# Patient Record
Sex: MALE | Race: WHITE | Employment: OTHER | URBAN - METROPOLITAN AREA
[De-identification: names, ages, dates, MRNs, and addresses within clinical notes are randomized per-mention and may not be internally consistent; named-entity substitution may affect disease eponyms.]

---

## 2017-08-10 ENCOUNTER — HOSPITAL ENCOUNTER (OUTPATIENT)
Dept: VASCULAR LAB | Age: 82
Discharge: HOME | End: 2017-08-10
Attending: FAMILY MEDICINE | Admitting: FAMILY MEDICINE

## 2017-08-10 DIAGNOSIS — I65.23 OCCLUSION AND STENOSIS OF BILATERAL CAROTID ARTERIES: ICD-10-CM

## 2018-09-17 ENCOUNTER — HOSPITAL ENCOUNTER (OUTPATIENT)
Age: 83
Discharge: HOME | End: 2018-09-17
Payer: MEDICARE

## 2018-09-17 LAB
FOLATE: 9.65 NG/ML (ref 4.78–24.2)
SEDIMENTATION RATE, ERYTHROCYTE: 4 MM/HR (ref 0–20)

## 2018-09-17 PROCEDURE — 86334 IMMUNOFIX E-PHORESIS SERUM: CPT

## 2018-09-17 PROCEDURE — 84165 PROTEIN E-PHORESIS SERUM: CPT

## 2018-09-17 PROCEDURE — 86780 TREPONEMA PALLIDUM: CPT

## 2018-09-17 PROCEDURE — 84155 ASSAY OF PROTEIN SERUM: CPT

## 2018-09-17 PROCEDURE — 85652 RBC SED RATE AUTOMATED: CPT

## 2018-09-17 PROCEDURE — 82746 ASSAY OF FOLIC ACID SERUM: CPT

## 2018-09-17 PROCEDURE — 36415 COLL VENOUS BLD VENIPUNCTURE: CPT

## 2018-09-17 PROCEDURE — 84425 ASSAY OF VITAMIN B-1: CPT

## 2018-09-18 LAB — TOTAL SYPHILLIS IGG/IGM: NORMAL

## 2018-09-19 LAB
ALBUMIN SERPL-MCNC: 3.8 G/DL (ref 3.1–4.9)
ALPHA-1-GLOBULIN: 0.3 G/DL (ref 0.2–0.4)
ALPHA-2-GLOBULIN: 0.8 G/DL (ref 0.4–1.1)
BETA GLOBULIN: 1 G/DL (ref 0.9–1.6)
GAMMA GLOBULIN: 1 G/DL (ref 0.6–1.8)
SPE/IFE INTERPRETATION: NORMAL
TOTAL PROTEIN: 6.8 G/DL (ref 6.4–8.2)

## 2018-09-22 LAB — VITAMIN B1 WHOLE BLOOD: 153 NMOL/L (ref 70–180)

## 2018-11-03 ENCOUNTER — HOSPITAL ENCOUNTER (EMERGENCY)
Age: 83
Discharge: HOME | End: 2018-11-03
Attending: EMERGENCY MEDICINE
Payer: MEDICARE

## 2018-11-03 ENCOUNTER — APPOINTMENT (OUTPATIENT)
Dept: GENERAL RADIOLOGY | Age: 83
End: 2018-11-03
Payer: MEDICARE

## 2018-11-03 VITALS
HEART RATE: 65 BPM | DIASTOLIC BLOOD PRESSURE: 67 MMHG | BODY MASS INDEX: 28.04 KG/M2 | RESPIRATION RATE: 16 BRPM | SYSTOLIC BLOOD PRESSURE: 162 MMHG | WEIGHT: 185 LBS | HEIGHT: 68 IN | OXYGEN SATURATION: 98 % | TEMPERATURE: 98 F

## 2018-11-03 DIAGNOSIS — M54.32 BILATERAL SCIATICA: ICD-10-CM

## 2018-11-03 DIAGNOSIS — M54.31 BILATERAL SCIATICA: ICD-10-CM

## 2018-11-03 DIAGNOSIS — R42 LIGHTHEADEDNESS: ICD-10-CM

## 2018-11-03 DIAGNOSIS — R20.2 NUMBNESS AND TINGLING OF BOTH FEET: Primary | ICD-10-CM

## 2018-11-03 DIAGNOSIS — R20.0 NUMBNESS AND TINGLING OF BOTH FEET: Primary | ICD-10-CM

## 2018-11-03 DIAGNOSIS — R20.2 PARESTHESIA: ICD-10-CM

## 2018-11-03 LAB
A/G RATIO: 1.7 (ref 1.1–2.2)
ALBUMIN SERPL-MCNC: 4.2 G/DL (ref 3.4–5)
ALP BLD-CCNC: 61 U/L (ref 40–129)
ALT SERPL-CCNC: 7 U/L (ref 10–40)
ANION GAP SERPL CALCULATED.3IONS-SCNC: 9 MMOL/L (ref 3–16)
AST SERPL-CCNC: 18 U/L (ref 15–37)
BASOPHILS ABSOLUTE: 0 K/UL (ref 0–0.2)
BASOPHILS RELATIVE PERCENT: 0.4 %
BILIRUB SERPL-MCNC: 1.1 MG/DL (ref 0–1)
BUN BLDV-MCNC: 12 MG/DL (ref 7–20)
CALCIUM SERPL-MCNC: 9.1 MG/DL (ref 8.3–10.6)
CHLORIDE BLD-SCNC: 101 MMOL/L (ref 99–110)
CO2: 30 MMOL/L (ref 21–32)
CREAT SERPL-MCNC: 1 MG/DL (ref 0.8–1.3)
EOSINOPHILS ABSOLUTE: 0.1 K/UL (ref 0–0.6)
EOSINOPHILS RELATIVE PERCENT: 0.8 %
GFR AFRICAN AMERICAN: >60
GFR NON-AFRICAN AMERICAN: >60
GLOBULIN: 2.5 G/DL
GLUCOSE BLD-MCNC: 90 MG/DL (ref 70–99)
HCT VFR BLD CALC: 44.8 % (ref 40.5–52.5)
HEMOGLOBIN: 15.3 G/DL (ref 13.5–17.5)
LYMPHOCYTES ABSOLUTE: 1.5 K/UL (ref 1–5.1)
LYMPHOCYTES RELATIVE PERCENT: 21.1 %
MAGNESIUM: 2.1 MG/DL (ref 1.8–2.4)
MCH RBC QN AUTO: 30.2 PG (ref 26–34)
MCHC RBC AUTO-ENTMCNC: 34.2 G/DL (ref 31–36)
MCV RBC AUTO: 88.3 FL (ref 80–100)
MONOCYTES ABSOLUTE: 0.6 K/UL (ref 0–1.3)
MONOCYTES RELATIVE PERCENT: 7.9 %
NEUTROPHILS ABSOLUTE: 5.1 K/UL (ref 1.7–7.7)
NEUTROPHILS RELATIVE PERCENT: 69.8 %
PDW BLD-RTO: 13.9 % (ref 12.4–15.4)
PLATELET # BLD: 148 K/UL (ref 135–450)
PMV BLD AUTO: 9.5 FL (ref 5–10.5)
POTASSIUM SERPL-SCNC: 3.9 MMOL/L (ref 3.5–5.1)
RBC # BLD: 5.07 M/UL (ref 4.2–5.9)
SODIUM BLD-SCNC: 140 MMOL/L (ref 136–145)
TOTAL PROTEIN: 6.7 G/DL (ref 6.4–8.2)
TROPONIN: <0.01 NG/ML
WBC # BLD: 7.3 K/UL (ref 4–11)

## 2018-11-03 PROCEDURE — 84484 ASSAY OF TROPONIN QUANT: CPT

## 2018-11-03 PROCEDURE — 93005 ELECTROCARDIOGRAM TRACING: CPT | Performed by: EMERGENCY MEDICINE

## 2018-11-03 PROCEDURE — 83735 ASSAY OF MAGNESIUM: CPT

## 2018-11-03 PROCEDURE — 99284 EMERGENCY DEPT VISIT MOD MDM: CPT

## 2018-11-03 PROCEDURE — 71046 X-RAY EXAM CHEST 2 VIEWS: CPT

## 2018-11-03 PROCEDURE — 85025 COMPLETE CBC W/AUTO DIFF WBC: CPT

## 2018-11-03 PROCEDURE — 80053 COMPREHEN METABOLIC PANEL: CPT

## 2018-11-03 ASSESSMENT — PAIN SCALES - GENERAL: PAINLEVEL_OUTOF10: 0

## 2018-11-03 ASSESSMENT — PAIN DESCRIPTION - LOCATION: LOCATION: CHEST

## 2018-11-03 NOTE — ED PROVIDER NOTES
Smokeless tobacco: Not on file    Alcohol use 0.6 oz/week     1 Cans of beer per week    Drug use: No    Sexual activity: Not on file     Other Topics Concern    Not on file     Social History Narrative    No narrative on file     No current facility-administered medications for this encounter. Current Outpatient Prescriptions   Medication Sig Dispense Refill    naproxen (NAPROSYN) 500 MG tablet Take 1 tablet by mouth 2 times daily 30 tablet 0    lidocaine (LIDODERM) 5 % Place 1 patch onto the skin daily 12 hours on, 12 hours off. 30 patch 0    vitamin B-12 (CYANOCOBALAMIN) 1000 MCG tablet Take 1,000 mcg by mouth daily.  clorazepate (TRANXENE) 7.5 MG tablet Take 3.75 mg by mouth 2 times daily. Lyndsay Von rosuvastatin (CRESTOR) 10 MG tablet Take 10 mg by mouth daily.  Omega 3 1000 MG CAPS Take  by mouth.  aspirin 81 MG tablet Take 81 mg by mouth daily. Allergies   Allergen Reactions    Buspar [Buspirone] Other (See Comments)     Face,face numb    Codeine Other (See Comments)     Face,lips numb    Ritalin [Methylphenidate Hcl] Other (See Comments)     Face. lips numb    Zoloft [Sertraline Hcl] Other (See Comments)     Numbness of lips       REVIEW OF SYSTEMS  10 systems reviewed, pertinent positives per HPI otherwise noted to be negative. PHYSICAL EXAM  BP (!) 183/91   Pulse 68   Temp 98 °F (36.7 °C)   Resp 16   Ht 5' 8\" (1.727 m)   Wt 185 lb (83.9 kg)   SpO2 97%   BMI 28.13 kg/m²   GENERAL APPEARANCE: Awake and alert. Cooperative. No acute distress. HEAD: Normocephalic. Atraumatic. EYES: PERRL. EOM's grossly intact. ENT: Mucous membranes are moist.   NECK: Supple. HEART: RRR. LUNGS: Respirations unlabored. CTAB. Good air exchange. Speaking comfortably in full sentences. BACK: No midline spinal tenderness or step-off. ABDOMEN: Soft. Non-distended. Non-tender. No guarding or rebound. Normal bowel sounds. EXTREMITIES: No peripheral edema.  Moves all extremities equally. All extremities neurovascularly intact. : Deferred  SKIN: Warm and dry. No acute rashes. NEUROLOGICAL: Alert and oriented. No gross facial drooping. Strength 5/5, sensation intact. Normal coordination. Gait is slow but steady. No direct assistance needed. PSYCHIATRIC: Normal mood and affect. LABS  I have reviewed all labs for this visit.    Results for orders placed or performed during the hospital encounter of 11/03/18   CBC Auto Differential   Result Value Ref Range    WBC 7.3 4.0 - 11.0 K/uL    RBC 5.07 4.20 - 5.90 M/uL    Hemoglobin 15.3 13.5 - 17.5 g/dL    Hematocrit 44.8 40.5 - 52.5 %    MCV 88.3 80.0 - 100.0 fL    MCH 30.2 26.0 - 34.0 pg    MCHC 34.2 31.0 - 36.0 g/dL    RDW 13.9 12.4 - 15.4 %    Platelets 805 643 - 760 K/uL    MPV 9.5 5.0 - 10.5 fL    Neutrophils % 69.8 %    Lymphocytes % 21.1 %    Monocytes % 7.9 %    Eosinophils % 0.8 %    Basophils % 0.4 %    Neutrophils # 5.1 1.7 - 7.7 K/uL    Lymphocytes # 1.5 1.0 - 5.1 K/uL    Monocytes # 0.6 0.0 - 1.3 K/uL    Eosinophils # 0.1 0.0 - 0.6 K/uL    Basophils # 0.0 0.0 - 0.2 K/uL   Comprehensive Metabolic Panel   Result Value Ref Range    Sodium 140 136 - 145 mmol/L    Potassium 3.9 3.5 - 5.1 mmol/L    Chloride 101 99 - 110 mmol/L    CO2 30 21 - 32 mmol/L    Anion Gap 9 3 - 16    Glucose 90 70 - 99 mg/dL    BUN 12 7 - 20 mg/dL    CREATININE 1.0 0.8 - 1.3 mg/dL    GFR Non-African American >60 >60    GFR African American >60 >60    Calcium 9.1 8.3 - 10.6 mg/dL    Total Protein 6.7 6.4 - 8.2 g/dL    Alb 4.2 3.4 - 5.0 g/dL    Albumin/Globulin Ratio 1.7 1.1 - 2.2    Total Bilirubin 1.1 (H) 0.0 - 1.0 mg/dL    Alkaline Phosphatase 61 40 - 129 U/L    ALT 7 (L) 10 - 40 U/L    AST 18 15 - 37 U/L    Globulin 2.5 g/dL   Troponin   Result Value Ref Range    Troponin <0.01 <0.01 ng/mL   Magnesium   Result Value Ref Range    Magnesium 2.10 1.80 - 2.40 mg/dL   EKG 12 Lead   Result Value Ref Range    Ventricular Rate 73 BPM    Atrial Rate 73 BPM    P-R Interval 176 ms    QRS Duration 90 ms    Q-T Interval 396 ms    QTc Calculation (Bazett) 436 ms    P Axis 89 degrees    R Axis -23 degrees    T Axis 21 degrees    Diagnosis       Sinus rhythm with occasional Premature ventricular complexesOtherwise normal ECGWhen compared with ECG of 26-JUN-2008 08:16,Premature ventricular complexes are now PresentConfirmed by JULIAN ROJAS MD ([id number]) on 11/4/2018 7:22:00 AM       EKG  EKG interpreted by me. Sinus rhythm with some PVCs, QTC is 436 of the  and QRS is 90. No significant ST elevation or depression. RADIOLOGY  X-RAYS:  I have reviewed radiologic plain film image(s). ALL OTHER NON-PLAIN FILM IMAGES SUCH AS CT, ULTRASOUND AND MRI HAVE BEEN READ BY THE RADIOLOGIST. XR CHEST STANDARD (2 VW)   Final Result   No acute process. MRI LUMBAR SPINE WO CONTRAST    (Results Pending)          ED COURSE/MDM  Patient seen and evaluated. Labs and imaging reviewed and results discussed with patient. This is a chronic issue that the patient has been having and I do believe he needs a reevaluation by his neurosurgeon. He said he can make an appointment and likely see him within the week. I also said the patient will likely need an MRI of his lumbar spine to reevaluate if there are any changes since the surgery over a year ago. He has a walker and a cane at home that he can use for ambulation. I do not think the patient is any concern here of cauda equina or any other significant back issues that require admission or emergent MRI or CT scan at this time. I also think that what the patient is describing to me with some of the pain down his leg sometimes that this may be due to sciatica, it is very likely the patient could have some changes since his surgery that require re-intervention but at the very least reevaluation by his neurosurgeon. Patient is an agreement with this plan. Plan of care discussed with patient.        I estimate there is LOW risk for ABDOMINAL AORTIC

## 2018-11-04 LAB
EKG ATRIAL RATE: 73 BPM
EKG DIAGNOSIS: NORMAL
EKG P AXIS: 89 DEGREES
EKG P-R INTERVAL: 176 MS
EKG Q-T INTERVAL: 396 MS
EKG QRS DURATION: 90 MS
EKG QTC CALCULATION (BAZETT): 436 MS
EKG R AXIS: -23 DEGREES
EKG T AXIS: 21 DEGREES
EKG VENTRICULAR RATE: 73 BPM

## 2018-11-04 PROCEDURE — 93010 ELECTROCARDIOGRAM REPORT: CPT | Performed by: INTERNAL MEDICINE

## 2018-11-09 ENCOUNTER — HOSPITAL ENCOUNTER (OUTPATIENT)
Dept: MRI IMAGING | Age: 83
Discharge: HOME | End: 2018-11-09
Payer: MEDICARE

## 2018-11-09 DIAGNOSIS — M48.062 SPINAL STENOSIS, LUMBAR REGION WITH NEUROGENIC CLAUDICATION: ICD-10-CM

## 2018-11-09 DIAGNOSIS — M54.31 BILATERAL SCIATICA: ICD-10-CM

## 2018-11-09 DIAGNOSIS — M54.32 BILATERAL SCIATICA: ICD-10-CM

## 2018-11-09 PROCEDURE — 6360000004 HC RX CONTRAST MEDICATION: Performed by: FAMILY MEDICINE

## 2018-11-09 PROCEDURE — 72158 MRI LUMBAR SPINE W/O & W/DYE: CPT

## 2018-11-09 PROCEDURE — A9579 GAD-BASE MR CONTRAST NOS,1ML: HCPCS | Performed by: FAMILY MEDICINE

## 2018-11-09 RX ADMIN — GADOTERIDOL 16 ML: 279.3 INJECTION, SOLUTION INTRAVENOUS at 07:08

## 2019-01-11 ENCOUNTER — HOSPITAL ENCOUNTER (OUTPATIENT)
Dept: VASCULAR LAB | Age: 84
Discharge: HOME | End: 2019-01-11
Payer: MEDICARE

## 2019-01-11 ENCOUNTER — HOSPITAL ENCOUNTER (OUTPATIENT)
Age: 84
Discharge: HOME | End: 2019-01-11
Payer: MEDICARE

## 2019-01-11 LAB
ALBUMIN SERPL-MCNC: 4.4 G/DL (ref 3.4–5)
ALP BLD-CCNC: 71 U/L (ref 40–129)
ALT SERPL-CCNC: 11 U/L (ref 10–40)
ANION GAP SERPL CALCULATED.3IONS-SCNC: 12 MMOL/L (ref 3–16)
AST SERPL-CCNC: 21 U/L (ref 15–37)
BASOPHILS ABSOLUTE: 0 K/UL (ref 0–0.2)
BASOPHILS RELATIVE PERCENT: 0.4 %
BILIRUB SERPL-MCNC: 1.9 MG/DL (ref 0–1)
BILIRUBIN DIRECT: 0.4 MG/DL (ref 0–0.3)
BILIRUBIN, INDIRECT: 1.5 MG/DL (ref 0–1)
BUN BLDV-MCNC: 12 MG/DL (ref 7–20)
CALCIUM SERPL-MCNC: 9.1 MG/DL (ref 8.3–10.6)
CHLORIDE BLD-SCNC: 104 MMOL/L (ref 99–110)
CHOLESTEROL, TOTAL: 114 MG/DL (ref 0–199)
CO2: 29 MMOL/L (ref 21–32)
CREAT SERPL-MCNC: 1 MG/DL (ref 0.8–1.3)
EOSINOPHILS ABSOLUTE: 0.1 K/UL (ref 0–0.6)
EOSINOPHILS RELATIVE PERCENT: 1.2 %
GFR AFRICAN AMERICAN: >60
GFR NON-AFRICAN AMERICAN: >60
GLUCOSE BLD-MCNC: 93 MG/DL (ref 70–99)
HCT VFR BLD CALC: 46.7 % (ref 40.5–52.5)
HDLC SERPL-MCNC: 52 MG/DL (ref 40–60)
HEMOGLOBIN: 15.4 G/DL (ref 13.5–17.5)
LDL CHOLESTEROL CALCULATED: 43 MG/DL
LYMPHOCYTES ABSOLUTE: 1.3 K/UL (ref 1–5.1)
LYMPHOCYTES RELATIVE PERCENT: 21.7 %
MCH RBC QN AUTO: 29.4 PG (ref 26–34)
MCHC RBC AUTO-ENTMCNC: 33 G/DL (ref 31–36)
MCV RBC AUTO: 89 FL (ref 80–100)
MONOCYTES ABSOLUTE: 0.5 K/UL (ref 0–1.3)
MONOCYTES RELATIVE PERCENT: 8.5 %
NEUTROPHILS ABSOLUTE: 4.1 K/UL (ref 1.7–7.7)
NEUTROPHILS RELATIVE PERCENT: 68.2 %
PDW BLD-RTO: 13.9 % (ref 12.4–15.4)
PLATELET # BLD: 123 K/UL (ref 135–450)
PMV BLD AUTO: 9.8 FL (ref 5–10.5)
POTASSIUM SERPL-SCNC: 4.6 MMOL/L (ref 3.5–5.1)
RBC # BLD: 5.25 M/UL (ref 4.2–5.9)
SODIUM BLD-SCNC: 145 MMOL/L (ref 136–145)
T4 FREE: 1.1 NG/DL (ref 0.9–1.8)
TOTAL PROTEIN: 6.8 G/DL (ref 6.4–8.2)
TRIGL SERPL-MCNC: 93 MG/DL (ref 0–150)
TSH SERPL DL<=0.05 MIU/L-ACNC: 3.67 UIU/ML (ref 0.27–4.2)
VITAMIN B-12: 770 PG/ML (ref 211–911)
VLDLC SERPL CALC-MCNC: 19 MG/DL
WBC # BLD: 6.1 K/UL (ref 4–11)

## 2019-01-11 PROCEDURE — 84439 ASSAY OF FREE THYROXINE: CPT

## 2019-01-11 PROCEDURE — 83036 HEMOGLOBIN GLYCOSYLATED A1C: CPT

## 2019-01-11 PROCEDURE — 80076 HEPATIC FUNCTION PANEL: CPT

## 2019-01-11 PROCEDURE — 85025 COMPLETE CBC W/AUTO DIFF WBC: CPT

## 2019-01-11 PROCEDURE — 80061 LIPID PANEL: CPT

## 2019-01-11 PROCEDURE — 93880 EXTRACRANIAL BILAT STUDY: CPT

## 2019-01-11 PROCEDURE — 84443 ASSAY THYROID STIM HORMONE: CPT

## 2019-01-11 PROCEDURE — 36415 COLL VENOUS BLD VENIPUNCTURE: CPT

## 2019-01-11 PROCEDURE — 80048 BASIC METABOLIC PNL TOTAL CA: CPT

## 2019-01-11 PROCEDURE — 82607 VITAMIN B-12: CPT

## 2019-01-12 LAB
ESTIMATED AVERAGE GLUCOSE: 114 MG/DL
HBA1C MFR BLD: 5.6 %

## 2019-02-13 ENCOUNTER — HOSPITAL ENCOUNTER (OUTPATIENT)
Age: 84
Discharge: HOME | End: 2019-02-13
Payer: MEDICARE

## 2019-02-13 LAB
ALBUMIN SERPL-MCNC: 4.4 G/DL (ref 3.4–5)
ALP BLD-CCNC: 71 U/L (ref 40–129)
ALT SERPL-CCNC: 8 U/L (ref 10–40)
AST SERPL-CCNC: 18 U/L (ref 15–37)
BILIRUB SERPL-MCNC: 1.6 MG/DL (ref 0–1)
BILIRUBIN DIRECT: 0.3 MG/DL (ref 0–0.3)
BILIRUBIN, INDIRECT: 1.3 MG/DL (ref 0–1)
TOTAL PROTEIN: 7 G/DL (ref 6.4–8.2)

## 2019-02-13 PROCEDURE — 36415 COLL VENOUS BLD VENIPUNCTURE: CPT

## 2019-02-13 PROCEDURE — 80076 HEPATIC FUNCTION PANEL: CPT

## 2019-07-23 ENCOUNTER — HOSPITAL ENCOUNTER (OUTPATIENT)
Age: 84
Discharge: HOME | End: 2019-07-23
Payer: MEDICARE

## 2019-07-23 LAB
ALBUMIN SERPL-MCNC: 4.3 G/DL (ref 3.4–5)
ALP BLD-CCNC: 71 U/L (ref 40–129)
ALT SERPL-CCNC: 7 U/L (ref 10–40)
ANION GAP SERPL CALCULATED.3IONS-SCNC: 8 MMOL/L (ref 3–16)
AST SERPL-CCNC: 18 U/L (ref 15–37)
BASOPHILS ABSOLUTE: 0 K/UL (ref 0–0.2)
BASOPHILS RELATIVE PERCENT: 0.2 %
BILIRUB SERPL-MCNC: 1.5 MG/DL (ref 0–1)
BILIRUBIN DIRECT: 0.3 MG/DL (ref 0–0.3)
BILIRUBIN, INDIRECT: 1.2 MG/DL (ref 0–1)
BUN BLDV-MCNC: 13 MG/DL (ref 7–20)
CALCIUM SERPL-MCNC: 9.7 MG/DL (ref 8.3–10.6)
CHLORIDE BLD-SCNC: 103 MMOL/L (ref 99–110)
CHOLESTEROL, TOTAL: 120 MG/DL (ref 0–199)
CO2: 30 MMOL/L (ref 21–32)
CREAT SERPL-MCNC: 1.2 MG/DL (ref 0.8–1.3)
EOSINOPHILS ABSOLUTE: 0.1 K/UL (ref 0–0.6)
EOSINOPHILS RELATIVE PERCENT: 1.5 %
GFR AFRICAN AMERICAN: >60
GFR NON-AFRICAN AMERICAN: 58
GLUCOSE BLD-MCNC: 135 MG/DL (ref 70–99)
HCT VFR BLD CALC: 44.7 % (ref 40.5–52.5)
HDLC SERPL-MCNC: 53 MG/DL (ref 40–60)
HEMOGLOBIN: 14.9 G/DL (ref 13.5–17.5)
LDL CHOLESTEROL CALCULATED: 42 MG/DL
LYMPHOCYTES ABSOLUTE: 1.2 K/UL (ref 1–5.1)
LYMPHOCYTES RELATIVE PERCENT: 18.9 %
MCH RBC QN AUTO: 29.7 PG (ref 26–34)
MCHC RBC AUTO-ENTMCNC: 33.4 G/DL (ref 31–36)
MCV RBC AUTO: 89 FL (ref 80–100)
MONOCYTES ABSOLUTE: 0.5 K/UL (ref 0–1.3)
MONOCYTES RELATIVE PERCENT: 8.2 %
NEUTROPHILS ABSOLUTE: 4.7 K/UL (ref 1.7–7.7)
NEUTROPHILS RELATIVE PERCENT: 71.2 %
PDW BLD-RTO: 14.3 % (ref 12.4–15.4)
PLATELET # BLD: 120 K/UL (ref 135–450)
PMV BLD AUTO: 10.6 FL (ref 5–10.5)
POTASSIUM SERPL-SCNC: 4.9 MMOL/L (ref 3.5–5.1)
RBC # BLD: 5.03 M/UL (ref 4.2–5.9)
SODIUM BLD-SCNC: 141 MMOL/L (ref 136–145)
TOTAL PROTEIN: 6.7 G/DL (ref 6.4–8.2)
TRIGL SERPL-MCNC: 126 MG/DL (ref 0–150)
VLDLC SERPL CALC-MCNC: 25 MG/DL
WBC # BLD: 6.5 K/UL (ref 4–11)

## 2019-07-23 PROCEDURE — 80061 LIPID PANEL: CPT

## 2019-07-23 PROCEDURE — 36415 COLL VENOUS BLD VENIPUNCTURE: CPT

## 2019-07-23 PROCEDURE — 80076 HEPATIC FUNCTION PANEL: CPT

## 2019-07-23 PROCEDURE — 85025 COMPLETE CBC W/AUTO DIFF WBC: CPT

## 2019-07-23 PROCEDURE — 80048 BASIC METABOLIC PNL TOTAL CA: CPT

## 2019-08-27 ENCOUNTER — HOSPITAL ENCOUNTER (OUTPATIENT)
Age: 84
Discharge: HOME | End: 2019-08-27
Payer: MEDICARE

## 2019-08-27 LAB
ALBUMIN SERPL-MCNC: 4.5 G/DL (ref 3.4–5)
ALP BLD-CCNC: 75 U/L (ref 40–129)
ALT SERPL-CCNC: 8 U/L (ref 10–40)
AST SERPL-CCNC: 19 U/L (ref 15–37)
BASOPHILS ABSOLUTE: 0 K/UL (ref 0–0.2)
BASOPHILS RELATIVE PERCENT: 0.4 %
BILIRUB SERPL-MCNC: 1.2 MG/DL (ref 0–1)
BILIRUBIN DIRECT: 0.3 MG/DL (ref 0–0.3)
BILIRUBIN, INDIRECT: 0.9 MG/DL (ref 0–1)
EOSINOPHILS ABSOLUTE: 0.1 K/UL (ref 0–0.6)
EOSINOPHILS RELATIVE PERCENT: 1 %
GLUCOSE FASTING: 114 MG/DL (ref 70–99)
HCT VFR BLD CALC: 47.5 % (ref 40.5–52.5)
HEMOGLOBIN: 15.7 G/DL (ref 13.5–17.5)
LYMPHOCYTES ABSOLUTE: 1.7 K/UL (ref 1–5.1)
LYMPHOCYTES RELATIVE PERCENT: 22.2 %
MCH RBC QN AUTO: 29.4 PG (ref 26–34)
MCHC RBC AUTO-ENTMCNC: 33.1 G/DL (ref 31–36)
MCV RBC AUTO: 88.8 FL (ref 80–100)
MONOCYTES ABSOLUTE: 0.7 K/UL (ref 0–1.3)
MONOCYTES RELATIVE PERCENT: 9 %
NEUTROPHILS ABSOLUTE: 5 K/UL (ref 1.7–7.7)
NEUTROPHILS RELATIVE PERCENT: 67.4 %
PDW BLD-RTO: 13.9 % (ref 12.4–15.4)
PLATELET # BLD: 152 K/UL (ref 135–450)
PMV BLD AUTO: 10.5 FL (ref 5–10.5)
RBC # BLD: 5.35 M/UL (ref 4.2–5.9)
TOTAL PROTEIN: 7.1 G/DL (ref 6.4–8.2)
WBC # BLD: 7.4 K/UL (ref 4–11)

## 2019-08-27 PROCEDURE — 85025 COMPLETE CBC W/AUTO DIFF WBC: CPT

## 2019-08-27 PROCEDURE — 80076 HEPATIC FUNCTION PANEL: CPT

## 2019-08-27 PROCEDURE — 82947 ASSAY GLUCOSE BLOOD QUANT: CPT

## 2019-08-27 PROCEDURE — 36415 COLL VENOUS BLD VENIPUNCTURE: CPT

## 2020-06-24 ENCOUNTER — HOSPITAL ENCOUNTER (INPATIENT)
Age: 85
LOS: 25 days | Discharge: NURSING, REHAB OR LONG-TERM CARE | DRG: 870 | End: 2020-07-20
Attending: EMERGENCY MEDICINE | Admitting: INTERNAL MEDICINE
Payer: MEDICARE

## 2020-06-24 ENCOUNTER — APPOINTMENT (OUTPATIENT)
Dept: GENERAL RADIOLOGY | Age: 85
DRG: 870 | End: 2020-06-24
Payer: MEDICARE

## 2020-06-24 LAB
A/G RATIO: 1.4 (ref 1.1–2.2)
ALBUMIN SERPL-MCNC: 3.9 G/DL (ref 3.4–5)
ALP BLD-CCNC: 68 U/L (ref 40–129)
ALT SERPL-CCNC: <5 U/L (ref 10–40)
AMORPHOUS: ABNORMAL /HPF
ANION GAP SERPL CALCULATED.3IONS-SCNC: 11 MMOL/L (ref 3–16)
AST SERPL-CCNC: 11 U/L (ref 15–37)
BACTERIA: ABNORMAL /HPF
BASOPHILS ABSOLUTE: 0.1 K/UL (ref 0–0.2)
BASOPHILS RELATIVE PERCENT: 0.6 %
BILIRUB SERPL-MCNC: 2.5 MG/DL (ref 0–1)
BILIRUBIN URINE: ABNORMAL
BLOOD, URINE: ABNORMAL
BUN BLDV-MCNC: 15 MG/DL (ref 7–20)
CALCIUM SERPL-MCNC: 8.5 MG/DL (ref 8.3–10.6)
CHLORIDE BLD-SCNC: 98 MMOL/L (ref 99–110)
CLARITY: ABNORMAL
CO2: 26 MMOL/L (ref 21–32)
COLOR: YELLOW
CREAT SERPL-MCNC: 1.2 MG/DL (ref 0.8–1.3)
D DIMER: 430 NG/ML DDU (ref 0–229)
EOSINOPHILS ABSOLUTE: 0 K/UL (ref 0–0.6)
EOSINOPHILS RELATIVE PERCENT: 0.1 %
EPITHELIAL CELLS, UA: ABNORMAL /HPF (ref 0–5)
GFR AFRICAN AMERICAN: >60
GFR NON-AFRICAN AMERICAN: 58
GLOBULIN: 2.8 G/DL
GLUCOSE BLD-MCNC: 121 MG/DL (ref 70–99)
GLUCOSE URINE: NEGATIVE MG/DL
HCT VFR BLD CALC: 42.1 % (ref 40.5–52.5)
HEMOGLOBIN: 14.1 G/DL (ref 13.5–17.5)
KETONES, URINE: 40 MG/DL
LEUKOCYTE ESTERASE, URINE: ABNORMAL
LYMPHOCYTES ABSOLUTE: 0.6 K/UL (ref 1–5.1)
LYMPHOCYTES RELATIVE PERCENT: 4.9 %
MCH RBC QN AUTO: 29.4 PG (ref 26–34)
MCHC RBC AUTO-ENTMCNC: 33.4 G/DL (ref 31–36)
MCV RBC AUTO: 88 FL (ref 80–100)
MICROSCOPIC EXAMINATION: YES
MONOCYTES ABSOLUTE: 0.9 K/UL (ref 0–1.3)
MONOCYTES RELATIVE PERCENT: 7.4 %
MUCUS: ABNORMAL /LPF
NEUTROPHILS ABSOLUTE: 10.6 K/UL (ref 1.7–7.7)
NEUTROPHILS RELATIVE PERCENT: 87 %
NITRITE, URINE: NEGATIVE
PDW BLD-RTO: 13.9 % (ref 12.4–15.4)
PH UA: 5.5 (ref 5–8)
PLATELET # BLD: 135 K/UL (ref 135–450)
PMV BLD AUTO: 9.1 FL (ref 5–10.5)
POTASSIUM REFLEX MAGNESIUM: 4.1 MMOL/L (ref 3.5–5.1)
PRO-BNP: 382 PG/ML (ref 0–449)
PROTEIN UA: 30 MG/DL
RBC # BLD: 4.78 M/UL (ref 4.2–5.9)
RBC UA: ABNORMAL /HPF (ref 0–4)
SODIUM BLD-SCNC: 135 MMOL/L (ref 136–145)
SPECIFIC GRAVITY UA: 1.02 (ref 1–1.03)
TOTAL PROTEIN: 6.7 G/DL (ref 6.4–8.2)
TROPONIN: <0.01 NG/ML
URINE REFLEX TO CULTURE: ABNORMAL
URINE TYPE: ABNORMAL
UROBILINOGEN, URINE: 2 E.U./DL
WBC # BLD: 12.2 K/UL (ref 4–11)
WBC UA: ABNORMAL /HPF (ref 0–5)

## 2020-06-24 PROCEDURE — 6360000002 HC RX W HCPCS

## 2020-06-24 PROCEDURE — 84484 ASSAY OF TROPONIN QUANT: CPT

## 2020-06-24 PROCEDURE — 93005 ELECTROCARDIOGRAM TRACING: CPT | Performed by: NURSE PRACTITIONER

## 2020-06-24 PROCEDURE — 87186 SC STD MICRODIL/AGAR DIL: CPT

## 2020-06-24 PROCEDURE — 81001 URINALYSIS AUTO W/SCOPE: CPT

## 2020-06-24 PROCEDURE — 85025 COMPLETE CBC W/AUTO DIFF WBC: CPT

## 2020-06-24 PROCEDURE — U0003 INFECTIOUS AGENT DETECTION BY NUCLEIC ACID (DNA OR RNA); SEVERE ACUTE RESPIRATORY SYNDROME CORONAVIRUS 2 (SARS-COV-2) (CORONAVIRUS DISEASE [COVID-19]), AMPLIFIED PROBE TECHNIQUE, MAKING USE OF HIGH THROUGHPUT TECHNOLOGIES AS DESCRIBED BY CMS-2020-01-R: HCPCS

## 2020-06-24 PROCEDURE — 83615 LACTATE (LD) (LDH) ENZYME: CPT

## 2020-06-24 PROCEDURE — 83880 ASSAY OF NATRIURETIC PEPTIDE: CPT

## 2020-06-24 PROCEDURE — 87086 URINE CULTURE/COLONY COUNT: CPT

## 2020-06-24 PROCEDURE — 71046 X-RAY EXAM CHEST 2 VIEWS: CPT

## 2020-06-24 PROCEDURE — 2500000003 HC RX 250 WO HCPCS

## 2020-06-24 PROCEDURE — 87077 CULTURE AEROBIC IDENTIFY: CPT

## 2020-06-24 PROCEDURE — 80053 COMPREHEN METABOLIC PANEL: CPT

## 2020-06-24 PROCEDURE — 85379 FIBRIN DEGRADATION QUANT: CPT

## 2020-06-24 PROCEDURE — 82728 ASSAY OF FERRITIN: CPT

## 2020-06-24 PROCEDURE — 99285 EMERGENCY DEPT VISIT HI MDM: CPT

## 2020-06-24 PROCEDURE — 2580000003 HC RX 258: Performed by: EMERGENCY MEDICINE

## 2020-06-24 RX ORDER — 0.9 % SODIUM CHLORIDE 0.9 %
1000 INTRAVENOUS SOLUTION INTRAVENOUS ONCE
Status: COMPLETED | OUTPATIENT
Start: 2020-06-24 | End: 2020-06-25

## 2020-06-24 RX ADMIN — SODIUM CHLORIDE 1000 ML: 9 INJECTION, SOLUTION INTRAVENOUS at 23:58

## 2020-06-24 ASSESSMENT — ENCOUNTER SYMPTOMS
ABDOMINAL PAIN: 0
SHORTNESS OF BREATH: 0
RHINORRHEA: 0
COLOR CHANGE: 0
SORE THROAT: 0

## 2020-06-24 NOTE — ED PROVIDER NOTES
[place] [address]        Pt Name: Amara Evans  MRN: [id number]  Birthdate 1935  Dateof evaluation: 6/24/2020  Provider: GRACIA Calvo - CNP  PCP: Jessica Badillo MD  ED Attending: No att. providers found    [facility]       Chief Complaint   Patient presents with    Fatigue     Pt c/o fatigue and low energy x 2 mths, however states over the past 2-3 days it has gotten sifnificantly worse. Temp of 101.7 at home today, 99.3 during triage. Pt states he did attend a [employer] 1wk ago, over 200 ppl present. HISTORY OF PRESENTILLNESS   (Location/Symptom, Timing/Onset, Context/Setting, Quality, Duration, Modifying Factors, Severity)  Note limiting factors. Amara Evans is a 82 y.o. male for fatigue. Onset was the last few months however over the past few days he has not been able to complete tasks he was normally able to complete due to becoming fatigued. He denies any chest pain. Duration has been nothing. Context includes nothing. Alleviating factors include nothing. Aggravating factors include nothing. Pain is 0/10. nothing has been used for pain today. Nursing Notes were all reviewed and agreed with or any disagreements were addressed  in the HPI. REVIEW OF SYSTEMS    (2-9 systems for level 4, 10 or more for level 5)     Review of Systems   Constitutional: Positive for fatigue. Negative for fever. HENT: Negative for congestion, rhinorrhea and sore throat. Respiratory: Negative for shortness of breath. Cardiovascular: Negative for chest pain. Gastrointestinal: Negative for abdominal pain. Genitourinary: Negative for decreased urine volume and difficulty urinating. Musculoskeletal: Negative for arthralgias and myalgias. Skin: Negative for color change and rash. Neurological: Negative for dizziness and light-headedness. Psychiatric/Behavioral: Negative for agitation.    All other systems occasional    Drug use: No    Sexual activity: Not Currently     Partners: Female   Lifestyle    Physical activity     Days per week: None     Minutes per session: None    Stress: None   Relationships    Social connections     Talks on phone: None     Gets together: None     Attends Taoist service: None     Active member of club or organization: None     Attends meetings of clubs or organizations: None     Relationship status: None    Intimate partner violence     Fear of current or ex partner: None     Emotionally abused: None     Physically abused: None     Forced sexual activity: None   Other Topics Concern    None   Social History Narrative    None       SCREENINGS    [place] Coma Scale  Eye Opening: Spontaneous  Best Verbal Response: Oriented  Best Motor Response: Obeys commands  [place] Coma Scale Score: 15        PHYSICAL EXAM  (up to 7 for level 4, 8 or more for level 5)     ED Triage Vitals [06/24/20 1943]   BP Temp Temp Source Pulse Resp SpO2 Height Weight   (!) 145/59 99.3 °F (37.4 °C) Oral 85 20 96 % 5' 7\" (1.702 m) 185 lb (83.9 kg)       Physical Exam  Constitutional:       Appearance: He is well-developed. HENT:      Head: Normocephalic and atraumatic. Neck:      Musculoskeletal: Normal range of motion. Cardiovascular:      Rate and Rhythm: Normal rate. Pulmonary:      Effort: Pulmonary effort is normal. No respiratory distress. Abdominal:      General: There is no distension. Palpations: Abdomen is soft. Tenderness: There is no abdominal tenderness. Musculoskeletal: Normal range of motion. Skin:     General: Skin is warm and dry. Neurological:      Mental Status: He is alert and oriented to person, place, and time.          DIAGNOSTIC RESULTS   LABS:    Labs Reviewed   CULTURE, URINE - Abnormal; Notable for the following components:       Result Value    Organism Staphylococcus epidermidis (*)     All other components within normal limits    Narrative:     ORDER#: [id number]                          ORDERED BY: Yinka Santana  SOURCE: Urine Clean Catch                  COLLECTED:  06/24/20 22:30  ANTIBIOTICS AT SUZY.:                      RECEIVED :  06/25/20 06:45  Performed at:  [facility]  [address] [place] [address] [place] [address]   Phone [phone]   CBC WITH AUTO DIFFERENTIAL - Abnormal; Notable for the following components:    WBC 12.2 (*)     Neutrophils Absolute 10.6 (*)     Lymphocytes Absolute 0.6 (*)     All other components within normal limits    Narrative:     Performed at:  [facility],  [place], [facility]   Phone [phone]   COMPREHENSIVE METABOLIC PANEL W/ REFLEX TO MG FOR LOW K - Abnormal; Notable for the following components:    Sodium 135 (*)     Chloride 98 (*)     Glucose 121 (*)     GFR Non- 58 (*)     Total Bilirubin 2.5 (*)     ALT <5 (*)     AST 11 (*)     All other components within normal limits    Narrative:     Performed at:  [facility] [address],  [employer] [place] [facility]   Phone [phone]   URINE RT REFLEX TO CULTURE - Abnormal; Notable for the following components:    Clarity, UA SL CLOUDY (*)     Bilirubin Urine MODERATE (*)     Ketones, Urine 40 (*)     Blood, Urine LARGE (*)     Protein, UA 30 (*)     Urobilinogen, Urine 2.0 (*)     Leukocyte Esterase, Urine TRACE (*)     All other components within normal limits    Narrative:     Performed at:  [facility]) - [facility],  [place], [facility]   Phone [phone] - Abnormal; Notable for the following components:    SARS-CoV-2, PCR DETECTED (*)     All other components within normal limits    Narrative:     [address] [facility]. [id number],  Microbiology results called to and read back by MEL Wright, 06/25/2020  22:11, by [facility]  Performed at:  [facility]  [address] [facility] [address]   Phone [phone]   MICROSCOPIC URINALYSIS - Abnormal; Notable for the following components:    Mucus, UA 4+ (*)     RBC, UA 21-50 (*)     Bacteria, UA 1+ (*)     All other components within normal limits    Narrative:     Performed at:  [facility] 75,  [place], [email or web address]   Phone [phone]   D-DIMER, QUANTITATIVE - Abnormal; Notable for the following components:    D-Dimer, Quant 430 (*)     All other components within normal limits    Narrative:     Performed at:  [facility] [address],  [place], [place] [facility]   Phone [phone]   COMPREHENSIVE METABOLIC PANEL W/ REFLEX TO MG FOR LOW K - Abnormal; Notable for the following components:    Glucose 104 (*)     Calcium 8.0 (*)     Total Protein 5.6 (*)     Alb 3.3 (*)     Total Bilirubin 1.9 (*)     ALT <5 (*)     AST 10 (*)     All other components within normal limits    Narrative:     Performed at:  [facility] [address],  [place], [place] [employer]   Phone [phone]   CBC - Abnormal; Notable for the following components:    Hemoglobin 12.8 (*)     Hematocrit 39.0 (*)     Platelets 147 (*)     All other components within normal limits    Narrative:     Performed at:  [facility] [address],  [place], [place] [employer]   Phone [phone]   COMPREHENSIVE METABOLIC PANEL W/ REFLEX TO MG FOR LOW K - Abnormal; Notable for the following components:    Sodium 133 (*)     Chloride 96 (*)     Glucose 103 (*)     Total Bilirubin 1.7 (*)     ALT <5 (*)     All other components within normal limits    Narrative:     Performed at:  [address] [facility] [address],  [place], [email or web address]   Phone [phone]   CBC WITH AUTO DIFFERENTIAL - Abnormal; Notable for the following components:    WBC 11.1 (*)     Neutrophils Absolute 9.4 (*)     Lymphocytes Absolute 0.9 (*)     All other components within normal limits    Narrative:     Performed at:  [facility]) - [facility],  [place], [facility]   Phone [phone]   D-DIMER, QUANTITATIVE - Abnormal; Notable for the following components:    D-Dimer, Quant 682 (*)     All other components within normal limits    Narrative:     Performed at:  [facility],  [place], [facility]   Phone [phone]   CULTURE, BLOOD 2    Narrative:     ORDER#: [id number]                          ORDERED BY: ANDREW MURPHY  SOURCE: Blood                              COLLECTED:  06/25/20 03:35  ANTIBIOTICS AT SUZY.:                      RECEIVED :  06/25/20 06:40  If child <=2 yrs old please draw pediatric bottle. ~Blood Culture #2  Performed at:  [facility]  [address] [facility] [place] [address]   Phone [phone]   CULTURE, BLOOD 1    Narrative:     ORDER#: [id number]                          ORDERED BY: ANDREW MURPHY  SOURCE: Blood                              COLLECTED:  06/25/20 03:35  ANTIBIOTICS AT SUZY.:                      RECEIVED :  06/25/20 06:40  If child <=2 yrs old please draw pediatric bottle. ~Blood Culture #1  Performed at:  [facility]  [address] [facility] [employer] [address]   Phone [phone]   CULTURE, RESPIRATORY   TROPONIN    Narrative:     Performed at:  [place] [address],  [place], [facility]   Phone [phone]   BRAIN NATRIURETIC PEPTIDE    Narrative:     Performed at:  [facility] [address],  [place], [facility]   Phone [phone]   LACTATE DEHYDROGENASE    Narrative:     Performed at:  [place] [address],  [place], [facility]   Phone [phone]   FERRITIN    Narrative: Performed at:  [facility]  [address] [facility] [address]   Phone [phone]   LACTATE, SEPSIS    Narrative:     Performed at:  [facility],  [place], [facility]   Phone [phone]   LACTATE, SEPSIS    Narrative:     Performed at:  [facility],  [place], [facility]   Phone [phone]   TROPONIN    Narrative:     Performed at:  [facility],  [place], [facility]   Phone [phone]   TROPONIN    Narrative:     Performed at:  [facility]) [facility] [address],  [place], [facility]   Phone [phone]       All other labs werewithin normal range or not returned as of this dictation. EKG: All EKG's are interpreted by the Emergency Department Physician who either signs or Co-signs this chart in the absence of acardiologist.  Please see their note for interpretation of EKG. RADIOLOGY:     Chest x-ray interpreted by radiologist for no radiographic evidence of acute cardiopulmonary disease. Interpretation per the Radiologist below, if available at the time of this note:    CT CHEST PULMONARY EMBOLISM W CONTRAST   Final Result   1. No pulmonary embolism. 2. Multifocal pneumonia scattered throughout both lungs with minimal pleural   effusions. XR CHEST STANDARD (2 VW)   Final Result   No radiographic evidence of acute pulmonary disease. No results found.       PROCEDURES   Unless otherwise noted below, none     Procedures     CRITICAL CARE TIME   N/A    CONSULTS:  IP CONSULT TO HOSPITALIST  IP CONSULT TO CARDIOLOGY  IP CONSULT TO PULMONOLOGY      EMERGENCYDEPARTMENT COURSE and DIFFERENTIAL DIAGNOSIS/MDM:   Vitals:    Vitals:    06/26/20 1624 06/26/20 1726 06/26/20 1800 06/26/20 1830   BP:       Pulse:       Resp:       Temp: 101.8 °F (38.8 °C) 101.5 °F (38.6 °C) 100.5 °F (38.1 °C) 99 °F (37.2 °C)   TempSrc:  Oral  Oral   SpO2:       Weight:       Height:           Patient was given the following medications:  Medications   cefTRIAXone (ROCEPHIN) 1 g IVPB in 50 mL D5W minibag (0 g Intravenous Stopped 6/26/20 0304)   rosuvastatin (CRESTOR) tablet 10 mg (10 mg Oral Given 6/26/20 0837)   aspirin chewable tablet 81 mg (81 mg Oral Given 6/26/20 0837)   sodium chloride flush 0.9 % injection 10 mL (10 mLs Intravenous Given 6/26/20 0837)   sodium chloride flush 0.9 % injection 10 mL (has no administration in time range)   acetaminophen (TYLENOL) tablet 650 mg (650 mg Oral Given 6/26/20 1529)     Or   acetaminophen (TYLENOL) suppository 650 mg ( Rectal See Alternative 6/26/20 1529)   enoxaparin (LOVENOX) injection 40 mg (40 mg Subcutaneous Given 6/26/20 0837)   melatonin tablet 3 mg (3 mg Oral Given 6/26/20 0157)   aluminum & magnesium hydroxide-simethicone (MAALOX) 200-200-20 MG/5ML suspension 30 mL (has no administration in time range)   pantoprazole (PROTONIX) tablet 40 mg (40 mg Oral Given 6/26/20 0952)   ondansetron (ZOFRAN) injection 4 mg (4 mg Intravenous Given 6/26/20 0952)   azithromycin (ZITHROMAX) tablet 500 mg (has no administration in time range)   dexamethasone (DECADRON) injection 6 mg (6 mg Intravenous Given 6/26/20 1816)   0.9 % sodium chloride bolus (0 mLs Intravenous Stopped 6/25/20 0647)   iopamidol (ISOVUE-370) 76 % injection 85 mL (85 mLs Intravenous Given 6/25/20 0134)       Patient was seen and evaluated by myself and Deanna Smith. Patient here today for increased fatigue. Patient reports that over the last few months he has been having fatigue. However he states over the last few days his fatigue is become worse. Patient reports that he has no endurance. He also reports that he had a fever of 101.7 earlier today. He denies any chest pain, shortness of breath, or abdominal pain.   On exam he is a pleasant awake and alert

## 2020-06-24 NOTE — LETTER
Beneficiary Notification Letter  BPCI Advanced     Your Doctor or Juanita,    We wanted to let you know that your health care provider, [facility], has volunteered to take part in our [facility] for [place] & Medicaid Services (CMS) Bundled Payments for [address] [facility] (BPCI Advanced). This doesnt change your Medicare rights or benefits and you dont need to do anything. What are bundled payments? A bundled payment combines, or bundles together, payments that Medicare makes to your health care providers for the many different kinds of medical services you might get in a specific time period. In BPCI Advanced, this time period could include a hospital  inpatient stay or outpatient procedure, plus 90 days. Why would Medicare bundle payments? Bundled payments are thought of as a value-based way to pay because health care  providers are responsible for both the quality and cost of medical care they give. This is  a relatively new way of paying health care providers compared to thefee-for-service  way Medicare has traditionally paid, where providers are paid separately for each  service they provide. Bundled payments encourage these providers to work together to  provide better, more coordinated care during your hospital stay, or outpatient procedure,  and through your recovery. What does BPCI Advance mean for me? Youre more likely to get even better care when hospitals, doctors, and other health care  providers work together. In BPCI Advanced, hospitals, doctors, and other health care  providers may be rewarded for providing better, more coordinated health care. Medicare  will watch BPCI Advanced participants closely to make sure that you and other patients  keep getting efficient, high quality care. What do I need to know about BPCI Advanced? Whats most important for you to know is that your Medicare rights and benefits wont  change because your health care provider is participating in [address] [place]. Medicare  will keep covering all of your medically necessary services. Even though Medicare will pay your doctor in a different way under BPCI Advanced,  how much you have to pay wont change. Health care providers and suppliers who  are enrolled in Medicare will submit their Medicare claims like they always have. Youll have all the same Medicare rights and protections, including the right to  choose which hospital, doctor, or other health care provider you see. If you dont want to  get care from a health care provider whos participating in [address] [place], then youll  have to choose a different health care provider whos not participating in the Model. How can I give feedback about my health care? Medicare might ask you to take a voluntary survey about the services and care you  received from [facility] during your hospital stay or outpatient procedure and for a specific period of time afterwards. You can decide whether you want to take the voluntary survey, but if you do, itll help Medicare make BPCI Advanced and the care of other Medicare patients better. If you have concerns or complaints about your care, you can:   · Talk to your doctor or health care provider. · Contact your Beneficiary and Family Centered Care Quality Improvement   Organization [employer] [employer] [facility]). You can get your BFCC-QIOs phone number at   Medicare.gov/contacts or by calling 1-800-MEDICARE. TTY users can call   7-471.572.4875. Where can I learn more about BPCI Advanced? Learn more about BPCI Advanced at https://innovation.cms.gov/initiatives/bpci-advanced/:  · A list of all the hospitals and physician group practices in the country participating   in [address] [place].   · All of the inpatient and outpatient Clinical Episodes that are currently included   under BPCI Advanced. A Clinical Episode is a grouping of medical conditions or   diagnoses that are included in the [address] [facility].

## 2020-06-25 ENCOUNTER — APPOINTMENT (OUTPATIENT)
Dept: CT IMAGING | Age: 85
DRG: 870 | End: 2020-06-25
Payer: MEDICARE

## 2020-06-25 PROBLEM — J18.9 MULTIFOCAL PNEUMONIA: Status: ACTIVE | Noted: 2020-06-25

## 2020-06-25 LAB
A/G RATIO: 1.4 (ref 1.1–2.2)
ALBUMIN SERPL-MCNC: 3.3 G/DL (ref 3.4–5)
ALP BLD-CCNC: 58 U/L (ref 40–129)
ALT SERPL-CCNC: <5 U/L (ref 10–40)
ANION GAP SERPL CALCULATED.3IONS-SCNC: 11 MMOL/L (ref 3–16)
AST SERPL-CCNC: 10 U/L (ref 15–37)
BILIRUB SERPL-MCNC: 1.9 MG/DL (ref 0–1)
BUN BLDV-MCNC: 14 MG/DL (ref 7–20)
CALCIUM SERPL-MCNC: 8 MG/DL (ref 8.3–10.6)
CHLORIDE BLD-SCNC: 102 MMOL/L (ref 99–110)
CO2: 23 MMOL/L (ref 21–32)
CREAT SERPL-MCNC: 1 MG/DL (ref 0.8–1.3)
EKG ATRIAL RATE: 80 BPM
EKG ATRIAL RATE: 85 BPM
EKG DIAGNOSIS: NORMAL
EKG DIAGNOSIS: NORMAL
EKG P AXIS: 41 DEGREES
EKG P AXIS: 90 DEGREES
EKG P-R INTERVAL: 182 MS
EKG P-R INTERVAL: 182 MS
EKG Q-T INTERVAL: 360 MS
EKG Q-T INTERVAL: 376 MS
EKG QRS DURATION: 96 MS
EKG QRS DURATION: 98 MS
EKG QTC CALCULATION (BAZETT): 415 MS
EKG QTC CALCULATION (BAZETT): 447 MS
EKG R AXIS: -13 DEGREES
EKG R AXIS: -26 DEGREES
EKG T AXIS: 42 DEGREES
EKG T AXIS: 70 DEGREES
EKG VENTRICULAR RATE: 80 BPM
EKG VENTRICULAR RATE: 85 BPM
FERRITIN: 97.3 NG/ML (ref 30–400)
GFR AFRICAN AMERICAN: >60
GFR NON-AFRICAN AMERICAN: >60
GLOBULIN: 2.3 G/DL
GLUCOSE BLD-MCNC: 104 MG/DL (ref 70–99)
HCT VFR BLD CALC: 39 % (ref 40.5–52.5)
HEMOGLOBIN: 12.8 G/DL (ref 13.5–17.5)
LACTATE DEHYDROGENASE: 170 U/L (ref 100–190)
LACTIC ACID, SEPSIS: 0.9 MMOL/L (ref 0.4–1.9)
LACTIC ACID, SEPSIS: 0.9 MMOL/L (ref 0.4–1.9)
MCH RBC QN AUTO: 29 PG (ref 26–34)
MCHC RBC AUTO-ENTMCNC: 32.8 G/DL (ref 31–36)
MCV RBC AUTO: 88.5 FL (ref 80–100)
PDW BLD-RTO: 14.1 % (ref 12.4–15.4)
PLATELET # BLD: 122 K/UL (ref 135–450)
PMV BLD AUTO: 9.9 FL (ref 5–10.5)
POTASSIUM REFLEX MAGNESIUM: 3.8 MMOL/L (ref 3.5–5.1)
RBC # BLD: 4.41 M/UL (ref 4.2–5.9)
SARS-COV-2, PCR: DETECTED
SODIUM BLD-SCNC: 136 MMOL/L (ref 136–145)
TOTAL PROTEIN: 5.6 G/DL (ref 6.4–8.2)
TROPONIN: <0.01 NG/ML
TROPONIN: <0.01 NG/ML
WBC # BLD: 9.7 K/UL (ref 4–11)

## 2020-06-25 PROCEDURE — 6360000002 HC RX W HCPCS: Performed by: INTERNAL MEDICINE

## 2020-06-25 PROCEDURE — 93005 ELECTROCARDIOGRAM TRACING: CPT | Performed by: EMERGENCY MEDICINE

## 2020-06-25 PROCEDURE — 93010 ELECTROCARDIOGRAM REPORT: CPT | Performed by: INTERNAL MEDICINE

## 2020-06-25 PROCEDURE — 36415 COLL VENOUS BLD VENIPUNCTURE: CPT

## 2020-06-25 PROCEDURE — 2580000003 HC RX 258: Performed by: INTERNAL MEDICINE

## 2020-06-25 PROCEDURE — 80053 COMPREHEN METABOLIC PANEL: CPT

## 2020-06-25 PROCEDURE — 99222 1ST HOSP IP/OBS MODERATE 55: CPT | Performed by: INTERNAL MEDICINE

## 2020-06-25 PROCEDURE — 87040 BLOOD CULTURE FOR BACTERIA: CPT

## 2020-06-25 PROCEDURE — 85027 COMPLETE CBC AUTOMATED: CPT

## 2020-06-25 PROCEDURE — 6370000000 HC RX 637 (ALT 250 FOR IP): Performed by: INTERNAL MEDICINE

## 2020-06-25 PROCEDURE — 71260 CT THORAX DX C+: CPT

## 2020-06-25 PROCEDURE — 84484 ASSAY OF TROPONIN QUANT: CPT

## 2020-06-25 PROCEDURE — 83605 ASSAY OF LACTIC ACID: CPT

## 2020-06-25 PROCEDURE — 6360000004 HC RX CONTRAST MEDICATION: Performed by: EMERGENCY MEDICINE

## 2020-06-25 PROCEDURE — 1200000000 HC SEMI PRIVATE

## 2020-06-25 RX ORDER — ACETAMINOPHEN 650 MG/1
650 SUPPOSITORY RECTAL EVERY 6 HOURS PRN
Status: DISCONTINUED | OUTPATIENT
Start: 2020-06-25 | End: 2020-07-20 | Stop reason: HOSPADM

## 2020-06-25 RX ORDER — ASPIRIN 81 MG/1
81 TABLET, CHEWABLE ORAL DAILY
Status: DISCONTINUED | OUTPATIENT
Start: 2020-06-25 | End: 2020-07-20 | Stop reason: HOSPADM

## 2020-06-25 RX ORDER — ACETAMINOPHEN 325 MG/1
650 TABLET ORAL EVERY 6 HOURS PRN
Status: DISCONTINUED | OUTPATIENT
Start: 2020-06-25 | End: 2020-07-20 | Stop reason: HOSPADM

## 2020-06-25 RX ORDER — SODIUM CHLORIDE 9 MG/ML
INJECTION, SOLUTION INTRAVENOUS CONTINUOUS
Status: DISCONTINUED | OUTPATIENT
Start: 2020-06-25 | End: 2020-06-25

## 2020-06-25 RX ORDER — SODIUM CHLORIDE 0.9 % (FLUSH) 0.9 %
10 SYRINGE (ML) INJECTION EVERY 12 HOURS SCHEDULED
Status: DISCONTINUED | OUTPATIENT
Start: 2020-06-25 | End: 2020-07-20 | Stop reason: HOSPADM

## 2020-06-25 RX ORDER — ROSUVASTATIN CALCIUM 10 MG/1
10 TABLET, COATED ORAL DAILY
Status: DISCONTINUED | OUTPATIENT
Start: 2020-06-25 | End: 2020-07-20 | Stop reason: HOSPADM

## 2020-06-25 RX ORDER — CEFTRIAXONE SODIUM 250 MG/1
250 INJECTION, POWDER, FOR SOLUTION INTRAMUSCULAR; INTRAVENOUS ONCE
Status: DISCONTINUED | OUTPATIENT
Start: 2020-06-25 | End: 2020-06-25

## 2020-06-25 RX ORDER — AZITHROMYCIN 250 MG/1
1000 TABLET, FILM COATED ORAL ONCE
Status: DISCONTINUED | OUTPATIENT
Start: 2020-06-25 | End: 2020-06-25

## 2020-06-25 RX ORDER — SODIUM CHLORIDE 0.9 % (FLUSH) 0.9 %
10 SYRINGE (ML) INJECTION PRN
Status: DISCONTINUED | OUTPATIENT
Start: 2020-06-25 | End: 2020-07-20 | Stop reason: HOSPADM

## 2020-06-25 RX ADMIN — ACETAMINOPHEN 650 MG: 325 TABLET ORAL at 21:03

## 2020-06-25 RX ADMIN — ROSUVASTATIN CALCIUM 10 MG: 10 TABLET, FILM COATED ORAL at 09:01

## 2020-06-25 RX ADMIN — IOPAMIDOL 85 ML: 755 INJECTION, SOLUTION INTRAVENOUS at 01:34

## 2020-06-25 RX ADMIN — SODIUM CHLORIDE: 9 INJECTION, SOLUTION INTRAVENOUS at 06:53

## 2020-06-25 RX ADMIN — ACETAMINOPHEN 650 MG: 325 TABLET ORAL at 04:53

## 2020-06-25 RX ADMIN — CEFTRIAXONE SODIUM 1 G: 1 INJECTION, POWDER, FOR SOLUTION INTRAMUSCULAR; INTRAVENOUS at 03:34

## 2020-06-25 RX ADMIN — ASPIRIN 81 MG 81 MG: 81 TABLET ORAL at 09:01

## 2020-06-25 RX ADMIN — ACETAMINOPHEN 650 MG: 325 TABLET ORAL at 15:23

## 2020-06-25 RX ADMIN — ENOXAPARIN SODIUM 40 MG: 40 INJECTION SUBCUTANEOUS at 09:02

## 2020-06-25 RX ADMIN — Medication 10 ML: at 19:54

## 2020-06-25 RX ADMIN — Medication 10 ML: at 09:02

## 2020-06-25 RX ADMIN — DEXTROSE MONOHYDRATE 500 MG: 50 INJECTION, SOLUTION INTRAVENOUS at 03:47

## 2020-06-25 ASSESSMENT — PAIN SCALES - GENERAL
PAINLEVEL_OUTOF10: 0

## 2020-06-25 NOTE — PROGRESS NOTES
Consult has been called to Dr. Marysol Hercules on 6/25/20. Spoke with dimple.  9:01 AM    Yovani Wagner  6/25/2020

## 2020-06-25 NOTE — PROGRESS NOTES
Brief progress note as patient was seen by nocturnist this morning. 82year old male presented with c/o dyspnea on exertion, fatigue, light-headedness, and fever. He gets light-headed when he stands too fast.      + low grade temp, leukocytosis. UTI, CT with multifocal pneumonia. Admitted to med-surg for pneumonia, UTI, and COVID rule out. Sepsis  - + leukocytosis, low grade fever)  - POA due to pneumonia, UTI  - blood, urine, sputum cx pending. Multi-focal pneumonia  - Treat with Rocephin, Zithromax D#1  - pulmonology consulted. UTI  - Rocephin D#1  - urine cx pending. Fatigue/light-headedness  - positive orthostatics in the ED  - cardiology consulted  - monitor on tele. Serial troponin. Initial is negative. - receiving IVF's. - repeat orthostatic vitals. Leukocytosis  - likely due to above  - resolved on repeat. Elevated D-dimer  - CT negative for PE    Hyperlipidemia  - on statin.      DVT Prophylaxis: Lovenox  Low fat diet  Code status: Full Code    Larsonchevy Griderruth FNP-C  6/25/2020

## 2020-06-25 NOTE — CONSULTS
Patient is being seen at the request of Dr. Debra Vazquez for a consultation for near syncope, pneumonia, R/O COVID    HISTORY OF PRESENT ILLNESS: 82-year-old male with minimal medical history who presented with a 2-month history of fatigue and lightheadedness, orthostasis. The symptoms worsened over the last 2 days and so he measured his temperature which was 101.7 at home. Without treatment, his temperature improved to 99.5 in the ER. He reported that he attended a corn hole tournament with several 100 people 1 week prior. PAST MEDICAL HISTORY:  Past Medical History:   Diagnosis Date    Arthritis     B12 deficiency 05/2014    Hyperlipidemia      PAST SURGICAL HISTORY:  Past Surgical History:   Procedure Laterality Date    BACK SURGERY      CAROTID ENDARTERECTOMY Left     COLONOSCOPY      UPPER GASTROINTESTINAL ENDOSCOPY  07/12/14    Minimal chronic gastritis       FAMILY HISTORY:  No known early lung disease    SOCIAL HISTORY:   reports that he has quit smoking. His smoking use included cigarettes. He started smoking about 29 years ago. He has a 25.00 pack-year smoking history. He has never used smokeless tobacco.    Scheduled Meds:   cefTRIAXone (ROCEPHIN) IV  1 g Intravenous Q24H    azithromycin  500 mg Intravenous Q24H    rosuvastatin  10 mg Oral Daily    aspirin  81 mg Oral Daily    sodium chloride flush  10 mL Intravenous 2 times per day    enoxaparin  40 mg Subcutaneous Daily     Continuous Infusions:   sodium chloride 150 mL/hr at 06/25/20 0653     PRN Meds:  sodium chloride flush, acetaminophen **OR** acetaminophen    ALLERGIES:  Patient is allergic to buspar [buspirone]; codeine; ritalin [methylphenidate hcl]; and zoloft [sertraline hcl].     REVIEW OF SYSTEMS:  Constitutional: + for fever  HENT: Negative for sore throat  Eyes: Negative for redness   Respiratory: Negative for dyspnea, cough  Cardiovascular: Negative for chest pain  Gastrointestinal: Negative for vomiting, diarrhea Genitourinary: Negative for hematuria   Musculoskeletal: Negative for arthralgias   Skin: Negative for rash  Neurological: + syncope  Hematological: Negative for adenopathy  Psychiatric/Behavorial: Negative for anxiety    PHYSICAL EXAM:  Blood pressure (!) 123/59, pulse 71, temperature 98.5 °F (36.9 °C), temperature source Oral, resp. rate 18, height 5' 7\" (1.702 m), weight 185 lb (83.9 kg), SpO2 95 %.' on RA  Gen: No distress. Eyes: PERRL. No sclera icterus. No conjunctival injection. ENT: No discharge. Pharynx clear. Neck: Trachea midline. No obvious mass. Resp: No accessory muscle use. No crackles. No wheezes. No rhonchi. No dullness on percussion. CV: Regular rate. Regular rhythm. No murmur or rub. No edema. Peripheral pulses are 2+. Capillary refill is less than 3 seconds. GI: Non-tender. Non-distended. No hernia. Skin: Warm and dry. No nodule on exposed extremities. Lymph: No cervical LAD. No supraclavicular LAD. M/S: No cyanosis. No joint deformity. No clubbing. Neuro: Awake. Alert. Moves all four extremities. Psych: Oriented x 3. No anxiety. LABS:  CBC:   Recent Labs     06/24/20 2135 06/25/20  0632   WBC 12.2* 9.7   HGB 14.1 12.8*   HCT 42.1 39.0*   MCV 88.0 88.5    122*     BMP:   Recent Labs     06/24/20 2135 06/25/20  0632   * 136   K 4.1 3.8   CL 98* 102   CO2 26 23   BUN 15 14   CREATININE 1.2 1.0     LIVER PROFILE:   Recent Labs     06/24/20 2135 06/25/20  0632   AST 11* 10*   ALT <5* <5*   BILITOT 2.5* 1.9*   ALKPHOS 68 58     PT/INR: No results for input(s): PROTIME, INR in the last 72 hours. APTT: No results for input(s): APTT in the last 72 hours.   UA:  Recent Labs     06/24/20  2230   COLORU Yellow   PHUR 5.5   WBCUA 3-5   RBCUA 21-50*   MUCUS 4+*   BACTERIA 1+*   CLARITYU SL CLOUDY*   SPECGRAV 1.025   LEUKOCYTESUR TRACE*   UROBILINOGEN 2.0*   BILIRUBINUR MODERATE*   BLOODU LARGE*   GLUCOSEU Negative   AMORPHOUS 3+     No results for input(s): PHART,

## 2020-06-25 NOTE — PROGRESS NOTES
4 Eyes Skin Assessment     The patient is being assess for   Admission    I agree that 2 RN's have performed a thorough Head to Toe Skin Assessment on the patient. ALL assessment sites listed below have been assessed. Areas assessed by both nurses:   [x]   Head, Face, and Ears   [x]   Shoulders, Back, and Chest, Abdomen  [x]   Arms, Elbows, and Hands   [x]   Coccyx, Sacrum, and Ischium  [x]   Legs, Feet, and Heels        Scattered bruising. **SHARE this note so that the co-signing nurse is able to place an eSignature**    Co-signer eSignature: Electronically signed by Eligio Cabrera RN on 6/25/20 at 6:50 AM EDT    Does the Patient have Skin Breakdown?   No          Cliff Prevention initiated:  NA   Wound Care Orders initiated:  NA      [facility] nurse consulted for Pressure Injury (Stage 3,4, Unstageable, DTI, NWPT, Complex wounds)and New or Established Ostomies:  NA      Primary Nurse eSignature: Electronically signed by Phuc Butler RN on 6/25/20 at 7:39 AM EDT

## 2020-06-25 NOTE — ED PROVIDER NOTES
Emergency Department Provider Note     Location: [place] [address] ED  6/24/2020    I independently performed a history and physical on Raheem Martinez. All diagnostic, treatment, and disposition decisions were made by myself in conjunction with the mid-level provider. Briefly, this is a 82 y.o. male here for shortness of breath, worse with exertion, fatigue, \"I am running out of gas a lot faster than I had been before\", he is a little lightheaded, especially with standing up too fast, has been notable for the past 2 to 3 months, but the past few days have been significantly worse, also had a fever today, but no significant cough with productive sputum, no urinary symptoms, when asked specifically, he states when he gets short of breath like he cannot catch his breath he may feel a little pressure within his chest, no known cardiac history, no history of DVT or PE, no leg swelling. Did not take anything for fever today, he does not take medications other than vitamins/supplements and his anti-lipidemic medicine, he is normally very active, was around a large group for people last weekend but no known sick contacts, greater than 200 teams for corn hole tournament, reported he was wearing a mask    ED Triage Vitals [06/24/20 1943]   BP Temp Temp Source Pulse Resp SpO2 Height Weight   (!) 145/59 99.3 °F (37.4 °C) Oral 85 20 96 % 5' 7\" (1.702 m) 185 lb (83.9 kg)      Exam:  Appears well for stated age, no acute distress, A&Ox4, heart occasionally irregular, rate in the 80s, no M/G/R, lungs CTAB, resp. Nonlabored, no abd tenderness, no peritoneal signs/no rebound/no guarding, no lower extremity edema or calf tenderness to palpation    Patient seen and examined. EKG  The Ekg interpreted by me in the absence of a cardiologist shows.   Sinus rhythm with PACs, rate 85, QTc 447, slight left axis deviation, no ST segment or T wave changes indicative of acute ischemia  Xr Chest Standard (2 Vw)    Result Date: 6/24/2020  EXAMINATION: TWO XRAY VIEWS OF THE CHEST 6/24/2020 8:13 pm COMPARISON: Chest x-ray dated 11/03/2018. HISTORY: ORDERING SYSTEM PROVIDED HISTORY: fatigue TECHNOLOGIST PROVIDED HISTORY: Reason for exam:->fatigue Reason for Exam: Fatigue (Pt c/o fatigue and low energy x 2 mths, however states over the past 2-3 days it has gotten sifnificantly worse. Temp of 101.7 at home today, 99.3 during triage. Pt states he did attend a [employer] tournament 1wk ago, over 200 ppl present. ) Acuity: Acute Type of Exam: Initial FINDINGS: HEART/MEDIASTINUM: The cardiomediastinal silhouette is within normal limits. PLEURA/LUNGS: There are no focal consolidations or pleural effusions. There is no appreciable pneumothorax. BONES/SOFT TISSUE: No acute abnormality. No radiographic evidence of acute pulmonary disease. Ct Chest Pulmonary Embolism W Contrast    Result Date: 6/25/2020  EXAMINATION: CTA OF THE CHEST 6/25/2020 1:33 am TECHNIQUE: CTA of the chest was performed after the administration of 85 mL Isovue 370 intravenous contrast.  Multiplanar reformatted images are provided for review. MIP images are provided for review. Dose modulation, iterative reconstruction, and/or weight based adjustment of the mA/kV was utilized to reduce the radiation dose to as low as reasonably achievable. COMPARISON: Correlation with concurrent chest radiograph HISTORY: Acute shortness of breath with borderline hypoxia and elevated D-dimer. FINDINGS: Image quality degraded by motion artifact. Pulmonary Arteries: Pulmonary arteries are adequately opacified for evaluation. No intraluminal filling defect to suggest pulmonary embolism. Main pulmonary artery is normal in caliber. Mediastinum: Heart size at the upper limit of normal.  Coronary artery calcification. No pericardial effusion. Thoracic aorta is normal caliber. No lymphadenopathy. Thyroid and esophagus are unremarkable.  Lungs/pleura: Multifocal areas of consolidation throughout both lungs. Minimal right and trace left pleural effusions with adjacent atelectasis. Upper Abdomen: No acute abnormality. Soft Tissues/Bones: Flowing ossification anteriorly along the spine suggests DISH. 1. No pulmonary embolism. 2. Multifocal pneumonia scattered throughout both lungs with minimal pleural effusions.        Results for orders placed or performed during the hospital encounter of 06/24/20   CBC Auto Differential   Result Value Ref Range    WBC 12.2 (H) 4.0 - 11.0 K/uL    RBC 4.78 4.20 - 5.90 M/uL    Hemoglobin 14.1 13.5 - 17.5 g/dL    Hematocrit 42.1 40.5 - 52.5 %    MCV 88.0 80.0 - 100.0 fL    MCH 29.4 26.0 - 34.0 pg    MCHC 33.4 31.0 - 36.0 g/dL    RDW 13.9 12.4 - 15.4 %    Platelets 012 978 - 038 K/uL    MPV 9.1 5.0 - 10.5 fL    Neutrophils % 87.0 %    Lymphocytes % 4.9 %    Monocytes % 7.4 %    Eosinophils % 0.1 %    Basophils % 0.6 %    Neutrophils Absolute 10.6 (H) 1.7 - 7.7 K/uL    Lymphocytes Absolute 0.6 (L) 1.0 - 5.1 K/uL    Monocytes Absolute 0.9 0.0 - 1.3 K/uL    Eosinophils Absolute 0.0 0.0 - 0.6 K/uL    Basophils Absolute 0.1 0.0 - 0.2 K/uL   Comprehensive Metabolic Panel w/ Reflex to MG   Result Value Ref Range    Sodium 135 (L) 136 - 145 mmol/L    Potassium reflex Magnesium 4.1 3.5 - 5.1 mmol/L    Chloride 98 (L) 99 - 110 mmol/L    CO2 26 21 - 32 mmol/L    Anion Gap 11 3 - 16    Glucose 121 (H) 70 - 99 mg/dL    BUN 15 7 - 20 mg/dL    CREATININE 1.2 0.8 - 1.3 mg/dL    GFR Non-African American 58 (A) >60    GFR African American >60 >60    Calcium 8.5 8.3 - 10.6 mg/dL    Total Protein 6.7 6.4 - 8.2 g/dL    Alb 3.9 3.4 - 5.0 g/dL    Albumin/Globulin Ratio 1.4 1.1 - 2.2    Total Bilirubin 2.5 (H) 0.0 - 1.0 mg/dL    Alkaline Phosphatase 68 40 - 129 U/L    ALT <5 (L) 10 - 40 U/L    AST 11 (L) 15 - 37 U/L    Globulin 2.8 g/dL   Urinalysis Reflex to Culture   Result Value Ref Range    Color, UA Yellow Straw/Yellow    Clarity, UA SL CLOUDY (A) Clear    Glucose, Ur Negative Negative mg/dL Bilirubin Urine MODERATE (A) Negative    Ketones, Urine 40 (A) Negative mg/dL    Specific Gravity, UA 1.025 1.005 - 1.030    Blood, Urine LARGE (A) Negative    pH, UA 5.5 5.0 - 8.0    Protein, UA 30 (A) Negative mg/dL    Urobilinogen, Urine 2.0 (A) <2.0 E.U./dL    Nitrite, Urine Negative Negative    Leukocyte Esterase, Urine TRACE (A) Negative    Microscopic Examination YES     Urine Type NotGiven     Urine Reflex to Culture Not Indicated    Troponin   Result Value Ref Range    Troponin <0.01 <0.01 ng/mL   Brain Natriuretic Peptide   Result Value Ref Range    Pro- 0 - 449 pg/mL   Microscopic Urinalysis   Result Value Ref Range    Mucus, UA 4+ (A) None Seen /LPF    WBC, UA 3-5 0 - 5 /HPF    RBC, UA 21-50 (A) 0 - 4 /HPF    Epithelial Cells, UA 2-5 0 - 5 /HPF    Bacteria, UA 1+ (A) None Seen /HPF    Amorphous, UA 3+ /HPF   D-Dimer, Quantitative   Result Value Ref Range    D-Dimer, Quant 430 (H) 0 - 229 ng/mL DDU   EKG 12 Lead   Result Value Ref Range    Ventricular Rate 80 BPM    Atrial Rate 80 BPM    P-R Interval 182 ms    QRS Duration 96 ms    Q-T Interval 360 ms    QTc Calculation (Bazett) 415 ms    P Axis 90 degrees    R Axis -26 degrees    T Axis 70 degrees    Diagnosis       Normal sinus rhythm with sinus arrhythmiaNormal ECGNo previous ECGs available     EKG interpretation by me, initial EKG with rate 80, normal sinus rhythm with sinus arrhythmia, QTc 415, no ST segment or T wave changes indicative of acute ischemia    For further details of [address] [place] [address] [facility] emergency department encounter, please see Colton Abernathy NP's documentation.   42-year-old male with fatigue and shortness of breath with exertion, questionable chest pressure at times, given his age and symptoms, he at least warrants cardiac evaluation, I did have concern for infectious process versus PE given the borderline low O2 sats at 93 to 94% with a good waveform even at rest, thus ordered d-dimer, which was slightly elevated, ordered CT PE study for further evaluation, plan was for admission for cardiac work-up, patient was signed out to me at 0000 by NP for further disposition, CT scan showed no PE, but a multifocal pneumonia, I do have concerns for COVID given his recent outing around large groups of people, given his age he is at higher risk for complications, test pending, he is not requiring any supplemental oxygen at this time, cardiac work-up will be warranted after this acute issue has resolved, he was given a bolus of fluids, was orthostatic, but not hypotensive, dropped from 260D to 891X systolic with laying to standing, this makes sense given his intermittent lightheadedness with standing symptoms, Dr. Joselin Cedillo agreed to accept for admission. Orders Placed This Encounter   Procedures    Culture, Blood 2    Culture, Blood 1    XR CHEST STANDARD (2 VW)    CT CHEST PULMONARY EMBOLISM W CONTRAST    CBC Auto Differential    Comprehensive Metabolic Panel w/ Reflex to MG    Urinalysis Reflex to Culture    Troponin    Brain Natriuretic Peptide    COVID-19    COVID-19    COVID-19    Microscopic Urinalysis    D-Dimer, Quantitative    Lactate dehydrogenase    Ferritin    Lactate, Sepsis    Telemetry monitoring    Ambulate patient    Inpatient consult to Hospitalist    EKG 12 Lead    EKG 12 Lead    Saline lock IV     Orders Placed This Encounter   Medications    0.9 % sodium chloride bolus    DISCONTD: cefTRIAXone (ROCEPHIN) injection 250 mg    DISCONTD: azithromycin (ZITHROMAX) tablet 1,000 mg    iopamidol (ISOVUE-370) 76 % injection 85 mL    DISCONTD: cefTRIAXone (ROCEPHIN) 1 g IVPB in 50 mL D5W minibag    DISCONTD: azithromycin (ZITHROMAX) 500 mg in D5W 250ml addavial     ED Course as of Jun 25 0233   Thu Jun 25, 2020   0227 I spoke to Dr. Joselin Cedillo who agreed to except for admission, he plans to start Rocephin and azithromycin, stated I did not need to started down here.   This will cover for UTI as well though the patient had no urinary symptoms at this time.    [SY]      ED Course User Index  [SY] Jayy Nava DO     Clinical Impression:  1. Fatigue, unspecified type    2. Leukocytosis, unspecified type    3. Hematuria, unspecified type    4. Suspected COVID-19 virus infection    5. Multifocal pneumonia    6. Asymptomatic bacteriuria    7. Dyspnea on exertion    8. Hyperlipidemia, unspecified hyperlipidemia type    9. Orthostasis        Disposition:  Patient admitted to the hospital in stable condition. This chart was generated in part by using Dragon Dictation system and may contain errors related to that system including errors in grammar, punctuation, and spelling, as well as words and phrases that may be inappropriate. If there are any questions or concerns please feel free to contact the dictating provider for clarification.            Jayy Nava,   06/25/20 Ryan Osuna, DO  06/25/20 9738

## 2020-06-25 NOTE — PROGRESS NOTES
Pt exercised at bedside. Pulse oxy decreased from 97% to 90-91%. Pt stated he was slightly SOB but not as baD as when he walked from the car into the ER. Orthostatic VS done with B/P drop but not a rise in HR. HR up to 94 with exercise.

## 2020-06-25 NOTE — PROGRESS NOTES
Patient admitted to room 225 from ED. Patient oriented to room, call light, bed rails, phone, lights and bathroom. Patient instructed about the schedule of the day including: vital sign frequency, lab draws, possible tests, frequency of MD and staff rounds, daily weights, I &O's and prescribed diet. Bed alarm not in place, patient a/o x4, low fall risk. Telemetry box in place, patient aware of placement and reason. Bed locked, in lowest position, side rails up 2/4, call light within reach. Recliner Assessment  Patient is able to demonstrated the ability to move from a reclining position to an upright position within the recliner. Released urine cytology results with a note to the patient on the portal.  She would like to reschedule her CT scan.  I encouraged her to go ahead and schedule the cystoscopy which was ordered.  Please reach out to her this AM and discuss her CT appointment as well as the cystoscopy.  Thank you.

## 2020-06-25 NOTE — H&P
Hospital Medicine History & Physical      PCP: Karyle Beery, MD    Date of Service: Pt seen/examined on 6/25/20 and admitted on 6/25/20 to Inpatient. Chief Complaint   Patient presents with    Fatigue     Pt c/o fatigue and low energy x 2 mths, however states over the past 2-3 days it has gotten sifnificantly worse. Temp of 101.7 at home today, 99.3 during triage. Pt states he did attend a [employer] 1wk ago, over 200 ppl present. History Of Present Illness: The patient is a 82 y.o. male with PMH below, presents with SOB/PINEDA, fatigue, lightheadedness, dyspnea on exertion, fever. Patient reports that for the last few months he is had increasing problems with fatigue and lightheadedness. He reports he gets lightheaded especially when he stands up too fast.  He also describes dyspnea on exertion which appears to be worsening even with a few steps. He does not have shortness of breath at rest.  He says he feels like \"I am running out of gas much faster than I used to. \"  He reports that he went to a corn hole tournament last week. There were approximately 200 teams there. He does not know of any sick contacts but was in close contact with many people there. He also notes that he has had a little bit of chest pressure but denies that it is actually vinod pain. He denies urinary symptoms. He has had an occasional cough but does not believe it has been productive. He reports that he is normally fairly active and independent but has been less so the last several days. He reports he measured his temperature at 101.7 at home today. His temp was 99.3 and 99.5 in the ER.     Past Medical History:        Diagnosis Date    Arthritis     B12 deficiency 05/2014    Hyperlipidemia        Past Surgical History:        Procedure Laterality Date    BACK SURGERY      CAROTID ENDARTERECTOMY Left     UPPER GASTROINTESTINAL ENDOSCOPY  07/12/14    Minimal chronic gastritis       Medications Prior to Admission:    Prior to Admission medications    Medication Sig Start Date End Date Taking? Authorizing Provider   vitamin B-12 (CYANOCOBALAMIN) 1000 MCG tablet Take 1,000 mcg by mouth daily. Yes Historical Provider, MD   clorazepate (TRANXENE) 7.5 MG tablet Take 3.75 mg by mouth 2 times daily. .   Yes Historical Provider, MD   rosuvastatin (CRESTOR) 10 MG tablet Take 10 mg by mouth daily. Yes Historical Provider, MD   Omega 3 1000 MG CAPS Take  by mouth. Yes Historical Provider, MD   aspirin 81 MG tablet Take 81 mg by mouth daily. Yes Historical Provider, MD       Allergies:  Buspar [buspirone]; Codeine; Ritalin [methylphenidate hcl]; and Zoloft [sertraline hcl]    Social History:    TOBACCO:   reports that he has quit smoking. He started smoking about 29 years ago. He has never used smokeless tobacco.  ETOH:   reports current alcohol use of about 1.0 standard drinks of alcohol per week. Family History:  Reviewed in detail and negative for DM, Early CAD, Cancer (except as below). Positive as follows:        Problem Relation Age of Onset    Cancer Neg Hx        REVIEW OF SYSTEMS:   Pertinent positives/negatives as follows: SOB/PINEDA, fatigue, lightheadedness, dyspnea on exertion, fever, and as discussed in HPI, otherwise a complete ROS performed and all other systems are negative  PHYSICAL EXAM PERFORMED:    BP (!) 147/67   Pulse 82   Temp 99.5 °F (37.5 °C) (Oral)   Resp 24   Ht 5' 7\" (1.702 m)   Wt 185 lb (83.9 kg)   SpO2 92%   BMI 28.98 kg/m²     GEN:  A&Ox3, NAD. HEENT:  NC/AT,EOMI, MMM, no erythema/exudates or visible masses. CVS:  Normal S1,S2. RRR. Without M/G/R.   LUNG:   CTA-B. no wheezes, rales or rhonchi  ABD:  Soft, ND/NT, BS+ x4. Without G/R.  EXT: 2+ pulses, no c/c/e. Brisk cap refill. PSY:  Thought process intact, affect appropriate. BRITT:  CN III-XII intact, moves all 4 spontaneously, sensory grossly intact. SKIN: No rash or lesions on visible skin.     Chart review shows recent radiographs:  Xr Chest Standard (2 Vw)    Result Date: 6/24/2020  EXAMINATION: TWO XRAY VIEWS OF THE CHEST 6/24/2020 8:13 pm COMPARISON: Chest x-ray dated 11/03/2018. HISTORY: ORDERING SYSTEM PROVIDED HISTORY: fatigue TECHNOLOGIST PROVIDED HISTORY: Reason for exam:->fatigue Reason for Exam: Fatigue (Pt c/o fatigue and low energy x 2 mths, however states over the past 2-3 days it has gotten sifnificantly worse. Temp of 101.7 at home today, 99.3 during triage. Pt states he did attend a [employer] 1wk ago, over 200 ppl present. ) Acuity: Acute Type of Exam: Initial FINDINGS: HEART/MEDIASTINUM: The cardiomediastinal silhouette is within normal limits. PLEURA/LUNGS: There are no focal consolidations or pleural effusions. There is no appreciable pneumothorax. BONES/SOFT TISSUE: No acute abnormality. No radiographic evidence of acute pulmonary disease. Ct Chest Pulmonary Embolism W Contrast    Result Date: 6/25/2020  EXAMINATION: CTA OF THE CHEST 6/25/2020 1:33 am TECHNIQUE: CTA of the chest was performed after the administration of 85 mL Isovue 370 intravenous contrast.  Multiplanar reformatted images are provided for review. MIP images are provided for review. Dose modulation, iterative reconstruction, and/or weight based adjustment of the mA/kV was utilized to reduce the radiation dose to as low as reasonably achievable. COMPARISON: Correlation with concurrent chest radiograph HISTORY: Acute shortness of breath with borderline hypoxia and elevated D-dimer. FINDINGS: Image quality degraded by motion artifact. Pulmonary Arteries: Pulmonary arteries are adequately opacified for evaluation. No intraluminal filling defect to suggest pulmonary embolism. Main pulmonary artery is normal in caliber. Mediastinum: Heart size at the upper limit of normal.  Coronary artery calcification. No pericardial effusion. Thoracic aorta is normal caliber. No lymphadenopathy.   Thyroid and esophagus are unremarkable. Lungs/pleura: Multifocal areas of consolidation throughout both lungs. Minimal right and trace left pleural effusions with adjacent atelectasis. Upper Abdomen: No acute abnormality. Soft Tissues/Bones: Flowing ossification anteriorly along the spine suggests DISH. 1. No pulmonary embolism. 2. Multifocal pneumonia scattered throughout both lungs with minimal pleural effusions. EKG 12 Lead [[id number]]    Collected: 06/24/20 2030    Updated: 06/24/20 2033     Ventricular Rate 80 BPM    Atrial Rate 80 BPM    P-R Interval 182 ms    QRS Duration 96 ms    Q-T Interval 360 ms    QTc Calculation (Bazett) 415 ms    P Axis 90 degrees    R Axis -26 degrees    T Axis 70 degrees    Diagnosis Normal sinus rhythm with sinus arrhythmiaNormal ECGNo previous ECGs available     CBC:  Recent Labs     06/24/20 2135   WBC 12.2*   HGB 14.1   HCT 42.1           RENAL  Recent Labs     06/24/20 2135   *   K 4.1   CL 98*   CO2 26   BUN 15   CREATININE 1.2   GLUCOSE 121*       Hemoglobin a1c:  Lab Results   Component Value Date    LABA1C 5.6 01/11/2019       LFT'S:  Recent Labs     06/24/20 2135   AST 11*   ALT <5*   BILITOT 2.5*   ALKPHOS 68     CARDIAC ENZYMES:   Recent Labs     06/24/20 2135   TROPONINI <0.01     Lab Results   Component Value Date    PROBNP 382 06/24/2020       LACTIC ACID:  Pending    U/A:  Recent Labs     06/24/20 2230   LEUKOCYTESUR TRACE*   BACTERIA 1+*   WBCUA 3-5   COLORU Yellow   RBCUA 21-50*   MUCUS 4+*   CLARITYU SL CLOUDY*   SPECGRAV 1.025   BLOODU LARGE*   GLUCOSEU Negative   AMORPHOUS 3+     PHYSICIAN CERTIFICATION  I certify that Shaniqua Baez is expected to be hospitalized for 2 midnights based on the following assessment and plan:    ASSESSMENT/PLAN:  1. Sepsis (WBC, HR, RR) likely related to multifocal PNA and/or complicated UTI. IV ceftriaxone and azithro. F/u cx. COVID IS a consideration. Droplet +, f/u labs. No need for pressors at this time.   Nebs per COVID

## 2020-06-25 NOTE — PLAN OF CARE
Problem: SAFETY  Goal: Free from accidental physical injury  6/25/2020 1108 by Dmitri Lopez RN  Outcome: Ongoing     Problem: KNOWLEDGE DEFICIT  Goal: Patient/S.O. demonstrates understanding of disease process, treatment plan, medications, and discharge instructions.   6/25/2020 1108 by Dmitri Lopez RN  Outcome: Ongoing     Problem: Airway Clearance - Ineffective:  Goal: Clear lung sounds  Description: Clear lung sounds  Outcome: Ongoing     Problem: Airway Clearance - Ineffective:  Goal: Ability to maintain a clear airway will improve  Description: Ability to maintain a clear airway will improve  Outcome: Ongoing     Problem: Gas Exchange - Impaired:  Goal: Levels of oxygenation will improve  Description: Levels of oxygenation will improve  Outcome: Ongoing     Problem: Hyperthermia:  Goal: Ability to maintain a body temperature in the normal range will improve  Description: Ability to maintain a body temperature in the normal range will improve  Outcome: Ongoing

## 2020-06-25 NOTE — CONSULTS
[address] [facility]  [phone]        Reason for Consultation/Chief Complaint: \"I have been having fatigue low energy, SOB, fever 101. 7. \"    Consulted for  Near syncope + orthostatics in ED    Remains in COVID rule out and HPI per chart secondarily    History of Present Illness:  Linda Velazquez is a 82 y.o. patient who presented to [facility] 6/25/20 with c/o SOB, fatigue, and lightheadedness. He has PMH of HTN, HLD, anxiety, arthritis, and s/p Left CEA. No documented cardiac history. Most recent Carotid US 05/29/20 50-69% stenosis right internal carotid artery; Left endarterectomy site is widely patent. Now presents with above compliants. He reports he recently attended corn hole tournament with 200+ people and had close contact with people. Started having dry cough and home temperature reported 101.7. Admit EKG revealed NSR with sinus arrhythmia 80bpm. Note CXR no radiographic evidence of acute pulmonary disease. Note CTPA showed multifocal pneumonia scattered throughout both lungs with minimal pleural effusions; note D-dimer 430, WBC 12.2; APL=758; Troponin neg x 1. Note +orthostatics (supine GC=589//62; sitting 130/58; standing 112/56). I have been asked to provide consultation regarding further management and testing. Past Medical History:   has a past medical history of Arthritis, B12 deficiency, and Hyperlipidemia. Surgical History:   has a past surgical history that includes Carotid endarterectomy (Left); back surgery; Upper gastrointestinal endoscopy (07/12/14); and Colonoscopy. Social History: in [facility] rule out   reports that he has quit smoking. His smoking use included cigarettes. He started smoking about 29 years ago. He has a 25.00 pack-year smoking history. He has never used smokeless tobacco. He reports current alcohol use of about 1.0 standard drinks of alcohol per week. He reports that he does not use drugs.      Family History:  in COVID rule out    family history is not on file. Home Medications:  Were reviewed and are listed in nursing record. and/or listed below  Prior to Admission medications    Medication Sig Start Date End Date Taking? Authorizing Provider   vitamin B-12 (CYANOCOBALAMIN) 1000 MCG tablet Take 1,000 mcg by mouth daily. Yes Historical Provider, MD   clorazepate (TRANXENE) 7.5 MG tablet Take 3.75 mg by mouth 2 times daily. .   Yes Historical Provider, MD   rosuvastatin (CRESTOR) 10 MG tablet Take 10 mg by mouth daily. Yes Historical Provider, MD   Omega 3 1000 MG CAPS Take  by mouth. Yes Historical Provider, MD   aspirin 81 MG tablet Take 81 mg by mouth daily. Yes Historical Provider, MD        Allergies:  Buspar [buspirone];  Codeine; Ritalin [methylphenidate hcl]; and Zoloft [sertraline hcl]     Review of Systems: deferred due to Covid r/o    Physical Examination:  Deferred due to R/O Covid-19 infxn, isolation, and conservation of PPE    Vitals:    20 0415   BP: (!) 154/73   Pulse: 80   Resp: 20   Temp: 100.4 °F (38 °C)   SpO2: 97%    Weight: 185 lb (83.9 kg)         Labs  CBC:   Lab Results   Component Value Date    WBC 12.2 2020    RBC 4.78 2020    HGB 14.1 2020    HCT 42.1 2020    MCV 88.0 2020    RDW 13.9 2020     2020     CMP:    Lab Results   Component Value Date     2020    K 4.1 2020    CL 98 2020    CO2 26 2020    BUN 15 2020    CREATININE 1.2 2020    GFRAA >60 2020    AGRATIO 1.4 2020    LABGLOM 58 2020    GLUCOSE 121 2020    PROT 6.7 2020    CALCIUM 8.5 2020    BILITOT 2.5 2020    ALKPHOS 68 2020    AST 11 2020    ALT <5 2020     PT/INR:  No results found for: PTINR  Lab Results   Component Value Date    TROPONINI <0.01 2020       EK20 I have reviewed EKG with the following interpretation: Impression:  See HPI Normal sinus rhythm with sinus arrhythmiaNormal ECGNo previous ECGs availableConfirmed by NELSY DIOP MD ([id number]) on 6/25/2020 7:59:14 AM    CXR 06/24/20 No radiographic evidence of acute pulmonary disease. CTPA  06/25/20  Pulmonary Arteries: Pulmonary arteries are adequately opacified for evaluation. No intraluminal filling defect to suggest pulmonary embolism. Main pulmonary artery is normal in caliber. Mediastinum: Heart size at the upper limit of normal.  Coronary artery calcification. No pericardial effusion. Thoracic aorta is normal caliber. No lymphadenopathy. Thyroid and esophagus are unremarkable. Lungs/pleura: Multifocal areas of consolidation throughout both lungs. Minimal right and trace left pleural effusions with adjacent atelectasis. Upper Abdomen: No acute abnormality. Soft Tissues/Bones: Flowing ossification anteriorly along the spine suggests DISH. Assessment:  Denny Bui is a 82 y.o. patient who presented to [facility] 6/25/20 with c/o SOB, fatigue, and lightheadedness. He has PMH of HTN, HLD, anxiety, arthritis, and s/p Left CEA. No documented cardiac history. Most recent Carotid US 05/29/20 50-69% stenosis right internal carotid artery; Left endarterectomy site is widely patent. Now presents with above compliants. He reports he [place] and had close contact with people. Started having dry cough and home temperature reported 101.7. Admit EKG revealed NSR with sinus arrhythmia 80bpm. Note CXR no radiographic evidence of acute pulmonary disease. Note CTPA showed multifocal pneumonia scattered throughout both lungs with minimal pleural effusions; note D-dimer 430, WBC 12.2; XQV=858; Troponin neg x 1. Note +orthostatics (supine VJ=044//62; sitting 130/58; standing 112/56). Diagnosis of lightheadedness due to orthostasis in elderly male with multi-focal PNA being ruled out for Covid-19. Recs:  1. Note BNP low for elderly person > 76yo and not c/w CHF.  Likely SOB more c/w PNA. 2. Continue IVF for hydration and recheck orthostatics. 3. Continue IV rocephin and azithromycin per IM doc. 4. Pulmonary consult pending. 5. Continue baby aspirin qd and crestor 10mg qd. 6. No need for cardiac testing at this time and no further recs. Would treat PNA and await Covid testing. Will sign off and please call if further questions. Patient Active Problem List   Diagnosis    Multifocal pneumonia       Thank you for allowing to us to participate in the care or Sharmin Lilly. Further evaluation will be based upon the patient's clinical course and testing results.

## 2020-06-26 LAB
A/G RATIO: 1.2 (ref 1.1–2.2)
ALBUMIN SERPL-MCNC: 3.7 G/DL (ref 3.4–5)
ALP BLD-CCNC: 67 U/L (ref 40–129)
ALT SERPL-CCNC: <5 U/L (ref 10–40)
ANION GAP SERPL CALCULATED.3IONS-SCNC: 12 MMOL/L (ref 3–16)
AST SERPL-CCNC: 19 U/L (ref 15–37)
BASOPHILS ABSOLUTE: 0 K/UL (ref 0–0.2)
BASOPHILS RELATIVE PERCENT: 0.3 %
BILIRUB SERPL-MCNC: 1.7 MG/DL (ref 0–1)
BUN BLDV-MCNC: 13 MG/DL (ref 7–20)
CALCIUM SERPL-MCNC: 8.6 MG/DL (ref 8.3–10.6)
CHLORIDE BLD-SCNC: 96 MMOL/L (ref 99–110)
CO2: 25 MMOL/L (ref 21–32)
CREAT SERPL-MCNC: 1 MG/DL (ref 0.8–1.3)
D DIMER: 682 NG/ML DDU (ref 0–229)
EOSINOPHILS ABSOLUTE: 0 K/UL (ref 0–0.6)
EOSINOPHILS RELATIVE PERCENT: 0.3 %
GFR AFRICAN AMERICAN: >60
GFR NON-AFRICAN AMERICAN: >60
GLOBULIN: 3.1 G/DL
GLUCOSE BLD-MCNC: 103 MG/DL (ref 70–99)
HCT VFR BLD CALC: 43.2 % (ref 40.5–52.5)
HEMOGLOBIN: 14.3 G/DL (ref 13.5–17.5)
LYMPHOCYTES ABSOLUTE: 0.9 K/UL (ref 1–5.1)
LYMPHOCYTES RELATIVE PERCENT: 8.1 %
MCH RBC QN AUTO: 29.2 PG (ref 26–34)
MCHC RBC AUTO-ENTMCNC: 33.2 G/DL (ref 31–36)
MCV RBC AUTO: 88.1 FL (ref 80–100)
MONOCYTES ABSOLUTE: 0.7 K/UL (ref 0–1.3)
MONOCYTES RELATIVE PERCENT: 6.1 %
NEUTROPHILS ABSOLUTE: 9.4 K/UL (ref 1.7–7.7)
NEUTROPHILS RELATIVE PERCENT: 85.2 %
PDW BLD-RTO: 14.1 % (ref 12.4–15.4)
PLATELET # BLD: 144 K/UL (ref 135–450)
PMV BLD AUTO: 9.1 FL (ref 5–10.5)
POTASSIUM REFLEX MAGNESIUM: 4.2 MMOL/L (ref 3.5–5.1)
RBC # BLD: 4.91 M/UL (ref 4.2–5.9)
SODIUM BLD-SCNC: 133 MMOL/L (ref 136–145)
TOTAL PROTEIN: 6.8 G/DL (ref 6.4–8.2)
WBC # BLD: 11.1 K/UL (ref 4–11)

## 2020-06-26 PROCEDURE — 6360000002 HC RX W HCPCS: Performed by: INTERNAL MEDICINE

## 2020-06-26 PROCEDURE — 36415 COLL VENOUS BLD VENIPUNCTURE: CPT

## 2020-06-26 PROCEDURE — 85379 FIBRIN DEGRADATION QUANT: CPT

## 2020-06-26 PROCEDURE — 80053 COMPREHEN METABOLIC PANEL: CPT

## 2020-06-26 PROCEDURE — 6360000002 HC RX W HCPCS: Performed by: NURSE PRACTITIONER

## 2020-06-26 PROCEDURE — 2580000003 HC RX 258: Performed by: INTERNAL MEDICINE

## 2020-06-26 PROCEDURE — 6370000000 HC RX 637 (ALT 250 FOR IP): Performed by: INTERNAL MEDICINE

## 2020-06-26 PROCEDURE — 1200000000 HC SEMI PRIVATE

## 2020-06-26 PROCEDURE — 99232 SBSQ HOSP IP/OBS MODERATE 35: CPT | Performed by: INTERNAL MEDICINE

## 2020-06-26 PROCEDURE — 85025 COMPLETE CBC W/AUTO DIFF WBC: CPT

## 2020-06-26 RX ORDER — ONDANSETRON 2 MG/ML
4 INJECTION INTRAMUSCULAR; INTRAVENOUS EVERY 6 HOURS PRN
Status: DISCONTINUED | OUTPATIENT
Start: 2020-06-26 | End: 2020-07-20 | Stop reason: HOSPADM

## 2020-06-26 RX ORDER — AZITHROMYCIN 250 MG/1
500 TABLET, FILM COATED ORAL DAILY
Status: DISCONTINUED | OUTPATIENT
Start: 2020-06-27 | End: 2020-06-27

## 2020-06-26 RX ORDER — MAGNESIUM HYDROXIDE/ALUMINUM HYDROXICE/SIMETHICONE 120; 1200; 1200 MG/30ML; MG/30ML; MG/30ML
30 SUSPENSION ORAL EVERY 6 HOURS PRN
Status: DISCONTINUED | OUTPATIENT
Start: 2020-06-26 | End: 2020-07-20 | Stop reason: HOSPADM

## 2020-06-26 RX ORDER — PANTOPRAZOLE SODIUM 40 MG/1
40 TABLET, DELAYED RELEASE ORAL
Status: DISCONTINUED | OUTPATIENT
Start: 2020-06-26 | End: 2020-06-29

## 2020-06-26 RX ORDER — DEXAMETHASONE SODIUM PHOSPHATE 4 MG/ML
6 INJECTION, SOLUTION INTRA-ARTICULAR; INTRALESIONAL; INTRAMUSCULAR; INTRAVENOUS; SOFT TISSUE DAILY
Status: DISCONTINUED | OUTPATIENT
Start: 2020-06-26 | End: 2020-07-02

## 2020-06-26 RX ORDER — LANOLIN ALCOHOL/MO/W.PET/CERES
3 CREAM (GRAM) TOPICAL NIGHTLY PRN
Status: DISCONTINUED | OUTPATIENT
Start: 2020-06-26 | End: 2020-06-29

## 2020-06-26 RX ADMIN — PANTOPRAZOLE SODIUM 40 MG: 40 TABLET, DELAYED RELEASE ORAL at 09:52

## 2020-06-26 RX ADMIN — Medication 10 ML: at 08:37

## 2020-06-26 RX ADMIN — ASPIRIN 81 MG 81 MG: 81 TABLET ORAL at 08:37

## 2020-06-26 RX ADMIN — CEFTRIAXONE SODIUM 1 G: 1 INJECTION, POWDER, FOR SOLUTION INTRAMUSCULAR; INTRAVENOUS at 02:00

## 2020-06-26 RX ADMIN — DEXAMETHASONE SODIUM PHOSPHATE 6 MG: 4 INJECTION, SOLUTION INTRAMUSCULAR; INTRAVENOUS at 18:16

## 2020-06-26 RX ADMIN — DEXTROSE MONOHYDRATE 500 MG: 50 INJECTION, SOLUTION INTRAVENOUS at 03:04

## 2020-06-26 RX ADMIN — ONDANSETRON HYDROCHLORIDE 4 MG: 2 INJECTION, SOLUTION INTRAMUSCULAR; INTRAVENOUS at 09:52

## 2020-06-26 RX ADMIN — ACETAMINOPHEN 650 MG: 325 TABLET ORAL at 15:29

## 2020-06-26 RX ADMIN — ROSUVASTATIN CALCIUM 10 MG: 10 TABLET, FILM COATED ORAL at 08:37

## 2020-06-26 RX ADMIN — MELATONIN 3 MG ORAL TABLET 3 MG: 3 TABLET ORAL at 01:57

## 2020-06-26 RX ADMIN — ENOXAPARIN SODIUM 40 MG: 40 INJECTION SUBCUTANEOUS at 08:37

## 2020-06-26 ASSESSMENT — PAIN SCALES - GENERAL: PAINLEVEL_OUTOF10: 0

## 2020-06-26 NOTE — PROGRESS NOTES
Pulmonary Progress Note  CC: Fever    Subjective: Fever    IV line peripheral    EXAM:   BP (!) 122/57   Pulse 80   Temp 99.7 °F (37.6 °C) (Oral)   Resp 16   Ht 5' 7\" (1.702 m)   Wt 185 lb (83.9 kg)   SpO2 92%   BMI 28.98 kg/m²  on room air  Constitutional:  No acute distress   HEENT: no scleral icterus  Neck: No tracheal deviation present. Cardiovascular: Normal heart sounds. Pulmonary/Chest: No wheezes. No rhonchi. No rales. No decreased breath sounds. No accessory muscle usage or stridor. Abdominal: Soft. Musculoskeletal: No cyanosis. No clubbing. Skin: Skin is warm and dry. Scheduled Meds:   pantoprazole  40 mg Oral QAM AC    cefTRIAXone (ROCEPHIN) IV  1 g Intravenous Q24H    azithromycin  500 mg Intravenous Q24H    rosuvastatin  10 mg Oral Daily    aspirin  81 mg Oral Daily    sodium chloride flush  10 mL Intravenous 2 times per day    enoxaparin  40 mg Subcutaneous Daily     Continuous Infusions:    PRN Meds:  melatonin, aluminum & magnesium hydroxide-simethicone, ondansetron, sodium chloride flush, acetaminophen **OR** acetaminophen    Labs:  CBC:   Recent Labs     06/24/20 2135 06/25/20  0632 06/26/20  0609   WBC 12.2* 9.7 11.1*   HGB 14.1 12.8* 14.3   HCT 42.1 39.0* 43.2   MCV 88.0 88.5 88.1    122* 144     BMP:   Recent Labs     06/24/20 2135 06/25/20  0632 06/26/20  0609   * 136 133*   K 4.1 3.8 4.2   CL 98* 102 96*   CO2 26 23 25   BUN 15 14 13   CREATININE 1.2 1.0 1.0       Cultures:  6/24/2020 SARS-CoV-2 PCR positive  6/24/2020 urine staph epi    Chest imaging was reviewed by me and showed   CTPA 6/25/2020  1. No pulmonary embolism. 2. Multifocal pneumonia scattered throughout both lungs with minimal pleural   effusions.      ASSESSMENT:  · Fever  · COVID-19 viral pneumonia  · Orthostasis     PLAN:  · - COVID-19 isolation, droplet plus, test pending  -   · Okay to discontinue ceftriaxone and azithromycin from pulmonary perspective    · Add Decadron 6 mg IV daily, the risk and potential benefits as well as the relatively poor supporting data for this were all discussed with the patient    · Lovenox

## 2020-06-26 NOTE — PLAN OF CARE
Problem: SAFETY  Goal: Free from accidental physical injury  Outcome: Ongoing     Problem: KNOWLEDGE DEFICIT  Goal: Patient/S.O. demonstrates understanding of disease process, treatment plan, medications, and discharge instructions.   Outcome: Ongoing     Problem: Airway Clearance - Ineffective:  Goal: Clear lung sounds  Description: Clear lung sounds  Outcome: Ongoing     Problem: Airway Clearance - Ineffective:  Goal: Ability to maintain a clear airway will improve  Description: Ability to maintain a clear airway will improve  Outcome: Ongoing     Problem: Gas Exchange - Impaired:  Goal: Levels of oxygenation will improve  Description: Levels of oxygenation will improve  Outcome: Ongoing     Problem: Hyperthermia:  Goal: Ability to maintain a body temperature in the normal range will improve  Description: Ability to maintain a body temperature in the normal range will improve  Outcome: Ongoing     Problem: Airway Clearance - Ineffective  Goal: Achieve or maintain patent airway  Outcome: Ongoing     Problem: Gas Exchange - Impaired  Goal: Absence of hypoxia  Outcome: Ongoing

## 2020-06-26 NOTE — PROGRESS NOTES
Awake in bed. PM assessment complete. Temp 102.7, tylenol unavail at this time. Cold cloths provided. No needs voiced. Call light in reach. Will cont to monitor.  Lina Serna

## 2020-06-26 NOTE — PROGRESS NOTES
Progress Note    Admit Date:  6/24/2020     Patient with COVID-19    He presented with complaints of fatigue, and fevers. He attended a [employer]  tournament about 2 weeks ago with over 200 people, most of them not wearing any masks. Subjective:  Mr. Trihsa Bosch states he feels better, still weak. Minimal cough and shortness of breath. Oxygen saturation stable on room air. Low-grade fevers today. T-max 102.1 °F last night    Objective:   BP (!) 122/57   Pulse 80   Temp 99.7 °F (37.6 °C) (Oral)   Resp 16   Ht 5' 7\" (1.702 m)   Wt 185 lb (83.9 kg)   SpO2 92%   BMI 28.98 kg/m²       Intake/Output Summary (Last 24 hours) at 6/26/2020 1019  Last data filed at 6/26/2020 0934  Gross per 24 hour   Intake 2110 ml   Output 650 ml   Net 1460 ml         Physical Exam:  General:  Awake, alert, NAD  Skin:  Warm and dry  Neck:  JVD absent. Neck supple  Chest: Diminished breath sounds . no wheezes, rales or rhonchi. Cardiovascular:  RRR ,S1S2 normal  Abdomen:  Soft, non tender, non distended, BS +  Extremities:  No edema. Intact peripheral pulses. Brisk cap refill, < 2 secs  Neuro: non focal      Medications:   Scheduled Meds:   pantoprazole  40 mg Oral QAM AC    cefTRIAXone (ROCEPHIN) IV  1 g Intravenous Q24H    azithromycin  500 mg Intravenous Q24H    rosuvastatin  10 mg Oral Daily    aspirin  81 mg Oral Daily    sodium chloride flush  10 mL Intravenous 2 times per day    enoxaparin  40 mg Subcutaneous Daily       Continuous Infusions:      Data:  CBC:   Recent Labs     06/24/20 2135 06/25/20 0632 06/26/20  0609   WBC 12.2* 9.7 11.1*   RBC 4.78 4.41 4.91   HGB 14.1 12.8* 14.3   HCT 42.1 39.0* 43.2   MCV 88.0 88.5 88.1   RDW 13.9 14.1 14.1    122* 144     BMP:   Recent Labs     06/24/20 2135 06/25/20  0632 06/26/20  0609   * 136 133*   K 4.1 3.8 4.2   CL 98* 102 96*   CO2 26 23 25   BUN 15 14 13   CREATININE 1.2 1.0 1.0     BNP: No results for input(s): BNP in the last 72 hours.   PT/INR: No results for input(s): PROTIME, INR in the last 72 hours. APTT: No results for input(s): APTT in the last 72 hours. CARDIAC ENZYMES:   Recent Labs     06/24/20  2135 06/25/20  1122 06/25/20  1730   TROPONINI <0.01 <0.01 <0.01     FASTING LIPID PANEL:  Lab Results   Component Value Date    CHOL 120 07/23/2019    HDL 53 07/23/2019    TRIG 126 07/23/2019     LIVER PROFILE:   Recent Labs     06/24/20  2135 06/25/20  0632 06/26/20  0609   AST 11* 10* 19   ALT <5* <5* <5*   BILITOT 2.5* 1.9* 1.7*   ALKPHOS 68 58 67        Radiology  CT CHEST PULMONARY EMBOLISM W CONTRAST   Final Result   1. No pulmonary embolism. 2. Multifocal pneumonia scattered throughout both lungs with minimal pleural   effusions. XR CHEST STANDARD (2 VW)   Final Result   No radiographic evidence of acute pulmonary disease. Assessment:  Principal Problem:    COVID-19  Active Problems:    Multifocal pneumonia    Orthostasis  Resolved Problems:    * No resolved hospital problems. 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 Started on  Dexamethasone  -Persistent fevers     UTI  - Rocephin --> switched to Cipro  - urine cx growing staph     Fatigue/light-headedness  - positive orthostatics in the ED  -Improved with IV hydration  - monitor on tele. - Serial troponin negative    Leukocytosis  - likely due to above  - resolved on repeat.      Elevated D-dimer  - CT negative for PE     Hyperlipidemia  - on statin.     Dyspepsia  -Add PPI     DVT Prophylaxis: Lovenox  Low fat diet  Code status: Full Code      Timoteo Montes MD 6/26/2020 10:19 AM

## 2020-06-26 NOTE — PROGRESS NOTES
Per pt request called Dtr Lu, & updated her on positive covid testing. DTR lives with pt.  Prateek Harman

## 2020-06-26 NOTE — PROGRESS NOTES
Pt positive COVID. Dr Shikha Chou updated. Spoke with Ryann Zimmer, clinical supervisor, & updated her with positive results bc pt's spouse is admitted on other unit in hospital & they do live together.  Clinical to update MD. Sofia Prieto

## 2020-06-26 NOTE — FLOWSHEET NOTE
06/26/20 1529   Vital Signs   Temp 102.1 °F (38.9 °C)   Temp Source Oral   Pulse 85   Heart Rate Source Monitor   Resp 18   BP (!) 153/75   Pain Assessment   Pain Level 0   Oxygen Therapy   SpO2 91 %   O2 Device None (Room air)   Pt given prn tylenol for fever of 102. 1. Dr. Lia Day into see patient. Pt denies further needs. No needs at this time. Will continue to monitor.

## 2020-06-27 PROBLEM — U07.1 COVID-19: Status: ACTIVE | Noted: 2020-06-27

## 2020-06-27 LAB
ORGANISM: ABNORMAL
URINE CULTURE, ROUTINE: ABNORMAL

## 2020-06-27 PROCEDURE — 6360000002 HC RX W HCPCS: Performed by: INTERNAL MEDICINE

## 2020-06-27 PROCEDURE — 2700000000 HC OXYGEN THERAPY PER DAY

## 2020-06-27 PROCEDURE — 2580000003 HC RX 258: Performed by: INTERNAL MEDICINE

## 2020-06-27 PROCEDURE — 99232 SBSQ HOSP IP/OBS MODERATE 35: CPT | Performed by: INTERNAL MEDICINE

## 2020-06-27 PROCEDURE — 6370000000 HC RX 637 (ALT 250 FOR IP): Performed by: INTERNAL MEDICINE

## 2020-06-27 PROCEDURE — 94761 N-INVAS EAR/PLS OXIMETRY MLT: CPT

## 2020-06-27 PROCEDURE — 1200000000 HC SEMI PRIVATE

## 2020-06-27 RX ORDER — CIPROFLOXACIN 250 MG/1
250 TABLET, FILM COATED ORAL EVERY 12 HOURS SCHEDULED
Status: DISCONTINUED | OUTPATIENT
Start: 2020-06-27 | End: 2020-06-29

## 2020-06-27 RX ADMIN — Medication 10 ML: at 20:18

## 2020-06-27 RX ADMIN — CIPROFLOXACIN 250 MG: 250 TABLET, FILM COATED ORAL at 20:18

## 2020-06-27 RX ADMIN — CIPROFLOXACIN 250 MG: 250 TABLET, FILM COATED ORAL at 08:47

## 2020-06-27 RX ADMIN — PANTOPRAZOLE SODIUM 40 MG: 40 TABLET, DELAYED RELEASE ORAL at 06:32

## 2020-06-27 RX ADMIN — Medication 10 ML: at 08:47

## 2020-06-27 RX ADMIN — ASPIRIN 81 MG 81 MG: 81 TABLET ORAL at 08:47

## 2020-06-27 RX ADMIN — ENOXAPARIN SODIUM 40 MG: 40 INJECTION SUBCUTANEOUS at 08:47

## 2020-06-27 RX ADMIN — DEXAMETHASONE SODIUM PHOSPHATE 6 MG: 4 INJECTION, SOLUTION INTRAMUSCULAR; INTRAVENOUS at 08:47

## 2020-06-27 RX ADMIN — ROSUVASTATIN CALCIUM 10 MG: 10 TABLET, FILM COATED ORAL at 08:47

## 2020-06-27 NOTE — PROGRESS NOTES
Handoff report and transfer of care given at bedside to [facility]. Patient in stable condition, denies needs/concerns at this time. Call light within reach.

## 2020-06-27 NOTE — PROGRESS NOTES
Pulmonary Progress Note  CC: Fever    Subjective: Fever    IV line peripheral    EXAM:   /60   Pulse 85   Temp 97.4 °F (36.3 °C) (Oral)   Resp 18   Ht 5' 7\" (1.702 m)   Wt 185 lb (83.9 kg)   SpO2 93%   BMI 28.98 kg/m²  on room air, 93% on RA on my recheck today  Constitutional:  No acute distress   HEENT: no scleral icterus  Neck: No tracheal deviation present. Cardiovascular: Normal heart sounds. Pulmonary/Chest: No wheezes. No rhonchi. No rales. No decreased breath sounds. No accessory muscle usage or stridor. Abdominal: Soft. Musculoskeletal: No cyanosis. No clubbing. Skin: Skin is warm and dry. Scheduled Meds:   ciprofloxacin  250 mg Oral 2 times per day    pantoprazole  40 mg Oral QAM AC    dexamethasone  6 mg Intravenous Daily    rosuvastatin  10 mg Oral Daily    aspirin  81 mg Oral Daily    sodium chloride flush  10 mL Intravenous 2 times per day    enoxaparin  40 mg Subcutaneous Daily     Continuous Infusions:    PRN Meds:  melatonin, aluminum & magnesium hydroxide-simethicone, ondansetron, sodium chloride flush, acetaminophen **OR** acetaminophen    Labs:  CBC:   Recent Labs     06/24/20  2135 06/25/20  0632 06/26/20  0609   WBC 12.2* 9.7 11.1*   HGB 14.1 12.8* 14.3   HCT 42.1 39.0* 43.2   MCV 88.0 88.5 88.1    122* 144     BMP:   Recent Labs     06/24/20 2135 06/25/20  0632 06/26/20  0609   * 136 133*   K 4.1 3.8 4.2   CL 98* 102 96*   CO2 26 23 25   BUN 15 14 13   CREATININE 1.2 1.0 1.0       Cultures:  6/24/2020 SARS-CoV-2 PCR positive  6/24/2020 urine staph epi    Chest imaging was reviewed by me and showed   CTPA 6/25/2020  1. No pulmonary embolism. 2. Multifocal pneumonia scattered throughout both lungs with minimal pleural   effusions.      ASSESSMENT:  · Fever  · COVID-19 viral pneumonia  · Orthostasis     PLAN:  · - COVID-19 isolation, droplet plus, test pending  -   · Okay to discontinue ceftriaxone and azithromycin from pulmonary perspective

## 2020-06-27 NOTE — PROGRESS NOTES
Shift assessment complete; see flow sheet. Scheduled medications administered; See MAR. IV flushed without difficulty. Pt denies pain at this time. Pt denies any needs at this time.  Pt educated on use of call light and to call out with needs, verbalized understanding, bed in low locked position for pt safety

## 2020-06-27 NOTE — PROGRESS NOTES
Pt placed on O2 2L via NC at this time. Oxygen was dropping to 87 while asleep. Pt denies any needs at this time.

## 2020-06-27 NOTE — PLAN OF CARE
Problem: SAFETY  Goal: Free from accidental physical injury  Outcome: Ongoing     Problem: KNOWLEDGE DEFICIT  Goal: Patient/S.O. demonstrates understanding of disease process, treatment plan, medications, and discharge instructions. Outcome: Ongoing     Problem: Airway Clearance - Ineffective:  Goal: Clear lung sounds  Description: Clear lung sounds  Outcome: Ongoing  Goal: Ability to maintain a clear airway will improve  Description: Ability to maintain a clear airway will improve  Outcome: Ongoing     Problem: Gas Exchange - Impaired:  Goal: Levels of oxygenation will improve  Description: Levels of oxygenation will improve  Outcome: Ongoing     Problem: Hyperthermia:  Goal: Ability to maintain a body temperature in the normal range will improve  Description: Ability to maintain a body temperature in the normal range will improve  Outcome: Ongoing     Problem: Airway Clearance - Ineffective  Goal: Achieve or maintain patent airway  Outcome: Ongoing     Problem: Gas Exchange - Impaired  Goal: Absence of hypoxia  Outcome: Ongoing  Goal: Promote optimal lung function  Outcome: Ongoing     Problem: Breathing Pattern - Ineffective  Goal: Ability to achieve and maintain a regular respiratory rate  Outcome: Ongoing     Problem:  Body Temperature -  Risk of, Imbalanced  Goal: Ability to maintain a body temperature within defined limits  Outcome: Ongoing  Goal: Will regain or maintain usual level of consciousness  Outcome: Ongoing  Goal: Complications related to the disease process, condition or treatment will be avoided or minimized  Outcome: Ongoing     Problem: Isolation Precautions - Risk of Spread of Infection  Goal: Prevent transmission of infection  Outcome: Ongoing     Problem: Nutrition Deficits  Goal: Optimize nutrtional status  Outcome: Ongoing     Problem: Risk for Fluid Volume Deficit  Goal: Maintain normal heart rhythm  Outcome: Ongoing  Goal: Maintain absence of muscle cramping  Outcome: Ongoing  Goal: Maintain normal serum potassium, sodium, calcium, phosphorus, and pH  Outcome: Ongoing     Problem: Loneliness or Risk for Loneliness  Goal: Demonstrate positive use of time alone when socialization is not possible  Outcome: Ongoing     Problem: Fatigue  Goal: Verbalize increase energy and improved vitality  Outcome: Ongoing     Problem: Patient Education: Go to Patient Education Activity  Goal: Patient/Family Education  Outcome: Ongoing

## 2020-06-27 NOTE — PROGRESS NOTES
Progress Note    Admit Date:  6/24/2020     Patient with COVID-19    He presented with complaints of fatigue, and fevers. He attended a [employer]  tournament about 2 weeks ago with over 200 people, most of them not wearing any masks. Having intermittent high fevers    Subjective:  Mr. Andie Cervantes states he feels better, still weak. Minimal cough and shortness of breath. Oxygen saturation stable on room air. Low-grade fevers today. T-max 102.1 °F last night    Objective:   /61   Pulse 62   Temp 97.3 °F (36.3 °C) (Oral)   Resp 18   Ht 5' 7\" (1.702 m)   Wt 185 lb (83.9 kg)   SpO2 93%   BMI 28.98 kg/m²       Intake/Output Summary (Last 24 hours) at 6/27/2020 1621  Last data filed at 6/27/2020 1441  Gross per 24 hour   Intake 900 ml   Output --   Net 900 ml         Physical Exam:  General:  Awake, alert, NAD  Skin:  Warm and dry  Neck:  JVD absent. Neck supple  Chest: Diminished breath sounds . + rhonchi  Cardiovascular:  RRR ,S1S2 normal  Abdomen:  Soft, non tender, non distended, BS +  Extremities:  No edema. Intact peripheral pulses. Brisk cap refill, < 2 secs  Neuro: non focal      Medications:   Scheduled Meds:   ciprofloxacin  250 mg Oral 2 times per day    pantoprazole  40 mg Oral QAM AC    dexamethasone  6 mg Intravenous Daily    rosuvastatin  10 mg Oral Daily    aspirin  81 mg Oral Daily    sodium chloride flush  10 mL Intravenous 2 times per day    enoxaparin  40 mg Subcutaneous Daily       Continuous Infusions:      Data:  CBC:   Recent Labs     06/24/20 2135 06/25/20 0632 06/26/20  0609   WBC 12.2* 9.7 11.1*   RBC 4.78 4.41 4.91   HGB 14.1 12.8* 14.3   HCT 42.1 39.0* 43.2   MCV 88.0 88.5 88.1   RDW 13.9 14.1 14.1    122* 144     BMP:   Recent Labs     06/24/20 2135 06/25/20  0632 06/26/20  0609   * 136 133*   K 4.1 3.8 4.2   CL 98* 102 96*   CO2 26 23 25   BUN 15 14 13   CREATININE 1.2 1.0 1.0     BNP: No results for input(s): BNP in the last 72 hours.   PT/INR: No results for input(s): PROTIME, INR in the last 72 hours. APTT: No results for input(s): APTT in the last 72 hours. CARDIAC ENZYMES:   Recent Labs     06/24/20 2135 06/25/20  1122 06/25/20  1730   TROPONINI <0.01 <0.01 <0.01     FASTING LIPID PANEL:  Lab Results   Component Value Date    CHOL 120 07/23/2019    HDL 53 07/23/2019    TRIG 126 07/23/2019     LIVER PROFILE:   Recent Labs     06/24/20  2135 06/25/20  0632 06/26/20  0609   AST 11* 10* 19   ALT <5* <5* <5*   BILITOT 2.5* 1.9* 1.7*   ALKPHOS 68 58 67        Radiology  CT CHEST PULMONARY EMBOLISM W CONTRAST   Final Result   1. No pulmonary embolism. 2. Multifocal pneumonia scattered throughout both lungs with minimal pleural   effusions. XR CHEST STANDARD (2 VW)   Final Result   No radiographic evidence of acute pulmonary disease. Cultures  COVID-19 positive  Urine cultures growing staph epidermidis  Respiratory cultures pending  Blood cultures no growth to date         Component Value Ref Range & Units Status Collected Lab   SARS-CoV-2, PCR DETECTEDAbnormal   Not Detected Final 06/24/2020 10:00 PM [address] Lab       Assessment:  Principal Problem:    COVID-19  Active Problems:    Multifocal pneumonia    Orthostasis    Sepsis ([address]    Urinary tract infection without hematuria  Resolved Problems:    * No resolved hospital problems. 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 Started on  Dexamethasone  -Persistent fevers     UTI  - Rocephin --> switched to Cipro  - urine cx growing staph     Fatigue/light-headedness  - positive orthostatics in the ED  -Improved with IV hydration  - monitor on tele. - Serial troponin negative    Leukocytosis  - likely due to above  - resolved on repeat.

## 2020-06-28 PROCEDURE — 99232 SBSQ HOSP IP/OBS MODERATE 35: CPT | Performed by: INTERNAL MEDICINE

## 2020-06-28 PROCEDURE — 1200000000 HC SEMI PRIVATE

## 2020-06-28 PROCEDURE — 2700000000 HC OXYGEN THERAPY PER DAY

## 2020-06-28 PROCEDURE — 6370000000 HC RX 637 (ALT 250 FOR IP): Performed by: INTERNAL MEDICINE

## 2020-06-28 PROCEDURE — 6360000002 HC RX W HCPCS: Performed by: INTERNAL MEDICINE

## 2020-06-28 PROCEDURE — 94761 N-INVAS EAR/PLS OXIMETRY MLT: CPT

## 2020-06-28 PROCEDURE — 2580000003 HC RX 258: Performed by: INTERNAL MEDICINE

## 2020-06-28 RX ORDER — TETRAHYDROZOLINE HCL 0.05 %
1 DROPS OPHTHALMIC (EYE) PRN
Status: DISCONTINUED | OUTPATIENT
Start: 2020-06-28 | End: 2020-07-20 | Stop reason: HOSPADM

## 2020-06-28 RX ORDER — KETOTIFEN FUMARATE 0.35 MG/ML
1 SOLUTION/ DROPS OPHTHALMIC 2 TIMES DAILY
Status: DISCONTINUED | OUTPATIENT
Start: 2020-06-28 | End: 2020-07-20 | Stop reason: HOSPADM

## 2020-06-28 RX ADMIN — CIPROFLOXACIN 250 MG: 250 TABLET, FILM COATED ORAL at 21:33

## 2020-06-28 RX ADMIN — ASPIRIN 81 MG 81 MG: 81 TABLET ORAL at 09:09

## 2020-06-28 RX ADMIN — CIPROFLOXACIN 250 MG: 250 TABLET, FILM COATED ORAL at 09:10

## 2020-06-28 RX ADMIN — DEXAMETHASONE SODIUM PHOSPHATE 6 MG: 4 INJECTION, SOLUTION INTRAMUSCULAR; INTRAVENOUS at 09:10

## 2020-06-28 RX ADMIN — ENOXAPARIN SODIUM 40 MG: 40 INJECTION SUBCUTANEOUS at 09:11

## 2020-06-28 RX ADMIN — MELATONIN 3 MG ORAL TABLET 3 MG: 3 TABLET ORAL at 21:33

## 2020-06-28 RX ADMIN — PANTOPRAZOLE SODIUM 40 MG: 40 TABLET, DELAYED RELEASE ORAL at 06:17

## 2020-06-28 RX ADMIN — Medication 10 ML: at 21:34

## 2020-06-28 RX ADMIN — ACETAMINOPHEN 650 MG: 325 TABLET ORAL at 09:09

## 2020-06-28 RX ADMIN — Medication 10 ML: at 09:10

## 2020-06-28 RX ADMIN — MELATONIN 3 MG ORAL TABLET 3 MG: 3 TABLET ORAL at 01:50

## 2020-06-28 RX ADMIN — KETOTIFEN FUMARATE 1 DROP: 0.35 SOLUTION/ DROPS OPHTHALMIC at 21:31

## 2020-06-28 RX ADMIN — ROSUVASTATIN CALCIUM 10 MG: 10 TABLET, FILM COATED ORAL at 09:09

## 2020-06-28 ASSESSMENT — PAIN SCALES - GENERAL
PAINLEVEL_OUTOF10: 6
PAINLEVEL_OUTOF10: 0
PAINLEVEL_OUTOF10: 0

## 2020-06-28 NOTE — PROGRESS NOTES
Progress Note    Admit Date:  6/24/2020     Patient with COVID-19  On droplet plus precautions      He presented with complaints of fatigue, and fevers. He attended a [employer]  tournament about 2 weeks ago with over 200 people, most of them not wearing any masks. Having intermittent high fevers    Subjective:  Mr. Mercedes Galvan feels ill today , persistent recurrent high fevers , T-max of 102.5 °F .   Progressive hypoxia , he was on room air now requiring oxygen 3 L. Weak,  Dyspneic. Minimal cough. Discussed with pulmonology. Plan to initiate Remdesivir today. Objective:   /71   Pulse 76   Temp 99.1 °F (37.3 °C) (Oral)   Resp 16   Ht 5' 7\" (1.702 m)   Wt 185 lb (83.9 kg)   SpO2 95%   BMI 28.98 kg/m²     No intake or output data in the 24 hours ending 06/28/20 1639      Physical Exam:  General:  Awake, alert, NAD  Looks fatigued  Skin:  Warm and dry  Neck:  JVD absent. Neck supple  Chest: Diminished breath sounds . + rhonchi  Cardiovascular:  RRR ,S1S2 normal  Abdomen:  Soft, non tender, non distended, BS +  Extremities:  No edema. Intact peripheral pulses. Brisk cap refill, < 2 secs  Neuro: non focal      Medications:   Scheduled Meds:   ciprofloxacin  250 mg Oral 2 times per day    pantoprazole  40 mg Oral QAM AC    dexamethasone  6 mg Intravenous Daily    rosuvastatin  10 mg Oral Daily    aspirin  81 mg Oral Daily    sodium chloride flush  10 mL Intravenous 2 times per day    enoxaparin  40 mg Subcutaneous Daily       Continuous Infusions:      Data:  CBC:   Recent Labs     06/26/20  0609   WBC 11.1*   RBC 4.91   HGB 14.3   HCT 43.2   MCV 88.1   RDW 14.1        BMP:   Recent Labs     06/26/20  0609   *   K 4.2   CL 96*   CO2 25   BUN 13   CREATININE 1.0     BNP: No results for input(s): BNP in the last 72 hours. PT/INR: No results for input(s): PROTIME, INR in the last 72 hours. APTT: No results for input(s): APTT in the last 72 hours.   CARDIAC ENZYMES:   Recent Labs 06/25/20  1730   TROPONINI <0.01     FASTING LIPID PANEL:  Lab Results   Component Value Date    CHOL 120 07/23/2019    HDL 53 07/23/2019    TRIG 126 07/23/2019     LIVER PROFILE:   Recent Labs     06/26/20  0609   AST 19   ALT <5*   BILITOT 1.7*   ALKPHOS 79        Radiology  CT CHEST PULMONARY EMBOLISM W CONTRAST   Final Result   1. No pulmonary embolism. 2. Multifocal pneumonia scattered throughout both lungs with minimal pleural   effusions. XR CHEST STANDARD (2 VW)   Final Result   No radiographic evidence of acute pulmonary disease. Cultures  COVID-19 positive  Urine cultures growing staph epidermidis  Respiratory cultures pending  Blood cultures no growth to date         Component Value Ref Range & Units Status Collected Lab   SARS-CoV-2, PCR DETECTEDAbnormal   Not Detected Final 06/24/2020 10:00 PM [address] Lab       Assessment:  Principal Problem:    COVID-19  Active Problems:    Multifocal pneumonia    Orthostasis    Sepsis ([address]    Urinary tract infection without hematuria  Resolved Problems:    * No resolved hospital problems. 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 Started on  Dexamethasone  -Persistent fevers, worsening hypoxia now. Plan initiate treatment with Remdesivir. May need convalescent plasma transfusion     UTI  - Rocephin --> switched to Cipro  - urine cx growing staph     Fatigue/light-headedness  - secondary to COVID 19  -positive orthostatics in the ED  -Improved with IV hydration  - monitor on tele. - Serial troponin negative    Leukocytosis  - likely due to above  - resolved on repeat.      Elevated D-dimer  - CT negative for PE     Hyperlipidemia  - on statin.     Dyspepsia  -Added

## 2020-06-28 NOTE — PLAN OF CARE
Problem: SAFETY  Goal: Free from accidental physical injury  Outcome: Ongoing     Problem: KNOWLEDGE DEFICIT  Goal: Patient/S.O. demonstrates understanding of disease process, treatment plan, medications, and discharge instructions. Outcome: Ongoing     Problem: Airway Clearance - Ineffective:  Goal: Clear lung sounds  Description: Clear lung sounds  Outcome: Ongoing  Goal: Ability to maintain a clear airway will improve  Description: Ability to maintain a clear airway will improve  Outcome: Ongoing     Problem: Gas Exchange - Impaired:  Goal: Levels of oxygenation will improve  Description: Levels of oxygenation will improve  Outcome: Ongoing     Problem: Hyperthermia:  Goal: Ability to maintain a body temperature in the normal range will improve  Description: Ability to maintain a body temperature in the normal range will improve  Outcome: Ongoing     Problem: Airway Clearance - Ineffective  Goal: Achieve or maintain patent airway  Outcome: Ongoing     Problem: Gas Exchange - Impaired  Goal: Absence of hypoxia  Outcome: Ongoing  Goal: Promote optimal lung function  Outcome: Ongoing     Problem: Breathing Pattern - Ineffective  Goal: Ability to achieve and maintain a regular respiratory rate  Outcome: Ongoing     Problem:  Body Temperature -  Risk of, Imbalanced  Goal: Ability to maintain a body temperature within defined limits  Outcome: Ongoing  Goal: Will regain or maintain usual level of consciousness  Outcome: Ongoing  Goal: Complications related to the disease process, condition or treatment will be avoided or minimized  Outcome: Ongoing     Problem: Isolation Precautions - Risk of Spread of Infection  Goal: Prevent transmission of infection  Outcome: Ongoing     Problem: Nutrition Deficits  Goal: Optimize nutrtional status  Outcome: Ongoing     Problem: Risk for Fluid Volume Deficit  Goal: Maintain normal heart rhythm  Outcome: Ongoing  Goal: Maintain absence of muscle cramping  Outcome: Ongoing  Goal:

## 2020-06-28 NOTE — PROGRESS NOTES
From [place] [facility]: \"Patients should have appropriate clinical and laboratory monitoring to aid in early detection of any potential adverse events. Monitor renal and hepatic function prior to initiating and daily during therapy with remdesivir; additionally monitor serum chemistries and hematology daily during therapy. Discontinue therapy in patients who develop ALT ? 5x ULN or ALT elevation accompanied by signs or symptoms of liver inflammation or increasing conjugated bilirubin, alkaline phosphatase, or INR. · Convalescent plasma consent was discussed with patient. However, he was still saturating 90% on RA on my check. If oxygenation deteriorates further, consider enrollment in [facility] trial ([email or web address] I left trial consent form with patient to review. Will type and screen in anticipation of potential enrollment.

## 2020-06-28 NOTE — PROGRESS NOTES
Handoff report and transfer of care given at bedside to [facility]. Patient in stable condition, denies needs/concerns at this time. Call light within reach.

## 2020-06-28 NOTE — PROGRESS NOTES
Shift assessment complete; see flow sheet. Scheduled medications administered; See MAR. IV flushed without difficulty. Pt 86% on RA, O2 2LPM nc placed. Pt denies pain at this time. Pt denies any needs at this time. Pt educated on use of call light and to call out with needs, verbalized understanding, bed in low locked position for pt safety.

## 2020-06-28 NOTE — FLOWSHEET NOTE
06/28/20 1905   Oxygen Therapy   SpO2 93 %   O2 Device Nasal cannula   O2 Flow Rate (L/min) 4 L/min   80 % 2lnc when writer went into room. Pt sitting up in chair. Stated having some shortness of breath when writer first came into room but it subsided after RN increased 02 to [address] [facility].    Report given to [facility] MEL Flores

## 2020-06-29 ENCOUNTER — APPOINTMENT (OUTPATIENT)
Dept: INTERVENTIONAL RADIOLOGY/VASCULAR | Age: 85
DRG: 870 | End: 2020-06-29
Payer: MEDICARE

## 2020-06-29 ENCOUNTER — APPOINTMENT (OUTPATIENT)
Dept: GENERAL RADIOLOGY | Age: 85
DRG: 870 | End: 2020-06-29
Payer: MEDICARE

## 2020-06-29 LAB
A/G RATIO: 1.2 (ref 1.1–2.2)
ABO/RH: NORMAL
ALBUMIN SERPL-MCNC: 3.5 G/DL (ref 3.4–5)
ALP BLD-CCNC: 73 U/L (ref 40–129)
ALT SERPL-CCNC: 11 U/L (ref 10–40)
ANION GAP SERPL CALCULATED.3IONS-SCNC: 13 MMOL/L (ref 3–16)
ANTIBODY SCREEN: NORMAL
AST SERPL-CCNC: 23 U/L (ref 15–37)
BANDED NEUTROPHILS RELATIVE PERCENT: 1 % (ref 0–7)
BASE EXCESS ARTERIAL: -1.2 MMOL/L (ref -3–3)
BASE EXCESS ARTERIAL: 2.4 MMOL/L (ref -3–3)
BASOPHILS ABSOLUTE: 0 K/UL (ref 0–0.2)
BASOPHILS RELATIVE PERCENT: 0 %
BILIRUB SERPL-MCNC: 0.8 MG/DL (ref 0–1)
BLOOD CULTURE, ROUTINE: NORMAL
BUN BLDV-MCNC: 19 MG/DL (ref 7–20)
CALCIUM SERPL-MCNC: 8.5 MG/DL (ref 8.3–10.6)
CARBOXYHEMOGLOBIN ARTERIAL: 0.3 % (ref 0–1.5)
CARBOXYHEMOGLOBIN ARTERIAL: 0.9 % (ref 0–1.5)
CHLORIDE BLD-SCNC: 97 MMOL/L (ref 99–110)
CO2: 26 MMOL/L (ref 21–32)
CREAT SERPL-MCNC: 1.2 MG/DL (ref 0.8–1.3)
CULTURE, BLOOD 2: NORMAL
EOSINOPHILS ABSOLUTE: 0 K/UL (ref 0–0.6)
EOSINOPHILS RELATIVE PERCENT: 0 %
GFR AFRICAN AMERICAN: >60
GFR NON-AFRICAN AMERICAN: 58
GLOBULIN: 3 G/DL
GLUCOSE BLD-MCNC: 103 MG/DL (ref 70–99)
GLUCOSE BLD-MCNC: 111 MG/DL (ref 70–99)
GLUCOSE BLD-MCNC: 176 MG/DL (ref 70–99)
GLUCOSE BLD-MCNC: 178 MG/DL (ref 70–99)
GLUCOSE BLD-MCNC: 84 MG/DL (ref 70–99)
HCO3 ARTERIAL: 23.6 MMOL/L (ref 21–29)
HCO3 ARTERIAL: 25.1 MMOL/L (ref 21–29)
HCT VFR BLD CALC: 42.1 % (ref 40.5–52.5)
HEMOGLOBIN, ART, EXTENDED: 13.2 G/DL (ref 13.5–17.5)
HEMOGLOBIN, ART, EXTENDED: 14.8 G/DL (ref 13.5–17.5)
HEMOGLOBIN: 13.8 G/DL (ref 13.5–17.5)
INR BLD: 1.14 (ref 0.86–1.14)
LYMPHOCYTES ABSOLUTE: 0.2 K/UL (ref 1–5.1)
LYMPHOCYTES RELATIVE PERCENT: 2 %
MCH RBC QN AUTO: 28.8 PG (ref 26–34)
MCHC RBC AUTO-ENTMCNC: 32.8 G/DL (ref 31–36)
MCV RBC AUTO: 87.9 FL (ref 80–100)
METHEMOGLOBIN ARTERIAL: 0.3 %
METHEMOGLOBIN ARTERIAL: 0.3 %
MONOCYTES ABSOLUTE: 0.4 K/UL (ref 0–1.3)
MONOCYTES RELATIVE PERCENT: 4 %
NEUTROPHILS ABSOLUTE: 10.5 K/UL (ref 1.7–7.7)
NEUTROPHILS RELATIVE PERCENT: 93 %
O2 CONTENT ARTERIAL: 18 ML/DL
O2 CONTENT ARTERIAL: 18 ML/DL
O2 SAT, ARTERIAL: 89.9 %
O2 SAT, ARTERIAL: 98.7 %
O2 THERAPY: ABNORMAL
O2 THERAPY: ABNORMAL
PCO2 ARTERIAL: 33.1 MMHG (ref 35–45)
PCO2 ARTERIAL: 40.1 MMHG (ref 35–45)
PDW BLD-RTO: 14.3 % (ref 12.4–15.4)
PERFORMED ON: ABNORMAL
PERFORMED ON: NORMAL
PH ARTERIAL: 7.39 (ref 7.35–7.45)
PH ARTERIAL: 7.5 (ref 7.35–7.45)
PLATELET # BLD: 167 K/UL (ref 135–450)
PLATELET SLIDE REVIEW: ADEQUATE
PMV BLD AUTO: 9.1 FL (ref 5–10.5)
PO2 ARTERIAL: 135.5 MMHG (ref 75–108)
PO2 ARTERIAL: 51.9 MMHG (ref 75–108)
POTASSIUM SERPL-SCNC: 3.8 MMOL/L (ref 3.5–5.1)
PROTHROMBIN TIME: 13.2 SEC (ref 10–13.2)
RBC # BLD: 4.79 M/UL (ref 4.2–5.9)
SLIDE REVIEW: ABNORMAL
SODIUM BLD-SCNC: 136 MMOL/L (ref 136–145)
TCO2 ARTERIAL: 24.9 MMOL/L
TCO2 ARTERIAL: 26.1 MMOL/L
TOTAL PROTEIN: 6.5 G/DL (ref 6.4–8.2)
WBC # BLD: 11.2 K/UL (ref 4–11)

## 2020-06-29 PROCEDURE — 86901 BLOOD TYPING SEROLOGIC RH(D): CPT

## 2020-06-29 PROCEDURE — C9113 INJ PANTOPRAZOLE SODIUM, VIA: HCPCS | Performed by: INTERNAL MEDICINE

## 2020-06-29 PROCEDURE — 87205 SMEAR GRAM STAIN: CPT

## 2020-06-29 PROCEDURE — 6370000000 HC RX 637 (ALT 250 FOR IP): Performed by: INTERNAL MEDICINE

## 2020-06-29 PROCEDURE — 99291 CRITICAL CARE FIRST HOUR: CPT | Performed by: INTERNAL MEDICINE

## 2020-06-29 PROCEDURE — 86900 BLOOD TYPING SEROLOGIC ABO: CPT

## 2020-06-29 PROCEDURE — 94002 VENT MGMT INPAT INIT DAY: CPT

## 2020-06-29 PROCEDURE — 85025 COMPLETE CBC W/AUTO DIFF WBC: CPT

## 2020-06-29 PROCEDURE — 94640 AIRWAY INHALATION TREATMENT: CPT

## 2020-06-29 PROCEDURE — 2500000003 HC RX 250 WO HCPCS: Performed by: INTERNAL MEDICINE

## 2020-06-29 PROCEDURE — 85610 PROTHROMBIN TIME: CPT

## 2020-06-29 PROCEDURE — 2580000003 HC RX 258: Performed by: INTERNAL MEDICINE

## 2020-06-29 PROCEDURE — 2700000000 HC OXYGEN THERAPY PER DAY

## 2020-06-29 PROCEDURE — 5A1955Z RESPIRATORY VENTILATION, GREATER THAN 96 CONSECUTIVE HOURS: ICD-10-PCS | Performed by: INTERNAL MEDICINE

## 2020-06-29 PROCEDURE — 6360000002 HC RX W HCPCS: Performed by: INTERNAL MEDICINE

## 2020-06-29 PROCEDURE — C1769 GUIDE WIRE: HCPCS

## 2020-06-29 PROCEDURE — 74018 RADEX ABDOMEN 1 VIEW: CPT

## 2020-06-29 PROCEDURE — 87070 CULTURE OTHR SPECIMN AEROBIC: CPT

## 2020-06-29 PROCEDURE — 94750 HC PULMONARY COMPLIANCE STUDY: CPT

## 2020-06-29 PROCEDURE — 89220 SPUTUM SPECIMEN COLLECTION: CPT

## 2020-06-29 PROCEDURE — 82803 BLOOD GASES ANY COMBINATION: CPT

## 2020-06-29 PROCEDURE — 2000000000 HC ICU R&B

## 2020-06-29 PROCEDURE — 0BH17EZ INSERTION OF ENDOTRACHEAL AIRWAY INTO TRACHEA, VIA NATURAL OR ARTIFICIAL OPENING: ICD-10-PCS | Performed by: INTERNAL MEDICINE

## 2020-06-29 PROCEDURE — 36600 WITHDRAWAL OF ARTERIAL BLOOD: CPT

## 2020-06-29 PROCEDURE — 2580000003 HC RX 258

## 2020-06-29 PROCEDURE — 36573 INSJ PICC RS&I 5 YR+: CPT

## 2020-06-29 PROCEDURE — 71045 X-RAY EXAM CHEST 1 VIEW: CPT

## 2020-06-29 PROCEDURE — 80053 COMPREHEN METABOLIC PANEL: CPT

## 2020-06-29 PROCEDURE — 02HV33Z INSERTION OF INFUSION DEVICE INTO SUPERIOR VENA CAVA, PERCUTANEOUS APPROACH: ICD-10-PCS | Performed by: INTERNAL MEDICINE

## 2020-06-29 PROCEDURE — 94761 N-INVAS EAR/PLS OXIMETRY MLT: CPT

## 2020-06-29 PROCEDURE — 86850 RBC ANTIBODY SCREEN: CPT

## 2020-06-29 PROCEDURE — 99292 CRITICAL CARE ADDL 30 MIN: CPT | Performed by: INTERNAL MEDICINE

## 2020-06-29 PROCEDURE — 36415 COLL VENOUS BLD VENIPUNCTURE: CPT

## 2020-06-29 RX ORDER — FENTANYL CITRATE 50 UG/ML
75 INJECTION, SOLUTION INTRAMUSCULAR; INTRAVENOUS ONCE
Status: COMPLETED | OUTPATIENT
Start: 2020-06-29 | End: 2020-06-29

## 2020-06-29 RX ORDER — MIDAZOLAM HYDROCHLORIDE 1 MG/ML
2 INJECTION INTRAMUSCULAR; INTRAVENOUS ONCE
Status: COMPLETED | OUTPATIENT
Start: 2020-06-29 | End: 2020-06-29

## 2020-06-29 RX ORDER — MIDAZOLAM HYDROCHLORIDE 1 MG/ML
2 INJECTION INTRAMUSCULAR; INTRAVENOUS
Status: DISCONTINUED | OUTPATIENT
Start: 2020-06-29 | End: 2020-07-15

## 2020-06-29 RX ORDER — 0.9 % SODIUM CHLORIDE 0.9 %
250 INTRAVENOUS SOLUTION INTRAVENOUS ONCE
Status: COMPLETED | OUTPATIENT
Start: 2020-06-29 | End: 2020-06-29

## 2020-06-29 RX ORDER — KETOROLAC TROMETHAMINE 30 MG/ML
INJECTION, SOLUTION INTRAMUSCULAR; INTRAVENOUS
Status: DISCONTINUED
Start: 2020-06-29 | End: 2020-06-29

## 2020-06-29 RX ORDER — PANTOPRAZOLE SODIUM 40 MG/10ML
40 INJECTION, POWDER, LYOPHILIZED, FOR SOLUTION INTRAVENOUS DAILY
Status: DISCONTINUED | OUTPATIENT
Start: 2020-06-29 | End: 2020-07-10

## 2020-06-29 RX ORDER — IPRATROPIUM BROMIDE AND ALBUTEROL SULFATE 2.5; .5 MG/3ML; MG/3ML
1 SOLUTION RESPIRATORY (INHALATION) EVERY 4 HOURS
Status: DISCONTINUED | OUTPATIENT
Start: 2020-06-29 | End: 2020-07-14

## 2020-06-29 RX ORDER — LIDOCAINE HYDROCHLORIDE 10 MG/ML
5 INJECTION, SOLUTION EPIDURAL; INFILTRATION; INTRACAUDAL; PERINEURAL ONCE
Status: COMPLETED | OUTPATIENT
Start: 2020-06-29 | End: 2020-06-29

## 2020-06-29 RX ORDER — SODIUM CHLORIDE 0.9 % (FLUSH) 0.9 %
10 SYRINGE (ML) INJECTION PRN
Status: DISCONTINUED | OUTPATIENT
Start: 2020-06-29 | End: 2020-06-29 | Stop reason: SDUPTHER

## 2020-06-29 RX ORDER — KETOROLAC TROMETHAMINE 30 MG/ML
15 INJECTION, SOLUTION INTRAMUSCULAR; INTRAVENOUS ONCE
Status: COMPLETED | OUTPATIENT
Start: 2020-06-29 | End: 2020-06-29

## 2020-06-29 RX ORDER — SODIUM CHLORIDE 0.9 % (FLUSH) 0.9 %
10 SYRINGE (ML) INJECTION EVERY 12 HOURS SCHEDULED
Status: DISCONTINUED | OUTPATIENT
Start: 2020-06-29 | End: 2020-06-29 | Stop reason: SDUPTHER

## 2020-06-29 RX ORDER — SODIUM CHLORIDE, SODIUM LACTATE, POTASSIUM CHLORIDE, CALCIUM CHLORIDE 600; 310; 30; 20 MG/100ML; MG/100ML; MG/100ML; MG/100ML
INJECTION, SOLUTION INTRAVENOUS
Status: COMPLETED
Start: 2020-06-29 | End: 2020-06-29

## 2020-06-29 RX ORDER — FENTANYL CITRATE 50 UG/ML
25 INJECTION, SOLUTION INTRAMUSCULAR; INTRAVENOUS
Status: DISCONTINUED | OUTPATIENT
Start: 2020-06-29 | End: 2020-07-12

## 2020-06-29 RX ORDER — CHLORHEXIDINE GLUCONATE 0.12 MG/ML
15 RINSE ORAL 2 TIMES DAILY
Status: DISCONTINUED | OUTPATIENT
Start: 2020-06-29 | End: 2020-07-15

## 2020-06-29 RX ORDER — SODIUM CHLORIDE, SODIUM LACTATE, POTASSIUM CHLORIDE, CALCIUM CHLORIDE 600; 310; 30; 20 MG/100ML; MG/100ML; MG/100ML; MG/100ML
INJECTION, SOLUTION INTRAVENOUS CONTINUOUS
Status: DISCONTINUED | OUTPATIENT
Start: 2020-06-29 | End: 2020-07-01

## 2020-06-29 RX ORDER — IPRATROPIUM BROMIDE AND ALBUTEROL SULFATE 2.5; .5 MG/3ML; MG/3ML
1 SOLUTION RESPIRATORY (INHALATION)
Status: DISCONTINUED | OUTPATIENT
Start: 2020-06-29 | End: 2020-06-29

## 2020-06-29 RX ORDER — MIDAZOLAM HYDROCHLORIDE 5 MG/ML
2.5 INJECTION INTRAMUSCULAR; INTRAVENOUS ONCE
Status: COMPLETED | OUTPATIENT
Start: 2020-06-29 | End: 2020-06-29

## 2020-06-29 RX ORDER — PROPOFOL 10 MG/ML
10 INJECTION, EMULSION INTRAVENOUS CONTINUOUS
Status: DISCONTINUED | OUTPATIENT
Start: 2020-06-29 | End: 2020-07-08

## 2020-06-29 RX ADMIN — Medication 10 ML: at 20:24

## 2020-06-29 RX ADMIN — FENTANYL CITRATE 100 MCG/HR: 50 INJECTION, SOLUTION INTRAMUSCULAR; INTRAVENOUS at 04:27

## 2020-06-29 RX ADMIN — CEFEPIME 2 G: 2 INJECTION, POWDER, FOR SOLUTION INTRAVENOUS at 09:18

## 2020-06-29 RX ADMIN — MIDAZOLAM HYDROCHLORIDE 2.5 MG: 5 INJECTION, SOLUTION INTRAMUSCULAR; INTRAVENOUS at 02:57

## 2020-06-29 RX ADMIN — SODIUM CHLORIDE, POTASSIUM CHLORIDE, SODIUM LACTATE AND CALCIUM CHLORIDE 1000 ML: 600; 310; 30; 20 INJECTION, SOLUTION INTRAVENOUS at 10:50

## 2020-06-29 RX ADMIN — CHLORHEXIDINE GLUCONATE 0.12% ORAL RINSE 15 ML: 1.2 LIQUID ORAL at 09:19

## 2020-06-29 RX ADMIN — FENTANYL CITRATE 75 MCG/HR: 50 INJECTION, SOLUTION INTRAMUSCULAR; INTRAVENOUS at 14:17

## 2020-06-29 RX ADMIN — SODIUM CHLORIDE 250 ML: 9 INJECTION, SOLUTION INTRAVENOUS at 04:10

## 2020-06-29 RX ADMIN — FENTANYL CITRATE 75 MCG: 50 INJECTION, SOLUTION INTRAMUSCULAR; INTRAVENOUS at 03:53

## 2020-06-29 RX ADMIN — MIDAZOLAM HYDROCHLORIDE 2 MG: 2 INJECTION, SOLUTION INTRAMUSCULAR; INTRAVENOUS at 03:36

## 2020-06-29 RX ADMIN — CEFEPIME 2 G: 2 INJECTION, POWDER, FOR SOLUTION INTRAVENOUS at 20:12

## 2020-06-29 RX ADMIN — KETOROLAC TROMETHAMINE 15 MG: 30 INJECTION, SOLUTION INTRAMUSCULAR at 05:03

## 2020-06-29 RX ADMIN — KETOTIFEN FUMARATE 1 DROP: 0.35 SOLUTION/ DROPS OPHTHALMIC at 09:22

## 2020-06-29 RX ADMIN — ENOXAPARIN SODIUM 40 MG: 40 INJECTION SUBCUTANEOUS at 09:18

## 2020-06-29 RX ADMIN — PANTOPRAZOLE SODIUM 40 MG: 40 INJECTION, POWDER, FOR SOLUTION INTRAVENOUS at 09:19

## 2020-06-29 RX ADMIN — IPRATROPIUM BROMIDE AND ALBUTEROL SULFATE 1 AMPULE: .5; 3 SOLUTION RESPIRATORY (INHALATION) at 11:53

## 2020-06-29 RX ADMIN — DEXAMETHASONE SODIUM PHOSPHATE 6 MG: 4 INJECTION, SOLUTION INTRAMUSCULAR; INTRAVENOUS at 09:19

## 2020-06-29 RX ADMIN — NOREPINEPHRINE BITARTRATE 2 MCG/MIN: 1 INJECTION INTRAVENOUS at 15:53

## 2020-06-29 RX ADMIN — IPRATROPIUM BROMIDE AND ALBUTEROL SULFATE 1 AMPULE: .5; 3 SOLUTION RESPIRATORY (INHALATION) at 19:37

## 2020-06-29 RX ADMIN — VANCOMYCIN HYDROCHLORIDE 1250 MG: 1 INJECTION, POWDER, LYOPHILIZED, FOR SOLUTION INTRAVENOUS at 13:17

## 2020-06-29 RX ADMIN — KETOTIFEN FUMARATE 1 DROP: 0.35 SOLUTION/ DROPS OPHTHALMIC at 20:14

## 2020-06-29 RX ADMIN — ROSUVASTATIN CALCIUM 10 MG: 10 TABLET, FILM COATED ORAL at 09:19

## 2020-06-29 RX ADMIN — IPRATROPIUM BROMIDE AND ALBUTEROL SULFATE 1 AMPULE: .5; 3 SOLUTION RESPIRATORY (INHALATION) at 22:33

## 2020-06-29 RX ADMIN — IPRATROPIUM BROMIDE AND ALBUTEROL SULFATE 1 AMPULE: .5; 3 SOLUTION RESPIRATORY (INHALATION) at 08:04

## 2020-06-29 RX ADMIN — PROPOFOL 15 MCG/KG/MIN: 10 INJECTION, EMULSION INTRAVENOUS at 02:51

## 2020-06-29 RX ADMIN — ENOXAPARIN SODIUM 40 MG: 40 INJECTION SUBCUTANEOUS at 20:13

## 2020-06-29 RX ADMIN — ASPIRIN 81 MG 81 MG: 81 TABLET ORAL at 09:19

## 2020-06-29 RX ADMIN — PROPOFOL 25 MCG/KG/MIN: 10 INJECTION, EMULSION INTRAVENOUS at 06:26

## 2020-06-29 RX ADMIN — MIDAZOLAM 2 MG: 1 INJECTION INTRAMUSCULAR; INTRAVENOUS at 03:05

## 2020-06-29 RX ADMIN — LIDOCAINE HYDROCHLORIDE 5 ML: 10 INJECTION, SOLUTION EPIDURAL; INFILTRATION; INTRACAUDAL; PERINEURAL at 10:56

## 2020-06-29 RX ADMIN — IPRATROPIUM BROMIDE AND ALBUTEROL SULFATE 1 AMPULE: .5; 3 SOLUTION RESPIRATORY (INHALATION) at 15:45

## 2020-06-29 RX ADMIN — SODIUM CHLORIDE 250 ML: 9 INJECTION, SOLUTION INTRAVENOUS at 05:58

## 2020-06-29 RX ADMIN — CHLORHEXIDINE GLUCONATE 0.12% ORAL RINSE 15 ML: 1.2 LIQUID ORAL at 20:12

## 2020-06-29 RX ADMIN — Medication 10 ML: at 09:18

## 2020-06-29 RX ADMIN — PROPOFOL 30 MCG/KG/MIN: 10 INJECTION, EMULSION INTRAVENOUS at 20:38

## 2020-06-29 RX ADMIN — PROPOFOL 25 MCG/KG/MIN: 10 INJECTION, EMULSION INTRAVENOUS at 15:49

## 2020-06-29 RX ADMIN — SODIUM CHLORIDE 250 ML: 9 INJECTION, SOLUTION INTRAVENOUS at 09:16

## 2020-06-29 ASSESSMENT — PULMONARY FUNCTION TESTS
PIF_VALUE: 27
PIF_VALUE: 25
PIF_VALUE: 26
PIF_VALUE: 25
PIF_VALUE: 25
PIF_VALUE: 29
PIF_VALUE: 26
PIF_VALUE: 30
PIF_VALUE: 28
PIF_VALUE: 26
PIF_VALUE: 30
PIF_VALUE: 31
PIF_VALUE: 27
PIF_VALUE: 28
PIF_VALUE: 25
PIF_VALUE: 27
PIF_VALUE: 27
PIF_VALUE: 31
PIF_VALUE: 28
PIF_VALUE: 24
PIF_VALUE: 25
PIF_VALUE: 24
PIF_VALUE: 32
PIF_VALUE: 24
PIF_VALUE: 27
PIF_VALUE: 25
PIF_VALUE: 30
PIF_VALUE: 31
PIF_VALUE: 28
PIF_VALUE: 29
PIF_VALUE: 28
PIF_VALUE: 25
PIF_VALUE: 25
PIF_VALUE: 24
PIF_VALUE: 28
PIF_VALUE: 26
PIF_VALUE: 26

## 2020-06-29 ASSESSMENT — PAIN SCALES - GENERAL
PAINLEVEL_OUTOF10: 0
PAINLEVEL_OUTOF10: 4
PAINLEVEL_OUTOF10: 0

## 2020-06-29 NOTE — PROGRESS NOTES
Telephone consent obtained from wife, Mone Kahn, for PICC placement. Verified with Chris Ndiaye RN. All questions answered at this time.

## 2020-06-29 NOTE — PLAN OF CARE
Nutrition Problem: Inadequate oral intake  Intervention: Food and/or Nutrient Delivery: Continue NPO, Start Tube Feeding  Nutritional Goals: patient will tolerate Vital High-Protein at goal rate of 60 ml/hr x 20 hours without GI distress, without s/s of aspiration, and without additional lab/fluid disturbances

## 2020-06-29 NOTE — PROGRESS NOTES
Shift assessment completed, see flow sheet. Pt not following commands with a RASS of -2 Intubated and sedated on AC #8 ETT, at 23 LL. RR 16 /  / FiO2 100% / PEEP 14. NSR on monitor, HR 69, BP 88/52, SpO2 96%. Respirations are easy, even, and unlabored. Bilateral lung sounds diminished. OG in place at 64, with TF at low wall suction. 2 PIVs in place, WNL with fentanyl gtt infusing at 75 mcg/hr and propofol gtt infusing at 25 mcg/kg/hr. Fischer in place and patent with STAT lock. Bilateral soft wrist restraints in place for pt safety. Call light within reach. Bed in lowest position. Bed alarm on. Will continue to monitor.

## 2020-06-29 NOTE — PROGRESS NOTES
Was called by aide that pt O2 was in 66's, went to assess pt. Pt alert and orientated, Bumped Oxygen to 6L nc, pt only went up to 80%, Called respiratory, Called Clinical, called Charge. Placed On non-rebreather on 15L, Pt still only up to 87% Glucose checked,    0210- Phycisian called. Pt transferred to ICU.

## 2020-06-29 NOTE — PROCEDURES
Pt was emergently intubated secondary . Pt was sedated with 80 mg Propofol and 50 mg rocuronium. Pt was intubated in a single attempt using a Glide scope with a size 4 MAC in place. The tip of a size 8 ETT was inserted between the cords and then advanced off of the stylette into the air way to 23 cm from the lip. Cuff was inflated and secured. Good CO2 color change and bilateral breath sounds auscultated. Vent settings provided to respiratory staff. OG was passed to 64 cm from the lip. CXR ordered and pending to verify tube placement.

## 2020-06-29 NOTE — PROGRESS NOTES
06/29/20 0257   Vent Information   Skin Assessment Clean, dry, & intact   Suction Catheter Diameter 14   Vent Type 840   Vent Mode AC/VC   Vt Ordered 450 mL   Rate Set 16 bmp   Peak Flow 60 L/min   Pressure Support 0 cmH20   FiO2  100 %   Sensitivity 3   PEEP/CPAP 10   I Time/ I Time % 0 s   Vent Patient Data   High Peep/I Pressure 0   Peak Inspiratory Pressure 28 cmH2O   Mean Airway Pressure 14 cmH20   Rate Measured 19 br/min   Vt Exhaled 447 mL   Minute Volume 8.88 Liters   I:E Ratio 1:2.40   Cough/Sputum   Sputum How Obtained Suctioned;Endotracheal   Cough Non-productive   Spontaneous Breathing Trial (SBT) RT Doc   Pulse 91   Breath Sounds   Right Upper Lobe Diminished   Right Middle Lobe Diminished   Right Lower Lobe Diminished   Left Upper Lobe Diminished   Left Lower Lobe Diminished   Additional Respiratory  Assessments   Resp 21   Position Semi-Harris's   Alarm Settings   High Pressure Alarm 40 cmH2O   Low Minute Volume Alarm 2.5 L/min   Apnea (secs) 20 secs   High Respiratory Rate 50 br/min   Low Exhaled Vt  250 mL   ETT (adult)   Placement Date/Time: 06/29/20 0230   Preoxygenation: Yes  Mask Ventilation: Ventilated by mask (1)  Technique: Video laryngoscopy  Type: Cuffed  Tube Size: 8 mm  Laryngoscope: GlideScope  Blade Size: 4  Location: Oral  Insertion attempts: 1  Placement. ..    Secured at 23 cm   Measured From [address] [place] [address],[address] By [employer] tube leblanc   Site Condition Dry

## 2020-06-29 NOTE — PROGRESS NOTES
Dr. Maya Roy rounding on the floor. Updated on the patient in regards to overnight events and current status. No new orders at this time.

## 2020-06-29 NOTE — PROGRESS NOTES
Called by staff to evaluate patient whose O2 requirements increasing. Dr. Williams Denise notified and in room. Patient increased to 100%NRB with O2 sats only 80-84%. Transferred to ICU. ABG attempted. Vapotherm started- O2 sats remain 87-90%. ABG obtained after several sticks on vapotherm. 2883- Patient a/o x4 Intubation explained to patient by Dr. Wililams Denise and resp. Staff RN to update patient's wife. 0- Texted daughter Laila Iglesias at patient's request.  Aaronjonn Iglesias then called and spoke with patient, updated her and assured her staff would text her after procedure.

## 2020-06-29 NOTE — PROGRESS NOTES
Pt opens eyes, coughing, restless. O2 sat 84-88%  Suctioned for small amount clear. PRN Versed given again.

## 2020-06-29 NOTE — PROGRESS NOTES
Pulmonary & Critical Care Medicine ICU Progress Note    CC: Acute hypoxic respiratory failure secondary to COVID-19    Events of Last 24 hours: Required intubation overnight for progressive respiratory failure with resistant hypoxia. The patient is now maintaining his oxygenation on the ventilator. He developed hypotension with sedation and has required some IV fluid. He had a fever of 104. 2. Propofol gtt 25  Fentanyl gtt 75    Invasive Lines: PICC 6/29/20    MV:  6/29/20  Vent Mode: AC/VC Rate Set: 16 bmp/Vt Ordered: 450 mL/ /FiO2 : (S) 90 %  Recent Labs     06/29/20  0220 06/29/20 0645   PHART 7.497* 7.388   HWH0IRX 33.1* 40.1   PO2ART 51.9* 135.5*       IV:   propofol 25 mcg/kg/min (06/29/20 0626)    fentaNYL (SUBLIMAZE) infusion 75 mcg/hr (06/29/20 0617)       Vitals:  BP (!) 88/52   Pulse 68   Temp 99.9 °F (37.7 °C) (Rectal)   Resp 16   Ht 5' 7\" (1.702 m)   Wt 186 lb (84.4 kg)   SpO2 97%   BMI 29.13 kg/m²   on vent    Intake/Output Summary (Last 24 hours) at 6/29/2020 0817  Last data filed at 6/29/2020 0600  Gross per 24 hour   Intake 303.42 ml   Output 50 ml   Net 253.42 ml     EXAM:  Constitutional: ill appearing. Not in distress. Eyes: PERRL. No sclera icterus. ENT: Normal Nose. Normal Tongue. Neck:  Trachea is midline. No thyroid tenderness. Respiratory: No accessory muscle usage. no decreased breath sounds. No wheezes. No rales. No Rhonchi. Cardiovascular: Normal S1S2. No murmur. No digit cyanosis. No digit clubbing. No LE edema. GI: Non-tender. Non-distended. Normal bowel sounds. No masses. No umbilical hernia. Skin: No rash on the exposed extremities. No Nodules on exposed extremities. Neuro: Sedated. Moves all extremities. Psych: No agitation, no anxiety.     Scheduled Meds:   chlorhexidine  15 mL Mouth/Throat BID    pantoprazole  40 mg Intravenous Daily    ipratropium-albuterol  1 ampule Inhalation Q4H    ketorolac        lidocaine 1 % injection  5 mL Intradermal Once  sodium chloride  250 mL Intravenous Once    remdesivir IVPB  100 mg Intravenous Q24H    ketotifen  1 drop Both Eyes BID    enoxaparin  40 mg Subcutaneous BID    ciprofloxacin  250 mg Oral 2 times per day    dexamethasone  6 mg Intravenous Daily    rosuvastatin  10 mg Oral Daily    aspirin  81 mg Oral Daily    sodium chloride flush  10 mL Intravenous 2 times per day     PRN Meds:  fentanNYL, midazolam, tetrahydrozoline, melatonin, aluminum & magnesium hydroxide-simethicone, ondansetron, sodium chloride flush, acetaminophen **OR** acetaminophen    Results:  CBC:   Recent Labs     06/29/20  0330   WBC 11.2*   HGB 13.8   HCT 42.1   MCV 87.9        BMP:   Recent Labs     06/29/20  0330      K 3.8   CL 97*   CO2 26   BUN 19   CREATININE 1.2     LIVER PROFILE:   Recent Labs     06/29/20  0330   AST 23   ALT 11   BILITOT 0.8   ALKPHOS 73       Cultures:      Films:  CXR was reviewed by me and it showed ETT in place  Multifocal airspace opacities and areas of atelectasis correlate with recent   CT chest.       ASSESSMENT:  · Acute hypoxic respiratory failure due to COVID 19  · ARDS  · COVID 19 viral pneumona      PLAN:   Mechanical ventilation as per my orders. The ventilator was adjusted by me at the bedside for unstable, life threatening respiratory failure. IV Propofol and Fentanyl gtt for sedation, target RASS -2, with daily spontaneous awakening trial.   Head of bed 30 degrees or higher at all times  Daily spontaneous breathing trial once PEEP less than 8, FiO2 less than 55%. · Start Vanc and Cefepime and send a tracheal aspirate  · Decadron 6 mg IV daily  · Lovenox BID for DVT prophylaxis  · Remdesevir D#2/5. Monitor renal, cbc, hepatic function daily during therapy. with remdesivir;  Discontinue therapy in patients who develop ALT ? 5x ULN or ALT elevation accompanied by signs or symptoms of liver inflammation or increasing conjugated bilirubin, alkaline phosphatase, or INR.   · Daily Ddimer and INR  · Convalescent plasma consent was discussed with patient by Dr. Jiménez Medicine yesterday. Consider enrollment in [facility] trial ([email or web address] · PICC  · Caution with IVF LR @ 75/hr  · Start TF's  · Total critical care time caring for this patient with life threatening, unstable organ failure, including direct patient contact, management of life support systems, review of data including imaging and labs, discussions with other team members and physicians at least 68 minutes so far today, excluding procedures.

## 2020-06-29 NOTE — PROGRESS NOTES
Patient starting to rest.  BP (!) 95/55   Pulse 82   Temp 104.2 °F (40.1 °C) (Rectal)   Resp 18   Ht 5' 7\" (1.702 m)   Wt 185 lb (83.9 kg)   SpO2 93%   BMI 28.98 kg/m²   Texted daughter to update on patient condition.

## 2020-06-29 NOTE — PROGRESS NOTES
Patient intubated 8.0 23 at the lip. Good color change. Place patient on vent A/C 450/16/100%/+10.   Kayla Portillo RRT

## 2020-06-29 NOTE — PROGRESS NOTES
OB Preparing for intubation. New IV placed. 1043- 80mg Propofol pushed by Dr. Amarjit Gill. O2 sats 86   BP- 169/77  0238- 50mg. Rocuronium IVP by Staff RN    6157- Pt intubated 8 ETT using glidoscope without difficulty. 23 at the lip. Good color change. OG placed 63cm- air bolus heard. Patient on vent- O2 sats 92% on 100% fiO2.

## 2020-06-29 NOTE — PROGRESS NOTES
Shift assessment complete; see flow sheet. Scheduled medications administered; See MAR. IV infusing without difficulty. O2 4LPM nc in place. Pt denies pain at this time. Pt denies any needs at this time.  Pt educated on use of call light and to call out with needs, verbalized understanding, bed in low locked position for pt safety

## 2020-06-29 NOTE — FLOWSHEET NOTE
06/29/20 1545   Vitals   BP (!) 92/51   MAP (mmHg) (!) 64     levophed started at 2 mcg/min to keep map greater than 65

## 2020-06-29 NOTE — PROGRESS NOTES
RESPIRATORY THERAPY ASSESSMENT    Name:  Xi Campuzano  Medical Record Number:  [id number]  Age: 82 y.o. Gender: male  : 1935  Today's Date:  2020  Room:  3013/3013-01    Assessment     Is the patient being admitted for a COPD or Asthma exacerbation? No   (If yes the patient will be seen every 4 hours for the first 24 hours and then reassessed)    Patient Admission Diagnosis      Allergies  Allergies   Allergen Reactions    Buspar [Buspirone] Other (See Comments)     Face,face numb    Codeine Other (See Comments)     Face,lips numb    Methylphenidate Other (See Comments)     Face numbness    Ritalin [Methylphenidate Hcl] Other (See Comments)     Face. lips numb    Zoloft [Sertraline Hcl] Other (See Comments)     Numbness of lips       Minimum Predicted Vital Capacity:               Actual Vital Capacity:                    Pulmonary History:No history  Home Oxygen Therapy:  room air  Home Respiratory Therapy:None   Current Respiratory Therapy:  duoneb q4wa          Respiratory Severity Index(RSI)   Patients with orders for inhalation medications, oxygen, or any therapeutic treatment modality will be placed on Respiratory Protocol. They will be assessed with the first treatment and at least every 72 hours thereafter. The following severity scale will be used to determine frequency of treatment intervention. Smoking History: Pulmonary Disease or Smoking History, Greater than 15 pack year = 2    Social History  Social History     Tobacco Use    Smoking status: Former Smoker     Packs/day: 1.00     Years: 25.00     Pack years: 25.00     Types: Cigarettes     Start date: 1990    Smokeless tobacco: Never Used   Substance Use Topics    Alcohol use:  Yes     Alcohol/week: 1.0 standard drinks     Types: 1 Cans of beer per week     Comment: occasional    Drug use: No       Recent Surgical History: None = 0  Past Surgical History  Past Surgical History:   Procedure Laterality Date    BACK minute, pending direction from physician. 3. Bronchodilators will be administered via Metered Dose Inhaler (MDI) with spacer when the following criteria are met:  a. Alert and cooperative     b. HR < 140 bpm  c. RR < 30 bpm                d. Can demonstrate a 2-3 second inspiratory hold  4. Bronchodilators will be administered via Hand Held Nebulizer [facility] to patients when ANY of the following criteria are met  a. Incognizant or uncooperative          b. Patients treated with HHN at Home        c. Unable to demonstrate proper use of MDI with spacer     d. RR > 30 bpm   5. Bronchodilators will be delivered via Metered Dose Inhaler (MDI), HHN, Aerogen to intubated patients on mechanical ventilation. 6. Inhalation medication orders will be delivered and/or substituted as outlined below. Aerosolized Medications Ordering and Administration Guidelines:    1. All Medications will be ordered by a physician, and their frequency and/or modality will be adjusted as defined by the patients Respiratory Severity Index (RSI) score. 2. If the patient does not have documented COPD, consider discontinuing anticholinergics when RSI is less than 9.  3. If the bronchospasm worsens (increased RSI), then the bronchodilator frequency can be increased to a maximum of every 4 hours. If greater than every 4 hours is required, the physician will be contacted. 4. If the bronchospasm improves, the frequency of the bronchodilator can be decreased, based on the patient's RSI, but not less than home treatment regimen frequency. 5. Bronchodilator(s) will be discontinued if patient has a RSI less than 9 and has received no scheduled or as needed treatment for 72  Hrs. Patients Ordered on a Mucolytic Agent:    1. Must always be administered with a bronchodilator. 2. Discontinue if patient experiences worsened bronchospasm, or secretions have lessened to the point that the patient is able to clear them with a cough.     Anti-inflammatory and Combination Medications:    1. If the patient lacks prior history of lung disease, is not using inhaled anti-inflammatory medication at home, and lacks wheezing by examination or by history for at least 24 hours, contact physician for possible discontinuation.

## 2020-06-29 NOTE — PROGRESS NOTES
Nutrition Assessment (Enteral Nutrition)    Type and Reason for Visit: Initial, Consult(consult for TF ordering and management)    Nutrition Recommendations:   1. Start TF - Vital AF 1.2 (\"semi-elemental\" formula in Epic) with a goal rate of 60 ml/hr x 20 hours. Start with 20 ml/hr and increase by 20 ml every 4 hours, as tolerated by patient, until goal rate can be achieved and maintained. Water flushes, 50 ml every 2 hours or per MD guidance. 2. Monitor TF start, rate, intake, and tolerance + water flushes. 3. Monitor vent status, sedation type/amount, TG trends, and plan of care. 4. Please obtain an actual, current weight for this patient. 5. Monitor nutrition-related labs, bowel function, and weight trends. Nutrition Assessment: patient was adequately nourished upon admission AEB adequate po intake and stable weight x at least 6 months PTA, however, patient is at risk for nutritional compromise at this time d/t diagnosis of COVID-19, intubation early this am + NPO status, and altered nutrition-related labs; will order TF so it can be started today    Malnutrition Assessment:  · Malnutrition Status: At risk for malnutrition  · Context: Acute illness or injury  · Findings of the 6 clinical characteristics of malnutrition (Minimum of 2 out of 6 clinical characteristics is required to make the diagnosis of moderate or severe Protein Calorie Malnutrition based on AND/ASPEN Guidelines):  1. Energy Intake-Less than or equal to 75% of estimated energy requirement, (x 1-2 days admission)    2. Weight Loss-No significant weight loss, in 6 months  3. Fat Loss-Unable to assess(no NFPE d/t COVID-19 precautions),    4. Muscle Loss-Unable to assess(no NFPE d/t COVID-19 precautions),    5. Fluid Accumulation-No significant fluid accumulation,    6.   Strength-Not measured    Nutrition Risk Level: High    Nutrition Needs:  · Estimated Daily Total Kcal: 1700 - 1870 kcals based on 20-22 kcals/kg/CBW  · Estimated Daily Protein (g): 80 - 101 g protein based on 1.2-1.5 g/kg/IBW  · Estimated Daily Fluid (ml/day): 1700 - 1900 ml     Nutrition Diagnosis:   · Problem: Inadequate oral intake  · Etiology: related to Impaired respiratory function-inability to consume food     Signs and symptoms:  as evidenced by NPO status due to medical condition, Intubation    Objective Information:  · Nutrition-Focused Physical Findings: patient is not available for NFPE d/t COVID-19 + status; per EMR - he was intubated early this am; he is on 25 mcg propofol; his medical status has worsened instead of improved; no BM x 4 days admission documented; LR at 75 ml/hr started this am; PICC placed this am; he is receiving Remdesevir for COVID-19  · Wound Type: None  · Current Nutrition Therapies:  · Oral Diet Orders: NPO   · Oral Diet intake: NPO  · Oral Nutrition Supplement (ONS) Orders: None  · ONS intake: NPO  · Tube Feeding (TF) Orders:   · Feeding Route: Orogastric  · Formula: Semi-elemental  · Rate (ml/hr):60 ml/hr     · Volume (ml/day): 1200 ml   · Duration: Continuous  · Additives/Modulars: (none)  · Water Flushes: 50 ml water flushes every 2 hours  · Current TF & Flush Orders Provides: TF to be started today  · Goal TF & Flush Orders Provides: Vital AF 1.2 with a goal rate of 60 ml/hr x 20 hours = 1200 ml TV, 1440 kcals, 90 g protein, and 973 ml free water + water flushes of 50 ml every 2 hours or per MD guidance  · Additional Calories: propofol at 25 mcg x 24 hours = 337 kcals from lipids  · Anthropometric Measures:  · Ht: 5' 7\" (170.2 cm)   · Current Body Wt: 186 lb (84.4 kg)(unsure whether stated or actual weight; obtained on 6/29/20)  · Admission Body Wt: 185 lb (83.9 kg)(stated weight obtained on 6/24/20)  · Usual Body Wt: (180's, per past PCP encounters)  · Weight Change: no significant weight changes/losses x at least 6 months   · Ideal Body Wt: 148 lb (67.1 kg), % Ideal Body 126%  · BMI Classification: BMI

## 2020-06-29 NOTE — PROGRESS NOTES
D: Patient is here from [place] [id number] via hospital bed, clinical , RT, RN, and Dr. Caitlin Puckett present, patient is on tele-monitor and oxygen. A: Patient lifted onto ICU bed, vital signs obtained and recorded, assessment completed and documented, greeted patient and oriented to room, unit, plan of care and treatment. Dr. Caitlin Puckett at bedside with patient to discuss plan of care.

## 2020-06-29 NOTE — PROGRESS NOTES
Pharmacy Note  Vancomycin Consult    Kole Ferrell is a 82 y.o. male started on Vancomycin for PNA; consult received from Dr. Mone Marsh to manage therapy. Also receiving the following antibiotics: cefepime. Patient Active Problem List   Diagnosis    Multifocal pneumonia    Orthostasis    COVID-19    Sepsis ([facility] [address]    Urinary tract infection without hematuria    ARDS (adult respiratory distress syndrome) ([facility] [address]       Allergies:  Buspar [buspirone]; Codeine; Methylphenidate; Ritalin [methylphenidate hcl]; and Zoloft [sertraline hcl]     Temp max:     Recent Labs     06/29/20  0330   BUN 19       Recent Labs     06/29/20  0330   CREATININE 1.2       Recent Labs     06/29/20  0330   WBC 11.2*         Intake/Output Summary (Last 24 hours) at 6/29/2020 1208  Last data filed at 6/29/2020 0800  Gross per 24 hour   Intake 480.42 ml   Output 60 ml   Net 420.42 ml       Culture Date      Source                       Results  NGTD-Blood  Urine-Staph  Covid positive      Ht Readings from Last 1 Encounters:   06/29/20 5' 7\" (1.702 m)        Wt Readings from Last 1 Encounters:   06/29/20 186 lb (84.4 kg)         Body mass index is 29.13 kg/m². Estimated Creatinine Clearance: 49 mL/min (based on SCr of 1.2 mg/dL). Goal Trough Level: 12-18 mcg/mL    Assessment/Plan:  Will initiate Vancomycin 1250mg q24h, trough due on 7/2 at 1230    Thank you for the consult. Will continue to follow.   Sonja Silverio Pharm D 6/29/202012:15 PM  .

## 2020-06-29 NOTE — PROGRESS NOTES
Care rounds completed with Dr. Carmen Amato and multidisciplinary team. Reviewed labs, meds, VS, assessment, & plan of care for today.  See dictated note and new orders for details.    -Start LR at 75 ml/hr for BP  -Start tube feed through OG  -If BP does not improve after an hour of tube feed and IVF, call for pressors  -Possibly give convalescent plasma if pt shows no improvement  -Continue remdesivir  -Start vanc and cefepime

## 2020-06-29 NOTE — PROGRESS NOTES
O2 sats up to 90%. Attempted to lay patient flat ryley place cooling blanket, O2 sats down to 87% immediately. Tylenol supp given and cooling blanket laid on top of patient for now, will place under patient when able. Dr Jolene Chapman notified of patient status, VS and temp. O2 sat slowly coming up.  92% at 0406.

## 2020-06-29 NOTE — PROGRESS NOTES
06/29/20 1545   Vent Information   Skin Assessment Clean, dry, & intact   Suction Catheter Diameter 14   Vent Type 840   Vent Mode AC/VC   Vt Ordered 450 mL   Rate Set 16 bmp   Peak Flow 70 L/min   Pressure Support 0 cmH20   FiO2  75 %   SpO2 93 %   SpO2/FiO2 ratio 124   Sensitivity 3   PEEP/CPAP 10   I Time/ I Time % 0 s   Vent Patient Data   High Peep/I Pressure 0   Peak Inspiratory Pressure 28 cmH2O   Mean Airway Pressure 14 cmH20   Rate Measured 17 br/min   Vt Exhaled 148 mL   Minute Volume 7.8 Liters   I:E Ratio 1.40:1   Cough/Sputum   Sputum How Obtained Suctioned;Endotracheal   $Obtained Sample $Induced Sputum   Cough Non-productive   Sputum Amount Small   Sputum Color White;Creamy   Tenacity Thick; Thin   Spontaneous Breathing Trial (SBT) RT Doc   Pulse 72   Breath Sounds   Right Upper Lobe Diminished   Right Middle Lobe Diminished   Right Lower Lobe Diminished   Left Upper Lobe Diminished   Left Lower Lobe Diminished   Additional Respiratory  Assessments   Resp 16   Position Semi-Harris's   Alarm Settings   High Pressure Alarm 40 cmH2O   Low Minute Volume Alarm 4 L/min   Apnea (secs) 20 secs   High Respiratory Rate 50 br/min   Low Exhaled Vt  250 mL   ETT (adult)   Placement Date/Time: 06/29/20 0230   Preoxygenation: Yes  Mask Ventilation: Ventilated by mask (1)  Technique: Video laryngoscopy  Type: Cuffed  Tube Size: 8 mm  Laryngoscope: GlideScope  Blade Size: 4  Location: Oral  Insertion attempts: 1  Placement. ..    Secured at 23 cm   Measured From Lips   ET Placement Left   Secured By Commercial tube leblanc   Site Condition Dry

## 2020-06-30 LAB
ALBUMIN SERPL-MCNC: 3.1 G/DL (ref 3.4–5)
ALP BLD-CCNC: 56 U/L (ref 40–129)
ALT SERPL-CCNC: 9 U/L (ref 10–40)
ANION GAP SERPL CALCULATED.3IONS-SCNC: 13 MMOL/L (ref 3–16)
AST SERPL-CCNC: 21 U/L (ref 15–37)
BASE EXCESS ARTERIAL: -2 MMOL/L (ref -3–3)
BASOPHILS ABSOLUTE: 0 K/UL (ref 0–0.2)
BASOPHILS RELATIVE PERCENT: 0.1 %
BILIRUB SERPL-MCNC: 0.5 MG/DL (ref 0–1)
BILIRUBIN DIRECT: <0.2 MG/DL (ref 0–0.3)
BILIRUBIN, INDIRECT: ABNORMAL MG/DL (ref 0–1)
BUN BLDV-MCNC: 31 MG/DL (ref 7–20)
CALCIUM SERPL-MCNC: 8.1 MG/DL (ref 8.3–10.6)
CARBOXYHEMOGLOBIN ARTERIAL: 0.3 % (ref 0–1.5)
CHLORIDE BLD-SCNC: 99 MMOL/L (ref 99–110)
CO2: 24 MMOL/L (ref 21–32)
CREAT SERPL-MCNC: 1.5 MG/DL (ref 0.8–1.3)
D DIMER: 653 NG/ML DDU (ref 0–229)
EOSINOPHILS ABSOLUTE: 0 K/UL (ref 0–0.6)
EOSINOPHILS RELATIVE PERCENT: 0.1 %
GFR AFRICAN AMERICAN: 54
GFR NON-AFRICAN AMERICAN: 45
GLUCOSE BLD-MCNC: 161 MG/DL (ref 70–99)
GLUCOSE BLD-MCNC: 164 MG/DL (ref 70–99)
GLUCOSE BLD-MCNC: 169 MG/DL (ref 70–99)
GLUCOSE BLD-MCNC: 181 MG/DL (ref 70–99)
GLUCOSE BLD-MCNC: 183 MG/DL (ref 70–99)
GLUCOSE BLD-MCNC: 199 MG/DL (ref 70–99)
HCO3 ARTERIAL: 23.1 MMOL/L (ref 21–29)
HCT VFR BLD CALC: 37.6 % (ref 40.5–52.5)
HEMOGLOBIN, ART, EXTENDED: 13 G/DL (ref 13.5–17.5)
HEMOGLOBIN: 12.3 G/DL (ref 13.5–17.5)
INR BLD: 1.09 (ref 0.86–1.14)
LYMPHOCYTES ABSOLUTE: 0.2 K/UL (ref 1–5.1)
LYMPHOCYTES RELATIVE PERCENT: 2.2 %
MCH RBC QN AUTO: 28.8 PG (ref 26–34)
MCHC RBC AUTO-ENTMCNC: 32.7 G/DL (ref 31–36)
MCV RBC AUTO: 88.2 FL (ref 80–100)
METHEMOGLOBIN ARTERIAL: 0.3 %
MONOCYTES ABSOLUTE: 0.7 K/UL (ref 0–1.3)
MONOCYTES RELATIVE PERCENT: 6.8 %
NEUTROPHILS ABSOLUTE: 9.9 K/UL (ref 1.7–7.7)
NEUTROPHILS RELATIVE PERCENT: 90.8 %
O2 CONTENT ARTERIAL: 18 ML/DL
O2 SAT, ARTERIAL: 97.1 %
O2 THERAPY: ABNORMAL
PCO2 ARTERIAL: 40.8 MMHG (ref 35–45)
PDW BLD-RTO: 14.6 % (ref 12.4–15.4)
PERFORMED ON: ABNORMAL
PH ARTERIAL: 7.37 (ref 7.35–7.45)
PHOSPHORUS: 3 MG/DL (ref 2.5–4.9)
PLATELET # BLD: 161 K/UL (ref 135–450)
PMV BLD AUTO: 9.4 FL (ref 5–10.5)
PO2 ARTERIAL: 94.6 MMHG (ref 75–108)
POTASSIUM SERPL-SCNC: 3.7 MMOL/L (ref 3.5–5.1)
PROTHROMBIN TIME: 12.6 SEC (ref 10–13.2)
RBC # BLD: 4.27 M/UL (ref 4.2–5.9)
SODIUM BLD-SCNC: 136 MMOL/L (ref 136–145)
TCO2 ARTERIAL: 24.4 MMOL/L
TOTAL PROTEIN: 5.5 G/DL (ref 6.4–8.2)
TRIGL SERPL-MCNC: 118 MG/DL (ref 0–150)
WBC # BLD: 11 K/UL (ref 4–11)

## 2020-06-30 PROCEDURE — 85610 PROTHROMBIN TIME: CPT

## 2020-06-30 PROCEDURE — C9113 INJ PANTOPRAZOLE SODIUM, VIA: HCPCS | Performed by: INTERNAL MEDICINE

## 2020-06-30 PROCEDURE — 94761 N-INVAS EAR/PLS OXIMETRY MLT: CPT

## 2020-06-30 PROCEDURE — 6360000002 HC RX W HCPCS: Performed by: INTERNAL MEDICINE

## 2020-06-30 PROCEDURE — 99291 CRITICAL CARE FIRST HOUR: CPT | Performed by: INTERNAL MEDICINE

## 2020-06-30 PROCEDURE — 2700000000 HC OXYGEN THERAPY PER DAY

## 2020-06-30 PROCEDURE — 2580000003 HC RX 258: Performed by: INTERNAL MEDICINE

## 2020-06-30 PROCEDURE — 85025 COMPLETE CBC W/AUTO DIFF WBC: CPT

## 2020-06-30 PROCEDURE — 94640 AIRWAY INHALATION TREATMENT: CPT

## 2020-06-30 PROCEDURE — 6370000000 HC RX 637 (ALT 250 FOR IP): Performed by: INTERNAL MEDICINE

## 2020-06-30 PROCEDURE — 2000000000 HC ICU R&B

## 2020-06-30 PROCEDURE — 80076 HEPATIC FUNCTION PANEL: CPT

## 2020-06-30 PROCEDURE — 99233 SBSQ HOSP IP/OBS HIGH 50: CPT | Performed by: INTERNAL MEDICINE

## 2020-06-30 PROCEDURE — 94003 VENT MGMT INPAT SUBQ DAY: CPT

## 2020-06-30 PROCEDURE — 84478 ASSAY OF TRIGLYCERIDES: CPT

## 2020-06-30 PROCEDURE — 36592 COLLECT BLOOD FROM PICC: CPT

## 2020-06-30 PROCEDURE — 80069 RENAL FUNCTION PANEL: CPT

## 2020-06-30 PROCEDURE — 85379 FIBRIN DEGRADATION QUANT: CPT

## 2020-06-30 PROCEDURE — 82803 BLOOD GASES ANY COMBINATION: CPT

## 2020-06-30 PROCEDURE — 94750 HC PULMONARY COMPLIANCE STUDY: CPT

## 2020-06-30 RX ORDER — NICOTINE POLACRILEX 4 MG
15 LOZENGE BUCCAL PRN
Status: DISCONTINUED | OUTPATIENT
Start: 2020-06-30 | End: 2020-07-20 | Stop reason: HOSPADM

## 2020-06-30 RX ORDER — DEXTROSE MONOHYDRATE 25 G/50ML
12.5 INJECTION, SOLUTION INTRAVENOUS PRN
Status: DISCONTINUED | OUTPATIENT
Start: 2020-06-30 | End: 2020-07-20 | Stop reason: HOSPADM

## 2020-06-30 RX ORDER — DEXTROSE MONOHYDRATE 50 MG/ML
100 INJECTION, SOLUTION INTRAVENOUS PRN
Status: DISCONTINUED | OUTPATIENT
Start: 2020-06-30 | End: 2020-07-20 | Stop reason: HOSPADM

## 2020-06-30 RX ADMIN — ENOXAPARIN SODIUM 40 MG: 40 INJECTION SUBCUTANEOUS at 20:35

## 2020-06-30 RX ADMIN — IPRATROPIUM BROMIDE AND ALBUTEROL SULFATE 1 AMPULE: .5; 3 SOLUTION RESPIRATORY (INHALATION) at 19:29

## 2020-06-30 RX ADMIN — ENOXAPARIN SODIUM 40 MG: 40 INJECTION SUBCUTANEOUS at 08:38

## 2020-06-30 RX ADMIN — INSULIN LISPRO 1 UNITS: 100 INJECTION, SOLUTION INTRAVENOUS; SUBCUTANEOUS at 12:53

## 2020-06-30 RX ADMIN — FENTANYL CITRATE 76 MCG/HR: 50 INJECTION, SOLUTION INTRAMUSCULAR; INTRAVENOUS at 01:10

## 2020-06-30 RX ADMIN — Medication 10 ML: at 20:47

## 2020-06-30 RX ADMIN — IPRATROPIUM BROMIDE AND ALBUTEROL SULFATE 1 AMPULE: .5; 3 SOLUTION RESPIRATORY (INHALATION) at 15:24

## 2020-06-30 RX ADMIN — IPRATROPIUM BROMIDE AND ALBUTEROL SULFATE 1 AMPULE: .5; 3 SOLUTION RESPIRATORY (INHALATION) at 02:30

## 2020-06-30 RX ADMIN — KETOTIFEN FUMARATE 1 DROP: 0.35 SOLUTION/ DROPS OPHTHALMIC at 08:39

## 2020-06-30 RX ADMIN — FENTANYL CITRATE 75 MCG/HR: 50 INJECTION, SOLUTION INTRAMUSCULAR; INTRAVENOUS at 12:54

## 2020-06-30 RX ADMIN — PROPOFOL 35 MCG/KG/MIN: 10 INJECTION, EMULSION INTRAVENOUS at 17:30

## 2020-06-30 RX ADMIN — CHLORHEXIDINE GLUCONATE 0.12% ORAL RINSE 15 ML: 1.2 LIQUID ORAL at 20:34

## 2020-06-30 RX ADMIN — PROPOFOL 35 MCG/KG/MIN: 10 INJECTION, EMULSION INTRAVENOUS at 08:27

## 2020-06-30 RX ADMIN — ROSUVASTATIN CALCIUM 10 MG: 10 TABLET, FILM COATED ORAL at 08:26

## 2020-06-30 RX ADMIN — CEFEPIME 2 G: 2 INJECTION, POWDER, FOR SOLUTION INTRAVENOUS at 20:34

## 2020-06-30 RX ADMIN — IPRATROPIUM BROMIDE AND ALBUTEROL SULFATE 1 AMPULE: .5; 3 SOLUTION RESPIRATORY (INHALATION) at 12:22

## 2020-06-30 RX ADMIN — KETOTIFEN FUMARATE 1 DROP: 0.35 SOLUTION/ DROPS OPHTHALMIC at 20:46

## 2020-06-30 RX ADMIN — PANTOPRAZOLE SODIUM 40 MG: 40 INJECTION, POWDER, FOR SOLUTION INTRAVENOUS at 08:27

## 2020-06-30 RX ADMIN — IPRATROPIUM BROMIDE AND ALBUTEROL SULFATE 1 AMPULE: .5; 3 SOLUTION RESPIRATORY (INHALATION) at 23:18

## 2020-06-30 RX ADMIN — SODIUM CHLORIDE, POTASSIUM CHLORIDE, SODIUM LACTATE AND CALCIUM CHLORIDE: 600; 310; 30; 20 INJECTION, SOLUTION INTRAVENOUS at 00:31

## 2020-06-30 RX ADMIN — PROPOFOL 35 MCG/KG/MIN: 10 INJECTION, EMULSION INTRAVENOUS at 23:10

## 2020-06-30 RX ADMIN — ASPIRIN 81 MG 81 MG: 81 TABLET ORAL at 08:26

## 2020-06-30 RX ADMIN — Medication 10 ML: at 08:27

## 2020-06-30 RX ADMIN — IPRATROPIUM BROMIDE AND ALBUTEROL SULFATE 1 AMPULE: .5; 3 SOLUTION RESPIRATORY (INHALATION) at 07:36

## 2020-06-30 RX ADMIN — VANCOMYCIN HYDROCHLORIDE 1250 MG: 1 INJECTION, POWDER, LYOPHILIZED, FOR SOLUTION INTRAVENOUS at 12:42

## 2020-06-30 RX ADMIN — SODIUM CHLORIDE, POTASSIUM CHLORIDE, SODIUM LACTATE AND CALCIUM CHLORIDE: 600; 310; 30; 20 INJECTION, SOLUTION INTRAVENOUS at 12:42

## 2020-06-30 RX ADMIN — DEXAMETHASONE SODIUM PHOSPHATE 6 MG: 4 INJECTION, SOLUTION INTRAMUSCULAR; INTRAVENOUS at 08:26

## 2020-06-30 RX ADMIN — INSULIN LISPRO 1 UNITS: 100 INJECTION, SOLUTION INTRAVENOUS; SUBCUTANEOUS at 20:43

## 2020-06-30 RX ADMIN — CHLORHEXIDINE GLUCONATE 0.12% ORAL RINSE 15 ML: 1.2 LIQUID ORAL at 08:26

## 2020-06-30 RX ADMIN — INSULIN LISPRO 1 UNITS: 100 INJECTION, SOLUTION INTRAVENOUS; SUBCUTANEOUS at 15:47

## 2020-06-30 RX ADMIN — CEFEPIME 2 G: 2 INJECTION, POWDER, FOR SOLUTION INTRAVENOUS at 08:26

## 2020-06-30 ASSESSMENT — PULMONARY FUNCTION TESTS
PIF_VALUE: 25
PIF_VALUE: 26
PIF_VALUE: 22
PIF_VALUE: 25
PIF_VALUE: 26
PIF_VALUE: 25
PIF_VALUE: 24
PIF_VALUE: 25
PIF_VALUE: 28
PIF_VALUE: 24
PIF_VALUE: 23
PIF_VALUE: 24
PIF_VALUE: 25
PIF_VALUE: 24
PIF_VALUE: 23
PIF_VALUE: 24
PIF_VALUE: 26
PIF_VALUE: 25
PIF_VALUE: 27
PIF_VALUE: 25
PIF_VALUE: 24
PIF_VALUE: 28

## 2020-06-30 ASSESSMENT — ENCOUNTER SYMPTOMS
DIARRHEA: 0
SHORTNESS OF BREATH: 0
COUGH: 0
VOMITING: 0

## 2020-06-30 ASSESSMENT — PAIN SCALES - GENERAL: PAINLEVEL_OUTOF10: 0

## 2020-06-30 NOTE — PROGRESS NOTES
Shift assessment complete. Patient currently intubated with ETT at 23LL. Vent settings are 16 RR, 450 VT, 10 of PEEP and 65% FiO2. Sedation appears to be in a good spot. RASS -1. When stimulated he is easily directed and respond appropriately. Propofol infusing at 35 mcg/kg/min. Fentanyl infusing at 75 mcg//h. Lungs are diminished throughout. Tolerating vent. Not a lot of sputum with ET suctioning. LEVOPHED turned off this am.   VSS. NSR on monitor with PACs. Bowels active x4. Abdomen is rounded but soft. TF infusing at 60 ml/h. He is getting 50 ml h2O flushes q2h in addition to LR at 75 ml/h. I will address this during care rounds. Trace edema noted. Central line dressing is clean, dry and intact with no signs of drainage. All tubing is current and dated. IPA caps applied to all access hubs. BUE soft wrist restraints in place due to attempts of pulling at ETT and lines. No signs of injury noted and PROM performed.      Electronically signed by Kelle Alston RN on 6/30/2020 at 9:09 AM    Vitals:    06/30/20 0831   BP: 137/79   Pulse: 90   Resp: 17   Temp: 98.1 °F (36.7 °C)   SpO2: 95%

## 2020-06-30 NOTE — PROGRESS NOTES
Patient reassessed. Patient currently intubated with ETT at 23LL. Vent settings are 16 RR, 450 VT, 10 of PEEP and 70% FiO2. Propofol infusing at 35 mcg/kg/min. Fentanyl infusing at 75 mcg//h. BUE soft wrist restraints in place due to attempts of pulling at ETT and lines. No signs of injury noted and PROM performed. Central line dressing is clean, dry and intact with no signs of drainage. All tubing is current and dated. IPA caps applied to all access hubs.      Electronically signed by Joshua Pollock RN on 6/30/2020 at 3:43 PM

## 2020-06-30 NOTE — PROGRESS NOTES
4 Eyes Skin Assessment     The patient is being assess for   Shift Handoff    I agree that 2 RN's have performed a thorough Head to Toe Skin Assessment on the patient. ALL assessment sites listed below have been assessed. Areas assessed by both nurses:   [x]   Head, Face, and Ears   [x]   Shoulders, Back, and Chest, Abdomen  [x]   Arms, Elbows, and Hands   [x]   Coccyx, Sacrum, and Ischium  [x]   Legs, Feet, and Heels          **SHARE this note so that the co-signing nurse is able to place an eSignature**    Co-signer eSignature: Electronically signed by Lars Hearn RN on 6/30/20 at 3:46 PM EDT    Does the Patient have Skin Breakdown?   No          Cliff Prevention initiated:  Yes   Wound Care Orders initiated:  No      [facility] nurse consulted for Pressure Injury (Stage 3,4, Unstageable, DTI, NWPT, Complex wounds)and New or Established Ostomies:  No      Primary Nurse eSignature: Electronically signed by Nadia Novoa RN on 6/30/20 at 6:09 AM EDT

## 2020-06-30 NOTE — PROGRESS NOTES
Internal Medicine ICU Progress Note      Events of Last 24 hours:     Patient was intubated for worsening respiratory failure by the nocturnist.  He was then transferred to the ICU. Patient has COVID-19. Same is awake and alert on the ventilator. This is in spite of sedation. He had a T-max 104.2 but is afebrile this morning. His highest temperature in the last 24 hours is 102.7 at 4:00 in the morning. After that he has been pretty much afebrile. Invasive Lines: PICC placed on 2020      MV: Intubated on 2020    Recent Labs     20  0645 20  0500   PHART 7.388 7.371   ECY9VPQ 40.1 40.8   PO2ART 135.5* 94.6       MV Settings:  Vent Mode: AC/VC Rate Set: 16 bmp/Vt Ordered: 450 mL/ /FiO2 : 65 %    IV:   propofol 35 mcg/kg/min (20 0827)    fentaNYL (SUBLIMAZE) infusion 76 mcg/hr (20 0110)    lactated ringers 75 mL/hr at 20 0031    norepinephrine Stopped (20)       Vitals:  Temp  Av °F (36.7 °C)  Min: 97.3 °F (36.3 °C)  Max: 98.4 °F (36.9 °C)  Pulse  Av.1  Min: 62  Max: 93  BP  Min: 86/54  Max: 195/68  SpO2  Av.4 %  Min: 89 %  Max: 99 %  FiO2   Av.7 %  Min: 60 %  Max: 90 %  Patient Vitals for the past 4 hrs:   BP Temp Temp src Pulse Resp SpO2   20 0903 (!) 120/57 -- -- 85 15 91 %   20 0831 137/79 98.1 °F (36.7 °C) Axillary 90 17 95 %   20 0800 (!) 148/56 -- -- 87 17 94 %   20 0742 -- -- -- -- 18 97 %   20 0739 -- -- -- 93 26 96 %   20 0700 (!) 144/62 -- -- 86 16 93 %   20 0658 -- -- -- 83 18 93 %   20 0602 (!) 155/54 -- -- 85 16 95 %   20 0600 -- -- -- 85 15 --       CVP:        Intake/Output Summary (Last 24 hours) at 2020 0913  Last data filed at 2020 0852  Gross per 24 hour   Intake 3847 ml   Output 680 ml   Net 3167 ml       EXAM:  General: He is on the ventilator. Can follow simple commands. Eyes open. .  Eyes: PERRL. No sclera icterus. No conjunctiva injected.   ENT: No discharge. Intubated. Neck: Trachea midline. Normal thyroid. Resp: No accessory muscle use. No crackles. No wheezing. No rhonchi. No dullness on percussion. CV: Regular rate. Regular rhythm. No mumur or rub. No edema. No JVD. Palpable pedal pulses. GI: Non-tender. Non-distended. No masses. No organmegaly. Normal bowel sounds. No hernia. Skin: Warm and dry. No nodule on exposed extremities. No rash on exposed extremities. Lymph: No cervical LAD. No supraclavicular LAD. M/S: No cyanosis. No joint deformity. No clubbing. Neuro: Awake. Follows commands. .   Psych: BALAJI    Medications:  Scheduled Meds:   chlorhexidine  15 mL Mouth/Throat BID    pantoprazole  40 mg Intravenous Daily    ipratropium-albuterol  1 ampule Inhalation Q4H    cefepime  2 g Intravenous Q12H    vancomycin  1,250 mg Intravenous Q24H    remdesivir IVPB  100 mg Intravenous Q24H    ketotifen  1 drop Both Eyes BID    enoxaparin  40 mg Subcutaneous BID    dexamethasone  6 mg Intravenous Daily    rosuvastatin  10 mg Oral Daily    aspirin  81 mg Oral Daily    sodium chloride flush  10 mL Intravenous 2 times per day       PRN Meds:  fentanNYL, midazolam, tetrahydrozoline, aluminum & magnesium hydroxide-simethicone, ondansetron, sodium chloride flush, acetaminophen **OR** acetaminophen    Results:  CBC:   Recent Labs     06/29/20 0330 06/30/20 0420   WBC 11.2* 11.0   HGB 13.8 12.3*   HCT 42.1 37.6*   MCV 87.9 88.2    161     BMP:   Recent Labs     06/29/20 0330 06/30/20 0420    136   K 3.8 3.7   CL 97* 99   CO2 26 24   PHOS  --  3.0   BUN 19 31*   CREATININE 1.2 1.5*     LIVER PROFILE:   Recent Labs     06/29/20 0330 06/30/20 0420   AST 23 21   ALT 11 9*   BILIDIR  --  <0.2   BILITOT 0.8 0.5   ALKPHOS 73 56     PT/INR:   Recent Labs     06/29/20 0330 06/30/20 0420   PROTIME 13.2 12.6   INR 1.14 1.09       Cultures:  Results for Will Pruett (MRN [id number]) as of 6/30/2020 09:20   Ref.  Range 6/24/2020 22:00 SARS-CoV-2, PCR Latest Ref Range: Not Detected  DETECTED (A)     Susceptibility     Staphylococcus epidermidis (1)     Antibiotic Interpretation TARA Status    ciprofloxacin Sensitive <=0.5 mcg/mL     nitrofurantoin Sensitive <=16 mcg/mL     oxacillin Sensitive <=0.25 mcg/mL     tetracycline Sensitive <=1 mcg/mL     trimethoprim-sulfamethoxazole Sensitive <=10 mcg/mL           Films:    IR PICC WO SQ PORT/PUMP > 5 YEARS   Final Result   Successful placement of PICC line. XR CHEST PORTABLE   Final Result   Appropriate endotracheal tube positioning. Multifocal airspace opacities and areas of atelectasis correlate with recent   CT chest.         XR ABDOMEN FOR NG/OG/NE TUBE PLACEMENT   Final Result   NG tube terminates over the stomach. CT CHEST PULMONARY EMBOLISM W CONTRAST   Final Result   1. No pulmonary embolism. 2. Multifocal pneumonia scattered throughout both lungs with minimal pleural   effusions. XR CHEST STANDARD (2 VW)   Final Result   No radiographic evidence of acute pulmonary disease. Assessment:    . Principal Problem:    COVID-19  Active Problems:    Multifocal pneumonia    Orthostasis    Sepsis ([address]    Urinary tract infection without hematuria    ARDS (adult respiratory distress syndrome) ([address]  Resolved Problems:    * No resolved hospital problems. *         Plan:    #Acute respiratory failure due to COVID-19. He has ARDS. Is on the ventilator. Pulmonary critical care is seeing him. His respiratory status is improved. His PEEP is down to 10 and his FiO2 is down to 60%. He is on Decadron 6 mg IV daily. #Patient has been weaned off Levophed. His sepsis is improving. #Multifocal pneumonia. Likely related to COVID-19. Tracheal aspirate sent. Continue broad-spectrum antibiotics including Vanco and cefepime. Continue Remdesevir day 3 of 5. #Patient's creatinine is 1.5 up slightly. Continue to monitor.     #Check daily d-dimer and INR.    #Convalescent plasma was discussed with the patient. #Lovenox twice daily dosing. All questions and concerns were addressed to the patient/family. Alternatives to my treatment were discussed. The note was completed using EMR. Every effort was made to ensure accuracy; however, inadvertent computerized transcription errors may be present.          Mirta Morales 9:13 AM 6/30/2020

## 2020-06-30 NOTE — PROGRESS NOTES
Vancomycin Day: 2    Patient's labs, cultures, vitals, and vancomycin regimen reviewed. Bump in Srcr but urine output is good.  Will continue with Vancomycin   Gareth Guerrero Pharm D 6/30/202012:50 PM  .

## 2020-06-30 NOTE — PLAN OF CARE
Absence of restraint-related injury  Outcome: Ongoing     Problem: Nutrition  Goal: Optimal nutrition therapy  6/30/2020 0141 by Radha Teague RN  Outcome: Ongoing  6/29/2020 1147 by Dortha Curling, RD, LD  Outcome: Not Met This Shift     Problem: Skin Integrity:  Goal: Will show no infection signs and symptoms  Description: Will show no infection signs and symptoms  Outcome: Ongoing  Goal: Absence of new skin breakdown  Description: Absence of new skin breakdown  Outcome: Ongoing     Problem: Falls - Risk of:  Goal: Will remain free from falls  Description: Will remain free from falls  Outcome: Ongoing  Goal: Absence of physical injury  Description: Absence of physical injury  Outcome: Ongoing

## 2020-06-30 NOTE — PROGRESS NOTES
06/30/20 0739   Vent Information   Skin Assessment Clean, dry, & intact   Vent Type 840   Vent Mode AC/VC   Vt Ordered 450 mL   Rate Set 16 bmp   Peak Flow 70 L/min   Pressure Support 0 cmH20   FiO2  70 %   SpO2 96 %   SpO2/FiO2 ratio 137.14   Sensitivity 3   PEEP/CPAP 10   I Time/ I Time % 0 s   Vent Patient Data   High Peep/I Pressure 0   Peak Inspiratory Pressure 23 cmH2O   Mean Airway Pressure 12 cmH20   Rate Measured 26 br/min   Vt Exhaled 482 mL   Minute Volume 11.3 Liters   I:E Ratio 1:4   Plateau Pressure 20 YEI20   Static Compliance 32 mL/cmH2O   Dynamic Compliance 26 mL/cmH2O   Cough/Sputum   Sputum How Obtained Endotracheal;Suctioned   Sputum Amount Small   Sputum Color White;Cloudy   Tenacity Thin   Spontaneous Breathing Trial (SBT) RT Doc   Pulse 93   Breath Sounds   Right Upper Lobe Crackles   Right Middle Lobe Crackles   Right Lower Lobe Crackles   Left Upper Lobe Crackles   Left Lower Lobe Crackles   Additional Respiratory  Assessments   Resp 26   Position Semi-Harris's   Alarm Settings   High Pressure Alarm 40 cmH2O   Low Minute Volume Alarm 4 L/min   Apnea (secs) 20 secs   High Respiratory Rate 50 br/min   Low Exhaled Vt  250 mL   ETT (adult)   Placement Date/Time: 06/29/20 0230   Preoxygenation: Yes  Mask Ventilation: Ventilated by mask (1)  Technique: Video laryngoscopy  Type: Cuffed  Tube Size: 8 mm  Laryngoscope: GlideScope  Blade Size: 4  Location: Oral  Insertion attempts: 1  Placement. ..    Secured at 23 cm   Measured From Lips   ET Placement Right   Secured By Commercial tube leblanc   Site Condition Dry

## 2020-06-30 NOTE — PROGRESS NOTES
Pulmonary & Critical Care Medicine ICU Progress Note    CC: Acute hypoxic respiratory failure secondary to COVID-19    Events of Last 24 hours: followed commands  Propofol gtt 35  Fentanyl gtt 75  Levo @ 2    Invasive Lines: PICC 6/29/20    MV:  6/29/20  Vent Mode: AC/VC Rate Set: 16 bmp/Vt Ordered: 450 mL/ /FiO2 : 60 %  Recent Labs     06/29/20  0645 06/30/20  0500   PHART 7.388 7.371   [id number] 40.1 40.8   PO2ART 135.5* 94.6       IV:   propofol 35 mcg/kg/min (06/30/20 0827)    fentaNYL (SUBLIMAZE) infusion 76 mcg/hr (06/30/20 0110)    lactated ringers 75 mL/hr at 06/30/20 0031    norepinephrine 2 mcg/min (06/29/20 1819)       Vitals:  BP (!) 148/56   Pulse 87   Temp 98.1 °F (36.7 °C) (Rectal)   Resp 17   Ht 5' 7\" (1.702 m)   Wt 186 lb (84.4 kg)   SpO2 94%   BMI 29.13 kg/m²   on vent    Intake/Output Summary (Last 24 hours) at 6/30/2020 0833  Last data filed at 6/30/2020 0658  Gross per 24 hour   Intake 3519 ml   Output 545 ml   Net 2974 ml     EXAM:  Constitutional: ill appearing. Not in distress. Eyes: PERRL. No sclera icterus. ENT: Normal Nose. Normal Tongue. ETT in place  Neck:  Trachea is midline. No thyroid tenderness. Respiratory: No accessory muscle usage. no decreased breath sounds. No wheezes. No rales. No Rhonchi. Cardiovascular: Normal S1S2. No murmur. No digit cyanosis. No digit clubbing. No LE edema. GI: Non-tender. Non-distended. Normal bowel sounds. No masses. No umbilical hernia. Skin: No rash on the exposed extremities. No Nodules on exposed extremities. Neuro: Sedated. Moves all extremities. Psych: No agitation, no anxiety.     Scheduled Meds:   chlorhexidine  15 mL Mouth/Throat BID    pantoprazole  40 mg Intravenous Daily    ipratropium-albuterol  1 ampule Inhalation Q4H    cefepime  2 g Intravenous Q12H    vancomycin  1,250 mg Intravenous Q24H    remdesivir IVPB  100 mg Intravenous Q24H    ketotifen  1 drop Both Eyes BID    enoxaparin  40 mg Subcutaneous BID    75/hr  · TF's  · Total critical care time caring for this patient with life threatening, unstable organ failure, including direct patient contact, management of life support systems, review of data including imaging and labs, discussions with other team members and physicians at least 40 minutes so far today, excluding procedures.

## 2020-06-30 NOTE — PROGRESS NOTES
06/30/20 1929   Vent Information   Vent Mode AC/VC   Vt Ordered 450 mL   Rate Set 16 bmp   Peak Flow 70 L/min   FiO2  70 %   SpO2 94 %   Sensitivity 3   PEEP/CPAP 10   Vent Patient Data   Peak Inspiratory Pressure 25 cmH2O   Mean Airway Pressure 15 cmH20   Rate Measured 19 br/min   Vt Exhaled 762 mL   Minute Volume 9.26 Liters   I:E Ratio 1:4.40   Plateau Pressure 20 YJD70   Static Compliance 76 mL/cmH2O   Dynamic Compliance 51 mL/cmH2O   Cough/Sputum   Cough Non-productive   Sputum Amount Small   Sputum Color Creamy   Tenacity Thick

## 2020-06-30 NOTE — PROGRESS NOTES
Patient reassessed. Pretty well unchanged. TF stopped. Will be restarted at 1600. Fentanyl infusing at 75 mcg//h. Propofol infusing at 35 mcg/kg/min. Patient currently intubated with ETT at 23LL. Vent settings are 16 RR, 450 VT, 10 of PEEP and 65% FiO2.      Electronically signed by Jose Luis Mcdonough RN on 6/30/2020 at 1:25 PM    Vitals:    06/30/20 1300   BP: 128/63   Pulse: 83   Resp: 14   Temp:    SpO2: 92%

## 2020-06-30 NOTE — PROGRESS NOTES
Amara Evans is a 82 y.o. male patient. Allergies   Allergen Reactions    Buspar [Buspirone] Other (See Comments)     Face,face numb    Codeine Other (See Comments)     Face,lips numb    Methylphenidate Other (See Comments)     Face numbness    Ritalin [Methylphenidate Hcl] Other (See Comments)     Face. lips numb    Zoloft [Sertraline Hcl] Other (See Comments)     Numbness of lips     Principal Problem:    COVID-19  Active Problems:    Multifocal pneumonia    Orthostasis    Sepsis (HCC)    Urinary tract infection without hematuria    ARDS (adult respiratory distress syndrome) (HCC)  Resolved Problems:    * No resolved hospital problems. *    Blood pressure (!) 114/52, pulse 75, temperature 98.1 °F (36.7 °C), temperature source Rectal, resp. rate 15, height 5' 7\" (1.702 m), weight 186 lb (84.4 kg), SpO2 95 %. Subjective:  Symptoms:  Stable. No shortness of breath, cough or diarrhea. Diet:  NPO (Continous Tube Feeding @ rate of 40ml/hr goal rate is 60 ml/hr). No vomiting. Activity level: Impaired due to weakness. Pain:  Pain is well controlled. (Observed no apparent s/s of pain or discomfort at this time. ). Objective:  General Appearance: In no acute distress and comfortable. Vital signs: (most recent): Blood pressure (!) 114/52, pulse 75, temperature 98.1 °F (36.7 °C), temperature source Rectal, resp. rate 15, height 5' 7\" (1.702 m), weight 186 lb (84.4 kg), SpO2 95 %. Vital signs are normal.  No fever. (Cooling blanket applied. ). Output: Minimal urine output (Observed less than 50ml within two hours. ) and no stool output. HEENT: (Unable to assess at this time. )    Lungs:  Normal respiratory rate. He is not in respiratory distress. No stridor. There are decreased breath sounds. No rales, wheezes or rhonchi. (Patient is on ventilator Assist control 16/min; ; FI02= 70%; and PEEP (10). )  Heart: Irregular rhythm. No murmur or friction rub.  (Observed Atrial

## 2020-06-30 NOTE — PROGRESS NOTES
Remdesivir-Day 3  Bun/Srcr 31/1.5, Crcl 39ml/min, LFT- WNL  Watch Crcl, below 30ml/min, may need to stop therapy  Susan MARTEL 6/30/202012:47 PM  .      .

## 2020-06-30 NOTE — PROGRESS NOTES
4 Eyes Skin Assessment     The patient is being assess for   Shift Handoff    I agree that 2 RN's have performed a thorough Head to Toe Skin Assessment on the patient. ALL assessment sites listed below have been assessed. Areas assessed by both nurses:   [x]   Head, Face, and Ears   [x]   Shoulders, Back, and Chest, Abdomen  [x]   Arms, Elbows, and Hands   [x]   Coccyx, Sacrum, and Ischium  [x]   Legs, Feet, and Heels        No new skin issues noted. *    **SHARE this note so that the co-signing nurse is able to place an eSignature**    Co-signer eSignature: Electronically signed by Kita Figueroa RN on 6/30/20 at 7:15 PM EDT    Does the Patient have Skin Breakdown?   No          Cliff Prevention initiated:  Yes   Wound Care Orders initiated:  No      [facility] nurse consulted for Pressure Injury (Stage 3,4, Unstageable, DTI, NWPT, Complex wounds)and New or Established Ostomies:  No      Primary Nurse eSignature: Electronically signed by Angelina Connelly RN on 6/30/20 at 7:27 PM EDT

## 2020-06-30 NOTE — PROGRESS NOTES
Shift report given to [facility] MEL LOPEZ  at bedside. Patient care handed off in stable condition at this time.    Electronically signed by Sai Mchugh RN on 6/30/2020 at 7:16 PM

## 2020-07-01 ENCOUNTER — APPOINTMENT (OUTPATIENT)
Dept: GENERAL RADIOLOGY | Age: 85
DRG: 870 | End: 2020-07-01
Payer: MEDICARE

## 2020-07-01 LAB
ALBUMIN SERPL-MCNC: 2.6 G/DL (ref 3.4–5)
ALBUMIN SERPL-MCNC: 2.7 G/DL (ref 3.4–5)
ALP BLD-CCNC: 49 U/L (ref 40–129)
ALT SERPL-CCNC: 8 U/L (ref 10–40)
ANION GAP SERPL CALCULATED.3IONS-SCNC: 9 MMOL/L (ref 3–16)
AST SERPL-CCNC: 17 U/L (ref 15–37)
BASE EXCESS ARTERIAL: 5.4 MMOL/L (ref -3–3)
BASOPHILS ABSOLUTE: 0 K/UL (ref 0–0.2)
BASOPHILS RELATIVE PERCENT: 0.1 %
BILIRUB SERPL-MCNC: 0.3 MG/DL (ref 0–1)
BILIRUBIN DIRECT: <0.2 MG/DL (ref 0–0.3)
BILIRUBIN, INDIRECT: ABNORMAL MG/DL (ref 0–1)
BUN BLDV-MCNC: 23 MG/DL (ref 7–20)
CALCIUM SERPL-MCNC: 7.9 MG/DL (ref 8.3–10.6)
CARBOXYHEMOGLOBIN ARTERIAL: 0.3 % (ref 0–1.5)
CHLORIDE BLD-SCNC: 103 MMOL/L (ref 99–110)
CO2: 26 MMOL/L (ref 21–32)
CREAT SERPL-MCNC: 0.9 MG/DL (ref 0.8–1.3)
CULTURE, RESPIRATORY: NORMAL
EOSINOPHILS ABSOLUTE: 0 K/UL (ref 0–0.6)
EOSINOPHILS RELATIVE PERCENT: 0.1 %
GFR AFRICAN AMERICAN: >60
GFR NON-AFRICAN AMERICAN: >60
GLUCOSE BLD-MCNC: 139 MG/DL (ref 70–99)
GLUCOSE BLD-MCNC: 146 MG/DL (ref 70–99)
GLUCOSE BLD-MCNC: 174 MG/DL (ref 70–99)
GLUCOSE BLD-MCNC: 179 MG/DL (ref 70–99)
GLUCOSE BLD-MCNC: 224 MG/DL (ref 70–99)
GRAM STAIN RESULT: NORMAL
HCO3 ARTERIAL: 31.8 MMOL/L (ref 21–29)
HCT VFR BLD CALC: 35.2 % (ref 40.5–52.5)
HEMOGLOBIN, ART, EXTENDED: 12.5 G/DL (ref 13.5–17.5)
HEMOGLOBIN: 11.8 G/DL (ref 13.5–17.5)
INR BLD: 1.09 (ref 0.86–1.14)
LYMPHOCYTES ABSOLUTE: 0.4 K/UL (ref 1–5.1)
LYMPHOCYTES RELATIVE PERCENT: 4 %
MCH RBC QN AUTO: 29.5 PG (ref 26–34)
MCHC RBC AUTO-ENTMCNC: 33.5 G/DL (ref 31–36)
MCV RBC AUTO: 87.9 FL (ref 80–100)
METHEMOGLOBIN ARTERIAL: 0.3 %
MONOCYTES ABSOLUTE: 0.8 K/UL (ref 0–1.3)
MONOCYTES RELATIVE PERCENT: 7.5 %
NEUTROPHILS ABSOLUTE: 9.6 K/UL (ref 1.7–7.7)
NEUTROPHILS RELATIVE PERCENT: 88.3 %
O2 CONTENT ARTERIAL: 17 ML/DL
O2 SAT, ARTERIAL: 94.5 %
O2 THERAPY: ABNORMAL
PCO2 ARTERIAL: 55.2 MMHG (ref 35–45)
PDW BLD-RTO: 14.4 % (ref 12.4–15.4)
PERFORMED ON: ABNORMAL
PH ARTERIAL: 7.38 (ref 7.35–7.45)
PHOSPHORUS: 1.9 MG/DL (ref 2.5–4.9)
PLATELET # BLD: 172 K/UL (ref 135–450)
PMV BLD AUTO: 9.5 FL (ref 5–10.5)
PO2 ARTERIAL: 74.8 MMHG (ref 75–108)
POTASSIUM SERPL-SCNC: 3.5 MMOL/L (ref 3.5–5.1)
PROTHROMBIN TIME: 12.7 SEC (ref 10–13.2)
RBC # BLD: 4.01 M/UL (ref 4.2–5.9)
SODIUM BLD-SCNC: 138 MMOL/L (ref 136–145)
TCO2 ARTERIAL: 33.5 MMOL/L
TOTAL PROTEIN: 5 G/DL (ref 6.4–8.2)
WBC # BLD: 10.8 K/UL (ref 4–11)

## 2020-07-01 PROCEDURE — 6360000002 HC RX W HCPCS: Performed by: INTERNAL MEDICINE

## 2020-07-01 PROCEDURE — 71045 X-RAY EXAM CHEST 1 VIEW: CPT

## 2020-07-01 PROCEDURE — 85025 COMPLETE CBC W/AUTO DIFF WBC: CPT

## 2020-07-01 PROCEDURE — 80069 RENAL FUNCTION PANEL: CPT

## 2020-07-01 PROCEDURE — 94003 VENT MGMT INPAT SUBQ DAY: CPT

## 2020-07-01 PROCEDURE — 36415 COLL VENOUS BLD VENIPUNCTURE: CPT

## 2020-07-01 PROCEDURE — 6370000000 HC RX 637 (ALT 250 FOR IP): Performed by: INTERNAL MEDICINE

## 2020-07-01 PROCEDURE — 94761 N-INVAS EAR/PLS OXIMETRY MLT: CPT

## 2020-07-01 PROCEDURE — 94640 AIRWAY INHALATION TREATMENT: CPT

## 2020-07-01 PROCEDURE — 2580000003 HC RX 258: Performed by: INTERNAL MEDICINE

## 2020-07-01 PROCEDURE — 94750 HC PULMONARY COMPLIANCE STUDY: CPT

## 2020-07-01 PROCEDURE — 2500000003 HC RX 250 WO HCPCS: Performed by: INTERNAL MEDICINE

## 2020-07-01 PROCEDURE — 2000000000 HC ICU R&B

## 2020-07-01 PROCEDURE — 99291 CRITICAL CARE FIRST HOUR: CPT | Performed by: INTERNAL MEDICINE

## 2020-07-01 PROCEDURE — 85610 PROTHROMBIN TIME: CPT

## 2020-07-01 PROCEDURE — 2700000000 HC OXYGEN THERAPY PER DAY

## 2020-07-01 PROCEDURE — 82803 BLOOD GASES ANY COMBINATION: CPT

## 2020-07-01 PROCEDURE — C9113 INJ PANTOPRAZOLE SODIUM, VIA: HCPCS | Performed by: INTERNAL MEDICINE

## 2020-07-01 PROCEDURE — 80076 HEPATIC FUNCTION PANEL: CPT

## 2020-07-01 PROCEDURE — 36592 COLLECT BLOOD FROM PICC: CPT

## 2020-07-01 PROCEDURE — 99233 SBSQ HOSP IP/OBS HIGH 50: CPT | Performed by: INTERNAL MEDICINE

## 2020-07-01 RX ADMIN — Medication 10 ML: at 08:16

## 2020-07-01 RX ADMIN — VANCOMYCIN HYDROCHLORIDE 1250 MG: 1 INJECTION, POWDER, LYOPHILIZED, FOR SOLUTION INTRAVENOUS at 13:17

## 2020-07-01 RX ADMIN — ROSUVASTATIN CALCIUM 10 MG: 10 TABLET, FILM COATED ORAL at 08:15

## 2020-07-01 RX ADMIN — ENOXAPARIN SODIUM 40 MG: 40 INJECTION SUBCUTANEOUS at 20:34

## 2020-07-01 RX ADMIN — IPRATROPIUM BROMIDE AND ALBUTEROL SULFATE 1 AMPULE: .5; 3 SOLUTION RESPIRATORY (INHALATION) at 07:35

## 2020-07-01 RX ADMIN — PANTOPRAZOLE SODIUM 40 MG: 40 INJECTION, POWDER, FOR SOLUTION INTRAVENOUS at 08:16

## 2020-07-01 RX ADMIN — INSULIN LISPRO 1 UNITS: 100 INJECTION, SOLUTION INTRAVENOUS; SUBCUTANEOUS at 12:12

## 2020-07-01 RX ADMIN — FENTANYL CITRATE 75 MCG/HR: 50 INJECTION, SOLUTION INTRAMUSCULAR; INTRAVENOUS at 01:31

## 2020-07-01 RX ADMIN — CHLORHEXIDINE GLUCONATE 0.12% ORAL RINSE 15 ML: 1.2 LIQUID ORAL at 20:34

## 2020-07-01 RX ADMIN — INSULIN LISPRO 1 UNITS: 100 INJECTION, SOLUTION INTRAVENOUS; SUBCUTANEOUS at 00:02

## 2020-07-01 RX ADMIN — FENTANYL CITRATE 75 MCG/HR: 50 INJECTION, SOLUTION INTRAMUSCULAR; INTRAVENOUS at 12:20

## 2020-07-01 RX ADMIN — IPRATROPIUM BROMIDE AND ALBUTEROL SULFATE 1 AMPULE: .5; 3 SOLUTION RESPIRATORY (INHALATION) at 15:54

## 2020-07-01 RX ADMIN — IPRATROPIUM BROMIDE AND ALBUTEROL SULFATE 1 AMPULE: .5; 3 SOLUTION RESPIRATORY (INHALATION) at 19:48

## 2020-07-01 RX ADMIN — CEFEPIME 2 G: 2 INJECTION, POWDER, FOR SOLUTION INTRAVENOUS at 08:12

## 2020-07-01 RX ADMIN — INSULIN LISPRO 2 UNITS: 100 INJECTION, SOLUTION INTRAVENOUS; SUBCUTANEOUS at 16:06

## 2020-07-01 RX ADMIN — ENOXAPARIN SODIUM 40 MG: 40 INJECTION SUBCUTANEOUS at 08:16

## 2020-07-01 RX ADMIN — POTASSIUM PHOSPHATE, MONOBASIC AND POTASSIUM PHOSPHATE, DIBASIC 30 MMOL: 224; 236 INJECTION, SOLUTION, CONCENTRATE INTRAVENOUS at 11:10

## 2020-07-01 RX ADMIN — KETOTIFEN FUMARATE 1 DROP: 0.35 SOLUTION/ DROPS OPHTHALMIC at 08:16

## 2020-07-01 RX ADMIN — PROPOFOL 35 MCG/KG/MIN: 10 INJECTION, EMULSION INTRAVENOUS at 13:18

## 2020-07-01 RX ADMIN — KETOTIFEN FUMARATE 1 DROP: 0.35 SOLUTION/ DROPS OPHTHALMIC at 21:09

## 2020-07-01 RX ADMIN — Medication 10 ML: at 20:34

## 2020-07-01 RX ADMIN — SODIUM CHLORIDE, POTASSIUM CHLORIDE, SODIUM LACTATE AND CALCIUM CHLORIDE: 600; 310; 30; 20 INJECTION, SOLUTION INTRAVENOUS at 02:08

## 2020-07-01 RX ADMIN — CHLORHEXIDINE GLUCONATE 0.12% ORAL RINSE 15 ML: 1.2 LIQUID ORAL at 08:16

## 2020-07-01 RX ADMIN — IPRATROPIUM BROMIDE AND ALBUTEROL SULFATE 1 AMPULE: .5; 3 SOLUTION RESPIRATORY (INHALATION) at 23:25

## 2020-07-01 RX ADMIN — PROPOFOL 35 MCG/KG/MIN: 10 INJECTION, EMULSION INTRAVENOUS at 08:31

## 2020-07-01 RX ADMIN — ASPIRIN 81 MG 81 MG: 81 TABLET ORAL at 08:15

## 2020-07-01 RX ADMIN — INSULIN LISPRO 1 UNITS: 100 INJECTION, SOLUTION INTRAVENOUS; SUBCUTANEOUS at 20:00

## 2020-07-01 RX ADMIN — IPRATROPIUM BROMIDE AND ALBUTEROL SULFATE 1 AMPULE: .5; 3 SOLUTION RESPIRATORY (INHALATION) at 11:56

## 2020-07-01 RX ADMIN — PROPOFOL 35 MCG/KG/MIN: 10 INJECTION, EMULSION INTRAVENOUS at 18:55

## 2020-07-01 RX ADMIN — IPRATROPIUM BROMIDE AND ALBUTEROL SULFATE 1 AMPULE: .5; 3 SOLUTION RESPIRATORY (INHALATION) at 03:23

## 2020-07-01 RX ADMIN — CEFEPIME 2 G: 2 INJECTION, POWDER, FOR SOLUTION INTRAVENOUS at 20:34

## 2020-07-01 RX ADMIN — DEXAMETHASONE SODIUM PHOSPHATE 6 MG: 4 INJECTION, SOLUTION INTRAMUSCULAR; INTRAVENOUS at 08:16

## 2020-07-01 ASSESSMENT — PULMONARY FUNCTION TESTS
PIF_VALUE: 25
PIF_VALUE: 24
PIF_VALUE: 25
PIF_VALUE: 25
PIF_VALUE: 24
PIF_VALUE: 24
PIF_VALUE: 25
PIF_VALUE: 25
PIF_VALUE: 22
PIF_VALUE: 24
PIF_VALUE: 23
PIF_VALUE: 25
PIF_VALUE: 23
PIF_VALUE: 25
PIF_VALUE: 35
PIF_VALUE: 34
PIF_VALUE: 23
PIF_VALUE: 25
PIF_VALUE: 25
PIF_VALUE: 34
PIF_VALUE: 23
PIF_VALUE: 31
PIF_VALUE: 27
PIF_VALUE: 26
PIF_VALUE: 19

## 2020-07-01 ASSESSMENT — PAIN SCALES - GENERAL: PAINLEVEL_OUTOF10: 0

## 2020-07-01 NOTE — PROGRESS NOTES
Patient reassessed. Patient currently intubated with ETT at 23LL. Vent settings are 16 RR, 450 VT, 10 of PEEP and 70% FiO2. Patient pulling smaller volumes on vent with frequent alarms. Desaturating to 87%. I believe he may have a cuff leak. RT made aware to assess this. Patient does not appear to be in distress at this time. Lungs show inspiratory wheezing in uppers. Propofol infusing at 35 mcg/kg/min. Fentanyl infusing at 75 mcg//h. BUE soft wrist restraints in place due to attempts of pulling at ETT and lines. No signs of injury noted and PROM performed. Central line dressing is clean, dry and intact with no signs of drainage. All tubing is current and dated. IPA caps applied to all access hubs. Unchanged otherwise.      Electronically signed by Khadra Street RN on 7/1/2020 at 11:38 AM    Vitals:    07/01/20 1100   BP: (!) 132/57   Pulse: 89   Resp: 15   Temp: 99 °F (37.2 °C)   SpO2: 92%

## 2020-07-01 NOTE — PROGRESS NOTES
drop Both Eyes BID    enoxaparin  40 mg Subcutaneous BID    dexamethasone  6 mg Intravenous Daily    rosuvastatin  10 mg Oral Daily    aspirin  81 mg Oral Daily    sodium chloride flush  10 mL Intravenous 2 times per day     PRN Meds:  glucose, dextrose, glucagon (rDNA), dextrose, fentanNYL, midazolam, tetrahydrozoline, aluminum & magnesium hydroxide-simethicone, ondansetron, sodium chloride flush, acetaminophen **OR** acetaminophen    Results:  CBC:   Recent Labs     06/29/20 0330 06/30/20 0420 07/01/20  0435   WBC 11.2* 11.0 10.8   HGB 13.8 12.3* 11.8*   HCT 42.1 37.6* 35.2*   MCV 87.9 88.2 87.9    161 172     BMP:   Recent Labs     06/29/20 0330 06/30/20 0420 07/01/20  0435    136 138   K 3.8 3.7 3.5   CL 97* 99 103   CO2 26 24 26   PHOS  --  3.0 1.9*   BUN 19 31* 23*   CREATININE 1.2 1.5* 0.9     LIVER PROFILE:   Recent Labs     06/29/20 0330 06/30/20 0420 07/01/20  0435   AST 23 21 17   ALT 11 9* 8*   BILIDIR  --  <0.2 <0.2   BILITOT 0.8 0.5 0.3   ALKPHOS 73 56 49       Cultures:  6/24 Urine: MSSA  6/24 COVID 19 +  6/29 Trach Asp    Films:  CXR was reviewed by me and it showed ETT in place and stable bilateral ASD      ASSESSMENT:  · Acute hypoxic respiratory failure due to COVID 19  · ARDS  · COVID 19 viral pneumona  · ANN    PLAN:   Mechanical ventilation as per my orders. The ventilator was adjusted by me at the bedside for unstable, life threatening respiratory failure. IV Propofol and Fentanyl gtt for sedation, target RASS -2, with daily spontaneous awakening trial.   Head of bed 30 degrees or higher at all times  Daily spontaneous breathing trial once PEEP less than 8, FiO2 less than 55%. · D#2 Vanc and Cefepime and send a tracheal aspirate  · Decadron 6 mg IV daily  · Lovenox BID for DVT prophylaxis  · Remdesevir D#3/5. Monitor renal, cbc, hepatic function daily during therapy. with remdesivir;  Discontinue therapy in patients who develop ALT ? 5x ULN or ALT elevation accompanied by signs or symptoms of liver inflammation or increasing conjugated bilirubin, alkaline phosphatase, or INR. · Stop IVF. Probably lasix tomorrow if Cr stable  · Replace k phos  · TF's  · Total critical care time caring for this patient with life threatening, unstable organ failure, including direct patient contact, management of life support systems, review of data including imaging and labs, discussions with other team members and physicians at least 38 minutes so far today, excluding procedures.

## 2020-07-01 NOTE — PROGRESS NOTES
Remdesivir-Day 4  Bun/Srcr  23/0.9, Crcl 66ml/min, LFT- WNL  Continue with remdesivir  Sonja Silverio Pharm D 7/1/202012:12 PM  .      . Kodi Lopez

## 2020-07-01 NOTE — PLAN OF CARE
Problem: SAFETY  Goal: Free from accidental physical injury  Outcome: Ongoing     Problem: KNOWLEDGE DEFICIT  Goal: Patient/S.O. demonstrates understanding of disease process, treatment plan, medications, and discharge instructions. Outcome: Ongoing     Problem: Airway Clearance - Ineffective:  Goal: Clear lung sounds  Description: Clear lung sounds  Outcome: Ongoing  Goal: Ability to maintain a clear airway will improve  Description: Ability to maintain a clear airway will improve  Outcome: Ongoing     Problem: Gas Exchange - Impaired:  Goal: Levels of oxygenation will improve  Description: Levels of oxygenation will improve  Outcome: Ongoing     Problem: Hyperthermia:  Goal: Ability to maintain a body temperature in the normal range will improve  Description: Ability to maintain a body temperature in the normal range will improve  Outcome: Ongoing     Problem: Airway Clearance - Ineffective  Goal: Achieve or maintain patent airway  Outcome: Ongoing     Problem: Gas Exchange - Impaired  Goal: Absence of hypoxia  Outcome: Ongoing  Goal: Promote optimal lung function  Outcome: Ongoing     Problem: Breathing Pattern - Ineffective  Goal: Ability to achieve and maintain a regular respiratory rate  Outcome: Ongoing     Problem:  Body Temperature -  Risk of, Imbalanced  Goal: Ability to maintain a body temperature within defined limits  Outcome: Ongoing  Goal: Will regain or maintain usual level of consciousness  Outcome: Ongoing  Goal: Complications related to the disease process, condition or treatment will be avoided or minimized  Outcome: Ongoing     Problem: Isolation Precautions - Risk of Spread of Infection  Goal: Prevent transmission of infection  Outcome: Ongoing     Problem: Nutrition Deficits  Goal: Optimize nutrtional status  Outcome: Ongoing     Problem: Risk for Fluid Volume Deficit  Goal: Maintain normal heart rhythm  Outcome: Ongoing  Goal: Maintain absence of muscle cramping  Outcome: Ongoing  Goal: Maintain normal serum potassium, sodium, calcium, phosphorus, and pH  Outcome: Ongoing     Problem: Loneliness or Risk for Loneliness  Goal: Demonstrate positive use of time alone when socialization is not possible  Outcome: Ongoing     Problem: Fatigue  Goal: Verbalize increase energy and improved vitality  Outcome: Ongoing     Problem: Patient Education: Go to Patient Education Activity  Goal: Patient/Family Education  Outcome: Ongoing     Problem: Restraint Use - Nonviolent/Non-Self-Destructive Behavior:  Goal: Absence of restraint indications  Description: Absence of restraint indications  Outcome: Ongoing  Goal: Absence of restraint-related injury  Description: Absence of restraint-related injury  Outcome: Ongoing     Problem: Nutrition  Goal: Optimal nutrition therapy  Outcome: Ongoing  Goal: Understanding of nutritional guidelines  Outcome: Ongoing     Problem: Skin Integrity:  Goal: Will show no infection signs and symptoms  Description: Will show no infection signs and symptoms  Outcome: Ongoing  Goal: Absence of new skin breakdown  Description: Absence of new skin breakdown  Outcome: Ongoing     Problem: Falls - Risk of:  Goal: Will remain free from falls  Description: Will remain free from falls  Outcome: Ongoing  Goal: Absence of physical injury  Description: Absence of physical injury  Outcome: Ongoing

## 2020-07-01 NOTE — PROGRESS NOTES
D: Bedside report received from [facility], all current drips, treatments, skin issues, and plan of care were reviewed by both RN's, care transferred.

## 2020-07-01 NOTE — PROGRESS NOTES
Shift report given to Leslye Barnes RN  at bedside. Patient care handed off in stable condition at this time.    Electronically signed by Carolanne Sandifer, RN on 7/1/2020 at 7:10 PM

## 2020-07-01 NOTE — PROGRESS NOTES
Pt is gurgly and vent low MV is alarming. Pt ETT is out to the 20 lip line. Advanced ETT back to the 23 lip line and getting xray to verify tube placement.

## 2020-07-01 NOTE — PROGRESS NOTES
Vancomycin Day: 3    Patient's labs, cultures, vitals, and vancomycin regimen reviewed. No changes today.   Trough due on 2nd Suzann Siad D 7/1/202012:09 PM  .

## 2020-07-01 NOTE — PROGRESS NOTES
thyroid. Resp: No accessory muscle use. No crackles. No wheezing. No rhonchi. No dullness on percussion. CV: Regular rate. Regular rhythm. No mumur or rub. No edema. No JVD. Palpable pedal pulses. GI: Non-tender. Non-distended. No masses. No organmegaly. Normal bowel sounds. No hernia. Skin: Warm and dry. No nodule on exposed extremities. No rash on exposed extremities. Lymph: No cervical LAD. No supraclavicular LAD. M/S: No cyanosis. No joint deformity. No clubbing. Neuro: Awake. Follows commands. .   Psych: Awake. Appears calm.     Medications:  Scheduled Meds:   potassium phosphate IVPB  30 mmol Intravenous Once    insulin lispro  0-6 Units Subcutaneous Q4H    chlorhexidine  15 mL Mouth/Throat BID    pantoprazole  40 mg Intravenous Daily    ipratropium-albuterol  1 ampule Inhalation Q4H    cefepime  2 g Intravenous Q12H    vancomycin  1,250 mg Intravenous Q24H    remdesivir IVPB  100 mg Intravenous Q24H    ketotifen  1 drop Both Eyes BID    enoxaparin  40 mg Subcutaneous BID    dexamethasone  6 mg Intravenous Daily    rosuvastatin  10 mg Oral Daily    aspirin  81 mg Oral Daily    sodium chloride flush  10 mL Intravenous 2 times per day       PRN Meds:  glucose, dextrose, glucagon (rDNA), dextrose, fentanNYL, midazolam, tetrahydrozoline, aluminum & magnesium hydroxide-simethicone, ondansetron, sodium chloride flush, acetaminophen **OR** acetaminophen    Results:  CBC:   Recent Labs     06/29/20 0330 06/30/20 0420 07/01/20 0435   WBC 11.2* 11.0 10.8   HGB 13.8 12.3* 11.8*   HCT 42.1 37.6* 35.2*   MCV 87.9 88.2 87.9    161 172     BMP:   Recent Labs     06/29/20 0330 06/30/20 0420 07/01/20 0435    136 138   K 3.8 3.7 3.5   CL 97* 99 103   CO2 26 24 26   PHOS  --  3.0 1.9*   BUN 19 31* 23*   CREATININE 1.2 1.5* 0.9     LIVER PROFILE:   Recent Labs     06/29/20 0330 06/30/20 0420 07/01/20 0435   AST 23 21 17   ALT 11 9* 8*   BILIDIR  --  <0.2 <0.2   BILITOT 0.8 0.5 0.3 (adult respiratory distress syndrome) ([facility] [address]    Hyperlipidemia  Resolved Problems:    * No resolved hospital problems. *         Plan:    #Acute respiratory failure due to COVID-19. He has ARDS. Is on the ventilator. Pulmonary critical care is seeing him. His respiratory status is about the same. his PEEP is 10 and but his FiO2 is up to 70%. He is on Decadron 6 mg IV daily. #Patient has been weaned off Levophed. His sepsis is improving. #Multifocal pneumonia. Likely related to COVID-19. Tracheal aspirate sent. Continue broad-spectrum antibiotics including Vanco and cefepime. Continue Remdesevir day 4 of 5. #Patient's creatinine is 0.9. Consider Lasix. #Check daily d-dimer and INR. #Convalescent plasma was discussed with the patient. #Lovenox twice daily dosing. Critically ill but stable. All questions and concerns were addressed to the patient/family. Alternatives to my treatment were discussed. The note was completed using EMR. Every effort was made to ensure accuracy; however, inadvertent computerized transcription errors may be present.          Loi Sutherland 9:46 AM 7/1/2020

## 2020-07-01 NOTE — PROGRESS NOTES
Patient reassessed. Pretty well unchanged.      Electronically signed by Ann Lee RN on 7/1/2020 at 4:09 PM

## 2020-07-01 NOTE — PROGRESS NOTES
Shift assessment complete. Patient currently intubated with ETT at [facility]. Vent settings are 16 RR, 450 VT, 10 of PEEP and 70% FiO2. Patient appears comfortable. Oxygenation appears the same as yesterday right now. He is able to engage in eye contact, follows commands and appears to understand my teachings right now. Propofol infusing at 35 mcg/kg/min. Fentanyl infusing at 75 mcg//h. LR discontinued. TF infusing at goal. H2O flushes according to dietician recommendations. +1 pitting edema noted in BLE and BUE. Central line dressing is clean, dry and intact with no signs of drainage. All tubing is current and dated. IPA caps applied to all access hubs. BUE soft wrist restraints in place due to attempts of pulling at ETT and lines. No signs of injury noted and PROM performed. Fischer in place. Much improved urine output and kidney function tests appear improved.      Electronically signed by Carolanne Sandifer, RN on 7/1/2020 at 8:53 AM    Vitals:    07/01/20 0800   BP: (!) 142/57   Pulse: 87   Resp: 17   Temp: 98.1 °F (36.7 °C)   SpO2: 95%

## 2020-07-02 PROBLEM — J12.82 PNEUMONIA DUE TO COVID-19 VIRUS: Status: ACTIVE | Noted: 2020-06-27

## 2020-07-02 LAB
ALBUMIN SERPL-MCNC: 2.9 G/DL (ref 3.4–5)
ALP BLD-CCNC: 54 U/L (ref 40–129)
ALT SERPL-CCNC: 11 U/L (ref 10–40)
ANION GAP SERPL CALCULATED.3IONS-SCNC: 9 MMOL/L (ref 3–16)
ANISOCYTOSIS: ABNORMAL
AST SERPL-CCNC: 22 U/L (ref 15–37)
ATYPICAL LYMPHOCYTE RELATIVE PERCENT: 1 % (ref 0–6)
BANDED NEUTROPHILS RELATIVE PERCENT: 1 % (ref 0–7)
BASE EXCESS ARTERIAL: 1.9 MMOL/L (ref -3–3)
BASOPHILS ABSOLUTE: 0 K/UL (ref 0–0.2)
BASOPHILS RELATIVE PERCENT: 0 %
BILIRUB SERPL-MCNC: 0.4 MG/DL (ref 0–1)
BILIRUBIN DIRECT: <0.2 MG/DL (ref 0–0.3)
BILIRUBIN, INDIRECT: ABNORMAL MG/DL (ref 0–1)
BLOOD BANK DISPENSE STATUS: NORMAL
BLOOD BANK DISPENSE STATUS: NORMAL
BLOOD BANK PRODUCT CODE: NORMAL
BLOOD BANK PRODUCT CODE: NORMAL
BPU ID: NORMAL
BPU ID: NORMAL
BUN BLDV-MCNC: 25 MG/DL (ref 7–20)
CALCIUM SERPL-MCNC: 7.8 MG/DL (ref 8.3–10.6)
CARBOXYHEMOGLOBIN ARTERIAL: 0.2 % (ref 0–1.5)
CHLORIDE BLD-SCNC: 101 MMOL/L (ref 99–110)
CO2: 28 MMOL/L (ref 21–32)
CREAT SERPL-MCNC: 0.9 MG/DL (ref 0.8–1.3)
D DIMER: 647 NG/ML DDU (ref 0–229)
DESCRIPTION BLOOD BANK: NORMAL
DESCRIPTION BLOOD BANK: NORMAL
EOSINOPHILS ABSOLUTE: 0 K/UL (ref 0–0.6)
EOSINOPHILS RELATIVE PERCENT: 0 %
FERRITIN: 200.3 NG/ML (ref 30–400)
GFR AFRICAN AMERICAN: >60
GFR NON-AFRICAN AMERICAN: >60
GLUCOSE BLD-MCNC: 128 MG/DL (ref 70–99)
GLUCOSE BLD-MCNC: 134 MG/DL (ref 70–99)
GLUCOSE BLD-MCNC: 144 MG/DL (ref 70–99)
GLUCOSE BLD-MCNC: 156 MG/DL (ref 70–99)
GLUCOSE BLD-MCNC: 210 MG/DL (ref 70–99)
GLUCOSE BLD-MCNC: 231 MG/DL (ref 70–99)
HCO3 ARTERIAL: 26.9 MMOL/L (ref 21–29)
HCT VFR BLD CALC: 36.9 % (ref 40.5–52.5)
HEMOGLOBIN, ART, EXTENDED: 12.7 G/DL (ref 13.5–17.5)
HEMOGLOBIN: 12.1 G/DL (ref 13.5–17.5)
HYPOCHROMIA: ABNORMAL
INR BLD: 1.13 (ref 0.86–1.14)
INR BLD: 1.15 (ref 0.86–1.14)
LACTATE DEHYDROGENASE: 330 U/L (ref 100–190)
LYMPHOCYTES ABSOLUTE: 0.9 K/UL (ref 1–5.1)
LYMPHOCYTES RELATIVE PERCENT: 6 %
MCH RBC QN AUTO: 28.7 PG (ref 26–34)
MCHC RBC AUTO-ENTMCNC: 32.6 G/DL (ref 31–36)
MCV RBC AUTO: 87.9 FL (ref 80–100)
METHEMOGLOBIN ARTERIAL: 0.3 %
MONOCYTES ABSOLUTE: 0.2 K/UL (ref 0–1.3)
MONOCYTES RELATIVE PERCENT: 2 %
MYELOCYTE PERCENT: 2 %
NEUTROPHILS ABSOLUTE: 11.1 K/UL (ref 1.7–7.7)
NEUTROPHILS RELATIVE PERCENT: 88 %
O2 CONTENT ARTERIAL: 17 ML/DL
O2 SAT, ARTERIAL: 93.6 %
O2 THERAPY: ABNORMAL
PCO2 ARTERIAL: 43.4 MMHG (ref 35–45)
PDW BLD-RTO: 14.4 % (ref 12.4–15.4)
PERFORMED ON: ABNORMAL
PH ARTERIAL: 7.41 (ref 7.35–7.45)
PHOSPHORUS: 2.4 MG/DL (ref 2.5–4.9)
PLATELET # BLD: 197 K/UL (ref 135–450)
PLATELET SLIDE REVIEW: ADEQUATE
PMV BLD AUTO: 9.2 FL (ref 5–10.5)
PO2 ARTERIAL: 67.4 MMHG (ref 75–108)
POLYCHROMASIA: ABNORMAL
POTASSIUM SERPL-SCNC: 4.2 MMOL/L (ref 3.5–5.1)
PROCALCITONIN: 0.22 NG/ML (ref 0–0.15)
PROTHROMBIN TIME: 13.1 SEC (ref 10–13.2)
PROTHROMBIN TIME: 13.4 SEC (ref 10–13.2)
RBC # BLD: 4.2 M/UL (ref 4.2–5.9)
SLIDE REVIEW: ABNORMAL
SODIUM BLD-SCNC: 138 MMOL/L (ref 136–145)
TCO2 ARTERIAL: 28.2 MMOL/L
TOTAL PROTEIN: 5.3 G/DL (ref 6.4–8.2)
TRIGL SERPL-MCNC: 69 MG/DL (ref 0–150)
WBC # BLD: 12.2 K/UL (ref 4–11)

## 2020-07-02 PROCEDURE — 94750 HC PULMONARY COMPLIANCE STUDY: CPT

## 2020-07-02 PROCEDURE — 36600 WITHDRAWAL OF ARTERIAL BLOOD: CPT

## 2020-07-02 PROCEDURE — 80076 HEPATIC FUNCTION PANEL: CPT

## 2020-07-02 PROCEDURE — 94761 N-INVAS EAR/PLS OXIMETRY MLT: CPT

## 2020-07-02 PROCEDURE — 85379 FIBRIN DEGRADATION QUANT: CPT

## 2020-07-02 PROCEDURE — 84145 PROCALCITONIN (PCT): CPT

## 2020-07-02 PROCEDURE — 94003 VENT MGMT INPAT SUBQ DAY: CPT

## 2020-07-02 PROCEDURE — 6360000002 HC RX W HCPCS: Performed by: INTERNAL MEDICINE

## 2020-07-02 PROCEDURE — 2700000000 HC OXYGEN THERAPY PER DAY

## 2020-07-02 PROCEDURE — 94640 AIRWAY INHALATION TREATMENT: CPT

## 2020-07-02 PROCEDURE — 85025 COMPLETE CBC W/AUTO DIFF WBC: CPT

## 2020-07-02 PROCEDURE — 87205 SMEAR GRAM STAIN: CPT

## 2020-07-02 PROCEDURE — 2580000003 HC RX 258: Performed by: INTERNAL MEDICINE

## 2020-07-02 PROCEDURE — 36592 COLLECT BLOOD FROM PICC: CPT

## 2020-07-02 PROCEDURE — 99291 CRITICAL CARE FIRST HOUR: CPT | Performed by: INTERNAL MEDICINE

## 2020-07-02 PROCEDURE — 87070 CULTURE OTHR SPECIMN AEROBIC: CPT

## 2020-07-02 PROCEDURE — C9113 INJ PANTOPRAZOLE SODIUM, VIA: HCPCS | Performed by: INTERNAL MEDICINE

## 2020-07-02 PROCEDURE — 80069 RENAL FUNCTION PANEL: CPT

## 2020-07-02 PROCEDURE — 83520 IMMUNOASSAY QUANT NOS NONAB: CPT

## 2020-07-02 PROCEDURE — 6370000000 HC RX 637 (ALT 250 FOR IP): Performed by: INTERNAL MEDICINE

## 2020-07-02 PROCEDURE — 82728 ASSAY OF FERRITIN: CPT

## 2020-07-02 PROCEDURE — 2000000000 HC ICU R&B

## 2020-07-02 PROCEDURE — 85610 PROTHROMBIN TIME: CPT

## 2020-07-02 PROCEDURE — 82803 BLOOD GASES ANY COMBINATION: CPT

## 2020-07-02 PROCEDURE — 99292 CRITICAL CARE ADDL 30 MIN: CPT | Performed by: INTERNAL MEDICINE

## 2020-07-02 PROCEDURE — 99233 SBSQ HOSP IP/OBS HIGH 50: CPT | Performed by: INTERNAL MEDICINE

## 2020-07-02 PROCEDURE — 84478 ASSAY OF TRIGLYCERIDES: CPT

## 2020-07-02 PROCEDURE — 87081 CULTURE SCREEN ONLY: CPT

## 2020-07-02 PROCEDURE — 36430 TRANSFUSION BLD/BLD COMPNT: CPT

## 2020-07-02 PROCEDURE — 83615 LACTATE (LD) (LDH) ENZYME: CPT

## 2020-07-02 PROCEDURE — 36415 COLL VENOUS BLD VENIPUNCTURE: CPT

## 2020-07-02 RX ORDER — 0.9 % SODIUM CHLORIDE 0.9 %
20 INTRAVENOUS SOLUTION INTRAVENOUS ONCE
Status: COMPLETED | OUTPATIENT
Start: 2020-07-02 | End: 2020-07-02

## 2020-07-02 RX ORDER — FUROSEMIDE 10 MG/ML
40 INJECTION INTRAMUSCULAR; INTRAVENOUS ONCE
Status: COMPLETED | OUTPATIENT
Start: 2020-07-02 | End: 2020-07-02

## 2020-07-02 RX ORDER — DEXAMETHASONE SODIUM PHOSPHATE 4 MG/ML
6 INJECTION, SOLUTION INTRA-ARTICULAR; INTRALESIONAL; INTRAMUSCULAR; INTRAVENOUS; SOFT TISSUE EVERY 12 HOURS
Status: DISCONTINUED | OUTPATIENT
Start: 2020-07-02 | End: 2020-07-04

## 2020-07-02 RX ORDER — FUROSEMIDE 10 MG/ML
20 INJECTION INTRAMUSCULAR; INTRAVENOUS ONCE
Status: COMPLETED | OUTPATIENT
Start: 2020-07-02 | End: 2020-07-02

## 2020-07-02 RX ADMIN — DEXAMETHASONE SODIUM PHOSPHATE 6 MG: 4 INJECTION, SOLUTION INTRAMUSCULAR; INTRAVENOUS at 09:16

## 2020-07-02 RX ADMIN — PROPOFOL 35 MCG/KG/MIN: 10 INJECTION, EMULSION INTRAVENOUS at 20:20

## 2020-07-02 RX ADMIN — IPRATROPIUM BROMIDE AND ALBUTEROL SULFATE 1 AMPULE: .5; 3 SOLUTION RESPIRATORY (INHALATION) at 16:29

## 2020-07-02 RX ADMIN — IPRATROPIUM BROMIDE AND ALBUTEROL SULFATE 1 AMPULE: .5; 3 SOLUTION RESPIRATORY (INHALATION) at 03:23

## 2020-07-02 RX ADMIN — FUROSEMIDE 20 MG: 10 INJECTION, SOLUTION INTRAVENOUS at 18:16

## 2020-07-02 RX ADMIN — Medication 10 ML: at 09:15

## 2020-07-02 RX ADMIN — CEFEPIME 2 G: 2 INJECTION, POWDER, FOR SOLUTION INTRAVENOUS at 20:46

## 2020-07-02 RX ADMIN — FENTANYL CITRATE 100 MCG/HR: 50 INJECTION, SOLUTION INTRAMUSCULAR; INTRAVENOUS at 12:29

## 2020-07-02 RX ADMIN — PROPOFOL 35 MCG/KG/MIN: 10 INJECTION, EMULSION INTRAVENOUS at 01:00

## 2020-07-02 RX ADMIN — PROPOFOL 35 MCG/KG/MIN: 10 INJECTION, EMULSION INTRAVENOUS at 05:47

## 2020-07-02 RX ADMIN — KETOTIFEN FUMARATE 1 DROP: 0.35 SOLUTION/ DROPS OPHTHALMIC at 09:42

## 2020-07-02 RX ADMIN — PANTOPRAZOLE SODIUM 40 MG: 40 INJECTION, POWDER, FOR SOLUTION INTRAVENOUS at 09:15

## 2020-07-02 RX ADMIN — PROPOFOL 35 MCG/KG/MIN: 10 INJECTION, EMULSION INTRAVENOUS at 14:53

## 2020-07-02 RX ADMIN — DEXAMETHASONE SODIUM PHOSPHATE 6 MG: 4 INJECTION, SOLUTION INTRAMUSCULAR; INTRAVENOUS at 20:46

## 2020-07-02 RX ADMIN — CHLORHEXIDINE GLUCONATE 0.12% ORAL RINSE 15 ML: 1.2 LIQUID ORAL at 09:15

## 2020-07-02 RX ADMIN — CHLORHEXIDINE GLUCONATE 0.12% ORAL RINSE 15 ML: 1.2 LIQUID ORAL at 20:35

## 2020-07-02 RX ADMIN — IPRATROPIUM BROMIDE AND ALBUTEROL SULFATE 1 AMPULE: .5; 3 SOLUTION RESPIRATORY (INHALATION) at 07:58

## 2020-07-02 RX ADMIN — SODIUM CHLORIDE 20 ML: 9 INJECTION, SOLUTION INTRAVENOUS at 14:55

## 2020-07-02 RX ADMIN — KETOTIFEN FUMARATE 1 DROP: 0.35 SOLUTION/ DROPS OPHTHALMIC at 20:36

## 2020-07-02 RX ADMIN — TETRAHYDROZOLINE HCL 1 DROP: 0.05 SOLUTION/ DROPS OPHTHALMIC at 20:36

## 2020-07-02 RX ADMIN — ASPIRIN 81 MG 81 MG: 81 TABLET ORAL at 09:15

## 2020-07-02 RX ADMIN — MIDAZOLAM HYDROCHLORIDE 1 MG/HR: 5 INJECTION, SOLUTION INTRAMUSCULAR; INTRAVENOUS at 12:29

## 2020-07-02 RX ADMIN — ENOXAPARIN SODIUM 40 MG: 40 INJECTION SUBCUTANEOUS at 20:46

## 2020-07-02 RX ADMIN — FENTANYL CITRATE 100 MCG/HR: 50 INJECTION, SOLUTION INTRAMUSCULAR; INTRAVENOUS at 23:02

## 2020-07-02 RX ADMIN — Medication 10 ML: at 20:35

## 2020-07-02 RX ADMIN — IPRATROPIUM BROMIDE AND ALBUTEROL SULFATE 1 AMPULE: .5; 3 SOLUTION RESPIRATORY (INHALATION) at 23:27

## 2020-07-02 RX ADMIN — IPRATROPIUM BROMIDE AND ALBUTEROL SULFATE 1 AMPULE: .5; 3 SOLUTION RESPIRATORY (INHALATION) at 19:57

## 2020-07-02 RX ADMIN — FENTANYL CITRATE 75 MCG/HR: 50 INJECTION, SOLUTION INTRAMUSCULAR; INTRAVENOUS at 00:17

## 2020-07-02 RX ADMIN — INSULIN LISPRO 2 UNITS: 100 INJECTION, SOLUTION INTRAVENOUS; SUBCUTANEOUS at 20:37

## 2020-07-02 RX ADMIN — PROPOFOL 45 MCG/KG/MIN: 10 INJECTION, EMULSION INTRAVENOUS at 09:55

## 2020-07-02 RX ADMIN — INSULIN LISPRO 2 UNITS: 100 INJECTION, SOLUTION INTRAVENOUS; SUBCUTANEOUS at 16:30

## 2020-07-02 RX ADMIN — INSULIN LISPRO 1 UNITS: 100 INJECTION, SOLUTION INTRAVENOUS; SUBCUTANEOUS at 12:45

## 2020-07-02 RX ADMIN — ENOXAPARIN SODIUM 40 MG: 40 INJECTION SUBCUTANEOUS at 09:15

## 2020-07-02 RX ADMIN — IPRATROPIUM BROMIDE AND ALBUTEROL SULFATE 1 AMPULE: .5; 3 SOLUTION RESPIRATORY (INHALATION) at 10:57

## 2020-07-02 RX ADMIN — ROSUVASTATIN CALCIUM 10 MG: 10 TABLET, FILM COATED ORAL at 09:15

## 2020-07-02 RX ADMIN — INSULIN LISPRO 1 UNITS: 100 INJECTION, SOLUTION INTRAVENOUS; SUBCUTANEOUS at 00:02

## 2020-07-02 RX ADMIN — FUROSEMIDE 40 MG: 10 INJECTION, SOLUTION INTRAMUSCULAR; INTRAVENOUS at 09:56

## 2020-07-02 RX ADMIN — CEFEPIME 2 G: 2 INJECTION, POWDER, FOR SOLUTION INTRAVENOUS at 09:15

## 2020-07-02 ASSESSMENT — PULMONARY FUNCTION TESTS
PIF_VALUE: 39
PIF_VALUE: 34
PIF_VALUE: 30
PIF_VALUE: 30
PIF_VALUE: 28
PIF_VALUE: 29
PIF_VALUE: 20
PIF_VALUE: 19
PIF_VALUE: 19
PIF_VALUE: 22
PIF_VALUE: 18
PIF_VALUE: 29
PIF_VALUE: 27
PIF_VALUE: 33
PIF_VALUE: 18
PIF_VALUE: 20
PIF_VALUE: 36
PIF_VALUE: 35
PIF_VALUE: 30
PIF_VALUE: 31
PIF_VALUE: 40
PIF_VALUE: 25
PIF_VALUE: 31
PIF_VALUE: 24
PIF_VALUE: 29
PIF_VALUE: 39
PIF_VALUE: 32
PIF_VALUE: 26
PIF_VALUE: 25
PIF_VALUE: 30
PIF_VALUE: 29
PIF_VALUE: 31
PIF_VALUE: 30
PIF_VALUE: 28
PIF_VALUE: 33
PIF_VALUE: 30
PIF_VALUE: 30
PIF_VALUE: 29

## 2020-07-02 NOTE — PROGRESS NOTES
Pulmonary & Critical Care Medicine ICU Progress Note    CC: Acute hypoxic respiratory failure secondary to COVID-19    Events of Last 24 hours:  More hypoxic. Pt alert and denies pain or SOB  Propofol gtt 35  Fentanyl gtt 75  Levo @ 2    Invasive Lines: PICC 6/29/20    MV:  6/29/20-  Vent Mode: AC/VC Rate Set: 16 bmp/Vt Ordered: 450 mL/ /FiO2 : 90 %  Recent Labs     07/01/20  0452 07/02/20  0545   PHART 7.379 7.410   [id number] 55.2* 43.4   PO2ART 74.8* 67.4*       IV:   midazolam      dextrose      propofol 45 mcg/kg/min (07/02/20 0955)    fentaNYL (SUBLIMAZE) infusion 75 mcg/hr (07/02/20 0017)    norepinephrine Stopped (06/30/20 0830)       Vitals:  /69   Pulse 90   Temp 99.1 °F (37.3 °C)   Resp 15   Ht 5' 7\" (1.702 m)   Wt 200 lb 1.6 oz (90.8 kg)   SpO2 (!) 89%   BMI 31.34 kg/m²   on vent    Intake/Output Summary (Last 24 hours) at 7/2/2020 1144  Last data filed at 7/2/2020 0547  Gross per 24 hour   Intake 2578 ml   Output 1570 ml   Net 1008 ml     EXAM:  Constitutional: ill appearing. Not in distress. Eyes: PERRL. No sclera icterus. ENT: Normal Nose. Normal Tongue. ETT in place  Neck:  Trachea is midline. No thyroid tenderness. Respiratory: No accessory muscle usage. no decreased breath sounds. No wheezes. No rales. No Rhonchi. Cardiovascular: Normal S1S2. No murmur. No digit cyanosis. No digit clubbing. No LE edema. GI: Non-tender. Non-distended. Normal bowel sounds. No masses. No umbilical hernia. Skin: No rash on the exposed extremities. No Nodules on exposed extremities. Neuro: Sedated but awakens to follow commands. Moves all extremities. Psych: No agitation, no anxiety.     Scheduled Meds:   dexamethasone  6 mg Intravenous Q12H    insulin lispro  0-6 Units Subcutaneous Q4H    chlorhexidine  15 mL Mouth/Throat BID    pantoprazole  40 mg Intravenous Daily    ipratropium-albuterol  1 ampule Inhalation Q4H    cefepime  2 g Intravenous Q12H    remdesivir IVPB  100 mg Intravenous Q24H    ketotifen  1 drop Both Eyes BID    enoxaparin  40 mg Subcutaneous BID    rosuvastatin  10 mg Oral Daily    aspirin  81 mg Oral Daily    sodium chloride flush  10 mL Intravenous 2 times per day     PRN Meds:  glucose, dextrose, glucagon (rDNA), dextrose, fentanNYL, midazolam, tetrahydrozoline, aluminum & magnesium hydroxide-simethicone, ondansetron, sodium chloride flush, acetaminophen **OR** acetaminophen    Results:  CBC:   Recent Labs     06/30/20 0420 07/01/20 0435 07/02/20  0540   WBC 11.0 10.8 12.2*   HGB 12.3* 11.8* 12.1*   HCT 37.6* 35.2* 36.9*   MCV 88.2 87.9 87.9    172 197     BMP:   Recent Labs     06/30/20 0420 07/01/20 0435 07/02/20  0540    138 138   K 3.7 3.5 4.2   CL 99 103 101   CO2 24 26 28   PHOS 3.0 1.9* 2.4*   BUN 31* 23* 25*   CREATININE 1.5* 0.9 0.9     LIVER PROFILE:   Recent Labs     06/30/20 0420 07/01/20 0435 07/02/20  0540   AST 21 17 22   ALT 9* 8* 11   BILIDIR <0.2 <0.2 <0.2   BILITOT 0.5 0.3 0.4   ALKPHOS 56 49 54       Cultures:  6/24 Urine: MSSA  6/24 COVID 19 +  6/29 Trach Asp    Films:  CXR was reviewed by me and it showed ETT in place and stable bilateral ASD      ASSESSMENT:  · Acute hypoxic respiratory failure due to COVID 19  · ARDS  · COVID 19 viral pneumona  · ANN -improved    PLAN:   Mechanical ventilation as per my orders. The ventilator was adjusted by me at the bedside for unstable, life threatening respiratory failure. IV Propofol and Fentanyl gtt for sedation, target RASS -2, with daily spontaneous awakening trial.   Head of bed 30 degrees or higher at all times  Daily spontaneous breathing trial once PEEP less than 8, FiO2 less than 55%. Start Versed gtt as pt may need to be further sedated to promote vent synchrony and he may need to start proning protocol  · Stop Vanc. Stop Cefepime if PCT normal  · Decadron 6 mg IV daily - increase to BID  · Lovenox BID for DVT prophylaxis  · Remdesevir D#4/10.  Monitor renal, cbc, hepatic function daily during therapy. with remdesivir;  Discontinue therapy in patients who develop ALT ? 5x ULN or ALT elevation accompanied by signs or symptoms of liver inflammation or increasing conjugated bilirubin, alkaline phosphatase, or INR. · Stop IVF. Probably lasix tomorrow if Cr stable  · Lasix 40mg IV x1  · ID consult to consider treating with convalescent serum  · TF's  · Total critical care time caring for this patient with life threatening, unstable organ failure, including direct patient contact, management of life support systems, review of data including imaging and labs, discussions with other team members and physicians at least 80 minutes so far today, excluding procedures.

## 2020-07-02 NOTE — PROGRESS NOTES
EAP consent and transfusion consent confirmation obtained from [facility], wife of patient per phone.

## 2020-07-02 NOTE — PROGRESS NOTES
RESPIRATORY THERAPY ASSESSMENT    Name:  Karol Trujillo  Medical Record Number:  [id number]  Age: 82 y.o. Gender: male  : 1935  Today's Date:  2020  Room:  [facility]3/3013-01    Assessment     Is the patient being admitted for a COPD or Asthma exacerbation? No   (If yes the patient will be seen every 4 hours for the first 24 hours and then reassessed)    Patient Admission Diagnosis      Allergies  Allergies   Allergen Reactions    Buspar [Buspirone] Other (See Comments)     Face,face numb    Codeine Other (See Comments)     Face,lips numb    Methylphenidate Other (See Comments)     Face numbness    Ritalin [Methylphenidate Hcl] Other (See Comments)     Face. lips numb    Zoloft [Sertraline Hcl] Other (See Comments)     Numbness of lips       Minimum Predicted Vital Capacity:               Actual Vital Capacity:                    Pulmonary History:No history  Home Oxygen Therapy:  room air  Home Respiratory Therapy:None   Current Respiratory Therapy:  duoneb q4  Treatment Type: Aerosol generator  Medications: Albuterol/Ipratropium    Respiratory Severity Index(RSI)   Patients with orders for inhalation medications, oxygen, or any therapeutic treatment modality will be placed on Respiratory Protocol. They will be assessed with the first treatment and at least every 72 hours thereafter. The following severity scale will be used to determine frequency of treatment intervention. Smoking History: Pulmonary Disease or Smoking History, Greater than 15 pack year = 2    Social History  Social History     Tobacco Use    Smoking status: Former Smoker     Packs/day: 1.00     Years: 25.00     Pack years: 25.00     Types: Cigarettes     Start date: 1990    Smokeless tobacco: Never Used   Substance Use Topics    Alcohol use:  Yes     Alcohol/week: 1.0 standard drinks     Types: 1 Cans of beer per week     Comment: occasional    Drug use: No       Recent Surgical History: None = 0  Past Surgical History  Past Surgical History:   Procedure Laterality Date    BACK SURGERY      CAROTID ENDARTERECTOMY Left     COLONOSCOPY      UPPER GASTROINTESTINAL ENDOSCOPY  07/12/14    Minimal chronic gastritis       Level of Consciousness: Comatose = 4    Level of Activity: Bedridden, unresponsive or quadriplegic = 4    Respiratory Pattern: Regular Pattern; RR 8-20 = 0    Breath Sounds: Diminshed bilaterally and/or crackles = 2    Sputum  Sputum Color: Creamy, Tenacity: Thick, Sputum How Obtained: Endotracheal, Suctioned  Cough: Strong, productive = 1    Vital Signs   /60   Pulse 81   Temp 99 °F (37.2 °C) (Temporal)   Resp 15   Ht 5' 7\" (1.702 m)   Wt 186 lb (84.4 kg)   SpO2 93%   BMI 29.13 kg/m²   SPO2 (COPD values may differ): Less than 86% on room air or greater than 92% on FiO2 greater than 50% = 4    Peak Flow (asthma only): not applicable = 0    RSI: 66-04 = Q4 (every four hours)        Plan       Goals: mobilize retained secretions, volume expansion and improve oxygenation    Patient/caregiver was educated on the proper method of use for Respiratory Care Devices:  Yes      Level of patient/caregiver understanding able to:   ? Verbalize understanding   ? Demonstrate understanding       ? Teach back        ? Needs reinforcement       ? No available caregiver               ? Other:     Response to education:  on vent     Is patient being placed on Home Treatment Regimen? No     Does the patient have everything they need prior to discharge? NA     Comments: pt assessed and chart reviewed    Plan of Care: duoneb q4    Electronically signed by Ivania Gonzalez RCP on 7/2/2020 at 4:50 AM    Respiratory Protocol Guidelines     1. Assessment and treatment by Respiratory Therapy will be initiated for medication and therapeutic interventions upon initiation of aerosolized medication. 2. Physician will be contacted for respiratory rate (RR) greater than 35 breaths per minute.  Therapy will be held for heart rate (HR) greater than 140 beats per minute, pending direction from physician. 3. Bronchodilators will be administered via Metered Dose Inhaler (MDI) with spacer when the following criteria are met:  a. Alert and cooperative     b. HR < 140 bpm  c. RR < 30 bpm                d. Can demonstrate a 2-3 second inspiratory hold  4. Bronchodilators will be administered via Hand Held Nebulizer [facility]) to patients when ANY of the following criteria are met  a. Incognizant or uncooperative          b. Patients treated with HHN at Home        c. Unable to demonstrate proper use of MDI with spacer     d. RR > 30 bpm   5. Bronchodilators will be delivered via Metered Dose Inhaler (MDI), HHN, Aerogen to intubated patients on mechanical ventilation. 6. Inhalation medication orders will be delivered and/or substituted as outlined below. Aerosolized Medications Ordering and Administration Guidelines:    1. All Medications will be ordered by a physician, and their frequency and/or modality will be adjusted as defined by the patients Respiratory Severity Index (RSI) score. 2. If the patient does not have documented COPD, consider discontinuing anticholinergics when RSI is less than 9.  3. If the bronchospasm worsens (increased RSI), then the bronchodilator frequency can be increased to a maximum of every 4 hours. If greater than every 4 hours is required, the physician will be contacted. 4. If the bronchospasm improves, the frequency of the bronchodilator can be decreased, based on the patient's RSI, but not less than home treatment regimen frequency. 5. Bronchodilator(s) will be discontinued if patient has a RSI less than 9 and has received no scheduled or as needed treatment for 72  Hrs. Patients Ordered on a Mucolytic Agent:    1. Must always be administered with a bronchodilator.     2. Discontinue if patient experiences worsened bronchospasm, or secretions have lessened to the point that the patient is able to clear them with a cough. Anti-inflammatory and Combination Medications:    1. If the patient lacks prior history of lung disease, is not using inhaled anti-inflammatory medication at home, and lacks wheezing by examination or by history for at least 24 hours, contact physician for possible discontinuation.

## 2020-07-02 NOTE — PROGRESS NOTES
D: RT reported that the ET tube was at 20 cc and that he put it in the correct position of 23 at the lip. A: Ordered a stat portable x ray to confirm correct position.

## 2020-07-02 NOTE — FLOWSHEET NOTE
07/02/20 1833   Vitals   BP (!) 126/58   Temp 99.3 °F (37.4 °C)   Temp Source Rectal   Pulse 85   Resp 15   SpO2 92 %   o s/sx transfuison reaction. Plasma transfusion continuing.

## 2020-07-02 NOTE — PROGRESS NOTES
Handoff report to Derian Harvey RN  4 Eyes Skin Assessment     The patient is being assess for   Shift Handoff    I agree that 2 RN's have performed a thorough Head to Toe Skin Assessment on the patient. ALL assessment sites listed below have been assessed. Areas assessed by both nurses:   [x]   Head, Face, and Ears   [x]   Shoulders, Back, and Chest, Abdomen  [x]   Arms, Elbows, and Hands   [x]   Coccyx, Sacrum, and Ischium  [x]   Legs, Feet, and Heels        Protective mepilex to sacrum    **SHARE this note so that the co-signing nurse is able to place an eSignature**    Co-signer eSignature: Electronically signed by Gian Riley RN on 7/2/20 at 7:28 PM EDT    Does the Patient have Skin Breakdown?   No          Cliff Prevention initiated:  Yes   Wound Care Orders initiated:  No      C nurse consulted for Pressure Injury (Stage 3,4, Unstageable, DTI, NWPT, Complex wounds)and New or Established Ostomies:  No      Primary Nurse eSignature: Electronically signed by Elon Severs, RN on 7/2/20 at 7:58 PM EDT

## 2020-07-02 NOTE — PROGRESS NOTES
Pulmonary & Critical Care Medicine ICU Progress Note    CC: Acute hypoxic respiratory failure secondary to COVID-19    Events of Last 24 hours:  More hypoxic. Pt alert and denies pain or SOB  Propofol gtt 35  Fentanyl gtt 75  Levo @ 2    Invasive Lines: PICC 6/29/20    MV:  6/29/20-  Vent Mode: AC/VC Rate Set: 16 bmp/Vt Ordered: 450 mL/ /FiO2 : 80 %  Recent Labs     07/01/20  0452 07/02/20  0545   PHART 7.379 7.410   [id number] 55.2* 43.4   PO2ART 74.8* 67.4*       IV:   dextrose      propofol 35 mcg/kg/min (07/02/20 0547)    fentaNYL (SUBLIMAZE) infusion 75 mcg/hr (07/02/20 0017)    norepinephrine Stopped (06/30/20 0830)       Vitals:  BP (!) 147/68   Pulse 99   Temp 99.1 °F (37.3 °C)   Resp 20   Ht 5' 7\" (1.702 m)   Wt 200 lb 1.6 oz (90.8 kg)   SpO2 (!) 89%   BMI 31.34 kg/m²   on vent    Intake/Output Summary (Last 24 hours) at 7/2/2020 0917  Last data filed at 7/2/2020 0547  Gross per 24 hour   Intake 3319 ml   Output 1835 ml   Net 1484 ml     EXAM:  Constitutional: ill appearing. Not in distress. Eyes: PERRL. No sclera icterus. ENT: Normal Nose. Normal Tongue. ETT in place  Neck:  Trachea is midline. No thyroid tenderness. Respiratory: No accessory muscle usage. no decreased breath sounds. No wheezes. No rales. No Rhonchi. Cardiovascular: Normal S1S2. No murmur. No digit cyanosis. No digit clubbing. No LE edema. GI: Non-tender. Non-distended. Normal bowel sounds. No masses. No umbilical hernia. Skin: No rash on the exposed extremities. No Nodules on exposed extremities. Neuro: Sedated but awakens to follow commands. Moves all extremities. Psych: No agitation, no anxiety.     Scheduled Meds:   insulin lispro  0-6 Units Subcutaneous Q4H    chlorhexidine  15 mL Mouth/Throat BID    pantoprazole  40 mg Intravenous Daily    ipratropium-albuterol  1 ampule Inhalation Q4H    cefepime  2 g Intravenous Q12H    vancomycin  1,250 mg Intravenous Q24H    remdesivir IVPB  100 mg Intravenous Q24H    ketotifen  1 drop Both Eyes BID    enoxaparin  40 mg Subcutaneous BID    dexamethasone  6 mg Intravenous Daily    rosuvastatin  10 mg Oral Daily    aspirin  81 mg Oral Daily    sodium chloride flush  10 mL Intravenous 2 times per day     PRN Meds:  glucose, dextrose, glucagon (rDNA), dextrose, fentanNYL, midazolam, tetrahydrozoline, aluminum & magnesium hydroxide-simethicone, ondansetron, sodium chloride flush, acetaminophen **OR** acetaminophen    Results:  CBC:   Recent Labs     06/30/20 0420 07/01/20 0435 07/02/20  0540   WBC 11.0 10.8 12.2*   HGB 12.3* 11.8* 12.1*   HCT 37.6* 35.2* 36.9*   MCV 88.2 87.9 87.9    172 197     BMP:   Recent Labs     06/30/20 0420 07/01/20 0435 07/02/20  0540    138 138   K 3.7 3.5 4.2   CL 99 103 101   CO2 24 26 28   PHOS 3.0 1.9* 2.4*   BUN 31* 23* 25*   CREATININE 1.5* 0.9 0.9     LIVER PROFILE:   Recent Labs     06/30/20 0420 07/01/20 0435 07/02/20  0540   AST 21 17 22   ALT 9* 8* 11   BILIDIR <0.2 <0.2 <0.2   BILITOT 0.5 0.3 0.4   ALKPHOS 56 49 54       Cultures:  6/24 Urine: MSSA  6/24 COVID 19 +  6/29 Trach Asp    Films:  CXR was reviewed by me and it showed ETT in place and stable bilateral ASD      ASSESSMENT:  · Acute hypoxic respiratory failure due to COVID 19  · ARDS  · COVID 19 viral pneumona  · ANN -improved    PLAN:   Mechanical ventilation as per my orders. The ventilator was adjusted by me at the bedside for unstable, life threatening respiratory failure. IV Propofol and Fentanyl gtt for sedation, target RASS -2, with daily spontaneous awakening trial.   Head of bed 30 degrees or higher at all times  Daily spontaneous breathing trial once PEEP less than 8, FiO2 less than 55%. Start Versed drip to increase sedation and promote vent synchrony in anticipation of possible need for proning  · Stop Vanc. Stop Cefepime if PCT normal  · Decadron 6 mg IV daily - change to BID  · Lovenox BID for DVT prophylaxis  · Remdesevir D#4/10.  Monitor renal, cbc, hepatic function daily during therapy. with remdesivir;  Discontinue therapy in patients who develop ALT ? 5x ULN or ALT elevation accompanied by signs or symptoms of liver inflammation or increasing conjugated bilirubin, alkaline phosphatase, or INR. · Stop IVF. Probably lasix tomorrow if Cr stable  · Lasix 40mg IV x1  · ID consulted to consider/ assist with treating with convalescent serum  · TF's  · Total critical care time caring for this patient with life threatening, unstable organ failure, including direct patient contact, management of life support systems, review of data including imaging and labs, discussions with other team members and physicians at least 80 minutes so far today, excluding procedures.

## 2020-07-02 NOTE — PROGRESS NOTES
D: TC from patient's daughter for an updated. A: Reported patient's current condition, vital signs, and orientation.

## 2020-07-02 NOTE — CONSULTS
Infectious Diseases   Consult Note        Admission Date: 6/24/2020  Hospital Day: Hospital Day: 9   Attending: Shyann Hayes MD  Date of service: 7/2/20     Reason for admission: Multifocal pneumonia [J18.9]    Chief complaint/ Reason for consult: Acute respiratory failure with hypoxia, COVID 19 pneumonia    Microbiology:        I have reviewed allavailable micro lab data and cultures    · Blood culture (2/2) - collected on 6/25/2020: In process  · Tracheal aspirate culture - collectedon 6/25/2020: In process  · Urine culture  - collected on 6/24/2020: 75,000 CFU per mL of staph epidermidis      Antibiotics and immunizations:       Current antibiotics: All antibiotics and their doses were reviewed by me    Recent Abx Admin                   cefepime (MAXIPIME) 2 g IVPB minibag (g) 2 g New Bag 07/02/20 0915     2 g New Bag 07/01/20 2034    remdesivir 100 mg in sodium chloride 0.9 % 250 mL IVPB (mg) 100 mg New Bag 07/01/20 2109    vancomycin (VANCOCIN) 1,250 mg in dextrose 5 % 250 mL IVPB (mg) 1,250 mg New Bag 07/01/20 1317                  Immunization History: All immunization history was reviewed by me today. Immunization History   Administered Date(s) Administered    Influenza, High Dose (Fluzone 65 yrs and older) 11/30/2012, 10/09/2015, 11/20/2016, 11/07/2018, 09/23/2019    Pneumococcal Conjugate 13-valent ([id number]) 07/10/2015, 11/08/2018    Pneumococcal Polysaccharide ([id number]) 04/13/2012    Td, unspecified formulation 01/15/1996    Tdap (Boostrix, Adacel) 05/27/2015    Zoster Live (Zostavax) 07/10/2015    Zoster Recombinant (Shingrix) 11/07/2018, 09/23/2019       Known drug allergies:      All allergies were reviewed and updated    Allergies   Allergen Reactions    Buspar [Buspirone] Other (See Comments)     Face,face numb    Codeine Other (See Comments)     Face,lips numb    Methylphenidate Other (See Comments)     Face numbness    Ritalin [Methylphenidate Hcl] Other (See Comments) Face.lips numb    Zoloft [Sertraline Hcl] Other (See Comments)     Numbness of lips       Social history:     Social History:  All social andepidemiologic history was reviewed and updated by me today as needed. · Tobacco use:   reports that he has quit smoking. His smoking use included cigarettes. He started smoking about 29 years ago. He has a 25.00 pack-year smoking history. He has never used smokeless tobacco.  · Alcohol use:   reports current alcohol use of about 1.0 standard drinks of alcohol per week. · Currently lives in: [address] [place], [place] [id number]  ·  reports no history of drug use. Assessment:     The patient is a 82 y.o. old male who  has a past medical history of Arthritis, B12 deficiency (05/2014), COVID-19 (06/25/2020), and Hyperlipidemia. with following problems:    · Acute respiratory failure with hypoxia  · Bilateral COVID 19 pneumonia  · Elevated d-dimer 653 on 6/30/2020  · Staph epidermidis in the urine culture on 6/24/2020, significance unclear, likely colonizer  · Status post PICC line placement on 6/29/2020  · Obesity Class 1 due to excess calorie intake : Body mass index is 31.34 kg/m². ·       Discussion: This is an 29-year-old male patient with severe COVID 19 pneumonia. Blood cultures from admission have been negative. His blood group is A+. Arterial blood gas from today showed arterial pH of 7.4, PO2 67.4, PCO2 43.4 and bicarb of 26.9      Plan:     Diagnostic Workup:     Will order every other day LDH level and d-dimer, which may help with prognostication (Reference: [facility]. Published online March 13, 2020. doi:10.1001/jamainternmed.2020.0994). We will also order pro calcitonin, ferritin and CK level every other day for now   2 sets of blood cultures from different sites for thoroughness of work-up   Chest x-ray reviewed.   Due to exposure risk, will avoid doing CT scan of the chest or 2 view chest x-ray due to limited utility   Nasal MRSA screen   Follow-up on tracheal aspirate culture from 6/29/2020   We will order interleukin-6 level        Antimicrobials:     Agree with IV remdesivir. As the patient has been intubated on 6/29/2020, will plan for a total of 10-day course of IV remdesivir   The patient will be a candidate for convalescent plasma therapy under [place] protocol. I tried to contact patient's wife to discuss the convalescent plasma therapy. I called both phone numbers listed in the chart several times and then I called patient's daughter, Ava Muñiz.  She told me that patient's wife is in a rehab center. I discussed the convalescent plasma therapy including the rationale, risks, benefits and alternatives with Lu in detail on phone and gave her the opportunity to ask questions. I have emailed the consent form to her to read and to discuss with her mother. If the patient's family consents for the convalescent plasma therapy, will plan for 2 units of convalescent plasma   If procalcitonin level is low and tracheal aspirate culture from 6/29/2020 remain negative, will recommend stopping IV cefepime   Agree with IV Decadron 6 mg daily according the results of the RECOVERY trial.  Okay to increase IV Decadron dose per pharmacy protocol   Maintain good glycemic control while on remdesivir and Decadron   Continue to monitor for sodium and potassium abnormalities in addition to liver and renal functions, anemia, hyperglycemia, constipation issues while on remdesivir   Endotracheal tube care and pulmonary toilet   Ventilator support to maintain oxygen saturation above 92%. Prone positioning as needed   Vasopressor support to maintain mean arterial pressure greater than 65 mmHg   Supportive care is still the cornerstone of treatment for COVID 19 infection, and information on all of the treatment modalities that are being discussed below is based on published literature available so far and some ongoing trials.   As  these recommendations are based on frequently changing guidelines and limited data available so far, these may change rapidly in coming days. [facility] Remdesevir has been granted FDA approval for emergency use in hospitalized COVID 19 patients under Emergency use authorization (EUA) provisions. Preliminary results from Adaptive COVID 19 Treatment Trial (ACTT - 1) sponsored by [employer] [facility] showed faster recovery time in patients who received Remdesveir as compared to placebo. (https://shea.org/. org/doi/full/10.1056/HIPEen7940818). This study also showed a trend towards mortality benefit in the treatment arm, however, the data on mortality benefit did not read statistical significance (which may be due to low power to detect mortality benefit in a study). Currently, a total of 68 sites including 47 in the [facility] and 21 in [facility] and [place] are participating in the ACTT trial.     In at least one recently published study, 5-day course of IV Remdesevir has not been found be statistically different from a 10-day course of IV Remdesevir in [facility] 19 patients, who are not on a mechanical ventilator (https://shea.org/. org/doi/full/10.1056/HYNNno7471298)    In the ongoing clinical trials on Remdesevir, patients with ALT and AST greater than 5 times upper limit of normal and chronic kidney disease of stage IV or worse are typically being excluded ( References: https://clinicaltrials.gov/ct2/show/OCK35042271sblrdkuz icon ; https://clinicaltrials.gov/ct2/show/TXK64328635           https://clinicaltrials.gov/ct2/show/LAD64057331 , MedicationWarning.is. html )   The RECOVERY (Randomised Evaluation of COVid-19 thERapY) trial conducted in [place] showed that dexamethasone reduced deaths by one-third in patients receiving invasive mechanical ventilation (29.0% vs. 40.7%, RR 0.65 [95% CI 0.51 to 0.82]; p<0.001), by one-fifth in patients receiving oxygen without invasive mechanical ventilation (21.5% vs. 25.0%, RR 0.80 [95% CI 0.70 to 0.92]; p=0.002), but did not reduce mortality in patients not receiving respiratory support at randomization (17.0% vs. 3.2%, RR 1.22 [95% CI 0.93 to 1.61]; p=0.14).). Dexamethasone was given up to 10 days in this trial (PokerClues.dk)   Interleukin-6 inhibitor, Tocilizumab (Actemra) has been  proposed in severely ill patients, particularly those with very high interleukin-6 levels. This medication is currently approved for moderate to severe rheumatoid arthritis. According to recent media reports about preliminary results of CORIMUNO-TOCI open-label randomized controlled trial carried out by the [facility] ([facility]) in 129 people hospitalized with moderate or severe viral pneumonia, Toclizumab may provide benefit in reducing mechanical ventilation or death as compared to standard treatment,but detailed scientific results data is awaited. NIH COVID-19 treatment panel had previously determined that there is insufficient clinical data to recommend either for or against the use of interleukin-1 or 6 inhibitors in COVID-19 infections. (https://clkxk17muwuoqkxtkwymbizbyy.nih.gov/therapeutic-options-under-investigation/host-modifiers-immunotherapy/)  · NIH COVID-19 treatment panel had previously determined that there is insufficient clinical data to recommend either for or against the use of convalescent plasma or hyperimmune immunoglobulin for the treatment of COVID-19 (AIII). However, these modalities may be tried on compassionate basis or as part of a clinical trial like the [facility] protocol in severe/life-threatening COVID-19 disease (References: https://dzsmd61uwxphosebxfdqaheokc.nih.gov/therapeutic-options-under-investigation/host-modifiers-immunotherapy/, Proceedings of the [employer] Sciences Apr 2020, 159493532; DOI: 10.1073/pnas. 1428596169, Regina Freitas et al. Treatment of 5 Critically Ill Patients (http://Talkpush/).  Avoid NSAIDs. Use acetaminophen for fever. · Lopinavir/ritonavir trial has failed to show benefits in the recently published trial in [facility] (Reference:N Engl J Med. 2020 Mar 18. doi: 10.1056/PKNPqj0995813). However, this trial was under-powered. Except in the context of a clinical trial, the COVID-19 Treatment Guidelines Panel (the NIH Panel) recommends against the use of lopinavir/ritonavir for the treatment of COVID-19. (https://cniys86cejmqtdfjjjjdnlidmc.nih.gov/therapeutic-options-under-investigation/antiviral-therapy/)  · Other medications that were previously proposed to possibly have a theoretical role in critically ill patients with COVID 19 infection include interferon beta-1b (Betraseron) due to their immunomodulatory effect. However, combination of this agent with ribavrin in MERS Coronavirus did not show benefit (Reference: Clin Infect Dis. 2019 Jun 25. CreditCleansed.pl. ). NIH COVID-19 treatment panel recommends against the use of interferons and Janus kinase inhibitors in COVID-19 infections at this time  (https://imrcm36wrzcbxbnnkagqnoafvd.nih.gov/therapeutic-options-under-investigation/host-modifiers-immunotherapy/)   Continue strict droplet plus isolation currently being used for COVID-19 infections.  Currently, there is no conclusive data to support either starting or stopping ACE inhibitor or angiotensin receptor blockers on patients with suspected or confirmed COVID 19 infection.  Recommend close vitals monitoring   Critically ill patient.   Continue close monitoring in the ICU setting   Discussed the above plan with ICU RN *   Discussed all above with Dr. Nicolás Woodard        Drug Monitoring:    · Continue serial monitoring for antibiotic toxicity as follows: CBC, CMP  · Continue to watch for following: new or worsening fever, hypotension, hives, lip swelling and redness or purulence at vascular access sites. I/v access Management:    · Continue to monitor i.v access sites for erythema, induration, discharge or tenderness. · As always, continue efforts to minimizetubes/lines/drains as clinically appropriate to reduce chances of line associated infections. Current isolation precautions:    Currently active isolation(s): Droplet Plus     Risk of Complications/Morbidity/Mortality: High     · Patient is critically ill and has a potentially life threatening infection that poses threat to life/bodily function. · There is potential for worsening infection/ sudden clinically significant or life-threatening deterioration in the patient's condition without appropriate antimicrobial therapy. · Complex medical decision making process was involved to select appropriate antimicrobial agents to reverse the cause of patient's severe infection/ illness. · Antimicrobial therapy requires intensive monitoring for toxicity and frequent dose adjustments to prevent toxicity and permanent end-organ dysfunction. Critical care time: 33 minutes    Thank you for involving me in the care of your patient. I will continue to follow. If you have any additional questions, please do not hesitate to contact me. Subjective:     Presenting complaint in ER:     Chief Complaint   Patient presents with    Fatigue     Pt c/o fatigue and low energy x 2 mths, however states over the past 2-3 days it has gotten sifnificantly worse. Temp of 101.7 at home today, 99.3 during triage. Pt states he did attend a [employer] tournament 1wk ago, over 200 ppl present. HPI: Armando Manjarrez is a 82 y.o. male patient, who was seen at the request of Dr. Deepti Begum MD.    History was obtained from chart review and ICU RN    The patient was admitted on 6/24/2020. I have been consulted to see the patient for above mentioned reason(s).     The patient has multiple medical comorbidities, and presented to the ER originally for generalized fatigue and low-grade fever. The patient was noted to have a fever 100.4 on presentation to the ER. Chest x-ray showed atypical lung infiltrates. The patient was given initially IV ceftriaxone and azithromycin for 2 days and a COVID 19 PCR test was also sent on 6/24/2020, which came back positive. The patient was started on IV remdesivir on 6/28/2020. He was spiking high fever and was also started on IV vancomycin and cefepime empirically by ICU team on 6/29/2020. The patient was intubated on 6/29/2020    IV vancomycin has been stopped today. The patient remains on ventilator and is currently on 80% FiO2    I have been asked for my opinion for management for this patient. Past Medical History: All past medical history reviewed today. Past Medical History:   Diagnosis Date    Arthritis     B12 deficiency 05/2014    COVID-19 06/25/2020    Hyperlipidemia          Past Surgical History: All pastsurgical history was reviewed today. Past Surgical History:   Procedure Laterality Date    BACK SURGERY      CAROTID ENDARTERECTOMY Left     COLONOSCOPY      UPPER GASTROINTESTINAL ENDOSCOPY  07/12/14    Minimal chronic gastritis         Family History: All family history was reviewed today. Problem Relation Age of Onset    Cancer Neg Hx          Medications: All current and past medications were reviewed. Medications Prior to Admission: vitamin B-12 (CYANOCOBALAMIN) 1000 MCG tablet, Take 1,000 mcg by mouth daily. clorazepate (TRANXENE) 7.5 MG tablet, Take 3.75 mg by mouth 2 times daily. .  rosuvastatin (CRESTOR) 10 MG tablet, Take 10 mg by mouth daily. Omega 3 1000 MG CAPS, Take  by mouth. aspirin 81 MG tablet, Take 81 mg by mouth daily.      insulin lispro  0-6 Units Subcutaneous Q4H    chlorhexidine  15 mL Mouth/Throat BID    pantoprazole  40 mg Intravenous Daily    ipratropium-albuterol  1 ampule Inhalation Q4H    cefepime  2 g Intravenous Q12H    remdesivir IVPB  100 mg Intravenous Q24H    ketotifen  1 drop Both Eyes BID    enoxaparin  40 mg Subcutaneous BID    dexamethasone  6 mg Intravenous Daily    rosuvastatin  10 mg Oral Daily    aspirin  81 mg Oral Daily    sodium chloride flush  10 mL Intravenous 2 times per day          REVIEW OF SYSTEMS:       Review of Systems   Unable to perform ROS: Intubated         Objective:       PHYSICAL EXAM:      Vitals:   Vitals:    07/02/20 0800 07/02/20 0900 07/02/20 1000 07/02/20 1059   BP: 134/66 (!) 147/68 (!) 144/65    Pulse: 90 99 99    Resp: 23 20 20 14   Temp:       TempSrc:       SpO2: 91% (!) 89% (!) 88% (!) 88%   Weight:       Height:           Physical Exam      PHYSICAL EXAM:     In-person bedside physical examination deferred. Pursuant to the emergency declaration under the [address] [place] [address] and the [employer] Act, this clinical encounter was conducted to provide necessary medical care. (Also consistent with new provisions and guidance offered by [facility] on March 18, 2020 in setting of COVID 19 outbreak and in order to preserve personal protective equipment in accordance with the flexibilities announced by CMS on March 30, 2020)   References: https://[facility]. [place].[facility]/Portals/0/Resources/COVID-19/3_18%20Telemed%20Guidance%20Updated%20March%2018. pdf?fsp=2488-19-65-542832-366                      https://[facility]. [facility]/Portals/0/Resources/COVID-19/3_18%20Telemed%20Guidance%20Updated%20March%2018. pdf?blx=[email or web address]                      [email or web address] pdf                            General: intubated and sedated per RN, on ventilator, vitals reviewed *  HEENT: endotracheal tube in place   Cardiovascular: Telemetry data reviewed, rest deferred   Pulmonary: deferred  Abdomen/GI: deferred  Neuro: deferred  Skin: deferred  Musculoskeletal:  deferred  Genitourinary: Deferred  Psych: deferred  Lymphatic/Immunologic: deferred         Intake and output:     I/O last 3 completed shifts: In: 2679 [I.V.:1512; RI/GJ:6286; IV Piggyback:300]  Out: 2055 [Urine:2055]    Lab Data:   All available labs were reviewed by me today. CBC:   Recent Labs     06/30/20 0420 07/01/20 0435 07/02/20  0540   WBC 11.0 10.8 12.2*   RBC 4.27 4.01* 4.20   HGB 12.3* 11.8* 12.1*   HCT 37.6* 35.2* 36.9*    172 197   MCV 88.2 87.9 87.9   MCH 28.8 29.5 28.7   MCHC 32.7 33.5 32.6   RDW 14.6 14.4 14.4   BANDSPCT  --   --  1        BMP:  Recent Labs     06/30/20 0420 07/01/20 0435 07/02/20  0540    138 138   K 3.7 3.5 4.2   CL 99 103 101   CO2 24 26 28   BUN 31* 23* 25*   CREATININE 1.5* 0.9 0.9   CALCIUM 8.1* 7.9* 7.8*   GLUCOSE 183* 146* 134*        Hepatic FunctionPanel:   Lab Results   Component Value Date    ALKPHOS 54 07/02/2020    ALT 11 07/02/2020    AST 22 07/02/2020    PROT 5.3 07/02/2020    BILITOT 0.4 07/02/2020    BILIDIR <0.2 07/02/2020    IBILI see below 07/02/2020    LABALBU 2.9 07/02/2020       CPK: No results found for: CKTOTAL  ESR:   Lab Results   Component Value Date    SEDRATE 4 09/17/2018     CRP: No results found for: CRP      Imaging: All pertinent images and reports for the current visit were reviewed by meduring this visit. XR CHEST PORTABLE   Final Result   Increased in degree and extent of bilateral mid and lower lung pneumonia. The increased may be partially accentuated by low lung volumes. XR CHEST 1 VW   Final Result   Persistent bilateral airspace disease, similar to prior. IR PICC WO SQ PORT/PUMP > 5 YEARS   Final Result   Successful placement of PICC line. XR CHEST PORTABLE   Final Result   Appropriate endotracheal tube positioning.       Multifocal airspace opacities and areas of atelectasis correlate with recent   CT chest.         XR ABDOMEN FOR NG/OG/NE TUBE PLACEMENT   Final Result   NG tube terminates over the stomach. CT CHEST PULMONARY EMBOLISM W CONTRAST   Final Result   1. No pulmonary embolism. 2. Multifocal pneumonia scattered throughout both lungs with minimal pleural   effusions. XR CHEST STANDARD (2 VW)   Final Result   No radiographic evidence of acute pulmonary disease. Outside records:    Labs, Microbiology, Radiology and pertinent results from Care everywhere, if available, were reviewed as a part ofthe consultation. Problem list:       Patient Active Problem List   Diagnosis Code    Multifocal pneumonia J18.9    Orthostasis I95.1    Pneumonia due to COVID-19 virus U07.1, J12.89    Sepsis ([facility] [address] A41.9    Urinary tract infection without hematuria N39.0    ARDS (adult respiratory distress syndrome) ([facility] [address] J80    Hyperlipidemia E78.5    Acute respiratory failure with hypoxia (HCC) J96.01    Class 1 obesity due to excess calories with body mass index (BMI) of 31.0 to 31.9 in adult E66.09, Z68.31    Peripherally inserted central catheter (PICC) in place Z45.2         Please note that this chart was generated using Dragon dictation software. Although every effort was made to ensure the accuracy of this automated transcription, some errors in transcription may have occurred inadvertently. If you may need any clarification, please do not hesitate to contact me through EPIC or at the phone number provided below with my electronic signature. Any pictures or media included in this note were obtained after taking informed verbal consent from the patient and with their approval to include those in the patient's medical record.       Ethan Fuentes MD, MPH  7/2/20, 10:42 AM EDT   [facility] Infectious Disease   Office: [phone]  Fax: [phone]  Tuesday AM clinic:   [address] [facility] 120  Thursday AM clinic: [facility]

## 2020-07-02 NOTE — PROGRESS NOTES
Remdesivir-Day 5  Bun/srcr 25/0.9, ALT/AST 22/0.4  Continue with same -   ID consult- will be getting Convalescent Plasma today.    Garrett Daniels Pharm D 7/2/20201:31 PM  .

## 2020-07-02 NOTE — PROGRESS NOTES
07/02/20 0323   Vent Information   Vent Type 840   Vent Mode AC/VC   Vt Ordered 450 mL   Rate Set 16 bmp   Peak Flow 60 L/min   Pressure Support 0 cmH20   FiO2  70 %   SpO2 93 %   SpO2/FiO2 ratio 132.86   Sensitivity 3   PEEP/CPAP 12   I Time/ I Time % 0 s   Humidification Source Heated wire   Humidification Temp 37   Humidification Temp Measured 36.9   Circuit Condensation Drained   Vent Patient Data   High Peep/I Pressure 0   Peak Inspiratory Pressure 25 cmH2O   Mean Airway Pressure 17 cmH20   Rate Measured 19 br/min   Vt Exhaled 511 mL   Minute Volume 10.3 Liters   I:E Ratio 1:3.60   Cough/Sputum   Sputum How Obtained Endotracheal;Suctioned   Cough Non-productive   Sputum Amount None   Spontaneous Breathing Trial (SBT) RT Doc   Pulse 81   Breath Sounds   Right Upper Lobe Diminished   Right Middle Lobe Diminished   Right Lower Lobe Diminished   Left Upper Lobe Diminished   Left Lower Lobe Diminished   Additional Respiratory  Assessments   Resp 15   Position Semi-Harris's   Alarm Settings   High Pressure Alarm 40 cmH2O   Low Minute Volume Alarm 5 L/min   Apnea (secs) 20 secs   High Respiratory Rate 40 br/min   Low Exhaled Vt  350 mL   ETT (adult)   Placement Date/Time: 06/29/20 0230   Preoxygenation: Yes  Mask Ventilation: Ventilated by mask (1)  Technique: Video laryngoscopy  Type: Cuffed  Tube Size: 8 mm  Laryngoscope: GlideScope  Blade Size: 4  Location: Oral  Insertion attempts: 1  Placement. ..    Secured at 23 cm   Measured From Lips   ET Placement Right   Secured By Commercial tube leblanc   Site Condition Dry

## 2020-07-02 NOTE — PROGRESS NOTES
A: Ghulam's test performed on right radial wrist, test was positive, site prepped with alcohol, blood gas drawn then sterile gauze was applied to site and direct pressure was held for one full minute, secured gauze with tape.

## 2020-07-02 NOTE — PROGRESS NOTES
Internal Medicine ICU Progress Note      Events of Last 24 hours:     Patient was intubated for worsening respiratory failure by the nocturnist.  He was then transferred to the ICU. Patient has COVID-19. Same is awake and alert on the ventilator. This is in spite of sedation. He had a T-max 104.2 but is afebrile this morning. His highest temperature in the last 24 hours is 102.7 at 4:00 in the morning. After that he has been pretty much afebrile. -his FiO2 is up to 70% PEEP is 10. Mentation is normal.  His creatinine is improved. Making good urine. - his respiratory status is some worse. He is on a PEEP of 14 and FiO2 of 80%. Invasive Lines: PICC placed on 2020      MV: Intubated on 2020    Recent Labs     20  0452 20  0545   PHART 7.379 7.410   TYF6IHP 55.2* 43.4   PO2ART 74.8* 67.4*       MV Settings:  Vent Mode: AC/VC Rate Set: 16 bmp/Vt Ordered: 450 mL/ /FiO2 : 80 %    IV:   dextrose      propofol 35 mcg/kg/min (20 0547)    fentaNYL (SUBLIMAZE) infusion 75 mcg/hr (20 0017)    norepinephrine Stopped (20 0830)       Vitals:  Temp  Av.9 °F (37.2 °C)  Min: 98.3 °F (36.8 °C)  Max: 99.1 °F (37.3 °C)  Pulse  Av.4  Min: 76  Max: 99  BP  Min: 115/60  Max: 148/63  SpO2  Av.1 %  Min: 87 %  Max: 95 %  FiO2   Av.4 %  Min: 70 %  Max: 80 %  Patient Vitals for the past 4 hrs:   BP Pulse Resp SpO2   20 0900 (!) 147/68 99 20 (!) 89 %   20 0800 134/66 90 23 91 %   20 0759 -- 92 24 (!) 87 %   20 0700 (!) 141/61 85 17 91 %   20 0600 (!) 146/68 93 20 90 %       CVP:        Intake/Output Summary (Last 24 hours) at 2020 0922  Last data filed at 2020 0547  Gross per 24 hour   Intake 3319 ml   Output 1835 ml   Net 1484 ml       EXAM:    See critical notes. Physical exam not done to conserve PPE.     Medications:  Scheduled Meds:   insulin lispro  0-6 Units Subcutaneous Q4H    chlorhexidine  15 mL Mouth/Throat BID    pantoprazole  40 mg Intravenous Daily    ipratropium-albuterol  1 ampule Inhalation Q4H    cefepime  2 g Intravenous Q12H    vancomycin  1,250 mg Intravenous Q24H    remdesivir IVPB  100 mg Intravenous Q24H    ketotifen  1 drop Both Eyes BID    enoxaparin  40 mg Subcutaneous BID    dexamethasone  6 mg Intravenous Daily    rosuvastatin  10 mg Oral Daily    aspirin  81 mg Oral Daily    sodium chloride flush  10 mL Intravenous 2 times per day       PRN Meds:  glucose, dextrose, glucagon (rDNA), dextrose, fentanNYL, midazolam, tetrahydrozoline, aluminum & magnesium hydroxide-simethicone, ondansetron, sodium chloride flush, acetaminophen **OR** acetaminophen    Results:  CBC:   Recent Labs     06/30/20 0420 07/01/20 0435 07/02/20  0540   WBC 11.0 10.8 12.2*   HGB 12.3* 11.8* 12.1*   HCT 37.6* 35.2* 36.9*   MCV 88.2 87.9 87.9    172 197     BMP:   Recent Labs     06/30/20 0420 07/01/20  0435 07/02/20  0540    138 138   K 3.7 3.5 4.2   CL 99 103 101   CO2 24 26 28   PHOS 3.0 1.9* 2.4*   BUN 31* 23* 25*   CREATININE 1.5* 0.9 0.9     LIVER PROFILE:   Recent Labs     06/30/20 0420 07/01/20 0435 07/02/20  0540   AST 21 17 22   ALT 9* 8* 11   BILIDIR <0.2 <0.2 <0.2   BILITOT 0.5 0.3 0.4   ALKPHOS 56 49 54     PT/INR:   Recent Labs     06/30/20 0420 07/01/20 0435 07/02/20  0540   PROTIME 12.6 12.7 13.1   INR 1.09 1.09 1.13     Results for NEWTON BOWENS (MRN [id number]) as of 7/1/2020 09:46   Ref. Range 6/24/2020 21:35 6/26/2020 15:24 6/30/2020 04:20   D-Dimer, Quant Latest Ref Range: 0 - 229 ng/mL  (H) 682 (H) 653 (H)     Results for Kailash SMITH (MRN [id number]) as of 7/1/2020 09:46   Ref. Range 6/24/2020 21:35   Ferritin Latest Ref Range: 30.0 - 400.0 ng/mL 97.3       Cultures:  Results for GOGOMeeta (MRN [id number]) as of 6/30/2020 09:20   Ref.  Range 6/24/2020 22:00   SARS-CoV-2, PCR Latest Ref Range: Not Detected  DETECTED (A)     Susceptibility     Staphylococcus epidermidis (1)     Antibiotic Interpretation TARA Status    ciprofloxacin Sensitive <=0.5 mcg/mL     nitrofurantoin Sensitive <=16 mcg/mL     oxacillin Sensitive <=0.25 mcg/mL     tetracycline Sensitive <=1 mcg/mL     trimethoprim-sulfamethoxazole Sensitive <=10 mcg/mL           Films:    XR CHEST PORTABLE   Final Result   Increased in degree and extent of bilateral mid and lower lung pneumonia. The increased may be partially accentuated by low lung volumes. XR CHEST 1 VW   Final Result   Persistent bilateral airspace disease, similar to prior. IR PICC WO SQ PORT/PUMP > 5 YEARS   Final Result   Successful placement of PICC line. XR CHEST PORTABLE   Final Result   Appropriate endotracheal tube positioning. Multifocal airspace opacities and areas of atelectasis correlate with recent   CT chest.         XR ABDOMEN FOR NG/OG/NE TUBE PLACEMENT   Final Result   NG tube terminates over the stomach. CT CHEST PULMONARY EMBOLISM W CONTRAST   Final Result   1. No pulmonary embolism. 2. Multifocal pneumonia scattered throughout both lungs with minimal pleural   effusions. XR CHEST STANDARD (2 VW)   Final Result   No radiographic evidence of acute pulmonary disease. Assessment:    . Principal Problem:    COVID-19  Active Problems:    Multifocal pneumonia    Orthostasis    Sepsis ([address]    Urinary tract infection without hematuria    ARDS (adult respiratory distress syndrome) ([address]    Hyperlipidemia  Resolved Problems:    * No resolved hospital problems. *         Plan:    #Acute respiratory failure due to COVID-19. He has ARDS. Is on the ventilator. Pulmonary critical care is seeing him. His respiratory status is about the same. his PEEP is 14 and but his FiO2 is up to 80%. He is on Decadron 6 mg IV daily. #Patient has been weaned off Levophed. His sepsis is improving. #Multifocal pneumonia. Likely related to COVID-19. Tracheal aspirate sent.   Continue broad-spectrum antibiotics cefepime. Stop vancomycin. Send another tracheal aspirate. Continue Remdesevir day 5. Will continue for now. #Patient's creatinine is 0.9. IV Lasix. #Check daily d-dimer and INR. #Convalescent plasma was discussed with the patient. Will explore further. #Lovenox twice daily dosing. Critically ill but stable. All questions and concerns were addressed to the patient/family. Alternatives to my treatment were discussed. The note was completed using EMR. Every effort was made to ensure accuracy; however, inadvertent computerized transcription errors may be present.          Sahra Abernathy 9:22 AM 7/2/2020

## 2020-07-02 NOTE — PROGRESS NOTES
This note also relates to the following rows which could not be included:  Vt Ordered - Cannot attach notes to unvalidated device data  Rate Set - Cannot attach notes to unvalidated device data  Peak Flow - Cannot attach notes to unvalidated device data  Pressure Support - Cannot attach notes to unvalidated device data  Sensitivity - Cannot attach notes to unvalidated device data  PEEP/CPAP - Cannot attach notes to unvalidated device data  I Time/ I Time % - Cannot attach notes to unvalidated device data  High Peep/I Pressure - Cannot attach notes to unvalidated device data  Peak Inspiratory Pressure - Cannot attach notes to unvalidated device data  Mean Airway Pressure - Cannot attach notes to unvalidated device data  Rate Measured - Cannot attach notes to unvalidated device data  Vt Exhaled - Cannot attach notes to unvalidated device data  Minute Volume - Cannot attach notes to unvalidated device data  I:E Ratio - Cannot attach notes to unvalidated device data  Pulse - Cannot attach notes to unvalidated device data  High Pressure Alarm - Cannot attach notes to unvalidated device data  Low Minute Volume Alarm - Cannot attach notes to unvalidated device data  High Respiratory Rate - Cannot attach notes to unvalidated device data       07/01/20 2325   Vent Information   Vent Type 840   Vent Mode AC/VC   FiO2  70 %   SpO2 94 %   SpO2/FiO2 ratio 134.29   Humidification Source Heated wire   Humidification Temp 37   Humidification Temp Measured 37.2   Circuit Condensation Drained   Vent Patient Data   Plateau Pressure 21 BNH17   Cough/Sputum   Sputum How Obtained Endotracheal;Suctioned   Cough Non-productive   Sputum Amount None   Breath Sounds   Right Upper Lobe Diminished   Right Middle Lobe Diminished   Right Lower Lobe Diminished   Left Upper Lobe Diminished   Left Lower Lobe Diminished   Additional Respiratory  Assessments   Resp 13   Position Semi-Harris's   Alarm Settings   Apnea (secs) 20 secs   Low Exhaled Vt 350 mL   ETT (adult)   Placement Date/Time: 06/29/20 0230   Preoxygenation: Yes  Mask Ventilation: Ventilated by mask (1)  Technique: Video laryngoscopy  Type: Cuffed  Tube Size: 8 mm  Laryngoscope: GlideScope  Blade Size: 4  Location: Oral  Insertion attempts: 1  Placement. ..    Secured at 23 cm   Measured From Lips   ET Placement Right   Secured By Commercial tube leblanc   Site Condition Dry

## 2020-07-03 ENCOUNTER — APPOINTMENT (OUTPATIENT)
Dept: GENERAL RADIOLOGY | Age: 85
DRG: 870 | End: 2020-07-03
Payer: MEDICARE

## 2020-07-03 LAB
ALBUMIN SERPL-MCNC: 2.7 G/DL (ref 3.4–5)
ALP BLD-CCNC: 57 U/L (ref 40–129)
ALT SERPL-CCNC: 15 U/L (ref 10–40)
ANION GAP SERPL CALCULATED.3IONS-SCNC: 10 MMOL/L (ref 3–16)
ANISOCYTOSIS: ABNORMAL
AST SERPL-CCNC: 33 U/L (ref 15–37)
ATYPICAL LYMPHOCYTE RELATIVE PERCENT: 1 % (ref 0–6)
BANDED NEUTROPHILS RELATIVE PERCENT: 3 % (ref 0–7)
BASE EXCESS ARTERIAL: 3.9 MMOL/L (ref -3–3)
BASOPHILS ABSOLUTE: 0 K/UL (ref 0–0.2)
BASOPHILS RELATIVE PERCENT: 0 %
BILIRUB SERPL-MCNC: 0.4 MG/DL (ref 0–1)
BILIRUBIN DIRECT: 0.3 MG/DL (ref 0–0.3)
BILIRUBIN, INDIRECT: 0.1 MG/DL (ref 0–1)
BUN BLDV-MCNC: 31 MG/DL (ref 7–20)
CALCIUM SERPL-MCNC: 7.8 MG/DL (ref 8.3–10.6)
CARBOXYHEMOGLOBIN ARTERIAL: 0.2 % (ref 0–1.5)
CHLORIDE BLD-SCNC: 95 MMOL/L (ref 99–110)
CO2: 29 MMOL/L (ref 21–32)
CREAT SERPL-MCNC: 0.9 MG/DL (ref 0.8–1.3)
D DIMER: 546 NG/ML DDU (ref 0–229)
EOSINOPHILS ABSOLUTE: 0 K/UL (ref 0–0.6)
EOSINOPHILS RELATIVE PERCENT: 0 %
FERRITIN: 203.5 NG/ML (ref 30–400)
GFR AFRICAN AMERICAN: >60
GFR NON-AFRICAN AMERICAN: >60
GLUCOSE BLD-MCNC: 156 MG/DL (ref 70–99)
GLUCOSE BLD-MCNC: 156 MG/DL (ref 70–99)
GLUCOSE BLD-MCNC: 180 MG/DL (ref 70–99)
GLUCOSE BLD-MCNC: 182 MG/DL (ref 70–99)
GLUCOSE BLD-MCNC: 190 MG/DL (ref 70–99)
GLUCOSE BLD-MCNC: 190 MG/DL (ref 70–99)
GLUCOSE BLD-MCNC: 221 MG/DL (ref 70–99)
HCO3 ARTERIAL: 29.9 MMOL/L (ref 21–29)
HCT VFR BLD CALC: 35.9 % (ref 40.5–52.5)
HEMOGLOBIN, ART, EXTENDED: 12.4 G/DL (ref 13.5–17.5)
HEMOGLOBIN: 11.5 G/DL (ref 13.5–17.5)
HYPOCHROMIA: ABNORMAL
INR BLD: 1.11 (ref 0.86–1.14)
LYMPHOCYTES ABSOLUTE: 0.5 K/UL (ref 1–5.1)
LYMPHOCYTES RELATIVE PERCENT: 2 %
MCH RBC QN AUTO: 27.8 PG (ref 26–34)
MCHC RBC AUTO-ENTMCNC: 32.2 G/DL (ref 31–36)
MCV RBC AUTO: 86.6 FL (ref 80–100)
METHEMOGLOBIN ARTERIAL: 0.3 %
MONOCYTES ABSOLUTE: 0.8 K/UL (ref 0–1.3)
MONOCYTES RELATIVE PERCENT: 5 %
NEUTROPHILS ABSOLUTE: 14.2 K/UL (ref 1.7–7.7)
NEUTROPHILS RELATIVE PERCENT: 89 %
O2 CONTENT ARTERIAL: 16 ML/DL
O2 SAT, ARTERIAL: 91.6 %
O2 THERAPY: ABNORMAL
PCO2 ARTERIAL: 51 MMHG (ref 35–45)
PDW BLD-RTO: 14.7 % (ref 12.4–15.4)
PERFORMED ON: ABNORMAL
PH ARTERIAL: 7.39 (ref 7.35–7.45)
PHOSPHORUS: 3.3 MG/DL (ref 2.5–4.9)
PLATELET # BLD: 200 K/UL (ref 135–450)
PLATELET SLIDE REVIEW: ADEQUATE
PMV BLD AUTO: 9.4 FL (ref 5–10.5)
PO2 ARTERIAL: 63.1 MMHG (ref 75–108)
POLYCHROMASIA: ABNORMAL
POTASSIUM SERPL-SCNC: 4.5 MMOL/L (ref 3.5–5.1)
PROCALCITONIN: 0.89 NG/ML (ref 0–0.15)
PROTHROMBIN TIME: 12.9 SEC (ref 10–13.2)
RBC # BLD: 4.15 M/UL (ref 4.2–5.9)
SLIDE REVIEW: ABNORMAL
SODIUM BLD-SCNC: 134 MMOL/L (ref 136–145)
TCO2 ARTERIAL: 31.5 MMOL/L
TOTAL CK: 227 U/L (ref 39–308)
TOTAL PROTEIN: 5.6 G/DL (ref 6.4–8.2)
WBC # BLD: 15.4 K/UL (ref 4–11)

## 2020-07-03 PROCEDURE — 6360000002 HC RX W HCPCS: Performed by: INTERNAL MEDICINE

## 2020-07-03 PROCEDURE — 94640 AIRWAY INHALATION TREATMENT: CPT

## 2020-07-03 PROCEDURE — 2000000000 HC ICU R&B

## 2020-07-03 PROCEDURE — 2700000000 HC OXYGEN THERAPY PER DAY

## 2020-07-03 PROCEDURE — 84145 PROCALCITONIN (PCT): CPT

## 2020-07-03 PROCEDURE — C9113 INJ PANTOPRAZOLE SODIUM, VIA: HCPCS | Performed by: INTERNAL MEDICINE

## 2020-07-03 PROCEDURE — 6370000000 HC RX 637 (ALT 250 FOR IP): Performed by: INTERNAL MEDICINE

## 2020-07-03 PROCEDURE — 85025 COMPLETE CBC W/AUTO DIFF WBC: CPT

## 2020-07-03 PROCEDURE — 2580000003 HC RX 258: Performed by: INTERNAL MEDICINE

## 2020-07-03 PROCEDURE — 82803 BLOOD GASES ANY COMBINATION: CPT

## 2020-07-03 PROCEDURE — 94750 HC PULMONARY COMPLIANCE STUDY: CPT

## 2020-07-03 PROCEDURE — 99291 CRITICAL CARE FIRST HOUR: CPT | Performed by: INTERNAL MEDICINE

## 2020-07-03 PROCEDURE — 80076 HEPATIC FUNCTION PANEL: CPT

## 2020-07-03 PROCEDURE — 85610 PROTHROMBIN TIME: CPT

## 2020-07-03 PROCEDURE — 71045 X-RAY EXAM CHEST 1 VIEW: CPT

## 2020-07-03 PROCEDURE — 94003 VENT MGMT INPAT SUBQ DAY: CPT

## 2020-07-03 PROCEDURE — 80069 RENAL FUNCTION PANEL: CPT

## 2020-07-03 PROCEDURE — 87040 BLOOD CULTURE FOR BACTERIA: CPT

## 2020-07-03 PROCEDURE — 99292 CRITICAL CARE ADDL 30 MIN: CPT | Performed by: INTERNAL MEDICINE

## 2020-07-03 PROCEDURE — 82550 ASSAY OF CK (CPK): CPT

## 2020-07-03 PROCEDURE — 2580000003 HC RX 258

## 2020-07-03 PROCEDURE — 82728 ASSAY OF FERRITIN: CPT

## 2020-07-03 PROCEDURE — 94761 N-INVAS EAR/PLS OXIMETRY MLT: CPT

## 2020-07-03 PROCEDURE — 85379 FIBRIN DEGRADATION QUANT: CPT

## 2020-07-03 RX ORDER — FUROSEMIDE 10 MG/ML
40 INJECTION INTRAMUSCULAR; INTRAVENOUS 2 TIMES DAILY
Status: DISCONTINUED | OUTPATIENT
Start: 2020-07-03 | End: 2020-07-07

## 2020-07-03 RX ORDER — SODIUM CHLORIDE 9 MG/ML
INJECTION, SOLUTION INTRAVENOUS
Status: COMPLETED
Start: 2020-07-03 | End: 2020-07-03

## 2020-07-03 RX ADMIN — SODIUM CHLORIDE: 9 INJECTION, SOLUTION INTRAVENOUS at 21:26

## 2020-07-03 RX ADMIN — PANTOPRAZOLE SODIUM 40 MG: 40 INJECTION, POWDER, FOR SOLUTION INTRAVENOUS at 08:14

## 2020-07-03 RX ADMIN — FENTANYL CITRATE 100 MCG/HR: 50 INJECTION, SOLUTION INTRAMUSCULAR; INTRAVENOUS at 10:19

## 2020-07-03 RX ADMIN — FUROSEMIDE 40 MG: 10 INJECTION, SOLUTION INTRAMUSCULAR; INTRAVENOUS at 09:57

## 2020-07-03 RX ADMIN — INSULIN LISPRO 2 UNITS: 100 INJECTION, SOLUTION INTRAVENOUS; SUBCUTANEOUS at 00:40

## 2020-07-03 RX ADMIN — DEXAMETHASONE SODIUM PHOSPHATE 6 MG: 4 INJECTION, SOLUTION INTRAMUSCULAR; INTRAVENOUS at 20:51

## 2020-07-03 RX ADMIN — PROPOFOL 35 MCG/KG/MIN: 10 INJECTION, EMULSION INTRAVENOUS at 07:34

## 2020-07-03 RX ADMIN — MIDAZOLAM HYDROCHLORIDE 3 MG/HR: 5 INJECTION, SOLUTION INTRAMUSCULAR; INTRAVENOUS at 19:19

## 2020-07-03 RX ADMIN — PROPOFOL 35 MCG/KG/MIN: 10 INJECTION, EMULSION INTRAVENOUS at 13:09

## 2020-07-03 RX ADMIN — IPRATROPIUM BROMIDE AND ALBUTEROL SULFATE 1 AMPULE: .5; 3 SOLUTION RESPIRATORY (INHALATION) at 23:06

## 2020-07-03 RX ADMIN — PROPOFOL 40 MCG/KG/MIN: 10 INJECTION, EMULSION INTRAVENOUS at 16:46

## 2020-07-03 RX ADMIN — ASPIRIN 81 MG 81 MG: 81 TABLET ORAL at 08:14

## 2020-07-03 RX ADMIN — KETOTIFEN FUMARATE 1 DROP: 0.35 SOLUTION/ DROPS OPHTHALMIC at 08:15

## 2020-07-03 RX ADMIN — CEFEPIME 2 G: 2 INJECTION, POWDER, FOR SOLUTION INTRAVENOUS at 08:13

## 2020-07-03 RX ADMIN — KETOTIFEN FUMARATE 1 DROP: 0.35 SOLUTION/ DROPS OPHTHALMIC at 20:56

## 2020-07-03 RX ADMIN — CHLORHEXIDINE GLUCONATE 0.12% ORAL RINSE 15 ML: 1.2 LIQUID ORAL at 08:14

## 2020-07-03 RX ADMIN — ENOXAPARIN SODIUM 40 MG: 40 INJECTION SUBCUTANEOUS at 20:51

## 2020-07-03 RX ADMIN — IPRATROPIUM BROMIDE AND ALBUTEROL SULFATE 1 AMPULE: .5; 3 SOLUTION RESPIRATORY (INHALATION) at 11:10

## 2020-07-03 RX ADMIN — CHLORHEXIDINE GLUCONATE 0.12% ORAL RINSE 15 ML: 1.2 LIQUID ORAL at 20:51

## 2020-07-03 RX ADMIN — PROPOFOL 35 MCG/KG/MIN: 10 INJECTION, EMULSION INTRAVENOUS at 02:12

## 2020-07-03 RX ADMIN — Medication 10 ML: at 08:14

## 2020-07-03 RX ADMIN — IPRATROPIUM BROMIDE AND ALBUTEROL SULFATE 1 AMPULE: .5; 3 SOLUTION RESPIRATORY (INHALATION) at 20:06

## 2020-07-03 RX ADMIN — FENTANYL CITRATE 100 MCG/HR: 50 INJECTION, SOLUTION INTRAMUSCULAR; INTRAVENOUS at 19:19

## 2020-07-03 RX ADMIN — IPRATROPIUM BROMIDE AND ALBUTEROL SULFATE 1 AMPULE: .5; 3 SOLUTION RESPIRATORY (INHALATION) at 07:15

## 2020-07-03 RX ADMIN — DEXAMETHASONE SODIUM PHOSPHATE 6 MG: 4 INJECTION, SOLUTION INTRAMUSCULAR; INTRAVENOUS at 08:14

## 2020-07-03 RX ADMIN — INSULIN LISPRO 1 UNITS: 100 INJECTION, SOLUTION INTRAVENOUS; SUBCUTANEOUS at 08:00

## 2020-07-03 RX ADMIN — FUROSEMIDE 40 MG: 10 INJECTION, SOLUTION INTRAMUSCULAR; INTRAVENOUS at 16:48

## 2020-07-03 RX ADMIN — IPRATROPIUM BROMIDE AND ALBUTEROL SULFATE 1 AMPULE: .5; 3 SOLUTION RESPIRATORY (INHALATION) at 15:05

## 2020-07-03 RX ADMIN — ENOXAPARIN SODIUM 40 MG: 40 INJECTION SUBCUTANEOUS at 08:13

## 2020-07-03 RX ADMIN — ROSUVASTATIN CALCIUM 10 MG: 10 TABLET, FILM COATED ORAL at 08:14

## 2020-07-03 RX ADMIN — MIDAZOLAM HYDROCHLORIDE 2 MG: 2 INJECTION, SOLUTION INTRAMUSCULAR; INTRAVENOUS at 16:48

## 2020-07-03 RX ADMIN — Medication 10 ML: at 20:58

## 2020-07-03 ASSESSMENT — PULMONARY FUNCTION TESTS
PIF_VALUE: 30
PIF_VALUE: 30
PIF_VALUE: 33
PIF_VALUE: 33
PIF_VALUE: 34
PIF_VALUE: 31
PIF_VALUE: 32
PIF_VALUE: 30
PIF_VALUE: 33
PIF_VALUE: 33
PIF_VALUE: 34
PIF_VALUE: 32
PIF_VALUE: 32
PIF_VALUE: 34
PIF_VALUE: 32
PIF_VALUE: 32
PIF_VALUE: 33
PIF_VALUE: 29
PIF_VALUE: 33
PIF_VALUE: 33
PIF_VALUE: 32
PIF_VALUE: 31
PIF_VALUE: 33
PIF_VALUE: 35
PIF_VALUE: 32
PIF_VALUE: 33
PIF_VALUE: 31
PIF_VALUE: 29
PIF_VALUE: 31
PIF_VALUE: 32
PIF_VALUE: 25
PIF_VALUE: 35
PIF_VALUE: 33

## 2020-07-03 ASSESSMENT — PAIN SCALES - GENERAL
PAINLEVEL_OUTOF10: 0

## 2020-07-03 NOTE — PROGRESS NOTES
FiO2 decreased to 60%             07/03/20 1510   Vent Information   Vt Ordered 450 mL   Rate Set 16 bmp   Peak Flow 80 L/min   Pressure Support 0 cmH20   FiO2  60 %   SpO2 91 %   SpO2/FiO2 ratio 151.67   Sensitivity 3   PEEP/CPAP 16   I Time/ I Time % 0 s   Humidification Temp Measured 36.6   Vent Patient Data   High Peep/I Pressure 0   Peak Inspiratory Pressure 30 cmH2O   Mean Airway Pressure 21 cmH20   Rate Measured 22 br/min   Vt Exhaled 0 mL   Minute Volume 8.51 Liters   I:E Ratio 4.50:1   Cough/Sputum   Sputum How Obtained None   Spontaneous Breathing Trial (SBT) RT Doc   Pulse 74   Breath Sounds   Right Upper Lobe Diminished   Right Middle Lobe Diminished   Right Lower Lobe Diminished   Left Upper Lobe Diminished   Left Lower Lobe Diminished   Additional Respiratory  Assessments   Resp 17   Position Semi-Harris's   Oral Care Completed? Yes   Alarm Settings   High Pressure Alarm 50 cmH2O   Low Minute Volume Alarm 5 L/min   High Respiratory Rate 40 br/min   ETT (adult)   Placement Date/Time: 06/29/20 0230   Preoxygenation: Yes  Mask Ventilation: Ventilated by mask (1)  Technique: Video laryngoscopy  Type: Cuffed  Tube Size: 8 mm  Laryngoscope: GlideScope  Blade Size: 4  Location: Oral  Insertion attempts: 1  Placement. ..    Secured at 23 cm   Measured From Lips   ET Placement Right   Secured By Commercial tube leblanc   Site Condition Dry

## 2020-07-03 NOTE — PROGRESS NOTES
FiO2 decraesed to 60%         07/03/20 1516   Vent Information   Vt Ordered 450 mL   Rate Set 16 bmp   Peak Flow 80 L/min   Pressure Support 0 cmH20   FiO2  60 %   SpO2 93 %   SpO2/FiO2 ratio 155   Sensitivity 3   PEEP/CPAP 16   I Time/ I Time % 0 s   Vent Patient Data   High Peep/I Pressure 0   Peak Inspiratory Pressure 33 cmH2O   Mean Airway Pressure 21 cmH20   Rate Measured 18 br/min   Vt Exhaled 484 mL   Minute Volume 8.36 Liters   I:E Ratio 1:3.10   Spontaneous Breathing Trial (SBT) RT Doc   Pulse 78   Additional Respiratory  Assessments   Resp 16   Alarm Settings   High Pressure Alarm 50 cmH2O   Low Minute Volume Alarm 5 L/min   High Respiratory Rate 40 br/min

## 2020-07-03 NOTE — PROGRESS NOTES
07/02/20 1958   Vent Information   $Ventilation $Subsequent Day   Vent Type 840   Vent Mode AC/VC   Vt Ordered 450 mL   Rate Set 16 bmp   Peak Flow 75 L/min   Pressure Support 0 cmH20   FiO2  90 %   SpO2 93 %   SpO2/FiO2 ratio 103.33   Sensitivity 3   PEEP/CPAP 14   I Time/ I Time % 0 s   Humidification Source Heated wire   Humidification Temp 37   Humidification Temp Measured 36.8   Circuit Condensation Drained   Vent Patient Data   High Peep/I Pressure 0   Peak Inspiratory Pressure 29 cmH2O   Mean Airway Pressure 18 cmH20   Rate Measured 22 br/min   Vt Exhaled 524 mL   Minute Volume 10.2 Liters   I:E Ratio 1:2.90   Plateau Pressure 26 XXR95   Static Compliance 44 mL/cmH2O   Dynamic Compliance 35 mL/cmH2O   Cough/Sputum   Sputum How Obtained Suctioned;Endotracheal   Cough Non-productive   Sputum Amount None   Spontaneous Breathing Trial (SBT) RT Doc   Pulse 80   Breath Sounds   Right Upper Lobe Diminished   Right Middle Lobe Diminished   Right Lower Lobe Diminished   Left Upper Lobe Diminished   Left Lower Lobe Diminished   Additional Respiratory  Assessments   Resp 14   Position Semi-Harris's   Alarm Settings   High Pressure Alarm 40 cmH2O   Low Minute Volume Alarm 5 L/min   Apnea (secs) 20 secs   High Respiratory Rate 40 br/min   Low Exhaled Vt  350 mL   ETT (adult)   Placement Date/Time: 06/29/20 0230   Preoxygenation: Yes  Mask Ventilation: Ventilated by mask (1)  Technique: Video laryngoscopy  Type: Cuffed  Tube Size: 8 mm  Laryngoscope: GlideScope  Blade Size: 4  Location: Oral  Insertion attempts: 1  Placement. ..    Secured at 23 cm   Measured From Lips   ET Placement Right   Secured By Commercial tube leblanc   Site Condition Dry

## 2020-07-03 NOTE — PLAN OF CARE
Problem: SAFETY  Goal: Free from accidental physical injury  7/3/2020 1356 by Krish Costa RN       Problem: KNOWLEDGE DEFICIT  Goal: Patient/S.O. demonstrates understanding of disease process, treatment plan, medications, and discharge instructions. 7/3/2020 1356 by Krish Costa RN  Outcome: Ongoing     Problem: Airway Clearance - Ineffective:  Goal: Clear lung sounds  Description: Clear lung sounds  7/3/2020 1356 by Krish Costa RN  Outcome: Ongoing  Goal: Ability to maintain a clear airway will improve  Description: Ability to maintain a clear airway will improve  7/3/2020 1356 by Krish Costa RN  Outcome: Ongoing     Problem: Gas Exchange - Impaired:  Goal: Levels of oxygenation will improve  Description: Levels of oxygenation will improve  7/3/2020 1356 by Krish Costa RN  Outcome: Ongoing     Problem: Hyperthermia:  Goal: Ability to maintain a body temperature in the normal range will improve  Description: Ability to maintain a body temperature in the normal range will improve  7/3/2020 1356 by Krish Costa RN  Outcome: Ongoing     Problem: Airway Clearance - Ineffective  Goal: Achieve or maintain patent airway  7/3/2020 1356 by Krish Costa RN  Outcome: Ongoing     Problem: Gas Exchange - Impaired  Goal: Absence of hypoxia  7/3/2020 1356 by Krish Costa RN  Outcome: Ongoing  Goal: Promote optimal lung function  7/3/2020 1356 by Krish Costa RN  Outcome: Ongoing     Problem: Breathing Pattern - Ineffective  Goal: Ability to achieve and maintain a regular respiratory rate  7/3/2020 1356 by Krish Costa RN  Outcome: Ongoing     Problem:  Body Temperature -  Risk of, Imbalanced  Goal: Ability to maintain a body temperature within defined limits  7/3/2020 1356 by Krish Costa RN  Outcome: Ongoing  Goal: Will regain or maintain usual level of consciousness  7/3/2020 1356 by Krish Costa RN  Outcome: Ongoing  Goal: Complications related to the disease process, condition or treatment will be avoided or minimized  Outcome: Ongoing     Problem: Isolation Precautions - Risk of Spread of Infection  Goal: Prevent transmission of infection  7/3/2020 1356 by Shelley Rosario RN  Outcome: Ongoing     Problem: Nutrition Deficits  Goal: Optimize nutrtional status  Outcome: Ongoing     Problem: Risk for Fluid Volume Deficit  Goal: Maintain normal heart rhythm  7/3/2020 1356 by Shelley Rosario RN  Outcome: Ongoing  Goal: Maintain absence of muscle cramping  7/3/2020 1356 by Shelley Rosario RN  Outcome: Ongoing  Goal: Maintain normal serum potassium, sodium, calcium, phosphorus, and pH  7/3/2020 1356 by Shelley Rosario RN  Outcome: Ongoing     Problem: Loneliness or Risk for Loneliness  Goal: Demonstrate positive use of time alone when socialization is not possible  Outcome: Ongoing     Problem: Fatigue  Goal: Verbalize increase energy and improved vitality  7/3/2020 1356 by Shelley Rosario RN  Outcome: Ongoing     Problem: Patient Education: Go to Patient Education Activity  Goal: Patient/Family Education  Outcome: Ongoing     Problem: Restraint Use - Nonviolent/Non-Self-Destructive Behavior:  Goal: Absence of restraint indications  Description: Absence of restraint indications  7/3/2020 1356 by Shelley Rosario RN  Outcome: Ongoing  Goal: Absence of restraint-related injury  Description: Absence of restraint-related injury  7/3/2020 1356 by Shelley Rosario RN  Outcome: Ongoing     Problem: Nutrition  Goal: Optimal nutrition therapy  Outcome: Ongoing  Goal: Understanding of nutritional guidelines  Outcome: Ongoing     Problem: Skin Integrity:  Goal: Will show no infection signs and symptoms  Description: Will show no infection signs and symptoms  Outcome: Ongoing  Goal: Absence of new skin breakdown  Description: Absence of new skin breakdown  Outcome: Ongoing     Problem: Falls - Risk of:  Goal: Will remain free from falls  Description: Will remain free from falls  Outcome: Ongoing  Goal: Absence of physical injury  Description: Absence of physical injury  Outcome: Ongoing     Problem: Urinary Elimination:  Goal: Signs and symptoms of infection will decrease  Description: Signs and symptoms of infection will decrease  Outcome: Ongoing  Goal: Complications related to the disease process, condition or treatment will be avoided or minimized  Description: Complications related to the disease process, condition or treatment will be avoided or minimized  Outcome: Ongoing

## 2020-07-03 NOTE — PROGRESS NOTES
Spoke with Dr. Iron Acosta from infectious disease and he stated that he wanted blood cultures x2- 1 set from PICC line and 1 from peripheral vein. We reviewed the patient's condition and he stated that he will be discontinuing the cefepime. Orders were readback and verified.

## 2020-07-03 NOTE — PLAN OF CARE
Problem: SAFETY  Goal: Free from accidental physical injury  7/3/2020 0747 by Jolene Villegas RN  Outcome: Ongoing     Problem: KNOWLEDGE DEFICIT  Goal: Patient/S.O. demonstrates understanding of disease process, treatment plan, medications, and discharge instructions. 7/3/2020 0747 by Jolene Villegas RN  Outcome: Ongoing     Problem: Airway Clearance - Ineffective:  Goal: Clear lung sounds  Description: Clear lung sounds  7/3/2020 0747 by Jolene Villegas RN  Outcome: Ongoing     Problem: Airway Clearance - Ineffective:  Goal: Ability to maintain a clear airway will improve  Description: Ability to maintain a clear airway will improve  7/3/2020 0747 by Jolene Villegas RN  Outcome: Ongoing     Problem: Gas Exchange - Impaired:  Goal: Levels of oxygenation will improve  Description: Levels of oxygenation will improve  7/3/2020 0747 by Jolene Villegas RN  Outcome: Ongoing     Problem: Hyperthermia:  Goal: Ability to maintain a body temperature in the normal range will improve  Description: Ability to maintain a body temperature in the normal range will improve  7/3/2020 0747 by Jolene Villegas RN  Outcome: Ongoing     Problem: Airway Clearance - Ineffective  Goal: Achieve or maintain patent airway  7/3/2020 0747 by Jolene Villegas RN  Outcome: Ongoing     Problem: Gas Exchange - Impaired  Goal: Absence of hypoxia  7/3/2020 0747 by Jolene Villegas RN  Outcome: Ongoing     Problem: Gas Exchange - Impaired  Goal: Promote optimal lung function  7/3/2020 0747 by Jolene Villegas RN  Outcome: Ongoing     Problem: Breathing Pattern - Ineffective  Goal: Ability to achieve and maintain a regular respiratory rate  7/3/2020 0747 by Jolene Villegas RN  Outcome: Ongoing     Problem: Body Temperature -  Risk of, Imbalanced  Goal: Ability to maintain a body temperature within defined limits  7/3/2020 0747 by Jolene Villegas RN  Outcome: Ongoing     Problem:  Body Temperature -  Risk of, Imbalanced  Goal: Will regain or maintain usual level of consciousness  7/3/2020 0747 by Juvencio Dunham RN  Outcome: Ongoing     Problem: Isolation Precautions - Risk of Spread of Infection  Goal: Prevent transmission of infection  7/3/2020 0747 by Juvencio Dunham RN  Outcome: Ongoing     Problem: Risk for Fluid Volume Deficit  Goal: Maintain normal heart rhythm  7/3/2020 0747 by Juvencio Dunham RN  Outcome: Ongoing     Problem: Risk for Fluid Volume Deficit  Goal: Maintain absence of muscle cramping  7/3/2020 0747 by Juvencio Dunham RN  Outcome: Ongoing     Problem: Risk for Fluid Volume Deficit  Goal: Maintain normal serum potassium, sodium, calcium, phosphorus, and pH  7/3/2020 0747 by Juvencio Dunham RN  Outcome: Ongoing     Problem: Restraint Use - Nonviolent/Non-Self-Destructive Behavior:  Goal: Absence of restraint indications  Description: Absence of restraint indications  7/3/2020 0747 by Juvencio Dunham RN  Outcome: Ongoing     Problem: Restraint Use - Nonviolent/Non-Self-Destructive Behavior:  Goal: Absence of restraint-related injury  Description: Absence of restraint-related injury  7/3/2020 0747 by Juvencio Dunham RN  Outcome: Ongoing

## 2020-07-03 NOTE — PROGRESS NOTES
Q12H    remdesivir IVPB  100 mg Intravenous Q24H    ketotifen  1 drop Both Eyes BID    enoxaparin  40 mg Subcutaneous BID    rosuvastatin  10 mg Oral Daily    aspirin  81 mg Oral Daily    sodium chloride flush  10 mL Intravenous 2 times per day     PRN Meds:  glucose, dextrose, glucagon (rDNA), dextrose, fentanNYL, midazolam, tetrahydrozoline, aluminum & magnesium hydroxide-simethicone, ondansetron, sodium chloride flush, acetaminophen **OR** acetaminophen    Results:  CBC:   Recent Labs     07/01/20  0435 07/02/20  0540 07/03/20  0543   WBC 10.8 12.2* 15.4*   HGB 11.8* 12.1* 11.5*   HCT 35.2* 36.9* 35.9*   MCV 87.9 87.9 86.6    197 200     BMP:   Recent Labs     07/01/20  0435 07/02/20  0540 07/03/20  0543    138 134*   K 3.5 4.2 4.5    101 95*   CO2 26 28 29   PHOS 1.9* 2.4* 3.3   BUN 23* 25* 31*   CREATININE 0.9 0.9 0.9     LIVER PROFILE:   Recent Labs     07/01/20  0435 07/02/20  0540 07/03/20  0543   AST 17 22 33   ALT 8* 11 15   BILIDIR <0.2 <0.2 0.3   BILITOT 0.3 0.4 0.4   ALKPHOS 49 54 57       Cultures:  6/24 Urine: MSSA  6/24 COVID 19 +  6/29 Trach Asp NRF    Films:  CXR was reviewed by me and it showed ETT in place and stable bilateral ASD      ASSESSMENT:  · Acute hypoxic respiratory failure due to COVID 19  · ARDS  · COVID 19 viral pneumona  · ANN -improved    PLAN:   Mechanical ventilation as per my orders. The ventilator was adjusted by me at the bedside for unstable, life threatening respiratory failure. IV Propofol and Fentanyl gtt for sedation, target RASS -2, with daily spontaneous awakening trial.   Head of bed 30 degrees or higher at all times  Daily spontaneous breathing trial once PEEP less than 8, FiO2 less than 55%. · Stop Vanc. Cefepime D#5/7  · Decadron 6 mg changed to BID  · Lovenox BID for DVT prophylaxis  · Remdesevir D#6/10. Monitor renal, cbc, hepatic function daily during therapy. with remdesivir;  Discontinue therapy in patients who develop ALT ? 5x ULN or ALT elevation accompanied by signs or symptoms of liver inflammation or increasing conjugated bilirubin, alkaline phosphatase, or INR. · S/p 2u CCP 7/2/20  · Lasix to 40mg IV BID   · SSI  · ID consult appreciated  · TF's  · Total critical care time caring for this patient with life threatening, unstable organ failure, including direct patient contact, management of life support systems, review of data including imaging and labs, discussions with other team members and physicians at least 68 minutes so far today, excluding procedures.

## 2020-07-03 NOTE — PROGRESS NOTES
Numbness of lips       Social history:     Social History:  All social andepidemiologic history was reviewed and updated by me today as needed. · Tobacco use:   reports that he has quit smoking. His smoking use included cigarettes. He started smoking about 29 years ago. He has a 25.00 pack-year smoking history. He has never used smokeless tobacco.  · Alcohol use:   reports current alcohol use of about 1.0 standard drinks of alcohol per week. · Currently lives in: [address] [place] [address]  ·  reports no history of drug use. Assessment:     The patient is a 82 y.o. old male who  has a past medical history of Arthritis, B12 deficiency (05/2014), COVID-19 (06/25/2020), and Hyperlipidemia. with following problems:    · Acute respiratory failure with hypoxia-this is ongoing  · Endotracheally intubated  · Requiring invasive mechanical ventilation  · ARDS - secondary to COVID 19  · Bilateral COVID 19 pneumonia-ongoing, on day 6 of IV remdesivir today, status post 2 units of convalescent plasma on 7/2/2020 under [place] protocol  · Elevated d-dimer 653 on 6/30/2020-slightly improved, d-dimer is 546 today  · Staph epidermidis in the urine culture on 6/24/2020, significance unclear, likely colonizer  · Status post PICC line placement on 6/29/2020  · Obesity Class 1 due to excess calorie intake : Body mass index is 31.34 kg/m². Discussion:      The patient received 2 units of convalescent plasma yesterday and tolerated it okay. Today is day 6 of IV remdesivir. Had a T-max of 102.2 at 10 AM yesterday and fever curve is trending downwards after that    Serum creatinine 0.9. CK is 227, , triglyceride was checked yesterday and was 39. Liver enzymes okay so far with ALT 15, AST of 33 and serum bilirubin of 0.4. Serum ferritin is 200.3. D-dimer is 546 today. Nasal MRSA screen pending. Portable chest x-ray image from today reviewed.   Shows diffuse bilateral atypical infiltrates, classic for COVID 19.  2+ yeast in the tracheal aspirate Gram stain from 7/2/2020 represents oral nikolay from fungal overgrowth. No evidence or suspicion of fungal respiratory infection. Procalcitonin is 0.89    BP ok per RN, he is off pressors. No pressure ulcers reported    Plan:     Diagnostic Workup:    · Will order 2 sets of blood cultures, 1 peripheral, 1 from the PICC line  · Continue to follow  fever curve, WBC count and blood cultures  · Follow up on liver and renal function  · Continue to monitor LDH, pro calcitonin, d-dimer, CK, ferritin every other day  · Follow-up on interleukin-6 level  · Follow-up on nasal MRSA screen    Antimicrobials:    · No evidence of bacterial infection so far. Leukocytosis likely secondary to Decadron. Procalcitonin is 0.89  · We will stop empiric IV cefepime at this time  · Will continue IV remdesivir 100 mg every 24 hours  · Plan for a total of 10-day course of IV remdesivir, if liver enzymes stay below 5 times upper limit of normal and as long as creatinine clearance stays above 30   · If interleukin-6 level comes back quite elevated, could consider Actemra for 2 doses 12 hours apart. Exact benefit has been uncertain and the studies done so far  · Okay to continue higher dose of IV Decadron given high oxygen requirement  · Maintain good glycemic control while on remdesivir and Decadron  · Anticoagulation per primary. In patients with a d-dimer 6 times above the normal limit, full dose IV anticoagulation has been recommended by some studies ( J Thromb Haemost. 2020 May;18(5):6717-7633. doi: 10.1111/jth.23167.  Epub 2020 Apr 27.)  Potential benefits will have to be weighed against the risks of bleeding episodes  · Endotracheal tube care and pulmonary toilet  · Ventilator support to maintain oxygen saturation above 92%  · Continue strict COVID 19 isolation precautions  · Pressure ulcer prevention precautions  · Continue to watch for new fevers or signs of secondary bacterial Systems   Unable to perform ROS: Intubated         Past Medical History: All past medical history reviewed today. Past Medical History:   Diagnosis Date    Arthritis     B12 deficiency 05/2014    COVID-19 06/25/2020    Hyperlipidemia        Past Surgical History: All past surgical history was reviewed today. Past Surgical History:   Procedure Laterality Date    BACK SURGERY      CAROTID ENDARTERECTOMY Left     COLONOSCOPY      UPPER GASTROINTESTINAL ENDOSCOPY  07/12/14    Minimal chronic gastritis       Family History: All family history was reviewed today. Problem Relation Age of Onset    Cancer Neg Hx        Objective:       PHYSICAL EXAM:      Vitals:   Vitals:    07/03/20 0700 07/03/20 0722 07/03/20 0800 07/03/20 0900   BP: 131/67  134/61 (!) 131/57   Pulse: 72 76 78 78   Resp: 16 15 17 16   Temp: 97.9 °F (36.6 °C)      TempSrc: Temporal      SpO2: 90% 97% 93% 91%   Weight:       Height:           Physical Exam      PHYSICAL EXAM:     In-person bedside physical examination deferred. Pursuant to the emergency declaration under the [address] [place] [address], [address] [place] [address] authority and the [employer] and [employer] Act, this clinical encounter was conducted to provide necessary medical care. (Also consistent with new provisions and guidance offered by [facility] on March 18, 2020 in setting of COVID 19 outbreak and in order to preserve personal protective equipment in accordance with the flexibilities announced by CMS on March 30, 2020)   References: https://[facility] [place]/Portals/0/Resources/COVID-19/3_18%20Telemed%20Guidance%20Updated%20March%2018. pdf?[email or web address]                      https://[facility] [place]/Portals/0/Resources/COVID-19/3_18%20Telemed%20Guidance%20Updated%20March%2018. pdf?[email or web address] pdf                            General: intubated and sedated, on ventilator, vitals reviewed   HEENT: endotracheal tube in place   Cardiovascular: Telemetry data reviewed, rest deferred   Pulmonary: deferred  Abdomen/GI: deferred  Neuro: deferred  Skin: deferred  Musculoskeletal:  deferred  Genitourinary: Deferred  Psych: deferred  Lymphatic/Immunologic: deferred         Intake and output:    I/O last 3 completed shifts: In: 2962 [I.V.:1301; Blood:433; NG/GT:1590]  Out: 2920 [Urine:3370]    Lab Data:   All available labs and old records have been reviewed by me. CBC:  Recent Labs     07/01/20 0435 07/02/20  0540 07/03/20  0543   WBC 10.8 12.2* 15.4*   RBC 4.01* 4.20 4.15*   HGB 11.8* 12.1* 11.5*   HCT 35.2* 36.9* 35.9*    197 200   MCV 87.9 87.9 86.6   MCH 29.5 28.7 27.8   MCHC 33.5 32.6 32.2   RDW 14.4 14.4 14.7   BANDSPCT  --  1 3        BMP:  Recent Labs     07/01/20 0435 07/02/20  0540 07/03/20  0543    138 134*   K 3.5 4.2 4.5    101 95*   CO2 26 28 29   BUN 23* 25* 31*   CREATININE 0.9 0.9 0.9   CALCIUM 7.9* 7.8* 7.8*   GLUCOSE 146* 134* 180*        Hepatic Function Panel:   Lab Results   Component Value Date    ALKPHOS 57 07/03/2020    ALT 15 07/03/2020    AST 33 07/03/2020    PROT 5.6 07/03/2020    BILITOT 0.4 07/03/2020    BILIDIR 0.3 07/03/2020    IBILI 0.1 07/03/2020    LABALBU 2.7 07/03/2020       CPK:   Lab Results   Component Value Date    CKTOTAL 227 07/03/2020     ESR:   Lab Results   Component Value Date    SEDRATE 4 09/17/2018     CRP: No results found for: CRP        Imaging: All pertinent images and reports for the current visit were reviewed by me during this visit. XR CHEST PORTABLE   Final Result   Supportive tubing is in normal position. Low lung volume study, with stable alveolar opacities in the mid and lower   lungs, pneumonia versus atelectasis.          XR CHEST PORTABLE   Final acetaminophen      Problem list:       Patient Active Problem List   Diagnosis Code    Multifocal pneumonia J18.9    Orthostasis I95.1    Pneumonia due to COVID-19 virus U07.1, J12.89    Sepsis ([facility] A41.9    Urinary tract infection without hematuria N39.0    ARDS (adult respiratory distress syndrome) ([facility] J80    Hyperlipidemia E78.5    Acute respiratory failure with hypoxia (HCC) J96.01    Class 1 obesity due to excess calories with body mass index (BMI) of 31.0 to 31.9 in adult E66.09, Z68.31    Peripherally inserted central catheter (PICC) in place Z45.2    Asymptomatic bacteriuria R82.71    Endotracheally intubated Z97.8    On mechanically assisted ventilation ([facility] Z99.11       Please note that this chart was generated using Dragon dictation software. Although every effort was made to ensure the accuracy of this automated transcription, some errors in transcription may have occurred inadvertently. If you may need any clarification, please do not hesitate to contact me through EPIC or at the phone number provided below with my electronic signature. Any pictures or media included in this note were obtained after taking informed verbal consent from the patient and with their approval to include those in the patient's medical record.     Светлана Reyes MD, MPH  7/3/2020 , 9:59 AM   [facility]   Office: [phone]  Fax: [phone]  Tuesday AM clinic:   [address] [place] [address]  Thursday AM [id number] [facility]

## 2020-07-03 NOTE — PROGRESS NOTES
Patient is tolerating a PEEP of 16 and 90% FIO2. No secretions noted during oral care. Vent settings are AC- Tidal volume 450/ Rate 16/ PEEP16/ 90%. The patient is satting 88-92%. Patient is able to perform hand  and move bilateral legs during sedation vacation. The patient's vital signs are stable. Tube feeding held related to high residual and will be restarted at noon. The patient is tolerating turns. Skin is dry and intact. No fevers noted.

## 2020-07-03 NOTE — PLAN OF CARE
Nutrition Problem: Inadequate oral intake  Intervention: Food and/or Nutrient Delivery: Continue NPO, Continue current Tube Feeding  Nutritional Goals: patient will tolerate Vital High-Protein at goal rate of 60 ml/hr x 20 hours without GI distress, without s/s of aspiration, and without additional lab/fluid disturbances

## 2020-07-03 NOTE — PROGRESS NOTES
Internal Medicine ICU Progress Note      Interval History:     Patient admitted with Isidro Baker. On droplet plus precautions. Patient with persistent high fevers, progressive hypoxia. Initiated on Remdesivir, dexamethasone. Patient was intubated for worsening respiratory failure on 2020. Requiring high flow O2 . Had high fevers , T max of 104 F  Low grade fevers , 20-- > S/P Convalescent plasma transfusion 2 units     7/3/2020--> no fevers today . Remains on mechanical vent . FiO2 at 65 %, PEEP 16      Invasive Lines: PICC placed on 2020      MV: Intubated on 2020    Recent Labs     20  0545 20  0552   PHART 7.410 7.386   [id number] 43.4 51.0*   PO2ART 67.4* 63.1*       MV Settings:  Vent Mode: AC/VC Rate Set: 16 bmp/Vt Ordered: 450 mL/ /FiO2 : 65 %    IV:   midazolam 3 mg/hr (20 0808)    dextrose      propofol 35 mcg/kg/min (20 1309)    fentaNYL (SUBLIMAZE) infusion 100 mcg/hr (20 1019)    norepinephrine Stopped (20 0830)       Vitals:  Temp  Av.1 °F (37.3 °C)  Min: 97.9 °F (36.6 °C)  Max: 99.3 °F (37.4 °C)  Pulse  Av  Min: 72  Max: 90  BP  Min: 107/54  Max: 172/74  SpO2  Av.6 %  Min: 90 %  Max: 97 %  FiO2   Av.6 %  Min: 65 %  Max: 90 %  Patient Vitals for the past 4 hrs:   BP Temp Temp src Pulse Resp SpO2   20 1300 125/63 -- -- 76 15 94 %   20 1200 (!) 172/74 -- -- 80 18 93 %   20 1100 134/67 98.1 °F (36.7 °C) Temporal 78 16 93 %   20 1000 (!) 130/59 -- -- 75 15 91 %       CVP:        Intake/Output Summary (Last 24 hours) at 7/3/2020 1353  Last data filed at 7/3/2020 1200  Gross per 24 hour   Intake 4379.49 ml   Output 3895 ml   Net 484.49 ml       Physical Exam:  General:  Pt is intubated, sedated, on mechanical vent. Skin:  Warm and dry  Neck:  JVD absent. Neck supple  Chest: Diminished breath sounds .  No wheezes or rhonchi   Cardiovascular:  RRR ,S1S2 normal  Abdomen:  Soft, non tender, non distended, BS +  Extremities:  No edema. Intact peripheral pulses. Brisk cap refill, < 2 secs  Neuro: non focal      Medications:  Scheduled Meds:   furosemide  40 mg Intravenous BID    insulin lispro  0-12 Units Subcutaneous Q4H    dexamethasone  6 mg Intravenous Q12H    insulin lispro  0-6 Units Subcutaneous Q4H    chlorhexidine  15 mL Mouth/Throat BID    pantoprazole  40 mg Intravenous Daily    ipratropium-albuterol  1 ampule Inhalation Q4H    remdesivir IVPB  100 mg Intravenous Q24H    ketotifen  1 drop Both Eyes BID    enoxaparin  40 mg Subcutaneous BID    rosuvastatin  10 mg Oral Daily    aspirin  81 mg Oral Daily    sodium chloride flush  10 mL Intravenous 2 times per day       PRN Meds:  glucose, dextrose, glucagon (rDNA), dextrose, fentanNYL, midazolam, tetrahydrozoline, aluminum & magnesium hydroxide-simethicone, ondansetron, sodium chloride flush, acetaminophen **OR** acetaminophen    Results:  CBC:   Recent Labs     07/01/20 0435 07/02/20  0540 07/03/20  0543   WBC 10.8 12.2* 15.4*   HGB 11.8* 12.1* 11.5*   HCT 35.2* 36.9* 35.9*   MCV 87.9 87.9 86.6    197 200     BMP:   Recent Labs     07/01/20 0435 07/02/20  0540 07/03/20  0543    138 134*   K 3.5 4.2 4.5    101 95*   CO2 26 28 29   PHOS 1.9* 2.4* 3.3   BUN 23* 25* 31*   CREATININE 0.9 0.9 0.9     LIVER PROFILE:   Recent Labs     07/01/20 0435 07/02/20  0540 07/03/20  0543   AST 17 22 33   ALT 8* 11 15   BILIDIR <0.2 <0.2 0.3   BILITOT 0.3 0.4 0.4   ALKPHOS 49 54 57     PT/INR:   Recent Labs     07/02/20 0540 07/02/20 2102 07/03/20  0543   PROTIME 13.1 13.4* 12.9   INR 1.13 1.15* 1.11     Results for GOGO NEWTON SARAH (MRN [id number]) as of 7/1/2020 09:46   Ref. Range 6/24/2020 21:35 6/26/2020 15:24 6/30/2020 04:20   D-Dimer, Quant Latest Ref Range: 0 - 229 ng/mL  (H) 682 (H) 653 (H)     Results for Storm SMITH (MRN [id number]) as of 7/1/2020 09:46   Ref.  Range 6/24/2020 21:35   Ferritin Latest Ref Range: 30.0 - 400.0 ng/mL 97.3       Cultures:  Results for PARSLEY, Meryle Fought (MRN [id number]) as of 6/30/2020 09:20   Ref. Range 6/24/2020 22:00   SARS-CoV-2, PCR Latest Ref Range: Not Detected  DETECTED (A)     Susceptibility     Staphylococcus epidermidis (1)     Antibiotic Interpretation TARA Status    ciprofloxacin Sensitive <=0.5 mcg/mL     nitrofurantoin Sensitive <=16 mcg/mL     oxacillin Sensitive <=0.25 mcg/mL     tetracycline Sensitive <=1 mcg/mL     trimethoprim-sulfamethoxazole Sensitive <=10 mcg/mL           Films:    XR CHEST PORTABLE   Final Result   Supportive tubing is in normal position. Low lung volume study, with stable alveolar opacities in the mid and lower   lungs, pneumonia versus atelectasis. XR CHEST PORTABLE   Final Result   Increased in degree and extent of bilateral mid and lower lung pneumonia. The increased may be partially accentuated by low lung volumes. XR CHEST 1 VW   Final Result   Persistent bilateral airspace disease, similar to prior. IR PICC WO SQ PORT/PUMP > 5 YEARS   Final Result   Successful placement of PICC line. XR CHEST PORTABLE   Final Result   Appropriate endotracheal tube positioning. Multifocal airspace opacities and areas of atelectasis correlate with recent   CT chest.         XR ABDOMEN FOR NG/OG/NE TUBE PLACEMENT   Final Result   NG tube terminates over the stomach. CT CHEST PULMONARY EMBOLISM W CONTRAST   Final Result   1. No pulmonary embolism. 2. Multifocal pneumonia scattered throughout both lungs with minimal pleural   effusions. XR CHEST STANDARD (2 VW)   Final Result   No radiographic evidence of acute pulmonary disease. Assessment:    . Principal Problem:    Pneumonia due to COVID-19 virus  Active Problems:    Multifocal pneumonia    Orthostasis    Sepsis ([address]    Urinary tract infection without hematuria    ARDS (adult respiratory distress syndrome) (HCC)    Hyperlipidemia Acute respiratory failure with hypoxia (HCC)    Class 1 obesity due to excess calories with body mass index (BMI) of 31.0 to 31.9 in adult    Peripherally inserted central catheter (PICC) in place    Asymptomatic bacteriuria    Endotracheally intubated    On mechanically assisted ventilation (HCC)  Resolved Problems:    * No resolved hospital problems. *         Plan:    #Acute hypoxic respiratory failure due to COVID-19. He has ARDS. - intubated, on mechanical ventillation  - seen by Pulmonary critical care . - His respiratory status is about the same. His PEEP is up to 14-->  16 ,  FiO2 is down to 80%--> 65%. # COVID -19  - He is on Decadron 6 mg IV daily.  - Remdesivir , day 6 of 10  -  S/P transfusion of 2 units of convalescent plasma on 7/2/2020    # Sepsis  - POA, due to PNA, COVID 19  - with leukocytosis, fevers, tachypnea  - COVID-19 detected  - Patient has been weaned off Levophed. His sepsis is improving. #Multifocal pneumonia  - related to COVID-19.    - Tracheal aspirate sent. - was on  broad-spectrum antibiotics, vanc and  Cefepime--> now off     # Monitor renal function  - Patient's creatinine is 0.9.   - on  IV Lasix. # elevated D dimer  - due to COVID 19  - CT chest neg for PE  - Check daily d-dimer and INR. # UTI  - cx positive for staph, treated    # Hyperlipidemia  - on statin. #Lovenox twice daily dosing. DIET TUBE FEED CONTINUOUS/CYCLIC NPO; Semi-elemental (Vital AF 1.2 ); Orogastric; Continuous; 20 (initial ); 60 (goal); 20 (over 20 hours)  Full Code    Critically ill but stable.       Shauna Jonny 1:53 PM 7/3/2020

## 2020-07-03 NOTE — PROGRESS NOTES
Nutrition Assessment (Enteral Nutrition)    Type and Reason for Visit: Reassess    Nutrition Recommendations:   1. Continue Vital High-Protein at goal rate of 60 ml/hr x 20 hours + 50 ml water flushes every 2 hours or per MD guidance. 2. Recommend to continue TF and re-instill entire residual content if </= 250 ml. If residual content is < 500 ml, re-instill half and discard other half + continue TF. If residual content is > 500 ml, stop TF and monitor for other s/s of GI dysfunction/EN intolerance. 3. Monitor TF rate, intake, and tolerance + water flushes. 4. Please obtain updated weight for this patient. 5. Monitor nutrition-related labs, bowel function (no BM x 8 days admission), and weight trends. Nutrition Assessment: patient is declining from a nutritional standpoint AEB TF rate was decreased to 20 ml/hr x 20 hours today so patient's nutrition needs are not being met via EN at this time and he remains at risk for further compromise d/t intubation + NPO status, need for EN as sole source of nutrition while intubated, and altered nutrition-related lab values; will continue Vital AF 1.2 with a goal rate of 60 ml/hr x 20 hours + 50 ml water flushes every 2 hours or per MD guidance    Malnutrition Assessment:  · Malnutrition Status: At risk for malnutrition  · Context: Acute illness or injury  · Findings of the 6 clinical characteristics of malnutrition (Minimum of 2 out of 6 clinical characteristics is required to make the diagnosis of moderate or severe Protein Calorie Malnutrition based on AND/ASPEN Guidelines):  1. Energy Intake-Less than or equal to 50% of estimated energy requirement, Greater than or equal to 5 days    2. Weight Loss-No significant weight loss, in 1 week(x 8 days admission)  3. Fat Loss-Unable to assess(no NFPE d/t COVID-19 precautions),    4. Muscle Loss-Unable to assess(no NFPE d/t COVID-19 precautions),    5.  Fluid Accumulation-No significant fluid accumulation, Extremities(trace in BLE)  6.  Strength-Not measured    Nutrition Risk Level:  Moderate    Nutrition Needs:  · Estimated Daily Total Kcal: 1700 - 1870 kcals based on 20-22 kcals/kg/CBW  · Estimated Daily Protein (g): 80 - 101 g protein based on 1.2-1.5 g/kg/IBW  · Estimated Daily Fluid (ml/day): 1700 - 1900 ml     Nutrition Diagnosis:   · Problem: Inadequate oral intake  · Etiology: related to Impaired respiratory function-inability to consume food     Signs and symptoms:  as evidenced by NPO status due to medical condition, Intubation    Objective Information:  · Nutrition-Focused Physical Findings: patient remains intubated and sedated on 40 mcg propofol at this time; he received 2 units of convalescent plasma on 7/2/20 and tolerated well; + Remdesivir continues; + fever; abdomen is soft, round, and bowel sounds are active; no BM x 8 days admission documented; patient is able to follow commands on vent  · Wound Type: None  · Current Nutrition Therapies:  · Oral Diet Orders: NPO   · Oral Diet intake: NPO  · Oral Nutrition Supplement (ONS) Orders: None  · ONS intake: NPO  · Tube Feeding (TF) Orders:   · Feeding Route: Orogastric  · Formula: Semi-elemental  · Rate (ml/hr):60 ml/hr     · Volume (ml/day): 1200 ml   · Duration: Continuous  · Additives/Modulars: (none)  · Water Flushes: 50 ml water flushes every 2 hours  · Current TF & Flush Orders Provides: Vital High-Protein at 20 ml/hr x 20 hours = 400 ml TV, 400 kcals, 35 g protein, and 334 ml free water + 50 ml water flushes every 2 hours or per MD guidance  · Goal TF & Flush Orders Provides: Vital AF 1.2 with a goal rate of 60 ml/hr x 20 hours = 1200 ml TV, 1440 kcals, 90 g protein, and 973 ml free water + water flushes of 50 ml every 2 hours or per MD guidance  · Additional Calories: propofol at 40 mcg x 24 hours = 577 kcals from lipids  · Anthropometric Measures:  · Ht: 5' 7\" (170.2 cm)   · Current Body Wt: 200 lb 1 oz (90.7 kg)(actual; bed scale; obtained on 7/2/20)  · Admission Body Wt: 200 lb 1 oz (90.7 kg)(actual; bed scale; obtained on 7/2/20)  · Usual Body Wt: (180's, per past PCP encounters)  · Weight Change: unable to assess since one actual weight was obtained on 7/2/20 - no other weights to compare   · Ideal Body Wt: 148 lb (67.1 kg), % Ideal Body 135%  · BMI Classification: BMI 30.0 - 34.9 Obese Class I(patient is + 10L of fluid since admission)    Nutrition Interventions:   Continue NPO, Continue current Tube Feeding  Continued Inpatient Monitoring, Coordination of Care, Coordination of Community Care    Nutrition Evaluation:   · Evaluation: (goal is ongoing)   · Goals: patient will tolerate Vital High-Protein at goal rate of 60 ml/hr x 20 hours without GI distress, without s/s of aspiration, and without additional lab/fluid disturbances   · Monitoring: TF Intake, TF Tolerance, Mental Status/Confusion, Weight, Pertinent Labs, Constipation, Monitor Bowel Function      Electronically signed by Ingrid Isbell RD, LD on 7/3/20 at 5:35 PM EDT    Contact Number: [phone]

## 2020-07-03 NOTE — PROGRESS NOTES
No change in presentation of the patient since noon. The patient is tolerating turns. No fevers noted. The patient is tolerating tube feeding.

## 2020-07-03 NOTE — PROGRESS NOTES
The patient's FIO2 was decreased to 65% and the patient is tolerating well. Bilateral wrist restraints for safety. Education provided to [facility] regarding restraints and current status. No fevers noted. Tube feeding restarted at 20mls/hr. During rounds Dr. Wendi Gomez ordered to increase the sliding scale insulin order from low dose to medium related to steroids. Low dose insulin sliding scale discontinued. Orders were read back and verified.

## 2020-07-03 NOTE — PROGRESS NOTES
Peep increased to 16cwp  FiO2 decreased to 75%  Plateau 25         77/36/69 0722   Vent Information   Vt Ordered 450 mL   Rate Set 16 bmp   Peak Flow 70 L/min   Pressure Support 0 cmH20   FiO2  75 %   SpO2 97 %   SpO2/FiO2 ratio 129.33   Sensitivity 3   PEEP/CPAP 16   I Time/ I Time % 0 s   Humidification Source Heated wire   Humidification Temp Measured 36.9   Vent Patient Data   High Peep/I Pressure 0   Peak Inspiratory Pressure 33 cmH2O   Mean Airway Pressure 21 cmH20   Rate Measured 20 br/min   Vt Exhaled 0 mL   Minute Volume 8.89 Liters   I:E Ratio 4.90:1   Plateau Pressure 26 [id number]   Static Compliance 71 mL/cmH2O   Dynamic Compliance 21 mL/cmH2O   Total PEEP 16 cmH20   Auto PEEP 2 cmH20   Cough/Sputum   Sputum How Obtained None   Spontaneous Breathing Trial (SBT) RT Doc   Pulse 76   Breath Sounds   Right Upper Lobe Diminished   Right Middle Lobe Diminished   Right Lower Lobe Diminished   Left Upper Lobe Diminished   Left Lower Lobe Diminished   Additional Respiratory  Assessments   Resp 15   Position Semi-Harris's   Oral Care Completed? Yes   Oral Care Mouth suctioned   Subglottic Suction Done? Yes   Alarm Settings   High Pressure Alarm 64 cmH2O   Low Minute Volume Alarm 5 L/min   High Respiratory Rate 40 br/min   ETT (adult)   Placement Date/Time: 06/29/20 0230   Preoxygenation: Yes  Mask Ventilation: Ventilated by mask (1)  Technique: Video laryngoscopy  Type: Cuffed  Tube Size: 8 mm  Laryngoscope: GlideScope  Blade Size: 4  Location: Oral  Insertion attempts: 1  Placement. ..    Secured at 23 cm   Measured From [address] [place] [address],[address] By Commercial tube leblanc   Site Condition Dry

## 2020-07-03 NOTE — PROGRESS NOTES
Shift assessment completed and documented. Pt sedated on mechanical ventilation. Pt COVID positive and remains in isolation. Versed, propofol, and Fentanyl for sedation, see MAR. Updated daughter, all questions answered. Pt checked for incontinence and repositioned for comfort. No needs at this time. Will continue to monitor.  Claude Null RN

## 2020-07-03 NOTE — PROGRESS NOTES
Convalescent plasma is complete at this time. Will draw a PT/INR in 15-60 minutes post transfusion, per MD order.  Gabrielle Ch RN

## 2020-07-04 ENCOUNTER — APPOINTMENT (OUTPATIENT)
Dept: GENERAL RADIOLOGY | Age: 85
DRG: 870 | End: 2020-07-04
Payer: MEDICARE

## 2020-07-04 LAB
ALBUMIN SERPL-MCNC: 2.7 G/DL (ref 3.4–5)
ALP BLD-CCNC: 56 U/L (ref 40–129)
ALT SERPL-CCNC: 22 U/L (ref 10–40)
ANION GAP SERPL CALCULATED.3IONS-SCNC: 10 MMOL/L (ref 3–16)
AST SERPL-CCNC: 48 U/L (ref 15–37)
BANDED NEUTROPHILS RELATIVE PERCENT: 4 % (ref 0–7)
BASE EXCESS ARTERIAL: 7.2 MMOL/L (ref -3–3)
BASOPHILS ABSOLUTE: 0 K/UL (ref 0–0.2)
BASOPHILS RELATIVE PERCENT: 0 %
BILIRUB SERPL-MCNC: 0.4 MG/DL (ref 0–1)
BILIRUBIN DIRECT: <0.2 MG/DL (ref 0–0.3)
BILIRUBIN, INDIRECT: ABNORMAL MG/DL (ref 0–1)
BUN BLDV-MCNC: 45 MG/DL (ref 7–20)
CALCIUM SERPL-MCNC: 8.1 MG/DL (ref 8.3–10.6)
CARBOXYHEMOGLOBIN ARTERIAL: 0.2 % (ref 0–1.5)
CHLORIDE BLD-SCNC: 95 MMOL/L (ref 99–110)
CO2: 33 MMOL/L (ref 21–32)
CREAT SERPL-MCNC: 1.1 MG/DL (ref 0.8–1.3)
CULTURE, RESPIRATORY: ABNORMAL
CULTURE, RESPIRATORY: ABNORMAL
D DIMER: 567 NG/ML DDU (ref 0–229)
EOSINOPHILS ABSOLUTE: 0 K/UL (ref 0–0.6)
EOSINOPHILS RELATIVE PERCENT: 0 %
GFR AFRICAN AMERICAN: >60
GFR NON-AFRICAN AMERICAN: >60
GLUCOSE BLD-MCNC: 166 MG/DL (ref 70–99)
GLUCOSE BLD-MCNC: 176 MG/DL (ref 70–99)
GLUCOSE BLD-MCNC: 176 MG/DL (ref 70–99)
GLUCOSE BLD-MCNC: 179 MG/DL (ref 70–99)
GLUCOSE BLD-MCNC: 192 MG/DL (ref 70–99)
GLUCOSE BLD-MCNC: 198 MG/DL (ref 70–99)
GLUCOSE BLD-MCNC: 200 MG/DL (ref 70–99)
GRAM STAIN RESULT: ABNORMAL
HCO3 ARTERIAL: 31.9 MMOL/L (ref 21–29)
HCT VFR BLD CALC: 35.4 % (ref 40.5–52.5)
HEMOGLOBIN, ART, EXTENDED: 12.6 G/DL (ref 13.5–17.5)
HEMOGLOBIN: 11.8 G/DL (ref 13.5–17.5)
INR BLD: 1.14 (ref 0.86–1.14)
LYMPHOCYTES ABSOLUTE: 0.2 K/UL (ref 1–5.1)
LYMPHOCYTES RELATIVE PERCENT: 1 %
MAGNESIUM: 2.6 MG/DL (ref 1.8–2.4)
MCH RBC QN AUTO: 29.1 PG (ref 26–34)
MCHC RBC AUTO-ENTMCNC: 33.3 G/DL (ref 31–36)
MCV RBC AUTO: 87.2 FL (ref 80–100)
METHEMOGLOBIN ARTERIAL: 0.3 %
MONOCYTES ABSOLUTE: 0.3 K/UL (ref 0–1.3)
MONOCYTES RELATIVE PERCENT: 2 %
MRSA CULTURE ONLY: NORMAL
NEUTROPHILS ABSOLUTE: 16.9 K/UL (ref 1.7–7.7)
NEUTROPHILS RELATIVE PERCENT: 93 %
O2 CONTENT ARTERIAL: 17 ML/DL
O2 SAT, ARTERIAL: 95.4 %
O2 THERAPY: ABNORMAL
ORGANISM: ABNORMAL
PCO2 ARTERIAL: 45.3 MMHG (ref 35–45)
PDW BLD-RTO: 14.7 % (ref 12.4–15.4)
PERFORMED ON: ABNORMAL
PH ARTERIAL: 7.46 (ref 7.35–7.45)
PHOSPHORUS: 2.4 MG/DL (ref 2.5–4.9)
PLATELET # BLD: 215 K/UL (ref 135–450)
PLATELET SLIDE REVIEW: ADEQUATE
PMV BLD AUTO: 9.4 FL (ref 5–10.5)
PO2 ARTERIAL: 73.4 MMHG (ref 75–108)
POTASSIUM SERPL-SCNC: 4.3 MMOL/L (ref 3.5–5.1)
PROTHROMBIN TIME: 13.2 SEC (ref 10–13.2)
RBC # BLD: 4.06 M/UL (ref 4.2–5.9)
SLIDE REVIEW: ABNORMAL
SODIUM BLD-SCNC: 138 MMOL/L (ref 136–145)
TCO2 ARTERIAL: 33.3 MMOL/L
TOTAL PROTEIN: 5.9 G/DL (ref 6.4–8.2)
WBC # BLD: 17.4 K/UL (ref 4–11)

## 2020-07-04 PROCEDURE — 6360000002 HC RX W HCPCS: Performed by: INTERNAL MEDICINE

## 2020-07-04 PROCEDURE — 94640 AIRWAY INHALATION TREATMENT: CPT

## 2020-07-04 PROCEDURE — 85025 COMPLETE CBC W/AUTO DIFF WBC: CPT

## 2020-07-04 PROCEDURE — 80069 RENAL FUNCTION PANEL: CPT

## 2020-07-04 PROCEDURE — 6370000000 HC RX 637 (ALT 250 FOR IP): Performed by: INTERNAL MEDICINE

## 2020-07-04 PROCEDURE — 2580000003 HC RX 258: Performed by: INTERNAL MEDICINE

## 2020-07-04 PROCEDURE — 85610 PROTHROMBIN TIME: CPT

## 2020-07-04 PROCEDURE — 94750 HC PULMONARY COMPLIANCE STUDY: CPT

## 2020-07-04 PROCEDURE — 2700000000 HC OXYGEN THERAPY PER DAY

## 2020-07-04 PROCEDURE — 99291 CRITICAL CARE FIRST HOUR: CPT | Performed by: INTERNAL MEDICINE

## 2020-07-04 PROCEDURE — 82803 BLOOD GASES ANY COMBINATION: CPT

## 2020-07-04 PROCEDURE — C9113 INJ PANTOPRAZOLE SODIUM, VIA: HCPCS | Performed by: INTERNAL MEDICINE

## 2020-07-04 PROCEDURE — 94003 VENT MGMT INPAT SUBQ DAY: CPT

## 2020-07-04 PROCEDURE — 80076 HEPATIC FUNCTION PANEL: CPT

## 2020-07-04 PROCEDURE — 94761 N-INVAS EAR/PLS OXIMETRY MLT: CPT

## 2020-07-04 PROCEDURE — 83735 ASSAY OF MAGNESIUM: CPT

## 2020-07-04 PROCEDURE — 71045 X-RAY EXAM CHEST 1 VIEW: CPT

## 2020-07-04 PROCEDURE — 2000000000 HC ICU R&B

## 2020-07-04 PROCEDURE — 85379 FIBRIN DEGRADATION QUANT: CPT

## 2020-07-04 RX ORDER — DEXAMETHASONE SODIUM PHOSPHATE 4 MG/ML
6 INJECTION, SOLUTION INTRA-ARTICULAR; INTRALESIONAL; INTRAMUSCULAR; INTRAVENOUS; SOFT TISSUE DAILY
Status: DISCONTINUED | OUTPATIENT
Start: 2020-07-05 | End: 2020-07-08

## 2020-07-04 RX ADMIN — PROPOFOL 30 MCG/KG/MIN: 10 INJECTION, EMULSION INTRAVENOUS at 20:44

## 2020-07-04 RX ADMIN — ENOXAPARIN SODIUM 40 MG: 40 INJECTION SUBCUTANEOUS at 08:22

## 2020-07-04 RX ADMIN — ENOXAPARIN SODIUM 40 MG: 40 INJECTION SUBCUTANEOUS at 21:05

## 2020-07-04 RX ADMIN — Medication 10 ML: at 08:23

## 2020-07-04 RX ADMIN — IPRATROPIUM BROMIDE AND ALBUTEROL SULFATE 1 AMPULE: .5; 3 SOLUTION RESPIRATORY (INHALATION) at 03:39

## 2020-07-04 RX ADMIN — PANTOPRAZOLE SODIUM 40 MG: 40 INJECTION, POWDER, FOR SOLUTION INTRAVENOUS at 08:23

## 2020-07-04 RX ADMIN — CHLORHEXIDINE GLUCONATE 0.12% ORAL RINSE 15 ML: 1.2 LIQUID ORAL at 20:45

## 2020-07-04 RX ADMIN — PROPOFOL 25 MCG/KG/MIN: 10 INJECTION, EMULSION INTRAVENOUS at 08:22

## 2020-07-04 RX ADMIN — DEXAMETHASONE SODIUM PHOSPHATE 6 MG: 4 INJECTION, SOLUTION INTRAMUSCULAR; INTRAVENOUS at 08:22

## 2020-07-04 RX ADMIN — IPRATROPIUM BROMIDE AND ALBUTEROL SULFATE 1 AMPULE: .5; 3 SOLUTION RESPIRATORY (INHALATION) at 20:47

## 2020-07-04 RX ADMIN — FUROSEMIDE 40 MG: 10 INJECTION, SOLUTION INTRAMUSCULAR; INTRAVENOUS at 17:39

## 2020-07-04 RX ADMIN — Medication 10 ML: at 20:45

## 2020-07-04 RX ADMIN — FUROSEMIDE 40 MG: 10 INJECTION, SOLUTION INTRAMUSCULAR; INTRAVENOUS at 08:22

## 2020-07-04 RX ADMIN — FENTANYL CITRATE 75 MCG/HR: 50 INJECTION, SOLUTION INTRAMUSCULAR; INTRAVENOUS at 08:45

## 2020-07-04 RX ADMIN — ROSUVASTATIN CALCIUM 10 MG: 10 TABLET, FILM COATED ORAL at 08:23

## 2020-07-04 RX ADMIN — PROPOFOL 30 MCG/KG/MIN: 10 INJECTION, EMULSION INTRAVENOUS at 15:30

## 2020-07-04 RX ADMIN — IPRATROPIUM BROMIDE AND ALBUTEROL SULFATE 1 AMPULE: .5; 3 SOLUTION RESPIRATORY (INHALATION) at 11:16

## 2020-07-04 RX ADMIN — IPRATROPIUM BROMIDE AND ALBUTEROL SULFATE 1 AMPULE: .5; 3 SOLUTION RESPIRATORY (INHALATION) at 08:13

## 2020-07-04 RX ADMIN — KETOTIFEN FUMARATE 1 DROP: 0.35 SOLUTION/ DROPS OPHTHALMIC at 20:45

## 2020-07-04 RX ADMIN — IPRATROPIUM BROMIDE AND ALBUTEROL SULFATE 1 AMPULE: .5; 3 SOLUTION RESPIRATORY (INHALATION) at 15:40

## 2020-07-04 RX ADMIN — ASPIRIN 81 MG 81 MG: 81 TABLET ORAL at 08:23

## 2020-07-04 RX ADMIN — FENTANYL CITRATE 75 MCG/HR: 50 INJECTION, SOLUTION INTRAMUSCULAR; INTRAVENOUS at 21:09

## 2020-07-04 RX ADMIN — KETOTIFEN FUMARATE 1 DROP: 0.35 SOLUTION/ DROPS OPHTHALMIC at 08:23

## 2020-07-04 ASSESSMENT — PULMONARY FUNCTION TESTS
PIF_VALUE: 32
PIF_VALUE: 33
PIF_VALUE: 28
PIF_VALUE: 25
PIF_VALUE: 25
PIF_VALUE: 28
PIF_VALUE: 31
PIF_VALUE: 32
PIF_VALUE: 40
PIF_VALUE: 19
PIF_VALUE: 34
PIF_VALUE: 27
PIF_VALUE: 35
PIF_VALUE: 24
PIF_VALUE: 34
PIF_VALUE: 33
PIF_VALUE: 27
PIF_VALUE: 34
PIF_VALUE: 39
PIF_VALUE: 27
PIF_VALUE: 29
PIF_VALUE: 28
PIF_VALUE: 27
PIF_VALUE: 33

## 2020-07-04 ASSESSMENT — PAIN SCALES - GENERAL
PAINLEVEL_OUTOF10: 0
PAINLEVEL_OUTOF10: 0

## 2020-07-04 NOTE — PROGRESS NOTES
Shift report given to Kimberly Ross RN  at bedside. Patient care handed off in stable condition at this time.    Electronically signed by Gloria Preciado RN on 7/4/2020 at 7:09 PM

## 2020-07-04 NOTE — PROGRESS NOTES
Shift assessment complete. Patient currently intubated with ETT at 23LL. Vent settings are 16 RR, 450 VT, 16 of PEEP and 60% FiO2. Fentanyl infusing at 75 mcg//h. Propofol infusing at 30 mcg/kg/min. Patient appears more comfortable now. Lungs are diminished throughout. Tolerating VENT now. NSR on monitor. VSS. BUE soft wrist restraints in place due to attempts of pulling at ETT and lines. No signs of injury noted and PROM performed. Central line dressing is clean, dry and intact with no signs of drainage. All tubing is current and dated. IPA caps applied to all access hubs.      Electronically signed by Basia Sebastian RN on 7/4/2020 at 9:14 AM    Vitals:    07/04/20 0903   BP: (!) 130/56   Pulse: 84   Resp: 15   Temp: 99 °F (37.2 °C)   SpO2: 95%

## 2020-07-04 NOTE — PROGRESS NOTES
D: Bedside report received from [facility], all current drips, treatments, skin issues, and plan of care were reviewed by both RN's, care transferred.

## 2020-07-04 NOTE — PROGRESS NOTES
A: Assessment completed and documented, no family present to discuss plan of care and patient is sedated and intubated, is unable to understand, bed exit alarm is on for added safety.

## 2020-07-04 NOTE — PROGRESS NOTES
07/03/20 2307   Vent Information   Vent Type 840   Vent Mode AC/VC   Vt Ordered 450 mL   Rate Set 16 bmp   Peak Flow 80 L/min   Pressure Support 0 cmH20   FiO2  60 %   SpO2 93 %   SpO2/FiO2 ratio 155   Sensitivity 3   PEEP/CPAP 16   I Time/ I Time % 0 s   Humidification Source Heated wire   Humidification Temp Measured 37   Circuit Condensation Drained   Vent Patient Data   High Peep/I Pressure 0   Peak Inspiratory Pressure 32 cmH2O   Mean Airway Pressure 22 cmH20   Rate Measured 19 br/min   Vt Exhaled 521 mL   Minute Volume 9.22 Liters   I:E Ratio 1:3.10   Cough/Sputum   Sputum How Obtained Endotracheal   Cough None   Sputum Amount None   Spontaneous Breathing Trial (SBT) RT Doc   Pulse 77   Breath Sounds   Right Upper Lobe Diminished   Right Middle Lobe Diminished   Right Lower Lobe Diminished   Left Upper Lobe Diminished   Left Lower Lobe Diminished   Additional Respiratory  Assessments   Resp 20   Alarm Settings   High Pressure Alarm 50 cmH2O   Low Minute Volume Alarm 5 L/min   Apnea (secs) 20 secs   High Respiratory Rate 40 br/min   Low Exhaled Vt  350 mL   Patient Observation   Observations 8ett 23 at lip

## 2020-07-04 NOTE — PROGRESS NOTES
07/04/20 0339   Vent Information   Vent Type 840   Vent Mode AC/VC   Vt Ordered 450 mL   Rate Set 16 bmp   Peak Flow 80 L/min   Pressure Support 0 cmH20   FiO2  60 %   SpO2 92 %   SpO2/FiO2 ratio 153.33   Sensitivity 3   PEEP/CPAP 16   I Time/ I Time % 0 s   Humidification Source Heated wire   Humidification Temp Measured 36.8   Circuit Condensation Drained   Vent Patient Data   High Peep/I Pressure 0   Peak Inspiratory Pressure 33 cmH2O   Mean Airway Pressure 18 cmH20   Rate Measured 29 br/min   Vt Exhaled 0 mL   Minute Volume 12.3 Liters   I:E Ratio 1:2.8   Cough/Sputum   Sputum How Obtained Endotracheal   Cough Non-productive   Sputum Amount None   Spontaneous Breathing Trial (SBT) RT Doc   Pulse 83   Breath Sounds   Right Upper Lobe Diminished   Right Middle Lobe Diminished   Right Lower Lobe Diminished   Left Upper Lobe Diminished   Left Lower Lobe Diminished   Additional Respiratory  Assessments   Resp 21   Alarm Settings   High Pressure Alarm 50 cmH2O   Low Minute Volume Alarm 5 L/min   Apnea (secs) 20 secs   High Respiratory Rate 40 br/min   Low Exhaled Vt  350 mL   Patient Observation   Observations 8ett 23 at lip

## 2020-07-04 NOTE — PROGRESS NOTES
Patient reassessed. VENT settings unchanged. 92% saturation on current settings. RN updated family at bedside via Loni Cabrera. Unchanged otherwise.      Electronically signed by Leti Philip RN on 7/4/2020 at 3:49 PM

## 2020-07-04 NOTE — PROGRESS NOTES
07/04/20 0814   Vent Information   Vent Type 840   Vent Mode AC/VC   Vt Ordered 450 mL   Rate Set 16 bmp   Peak Flow 80 L/min   Pressure Support 0 cmH20   FiO2  60 %   SpO2 93 %   SpO2/FiO2 ratio 155   Sensitivity 3   PEEP/CPAP 16   I Time/ I Time % 0 s   Humidification Source Heated wire   Humidification Temp 37   Circuit Condensation Drained   Vent Patient Data   High Peep/I Pressure 0   Peak Inspiratory Pressure 27 cmH2O   Mean Airway Pressure 19 cmH20   Rate Measured 23 br/min   Vt Exhaled 665 mL   Minute Volume 10.7 Liters   I:E Ratio 1:3.10   Plateau Pressure 25 [id number]   Static Compliance 82 mL/cmH2O   Dynamic Compliance 73 mL/cmH2O   Cough/Sputum   Sputum How Obtained Endotracheal;Suctioned   Sputum Amount None   Spontaneous Breathing Trial (SBT) RT Doc   Pulse 83   Breath Sounds   Right Upper Lobe Diminished   Right Middle Lobe Diminished   Right Lower Lobe Diminished   Left Upper Lobe Diminished   Left Lower Lobe Diminished   Additional Respiratory  Assessments   Resp 21   Position Semi-Harris's   Oral Care Completed? Yes   Oral Care Mouth suctioned   Subglottic Suction Done? Yes   Alarm Settings   High Pressure Alarm 50 cmH2O   Low Minute Volume Alarm 5 L/min   Apnea (secs) 20 secs   High Respiratory Rate 40 br/min   Low Exhaled Vt  350 mL   Patient Observation   Observations 8ett 23 at lip   ETT (adult)   Placement Date/Time: 06/29/20 0230   Preoxygenation: Yes  Mask Ventilation: Ventilated by mask (1)  Technique: Video laryngoscopy  Type: Cuffed  Tube Size: 8 mm  Laryngoscope: GlideScope  Blade Size: 4  Location: Oral  Insertion attempts: 1  Placement. ..    Secured at 23 cm   Measured From [address] [place] [address],[address] By Commercial tube leblanc   Site Condition Dry

## 2020-07-04 NOTE — PROGRESS NOTES
Pulmonary & Critical Care Medicine ICU Progress Note    CC: Acute hypoxic respiratory failure secondary to COVID-19    Events of Last 24 hours: Afebrile  Propofol gtt 30  Fentanyl gtt 75  Versed gtt off    Invasive Lines: PICC 6/29/20    MV:  6/29/20-  Vent Mode: AC/VC Rate Set: 16 bmp/Vt Ordered: 430 mL/ /FiO2 : 60 %  Recent Labs     07/03/20  0552 07/04/20  0500   PHART 7.386 7.465*   RRU7GLA 51.0* 45.3*   PO2ART 63.1* 73.4*       IV:   dextrose      propofol 30 mcg/kg/min (07/04/20 0845)    fentaNYL (SUBLIMAZE) infusion 75 mcg/hr (07/04/20 0845)    norepinephrine Stopped (06/30/20 0830)       Vitals:  /62   Pulse 87   Temp 98.1 °F (36.7 °C) (Temporal)   Resp 16   Ht 5' 7\" (1.702 m)   Wt 191 lb (86.6 kg)   SpO2 93%   BMI 29.91 kg/m²   on vent    Intake/Output Summary (Last 24 hours) at 7/4/2020 1246  Last data filed at 7/4/2020 1145  Gross per 24 hour   Intake 3179.68 ml   Output 2300 ml   Net 879.68 ml     EXAM:  Constitutional: ill appearing. Not in distress. Eyes: PERRL. No sclera icterus. ENT: Normal Nose. Normal Tongue. ETT in place  Neck:  Trachea is midline. No thyroid tenderness. Respiratory: No accessory muscle usage. no decreased breath sounds. No wheezes. No rales. No Rhonchi. Cardiovascular: Normal S1S2. No murmur. No digit cyanosis. No digit clubbing. No LE edema. GI: Non-tender. Non-distended. Normal bowel sounds. No masses. No umbilical hernia. Skin: No rash on the exposed extremities. No Nodules on exposed extremities. Neuro: Sedated but awakens to follow commands. Moves all extremities. Psych: No agitation, no anxiety.     Scheduled Meds:   [START ON 7/5/2020] dexamethasone  6 mg Intravenous Daily    furosemide  40 mg Intravenous BID    insulin lispro  0-12 Units Subcutaneous Q4H    chlorhexidine  15 mL Mouth/Throat BID    pantoprazole  40 mg Intravenous Daily    ipratropium-albuterol  1 ampule Inhalation Q4H    remdesivir IVPB  100 mg Intravenous Q24H    accompanied by signs or symptoms of liver inflammation or increasing conjugated bilirubin, alkaline phosphatase, or INR. · S/p 2u CCP 7/2/20  · SSI  · ID consult appreciated  · TF's  · Total critical care time caring for this patient with life threatening, unstable organ failure, including direct patient contact, management of life support systems, review of data including imaging and labs, discussions with other team members and physicians at least 36 minutes so far today, excluding procedures.

## 2020-07-04 NOTE — PROGRESS NOTES
Patient reassessed. Repositioned. Patient currently intubated with ETT at 23LL. Vent settings are 16 RR, 430 VT, 12 of PEEP and 60% FiO2. Propofol infusing at 30 mcg/kg/min. Fentanyl infusing at 75 mcg//h. Patient appears comfortable. BUE soft wrist restraints in place due to attempts of pulling at ETT and lines. No signs of injury noted and PROM performed. Central line dressing is clean, dry and intact with no signs of drainage. All tubing is current and dated. IPA caps applied to all access hubs.      Electronically signed by Ann Lee RN on 7/4/2020 at 11:47 AM

## 2020-07-04 NOTE — PROGRESS NOTES
07/04/20 1117   Vent Information   Vent Type 840   Vent Mode AC/VC   Vt Ordered 430 mL   Rate Set 16 bmp   Peak Flow 80 L/min   Pressure Support 0 cmH20   FiO2  60 %   SpO2 94 %   SpO2/FiO2 ratio 156.67   Sensitivity 3   PEEP/CPAP 12   I Time/ I Time % 0 s   Humidification Temp 37   Circuit Condensation Drained   Vent Patient Data   High Peep/I Pressure 0   Peak Inspiratory Pressure 33 cmH2O   Mean Airway Pressure 20 cmH20   Rate Measured 22 br/min   Vt Exhaled 643 mL   Minute Volume 8.68 Liters   I:E Ratio 4.20:1   Cough/Sputum   Sputum Amount None   Spontaneous Breathing Trial (SBT) RT Doc   Pulse 77   Breath Sounds   Right Upper Lobe Diminished   Right Middle Lobe Diminished   Right Lower Lobe Diminished   Left Upper Lobe Diminished   Left Lower Lobe Diminished   Additional Respiratory  Assessments   Resp 14   Position Semi-Harris's   Oral Care Completed? Yes   Oral Care Mouth suctioned   Alarm Settings   High Pressure Alarm 50 cmH2O   Low Minute Volume Alarm 5 L/min   High Respiratory Rate 40 br/min   ETT (adult)   Placement Date/Time: 06/29/20 0230   Preoxygenation: Yes  Mask Ventilation: Ventilated by mask (1)  Technique: Video laryngoscopy  Type: Cuffed  Tube Size: 8 mm  Laryngoscope: GlideScope  Blade Size: 4  Location: Oral  Insertion attempts: 1  Placement. ..    Secured at 23 cm   Measured From [address] [place] [address],[address] By Commercial tube leblanc

## 2020-07-04 NOTE — PROGRESS NOTES
PEEP reduced to 14 and VT reduced to 430 per Dr. Rafaela Grullon order.      Electronically signed by Adam Pink RN on 7/4/2020 at 10:39 AM

## 2020-07-04 NOTE — PROGRESS NOTES
07/03/20 2006   Vent Information   $Ventilation $Subsequent Day   Skin Assessment Clean, dry, & intact   Vent Type 840   Vent Mode AC/VC   Vt Ordered 450 mL   Rate Set 16 bmp   Peak Flow 80 L/min   Pressure Support 0 cmH20   FiO2  60 %   SpO2 92 %   SpO2/FiO2 ratio 153.33   Sensitivity 3   PEEP/CPAP 16   I Time/ I Time % 0 s   Humidification Source Heated wire   Humidification Temp Measured 36.9   Circuit Condensation Drained   Vent Patient Data   High Peep/I Pressure 0   Peak Inspiratory Pressure 34 cmH2O   Mean Airway Pressure 22 cmH20   Rate Measured 19 br/min   Vt Exhaled 526 mL   Minute Volume 9.09 Liters   I:E Ratio 1:3.10   Plateau Pressure 26 [id number]   Static Compliance 53 mL/cmH2O   Dynamic Compliance 28 mL/cmH2O   Cough/Sputum   Cough Non-productive   Sputum Amount None   Spontaneous Breathing Trial (SBT) RT Doc   Pulse 79   Breath Sounds   Right Upper Lobe Diminished   Right Middle Lobe Diminished   Right Lower Lobe Diminished   Left Upper Lobe Diminished   Left Lower Lobe Diminished   Additional Respiratory  Assessments   Resp 15   Alarm Settings   High Pressure Alarm 50 cmH2O   Low Minute Volume Alarm 5 L/min   Apnea (secs) 20 secs   High Respiratory Rate 40 br/min   Low Exhaled Vt  350 mL   Patient Observation   Observations 8ett 23 at lip

## 2020-07-04 NOTE — PROGRESS NOTES
dry  Neck:  JVD absent. Neck supple  Chest: Diminished breath sounds . No wheezes or rhonchi   Cardiovascular:  RRR ,S1S2 normal  Abdomen:  Soft, non tender, non distended, BS +  Extremities:  trace edema. Intact peripheral pulses. Brisk cap refill, < 2 secs  Neuro: non focal      Medications:  Scheduled Meds:   [START ON 7/5/2020] dexamethasone  6 mg Intravenous Daily    furosemide  40 mg Intravenous BID    insulin lispro  0-12 Units Subcutaneous Q4H    chlorhexidine  15 mL Mouth/Throat BID    pantoprazole  40 mg Intravenous Daily    ipratropium-albuterol  1 ampule Inhalation Q4H    remdesivir IVPB  100 mg Intravenous Q24H    ketotifen  1 drop Both Eyes BID    enoxaparin  40 mg Subcutaneous BID    rosuvastatin  10 mg Oral Daily    aspirin  81 mg Oral Daily    sodium chloride flush  10 mL Intravenous 2 times per day       PRN Meds:  glucose, dextrose, glucagon (rDNA), dextrose, fentanNYL, midazolam, tetrahydrozoline, aluminum & magnesium hydroxide-simethicone, ondansetron, sodium chloride flush, acetaminophen **OR** acetaminophen    Results:  CBC:   Recent Labs     07/02/20  0540 07/03/20  0543 07/04/20  0500   WBC 12.2* 15.4* 17.4*   HGB 12.1* 11.5* 11.8*   HCT 36.9* 35.9* 35.4*   MCV 87.9 86.6 87.2    200 215     BMP:   Recent Labs     07/02/20  0540 07/03/20  0543 07/04/20  0500    134* 138   K 4.2 4.5 4.3    95* 95*   CO2 28 29 33*   PHOS 2.4* 3.3 2.4*   BUN 25* 31* 45*   CREATININE 0.9 0.9 1.1     LIVER PROFILE:   Recent Labs     07/02/20  0540 07/03/20  0543 07/04/20  0500   AST 22 33 48*   ALT 11 15 22   BILIDIR <0.2 0.3 <0.2   BILITOT 0.4 0.4 0.4   ALKPHOS 54 57 56     PT/INR:   Recent Labs     07/02/20  2102 07/03/20  0543 07/04/20  0500   PROTIME 13.4* 12.9 13.2   INR 1.15* 1.11 1.14     Results for NEWTON BOWENS SARAH (MRN [id number]) as of 7/1/2020 09:46   Ref.  Range 6/24/2020 21:35 6/26/2020 15:24 6/30/2020 04:20   D-Dimer, Mcdonald Orts Latest Ref Range: 0 - 229 ng/mL  (H) pulmonary disease. Assessment:    . Principal Problem:    Pneumonia due to COVID-19 virus  Active Problems:    Multifocal pneumonia    Orthostasis    Sepsis ([facility] [address]    Urinary tract infection without hematuria    ARDS (adult respiratory distress syndrome) ([facility])    Hyperlipidemia    Acute respiratory failure with hypoxia ([facility])    Class 1 obesity due to excess calories with body mass index (BMI) of 31.0 to 31.9 in adult    Peripherally inserted central catheter (PICC) in place    Asymptomatic bacteriuria    Endotracheally intubated    On mechanically assisted ventilation ([facility])  Resolved Problems:    * No resolved hospital problems. *         Plan:    #Acute hypoxic respiratory failure due to COVID-19. He has ARDS. - intubated, on mechanical ventillation  - seen by Pulmonary critical care . -Positive fluid balance, getting IV Lasix.  -Patient stable and slowly improving on mechanical ventilation, hypoxia is improving. His PEEP down from 16-12 today ,FiO2 is down to 80%--> 65%--> 60% today. # COVID -19  - He is on Decadron 6 mg IV daily.  - Remdesivir , day 7 of 10  -  S/P transfusion of 2 units of convalescent plasma on 7/2/2020    # Sepsis  - POA, due to PNA, COVID 19  - with leukocytosis, fevers, tachypnea  - COVID-19 detected  - Patient has been weaned off Levophed. His sepsis is improving. #Multifocal pneumonia  - related to COVID-19.    - Tracheal aspirate sent. - was on  broad-spectrum antibiotics, vanc and  Cefepime--> now off     # Monitor renal function  - Patient's creatinine is 0.9--> 1.1.   - on  IV Lasix. # elevated D dimer  - due to COVID 19  - CT chest neg for PE  - Check daily d-dimer and INR. # UTI  - cx positive for staph, treated    # Hyperlipidemia  - on statin. #Lovenox twice daily dosing. DIET TUBE FEED CONTINUOUS/CYCLIC NPO; Semi-elemental (Vital AF 1.2 );  Orogastric; Continuous; 20 (initial ); 60 (goal); 20 (over 20 hours)  Full Code    Critically ill but adan Garzon Goes 12:53 PM 7/4/2020

## 2020-07-04 NOTE — PLAN OF CARE
rhythm  Outcome: Ongoing  Goal: Maintain absence of muscle cramping  Outcome: Ongoing  Goal: Maintain normal serum potassium, sodium, calcium, phosphorus, and pH  Outcome: Ongoing     Problem: Loneliness or Risk for Loneliness  Goal: Demonstrate positive use of time alone when socialization is not possible  Outcome: Ongoing     Problem: Fatigue  Goal: Verbalize increase energy and improved vitality  Outcome: Ongoing     Problem: Patient Education: Go to Patient Education Activity  Goal: Patient/Family Education  Outcome: Ongoing     Problem: Restraint Use - Nonviolent/Non-Self-Destructive Behavior:  Goal: Absence of restraint indications  Description: Absence of restraint indications  Outcome: Ongoing  Goal: Absence of restraint-related injury  Description: Absence of restraint-related injury  Outcome: Ongoing     Problem: Nutrition  Goal: Optimal nutrition therapy  Outcome: Ongoing  Goal: Understanding of nutritional guidelines  Outcome: Ongoing     Problem: Skin Integrity:  Goal: Will show no infection signs and symptoms  Description: Will show no infection signs and symptoms  Outcome: Ongoing  Goal: Absence of new skin breakdown  Description: Absence of new skin breakdown  7/4/2020 1100 by Una Landeros RN  Outcome: Ongoing  7/4/2020 0039 by Javed Bustos RN  Outcome: Ongoing     Problem: Falls - Risk of:  Goal: Will remain free from falls  Description: Will remain free from falls  7/4/2020 1100 by Una Landeros RN  Outcome: Ongoing  7/4/2020 0039 by Javed Bustos RN  Outcome: Ongoing  Goal: Absence of physical injury  Description: Absence of physical injury  Outcome: Ongoing     Problem: Urinary Elimination:  Goal: Signs and symptoms of infection will decrease  Description: Signs and symptoms of infection will decrease  Outcome: Ongoing  Goal: Complications related to the disease process, condition or treatment will be avoided or minimized  Description: Complications related to the disease process, condition or treatment will be avoided or minimized  Outcome: Ongoing     Problem: Pain:  Goal: Pain level will decrease  Description: Pain level will decrease  Outcome: Ongoing  Goal: Control of acute pain  Description: Control of acute pain  Outcome: Ongoing  Goal: Control of chronic pain  Description: Control of chronic pain  Outcome: Ongoing

## 2020-07-04 NOTE — PROGRESS NOTES
4 Eyes Skin Assessment     The patient is being assess for   Shift Handoff    I agree that 2 RN's have performed a thorough Head to Toe Skin Assessment on the patient. ALL assessment sites listed below have been assessed. Areas assessed by both nurses:   [x]   Head, Face, and Ears   [x]   Shoulders, Back, and Chest, Abdomen  [x]   Arms, Elbows, and Hands   [x]   Coccyx, Sacrum, and Ischium  [x]   Legs, Feet, and Heels        No new skin issues noted. **SHARE this note so that the co-signing nurse is able to place an eSignature**    Co-signer eSignature: Electronically signed by Kari Conner RN on 7/4/20 at 7:09 PM EDT    Does the Patient have Skin Breakdown?   No          Cliff Prevention initiated:  Yes   Wound Care Orders initiated:  No      [facility] nurse consulted for Pressure Injury (Stage 3,4, Unstageable, DTI, NWPT, Complex wounds)and New or Established Ostomies:  No      Primary Nurse eSignature: Electronically signed by Leroy Mann RN on 7/4/20 at 7:50 PM EDT

## 2020-07-04 NOTE — PLAN OF CARE
Problem: Airway Clearance - Ineffective  Goal: Achieve or maintain patent airway  7/4/2020 0039 by Tania Bloom RN  Outcome: Ongoing     Problem: Skin Integrity:  Goal: Absence of new skin breakdown  Description: Absence of new skin breakdown  7/4/2020 0039 by Tania Bloom RN  Outcome: Ongoing     Problem: Falls - Risk of:  Goal: Will remain free from falls  Description: Fall risk precautions in place. Pt cannot use call light; ETT/sedated. Frequent visual monitoring in place q1h. Bed alarm activated.     7/4/2020 0039 by Tania Bloom RN  Outcome: Ongoing

## 2020-07-04 NOTE — PROGRESS NOTES
07/04/20 1541   Vent Information   Vent Type 840   Vent Mode AC/VC   Vt Ordered 430 mL   Rate Set 16 bmp   Peak Flow 80 L/min   Pressure Support 0 cmH20   FiO2  60 %   SpO2 92 %   SpO2/FiO2 ratio 153.33   Sensitivity 3   PEEP/CPAP 12   I Time/ I Time % 0 s   Humidification Source Heated wire   Humidification Temp 37   Circuit Condensation Drained   Vent Patient Data   High Peep/I Pressure 0   Peak Inspiratory Pressure 27 cmH2O   Mean Airway Pressure 16 cmH20   Rate Measured 20 br/min   Vt Exhaled 465 mL   Minute Volume 10.3 Liters   I:E Ratio 1:1.50   Cough/Sputum   Cough None   Spontaneous Breathing Trial (SBT) RT Doc   Pulse 80   Breath Sounds   Right Upper Lobe Diminished   Right Middle Lobe Diminished   Right Lower Lobe Diminished   Left Upper Lobe Diminished   Left Lower Lobe Diminished   Additional Respiratory  Assessments   Resp 18   Position Semi-Harris's   Oral Care Completed? Yes   Oral Care Mouth suctioned   Subglottic Suction Done? Yes   Alarm Settings   High Pressure Alarm 50 cmH2O   Low Minute Volume Alarm 5 L/min   Apnea (secs) 20 secs   High Respiratory Rate 40 br/min   Low Exhaled Vt  350 mL   Patient Observation   Observations 8ett 23 at lip   ETT (adult)   Placement Date/Time: 06/29/20 0230   Preoxygenation: Yes  Mask Ventilation: Ventilated by mask (1)  Technique: Video laryngoscopy  Type: Cuffed  Tube Size: 8 mm  Laryngoscope: GlideScope  Blade Size: 4  Location: Oral  Insertion attempts: 1  Placement. ..    Secured at 23 cm   Measured From Lips   ET Placement Right   Secured By Commercial tube leblanc   Site Condition Dry

## 2020-07-05 ENCOUNTER — APPOINTMENT (OUTPATIENT)
Dept: GENERAL RADIOLOGY | Age: 85
DRG: 870 | End: 2020-07-05
Payer: MEDICARE

## 2020-07-05 LAB
ALBUMIN SERPL-MCNC: 2.7 G/DL (ref 3.4–5)
ALBUMIN SERPL-MCNC: 2.9 G/DL (ref 3.4–5)
ALP BLD-CCNC: 57 U/L (ref 40–129)
ALT SERPL-CCNC: 29 U/L (ref 10–40)
ANION GAP SERPL CALCULATED.3IONS-SCNC: 11 MMOL/L (ref 3–16)
AST SERPL-CCNC: 57 U/L (ref 15–37)
BASE EXCESS ARTERIAL: 8.2 MMOL/L (ref -3–3)
BASOPHILS ABSOLUTE: 0 K/UL (ref 0–0.2)
BASOPHILS RELATIVE PERCENT: 0 %
BILIRUB SERPL-MCNC: 0.4 MG/DL (ref 0–1)
BILIRUBIN DIRECT: <0.2 MG/DL (ref 0–0.3)
BILIRUBIN, INDIRECT: ABNORMAL MG/DL (ref 0–1)
BUN BLDV-MCNC: 54 MG/DL (ref 7–20)
CALCIUM SERPL-MCNC: 7.9 MG/DL (ref 8.3–10.6)
CARBOXYHEMOGLOBIN ARTERIAL: 0.3 % (ref 0–1.5)
CHLORIDE BLD-SCNC: 98 MMOL/L (ref 99–110)
CO2: 34 MMOL/L (ref 21–32)
CREAT SERPL-MCNC: 1.1 MG/DL (ref 0.8–1.3)
D DIMER: 627 NG/ML DDU (ref 0–229)
EOSINOPHILS ABSOLUTE: 0 K/UL (ref 0–0.6)
EOSINOPHILS RELATIVE PERCENT: 0 %
GFR AFRICAN AMERICAN: >60
GFR NON-AFRICAN AMERICAN: >60
GLUCOSE BLD-MCNC: 144 MG/DL (ref 70–99)
GLUCOSE BLD-MCNC: 153 MG/DL (ref 70–99)
GLUCOSE BLD-MCNC: 160 MG/DL (ref 70–99)
GLUCOSE BLD-MCNC: 161 MG/DL (ref 70–99)
GLUCOSE BLD-MCNC: 179 MG/DL (ref 70–99)
GLUCOSE BLD-MCNC: 188 MG/DL (ref 70–99)
GLUCOSE BLD-MCNC: >600 MG/DL (ref 70–99)
HCO3 ARTERIAL: 32.8 MMOL/L (ref 21–29)
HCT VFR BLD CALC: 38.5 % (ref 40.5–52.5)
HEMOGLOBIN, ART, EXTENDED: 12.9 G/DL (ref 13.5–17.5)
HEMOGLOBIN: 12.3 G/DL (ref 13.5–17.5)
INR BLD: 1.11 (ref 0.86–1.14)
INTERLEUKIN-6: 483.9 PG/ML
LYMPHOCYTES ABSOLUTE: 0.9 K/UL (ref 1–5.1)
LYMPHOCYTES RELATIVE PERCENT: 6 %
MCH RBC QN AUTO: 27.9 PG (ref 26–34)
MCHC RBC AUTO-ENTMCNC: 32 G/DL (ref 31–36)
MCV RBC AUTO: 86.9 FL (ref 80–100)
METHEMOGLOBIN ARTERIAL: 0.3 %
MONOCYTES ABSOLUTE: 0.6 K/UL (ref 0–1.3)
MONOCYTES RELATIVE PERCENT: 4 %
NEUTROPHILS ABSOLUTE: 12.9 K/UL (ref 1.7–7.7)
NEUTROPHILS RELATIVE PERCENT: 90 %
O2 CONTENT ARTERIAL: 18 ML/DL
O2 SAT, ARTERIAL: 98.4 %
O2 THERAPY: ABNORMAL
PCO2 ARTERIAL: 45 MMHG (ref 35–45)
PDW BLD-RTO: 14.6 % (ref 12.4–15.4)
PERFORMED ON: ABNORMAL
PH ARTERIAL: 7.48 (ref 7.35–7.45)
PHOSPHORUS: 2.1 MG/DL (ref 2.5–4.9)
PLATELET # BLD: 240 K/UL (ref 135–450)
PLATELET SLIDE REVIEW: ADEQUATE
PMV BLD AUTO: 9.5 FL (ref 5–10.5)
PO2 ARTERIAL: 115.4 MMHG (ref 75–108)
POTASSIUM SERPL-SCNC: 4 MMOL/L (ref 3.5–5.1)
PROCALCITONIN: 0.34 NG/ML (ref 0–0.15)
PROTHROMBIN TIME: 12.9 SEC (ref 10–13.2)
RBC # BLD: 4.42 M/UL (ref 4.2–5.9)
SLIDE REVIEW: ABNORMAL
SODIUM BLD-SCNC: 143 MMOL/L (ref 136–145)
TCO2 ARTERIAL: 34.1 MMOL/L
TOTAL CK: 71 U/L (ref 39–308)
TOTAL PROTEIN: 5.8 G/DL (ref 6.4–8.2)
WBC # BLD: 14.3 K/UL (ref 4–11)

## 2020-07-05 PROCEDURE — 94750 HC PULMONARY COMPLIANCE STUDY: CPT

## 2020-07-05 PROCEDURE — 36415 COLL VENOUS BLD VENIPUNCTURE: CPT

## 2020-07-05 PROCEDURE — 94761 N-INVAS EAR/PLS OXIMETRY MLT: CPT

## 2020-07-05 PROCEDURE — 82803 BLOOD GASES ANY COMBINATION: CPT

## 2020-07-05 PROCEDURE — 2580000003 HC RX 258: Performed by: INTERNAL MEDICINE

## 2020-07-05 PROCEDURE — 94003 VENT MGMT INPAT SUBQ DAY: CPT

## 2020-07-05 PROCEDURE — 6370000000 HC RX 637 (ALT 250 FOR IP): Performed by: INTERNAL MEDICINE

## 2020-07-05 PROCEDURE — 71045 X-RAY EXAM CHEST 1 VIEW: CPT

## 2020-07-05 PROCEDURE — 84145 PROCALCITONIN (PCT): CPT

## 2020-07-05 PROCEDURE — 85025 COMPLETE CBC W/AUTO DIFF WBC: CPT

## 2020-07-05 PROCEDURE — 2000000000 HC ICU R&B

## 2020-07-05 PROCEDURE — 80069 RENAL FUNCTION PANEL: CPT

## 2020-07-05 PROCEDURE — 36600 WITHDRAWAL OF ARTERIAL BLOOD: CPT

## 2020-07-05 PROCEDURE — 85379 FIBRIN DEGRADATION QUANT: CPT

## 2020-07-05 PROCEDURE — 2700000000 HC OXYGEN THERAPY PER DAY

## 2020-07-05 PROCEDURE — 99291 CRITICAL CARE FIRST HOUR: CPT | Performed by: INTERNAL MEDICINE

## 2020-07-05 PROCEDURE — 82550 ASSAY OF CK (CPK): CPT

## 2020-07-05 PROCEDURE — 94640 AIRWAY INHALATION TREATMENT: CPT

## 2020-07-05 PROCEDURE — 85610 PROTHROMBIN TIME: CPT

## 2020-07-05 PROCEDURE — 6360000002 HC RX W HCPCS: Performed by: INTERNAL MEDICINE

## 2020-07-05 PROCEDURE — C9113 INJ PANTOPRAZOLE SODIUM, VIA: HCPCS | Performed by: INTERNAL MEDICINE

## 2020-07-05 PROCEDURE — 2500000003 HC RX 250 WO HCPCS: Performed by: INTERNAL MEDICINE

## 2020-07-05 PROCEDURE — 80076 HEPATIC FUNCTION PANEL: CPT

## 2020-07-05 RX ORDER — SODIUM CHLORIDE 9 MG/ML
INJECTION, SOLUTION INTRAVENOUS
Status: DISPENSED
Start: 2020-07-05 | End: 2020-07-05

## 2020-07-05 RX ADMIN — FUROSEMIDE 40 MG: 10 INJECTION, SOLUTION INTRAMUSCULAR; INTRAVENOUS at 17:35

## 2020-07-05 RX ADMIN — IPRATROPIUM BROMIDE AND ALBUTEROL SULFATE 1 AMPULE: .5; 3 SOLUTION RESPIRATORY (INHALATION) at 08:10

## 2020-07-05 RX ADMIN — IPRATROPIUM BROMIDE AND ALBUTEROL SULFATE 1 AMPULE: .5; 3 SOLUTION RESPIRATORY (INHALATION) at 04:12

## 2020-07-05 RX ADMIN — PANTOPRAZOLE SODIUM 40 MG: 40 INJECTION, POWDER, FOR SOLUTION INTRAVENOUS at 10:58

## 2020-07-05 RX ADMIN — FENTANYL CITRATE 75 MCG/HR: 50 INJECTION, SOLUTION INTRAMUSCULAR; INTRAVENOUS at 10:59

## 2020-07-05 RX ADMIN — IPRATROPIUM BROMIDE AND ALBUTEROL SULFATE 1 AMPULE: .5; 3 SOLUTION RESPIRATORY (INHALATION) at 12:05

## 2020-07-05 RX ADMIN — DEXAMETHASONE SODIUM PHOSPHATE 6 MG: 4 INJECTION, SOLUTION INTRAMUSCULAR; INTRAVENOUS at 07:56

## 2020-07-05 RX ADMIN — ENOXAPARIN SODIUM 40 MG: 40 INJECTION SUBCUTANEOUS at 07:57

## 2020-07-05 RX ADMIN — PROPOFOL 35 MCG/KG/MIN: 10 INJECTION, EMULSION INTRAVENOUS at 07:55

## 2020-07-05 RX ADMIN — PROPOFOL 30 MCG/KG/MIN: 10 INJECTION, EMULSION INTRAVENOUS at 03:16

## 2020-07-05 RX ADMIN — CHLORHEXIDINE GLUCONATE 0.12% ORAL RINSE 15 ML: 1.2 LIQUID ORAL at 07:57

## 2020-07-05 RX ADMIN — ROSUVASTATIN CALCIUM 10 MG: 10 TABLET, FILM COATED ORAL at 07:57

## 2020-07-05 RX ADMIN — FUROSEMIDE 40 MG: 10 INJECTION, SOLUTION INTRAMUSCULAR; INTRAVENOUS at 07:56

## 2020-07-05 RX ADMIN — FENTANYL CITRATE 75 MCG/HR: 50 INJECTION, SOLUTION INTRAMUSCULAR; INTRAVENOUS at 22:32

## 2020-07-05 RX ADMIN — IPRATROPIUM BROMIDE AND ALBUTEROL SULFATE 1 AMPULE: .5; 3 SOLUTION RESPIRATORY (INHALATION) at 20:06

## 2020-07-05 RX ADMIN — IPRATROPIUM BROMIDE AND ALBUTEROL SULFATE 1 AMPULE: .5; 3 SOLUTION RESPIRATORY (INHALATION) at 00:14

## 2020-07-05 RX ADMIN — IPRATROPIUM BROMIDE AND ALBUTEROL SULFATE 1 AMPULE: .5; 3 SOLUTION RESPIRATORY (INHALATION) at 15:04

## 2020-07-05 RX ADMIN — KETOTIFEN FUMARATE 1 DROP: 0.35 SOLUTION/ DROPS OPHTHALMIC at 08:00

## 2020-07-05 RX ADMIN — PROPOFOL 35 MCG/KG/MIN: 10 INJECTION, EMULSION INTRAVENOUS at 17:35

## 2020-07-05 RX ADMIN — Medication 10 ML: at 21:06

## 2020-07-05 RX ADMIN — ENOXAPARIN SODIUM 40 MG: 40 INJECTION SUBCUTANEOUS at 21:06

## 2020-07-05 RX ADMIN — ASPIRIN 81 MG 81 MG: 81 TABLET ORAL at 07:57

## 2020-07-05 RX ADMIN — TETRAHYDROZOLINE HCL 1 DROP: 0.05 SOLUTION/ DROPS OPHTHALMIC at 08:00

## 2020-07-05 RX ADMIN — PROPOFOL 35 MCG/KG/MIN: 10 INJECTION, EMULSION INTRAVENOUS at 15:13

## 2020-07-05 RX ADMIN — IPRATROPIUM BROMIDE AND ALBUTEROL SULFATE 1 AMPULE: .5; 3 SOLUTION RESPIRATORY (INHALATION) at 23:10

## 2020-07-05 RX ADMIN — CHLORHEXIDINE GLUCONATE 0.12% ORAL RINSE 15 ML: 1.2 LIQUID ORAL at 21:06

## 2020-07-05 RX ADMIN — KETOTIFEN FUMARATE 1 DROP: 0.35 SOLUTION/ DROPS OPHTHALMIC at 21:07

## 2020-07-05 RX ADMIN — SODIUM PHOSPHATE, MONOBASIC, MONOHYDRATE 15 MMOL: 276; 142 INJECTION, SOLUTION INTRAVENOUS at 15:13

## 2020-07-05 ASSESSMENT — PULMONARY FUNCTION TESTS
PIF_VALUE: 32
PIF_VALUE: 20
PIF_VALUE: 29
PIF_VALUE: 30
PIF_VALUE: 29
PIF_VALUE: 21
PIF_VALUE: 34
PIF_VALUE: 19
PIF_VALUE: 21
PIF_VALUE: 36
PIF_VALUE: 35
PIF_VALUE: 23
PIF_VALUE: 35
PIF_VALUE: 21
PIF_VALUE: 30
PIF_VALUE: 21
PIF_VALUE: 27
PIF_VALUE: 26
PIF_VALUE: 28
PIF_VALUE: 22
PIF_VALUE: 35
PIF_VALUE: 29
PIF_VALUE: 24
PIF_VALUE: 35

## 2020-07-05 NOTE — PROGRESS NOTES
Internal Medicine ICU Progress Note      Interval History:     Patient admitted with Isidro -19. On droplet plus precautions. Patient with persistent high fevers, progressive hypoxia. Initiated on Remdesivir, dexamethasone. Patient was intubated for worsening respiratory failure on 2020. Requiring high flow O2 . Had high fevers with T max of 104 F    20-- > S/P Convalescent plasma transfusion 2 units   7/3/2020--> fevers improved, hypoxia improving     2020  -Mostly afebrile T-max 99 °F.   - Remains on mechanical vent . - Positive fluid balance of 10 L,  getting IV Lasix  -Hypoxia is improving , FiO2 now down to 60 %,  PEEP is down to 12     On droplet plus precautions    Invasive Lines: PICC placed on 2020      MV: Intubated on 2020    Recent Labs     20  0500 20  0520   PHART 7.465* 7.480*   [id number] 45.3* 45.0   PO2ART 73.4* 115.4*       MV Settings:  Vent Mode: AC/VC Rate Set: 16 bmp/Vt Ordered: 430 mL/ /FiO2 : 60 %    IV:   dextrose      propofol 35 mcg/kg/min (20 0755)    fentaNYL (SUBLIMAZE) infusion 75 mcg/hr (20 1059)       Vitals:  Temp  Av.4 °F (36.9 °C)  Min: 98 °F (36.7 °C)  Max: 98.8 °F (37.1 °C)  Pulse  Av.1  Min: 72  Max: 88  BP  Min: 118/55  Max: 150/61  SpO2  Av.5 %  Min: 89 %  Max: 94 %  FiO2   Av %  Min: 60 %  Max: 60 %  Patient Vitals for the past 4 hrs:   BP Temp Pulse Resp SpO2   20 1403 (!) 147/66 -- 88 20 92 %   20 1304 (!) 128/59 -- 85 14 93 %   20 1206 -- -- 78 19 93 %   20 1203 (!) 118/55 -- 78 16 94 %   20 1104 136/60 98 °F (36.7 °C) 82 17 91 %       CVP:        Intake/Output Summary (Last 24 hours) at 2020 1431  Last data filed at 2020 0600  Gross per 24 hour   Intake 1812.26 ml   Output 2475 ml   Net -662.74 ml       Physical Exam:  General:  Pt is intubated, sedated, on mechanical vent. Skin:  Warm and dry  Neck:  JVD absent.  Neck supple  Chest: Diminished breath sounds . No wheezes or rhonchi   Cardiovascular:  RRR ,S1S2 normal  Abdomen:  Soft, non tender, non distended, BS +  Extremities:  trace edema. Intact peripheral pulses. Brisk cap refill, < 2 secs  Neuro: non focal      Medications:  Scheduled Meds:   sodium phosphate IVPB  15 mmol Intravenous Once    dexamethasone  6 mg Intravenous Daily    furosemide  40 mg Intravenous BID    insulin lispro  0-12 Units Subcutaneous Q4H    chlorhexidine  15 mL Mouth/Throat BID    pantoprazole  40 mg Intravenous Daily    ipratropium-albuterol  1 ampule Inhalation Q4H    remdesivir IVPB  100 mg Intravenous Q24H    ketotifen  1 drop Both Eyes BID    enoxaparin  40 mg Subcutaneous BID    rosuvastatin  10 mg Oral Daily    aspirin  81 mg Oral Daily    sodium chloride flush  10 mL Intravenous 2 times per day       PRN Meds:  glucose, dextrose, glucagon (rDNA), dextrose, fentanNYL, midazolam, tetrahydrozoline, aluminum & magnesium hydroxide-simethicone, ondansetron, sodium chloride flush, acetaminophen **OR** acetaminophen    Results:  CBC:   Recent Labs     07/03/20  0543 07/04/20  0500 07/05/20  0510   WBC 15.4* 17.4* 14.3*   HGB 11.5* 11.8* 12.3*   HCT 35.9* 35.4* 38.5*   MCV 86.6 87.2 86.9    215 240     BMP:   Recent Labs     07/03/20  0543 07/04/20  0500 07/05/20  0510   * 138 143   K 4.5 4.3 4.0   CL 95* 95* 98*   CO2 29 33* 34*   PHOS 3.3 2.4* 2.1*   BUN 31* 45* 54*   CREATININE 0.9 1.1 1.1     LIVER PROFILE:   Recent Labs     07/03/20  0543 07/04/20  0500 07/05/20  0510   AST 33 48* 57*   ALT 15 22 29   BILIDIR 0.3 <0.2 <0.2   BILITOT 0.4 0.4 0.4   ALKPHOS 57 56 57     PT/INR:   Recent Labs     07/03/20  0543 07/04/20  0500 07/05/20  0510   PROTIME 12.9 13.2 12.9   INR 1.11 1.14 1.11     Results for NEWTON BOWENS (MRN [id number]) as of 7/1/2020 09:46   Ref.  Range 6/24/2020 21:35 6/26/2020 15:24 6/30/2020 04:20   D-DimerHumberto Latest Ref Range: 0 - 229 ng/mL  (H) 682 (H) 653 (H)     Results Final Result   No radiographic evidence of acute pulmonary disease. Assessment:    . Principal Problem:    Pneumonia due to COVID-19 virus  Active Problems:    Multifocal pneumonia    Orthostasis    Sepsis ([address]    Urinary tract infection without hematuria    ARDS (adult respiratory distress syndrome) ([facility])    Hyperlipidemia    Acute respiratory failure with hypoxia ([facility])    Class 1 obesity due to excess calories with body mass index (BMI) of 31.0 to 31.9 in adult    Peripherally inserted central catheter (PICC) in place    Asymptomatic bacteriuria    Endotracheally intubated    On mechanically assisted ventilation ([facility])  Resolved Problems:    * No resolved hospital problems. *         Plan:    #Acute hypoxic respiratory failure due to COVID-19. He has ARDS. - intubated, on mechanical ventillation  - seen by Pulmonary critical care . -Positive fluid balance, getting IV Lasix.  -Patient stable and slowly improving on mechanical ventilation, hypoxia is improving. His PEEP down from 16-12 , FiO2 is down to 80%--> 65%--> 60%. # COVID -19  - He is on Decadron 6 mg IV daily.  - Remdesivir , day 8 of 10  -  S/P transfusion of 2 units of convalescent plasma on 7/2/2020    # Sepsis  - POA, due to PNA, COVID 19  - with leukocytosis, fevers, tachypnea  - COVID-19 detected  - Patient has been weaned off Levophed. His sepsis is improving. #Multifocal pneumonia  - related to COVID-19.    - Tracheal aspirate sent. - was on  broad-spectrum antibiotics, vanc and  Cefepime--> now off     # Monitor renal function  - Patient's creatinine is 0.9--> 1.1.   - on  IV Lasix. # elevated D dimer  - due to COVID 19  - CT chest neg for PE    # UTI  - cx positive for staph, treated    # Hyperlipidemia  - on statin. #Lovenox twice daily dosing. DIET TUBE FEED CONTINUOUS/CYCLIC NPO; Semi-elemental (Vital AF 1.2 );  Orogastric; Continuous; 20 (initial ); 60 (goal); 20 (over 20 hours)  Full Code    Critically ill but stable, slowly improving .       Jean Pierre Gonzalez 2:31 PM 7/5/2020

## 2020-07-05 NOTE — PROGRESS NOTES
RESPIRATORY THERAPY ASSESSMENT    Name:  Yasmany Cruz  Medical Record Number:  [id number]  Age: 82 y.o. Gender: male  : 1935  Today's Date:  2020  Room:  3013/3013-01    Assessment     Is the patient being admitted for a COPD or Asthma exacerbation? No   (If yes the patient will be seen every 4 hours for the first 24 hours and then reassessed)    Patient Admission Diagnosis      Allergies  Allergies   Allergen Reactions    Buspar [Buspirone] Other (See Comments)     Face,face numb    Codeine Other (See Comments)     Face,lips numb    Methylphenidate Other (See Comments)     Face numbness    Ritalin [Methylphenidate Hcl] Other (See Comments)     Face. lips numb    Zoloft [Sertraline Hcl] Other (See Comments)     Numbness of lips       Minimum Predicted Vital Capacity:               Actual Vital Capacity:                    Pulmonary History:no history  Home Oxygen Therapy:  room air  Home Respiratory Therapy:None   Current Respiratory Therapy:  duoneb q4  Treatment Type: Aerosol generator  Medications: Albuterol/Ipratropium    Respiratory Severity Index(RSI)   Patients with orders for inhalation medications, oxygen, or any therapeutic treatment modality will be placed on Respiratory Protocol. They will be assessed with the first treatment and at least every 72 hours thereafter. The following severity scale will be used to determine frequency of treatment intervention. Smoking History: Pulmonary Disease or Smoking History, Greater than 15 pack year = 2    Social History  Social History     Tobacco Use    Smoking status: Former Smoker     Packs/day: 1.00     Years: 25.00     Pack years: 25.00     Types: Cigarettes     Start date: 1990    Smokeless tobacco: Never Used   Substance Use Topics    Alcohol use:  Yes     Alcohol/week: 1.0 standard drinks     Types: 1 Cans of beer per week     Comment: occasional    Drug use: No       Recent Surgical History: None = 0  Past Surgical History  Past Surgical History:   Procedure Laterality Date    BACK SURGERY      CAROTID ENDARTERECTOMY Left     COLONOSCOPY      UPPER GASTROINTESTINAL ENDOSCOPY  07/12/14    Minimal chronic gastritis       Level of Consciousness: Comatose = 4    Level of Activity: Bedridden, unresponsive or quadriplegic = 4    Respiratory Pattern: Regular Pattern; RR 8-20 = 0    Breath Sounds: Diminshed bilaterally and/or crackles = 2    Sputum  Sputum Color: Creamy, Tenacity: Thick, Sputum How Obtained: Endotracheal  Cough: Strong, productive = 1    Vital Signs   BP (!) 150/61   Pulse 72   Temp 98.4 °F (36.9 °C) (Temporal)   Resp 16   Ht 5' 7\" (1.702 m)   Wt 191 lb (86.6 kg)   SpO2 92%   BMI 29.91 kg/m²   SPO2 (COPD values may differ): Less than 86% on room air or greater than 92% on FiO2 greater than 50% = 4    Peak Flow (asthma only): not applicable = 0    RSI: 74-53 = Q4 (every four hours)        Plan       Goals: mobilize retained secretions and volume expansion    Patient/caregiver was educated on the proper method of use for Respiratory Care Devices: On vent      Level of patient/caregiver understanding able to:   ? Verbalize understanding   ? Demonstrate understanding       ? Teach back        ? Needs reinforcement       ? No available caregiver               ? Other:     Response to education:  On vent     Is patient being placed on Home Treatment Regimen? No     Does the patient have everything they need prior to discharge? NA     Comments: chart reviewed and patient assessed    Plan of Care: keep duoneb scheduled q4 per assessment    Electronically signed by Jayy Bright RCP on 7/5/2020 at 4:55 AM    Respiratory Protocol Guidelines     1. Assessment and treatment by Respiratory Therapy will be initiated for medication and therapeutic interventions upon initiation of aerosolized medication. 2. Physician will be contacted for respiratory rate (RR) greater than 35 breaths per minute.  Therapy will be held for heart rate (HR) greater than 140 beats per minute, pending direction from physician. 3. Bronchodilators will be administered via Metered Dose Inhaler (MDI) with spacer when the following criteria are met:  a. Alert and cooperative     b. HR < 140 bpm  c. RR < 30 bpm                d. Can demonstrate a 2-3 second inspiratory hold  4. Bronchodilators will be administered via Hand Held Nebulizer [facility]) to patients when ANY of the following criteria are met  a. Incognizant or uncooperative          b. Patients treated with HHN at Home        c. Unable to demonstrate proper use of MDI with spacer     d. RR > 30 bpm   5. Bronchodilators will be delivered via Metered Dose Inhaler (MDI), HHN, Aerogen to intubated patients on mechanical ventilation. 6. Inhalation medication orders will be delivered and/or substituted as outlined below. Aerosolized Medications Ordering and Administration Guidelines:    1. All Medications will be ordered by a physician, and their frequency and/or modality will be adjusted as defined by the patients Respiratory Severity Index (RSI) score. 2. If the patient does not have documented COPD, consider discontinuing anticholinergics when RSI is less than 9.  3. If the bronchospasm worsens (increased RSI), then the bronchodilator frequency can be increased to a maximum of every 4 hours. If greater than every 4 hours is required, the physician will be contacted. 4. If the bronchospasm improves, the frequency of the bronchodilator can be decreased, based on the patient's RSI, but not less than home treatment regimen frequency. 5. Bronchodilator(s) will be discontinued if patient has a RSI less than 9 and has received no scheduled or as needed treatment for 72  Hrs. Patients Ordered on a Mucolytic Agent:    1. Must always be administered with a bronchodilator.     2. Discontinue if patient experiences worsened bronchospasm, or secretions have lessened to the point that the patient is able to clear them with a cough. Anti-inflammatory and Combination Medications:    1. If the patient lacks prior history of lung disease, is not using inhaled anti-inflammatory medication at home, and lacks wheezing by examination or by history for at least 24 hours, contact physician for possible discontinuation.

## 2020-07-05 NOTE — PROGRESS NOTES
A: 90 cc's of Versed wasted and witnessed by Evelia Mcdaniel. Witnessed waste of Versed 90mL with  Jonny Bravo RN.   Electronically signed by Kary Pulido RN on 7/5/2020 at 12:27 AM

## 2020-07-05 NOTE — PROGRESS NOTES
End of shift note. VSS during this shift. Pt continues intubated/sedated with fentanyl 75 mcg and propofol 35 mcg. Right upper arm PICC remains dry/intact. Tube feed continues at goal with no issues. Bilateral soft wrist restraints continue for safety of airway. Pt continues in droplet precautions as he is positive for covid.

## 2020-07-05 NOTE — PROGRESS NOTES
07/05/20 0810   Vent Information   Skin Assessment Clean, dry, & intact   Vent Type 840   Vent Mode AC/VC   Vt Ordered 430 mL   Rate Set 16 bmp   Peak Flow 80 L/min   Pressure Support 0 cmH20   FiO2  60 %   SpO2 90 %   SpO2/FiO2 ratio 150   Sensitivity 3   PEEP/CPAP 12   I Time/ I Time % 0 s   Humidification Source Heated wire   Humidification Temp 37   Circuit Condensation Drained   Vent Patient Data   High Peep/I Pressure 0   Peak Inspiratory Pressure 21 cmH2O   Mean Airway Pressure 14 cmH20   Rate Measured 21 br/min   Vt Exhaled 461 mL   Minute Volume 11 Liters   I:E Ratio 1:2.20   Cough/Sputum   Sputum How Obtained Suctioned;Endotracheal   Sputum Amount None   Spontaneous Breathing Trial (SBT) RT Doc   Pulse 87   Breath Sounds   Right Upper Lobe Diminished   Right Middle Lobe Diminished   Right Lower Lobe Diminished   Left Upper Lobe Diminished   Left Lower Lobe Diminished   Additional Respiratory  Assessments   Resp 21   Position Semi-Harris's   Oral Care Completed? Yes   Oral Care Mouth suctioned;Mouth moisturizer;Mouth swabbed   Subglottic Suction Done? Yes   Alarm Settings   High Pressure Alarm 50 cmH2O   Low Minute Volume Alarm 5 L/min   Apnea (secs) 20 secs   High Respiratory Rate 40 br/min   Low Exhaled Vt  350 mL   Patient Observation   Observations 8ett 23 at lip   ETT (adult)   Placement Date/Time: 06/29/20 0230   Preoxygenation: Yes  Mask Ventilation: Ventilated by mask (1)  Technique: Video laryngoscopy  Type: Cuffed  Tube Size: 8 mm  Laryngoscope: GlideScope  Blade Size: 4  Location: Oral  Insertion attempts: 1  Placement. ..    Secured at 23 cm   Measured From [address] [place] [address],[address] By [employer] tube leblanc   Site Condition Dry

## 2020-07-05 NOTE — PROGRESS NOTES
07/05/20 1206   Vent Information   Skin Assessment Clean, dry, & intact   Vent Type 840   Vent Mode AC/VC   Vt Ordered 430 mL   Rate Set 16 bmp   Peak Flow 80 L/min   Pressure Support 0 cmH20   FiO2  60 %   SpO2 93 %   SpO2/FiO2 ratio 155   Sensitivity 3   PEEP/CPAP 12   I Time/ I Time % 0 s   Humidification Source Heated wire   Humidification Temp 37   Circuit Condensation Drained   Vent Patient Data   High Peep/I Pressure 0   Peak Inspiratory Pressure 28 cmH2O   Mean Airway Pressure 19 cmH20   Rate Measured 22 br/min   Vt Exhaled 28 mL   Minute Volume 8.99 Liters   I:E Ratio 4.00:1   Cough/Sputum   Cough None   Spontaneous Breathing Trial (SBT) RT Doc   Pulse 78   Breath Sounds   Right Upper Lobe Diminished   Right Middle Lobe Diminished   Right Lower Lobe Diminished   Left Upper Lobe Diminished   Left Lower Lobe Diminished   Additional Respiratory  Assessments   Resp 19   Position Semi-Harris's   Oral Care Completed? Yes   Oral Care Mouth suctioned   Alarm Settings   High Pressure Alarm 50 cmH2O   Low Minute Volume Alarm 5 L/min   Apnea (secs) 20 secs   High Respiratory Rate 40 br/min   Low Exhaled Vt  350 mL   ETT (adult)   Placement Date/Time: 06/29/20 0230   Preoxygenation: Yes  Mask Ventilation: Ventilated by mask (1)  Technique: Video laryngoscopy  Type: Cuffed  Tube Size: 8 mm  Laryngoscope: GlideScope  Blade Size: 4  Location: Oral  Insertion attempts: 1  Placement. ..    Secured at 23 cm   Measured From [address] [place] [address],[address] By [employer] tube leblanc   Site Condition Dry

## 2020-07-05 NOTE — PROGRESS NOTES
07/05/20 0412   Vent Information   Vent Type 840   Vent Mode AC/VC   Vt Ordered 430 mL   Rate Set 16 bmp   Peak Flow 80 L/min   Pressure Support 0 cmH20   FiO2  60 %   SpO2 93 %   SpO2/FiO2 ratio 155   Sensitivity 3   PEEP/CPAP 12   I Time/ I Time % 0 s   Humidification Source Heated wire   Humidification Temp Measured 37   Circuit Condensation Drained   Vent Patient Data   High Peep/I Pressure 0   Peak Inspiratory Pressure 30 cmH2O   Mean Airway Pressure 18 cmH20   Rate Measured 24 br/min   Vt Exhaled 618 mL   Minute Volume 7.84 Liters   I:E Ratio 1:3.30   Cough/Sputum   Sputum How Obtained Endotracheal   Cough Non-productive   Sputum Amount None   Spontaneous Breathing Trial (SBT) RT Doc   Pulse 72   Breath Sounds   Right Upper Lobe Diminished   Right Middle Lobe Diminished   Right Lower Lobe Diminished   Left Upper Lobe Diminished   Left Lower Lobe Diminished   Additional Respiratory  Assessments   Resp 17   Alarm Settings   High Pressure Alarm 50 cmH2O   Low Minute Volume Alarm 5 L/min   Apnea (secs) 20 secs   High Respiratory Rate 40 br/min   Low Exhaled Vt  350 mL   Patient Observation   Observations 8ett 23 at lip

## 2020-07-05 NOTE — PROGRESS NOTES
Shift assessment is complete. Pt continues on the vent, ET/OG intact. Tube feed continues at goal with patient tolerating. Right upper arm triple PICC is dry/intact with fentanyl at 75 mcg along with propofol at 35 mcg and a RASS -2. Left/right lungs are diminished. Fischer is intact and draining yellow/clear. VSS. Bilateral soft wrist restraints remain for safety of airway and lines. Pt remains in covid precautions as he is positive for covid. Droplet precautions with yellow cart at the doorway.

## 2020-07-05 NOTE — PROGRESS NOTES
07/05/20 0014   Vent Information   Vent Type 840   Vent Mode AC/VC   Vt Ordered 430 mL   Rate Set 16 bmp   Peak Flow 80 L/min   Pressure Support 0 cmH20   FiO2  60 %   SpO2 92 %   SpO2/FiO2 ratio 153.33   Sensitivity 3   PEEP/CPAP 12   I Time/ I Time % 0 s   Humidification Source Heated wire   Humidification Temp Measured 37   Circuit Condensation Drained   Vent Patient Data   High Peep/I Pressure 0   Peak Inspiratory Pressure 27 cmH2O   Mean Airway Pressure 18 cmH20   Rate Measured 19 br/min   Vt Exhaled 524 mL   Minute Volume 9.62 Liters   I:E Ratio 1:3.30   Cough/Sputum   Sputum How Obtained Endotracheal   Cough Non-productive   Sputum Amount None   Spontaneous Breathing Trial (SBT) RT Doc   Pulse 73   Breath Sounds   Right Upper Lobe Diminished   Right Middle Lobe Diminished   Right Lower Lobe Diminished   Left Upper Lobe Diminished   Left Lower Lobe Diminished   Additional Respiratory  Assessments   Resp 14   Alarm Settings   High Pressure Alarm 50 cmH2O   Low Minute Volume Alarm 5 L/min   Apnea (secs) 20 secs   High Respiratory Rate 40 br/min   Low Exhaled Vt  350 mL   Patient Observation   Observations 8ett 23 at lip

## 2020-07-05 NOTE — PROGRESS NOTES
Pulmonary & Critical Care Medicine ICU Progress Note    CC: Acute hypoxic respiratory failure secondary to COVID-19    Events of Last 24 hours: Afebrile  Propofol gtt 30  Fentanyl gtt 75  Versed gtt off    Invasive Lines: PICC 6/29/20    MV:  6/29/20-  Vent Mode: AC/VC Rate Set: 16 bmp/Vt Ordered: 430 mL/ /FiO2 : 60 %  Recent Labs     07/04/20  0500 07/05/20  0520   PHART 7.465* 7.480*   [id number] 45.3* 45.0   PO2ART 73.4* 115.4*       IV:   sodium chloride      dextrose      propofol 35 mcg/kg/min (07/05/20 0755)    fentaNYL (SUBLIMAZE) infusion 75 mcg/hr (07/04/20 2109)       Vitals:  /60   Pulse 82   Temp 98 °F (36.7 °C)   Resp 17   Ht 5' 7\" (1.702 m)   Wt 184 lb 15.5 oz (83.9 kg)   SpO2 91%   BMI 28.97 kg/m²   on vent    Intake/Output Summary (Last 24 hours) at 7/5/2020 1140  Last data filed at 7/5/2020 0600  Gross per 24 hour   Intake 1908.26 ml   Output 3475 ml   Net -1566.74 ml     EXAM:  Constitutional: ill appearing. Not in distress. Eyes: PERRL. No sclera icterus. ENT: Normal Nose. Normal Tongue. ETT in place  Neck:  Trachea is midline. No thyroid tenderness. Respiratory: No accessory muscle usage. no decreased breath sounds. No wheezes. No rales. No Rhonchi. Cardiovascular: Normal S1S2. No murmur. No digit cyanosis. No digit clubbing. No LE edema. GI: Non-tender. Non-distended. Normal bowel sounds. No masses. No umbilical hernia. Skin: No rash on the exposed extremities. No Nodules on exposed extremities. Neuro: Sedated but awakens to follow commands. Moves all extremities. Psych: No agitation, no anxiety.     Scheduled Meds:   dexamethasone  6 mg Intravenous Daily    furosemide  40 mg Intravenous BID    insulin lispro  0-12 Units Subcutaneous Q4H    chlorhexidine  15 mL Mouth/Throat BID    pantoprazole  40 mg Intravenous Daily    ipratropium-albuterol  1 ampule Inhalation Q4H    remdesivir IVPB  100 mg Intravenous Q24H    ketotifen  1 drop Both Eyes BID    enoxaparin 40 mg Subcutaneous BID    rosuvastatin  10 mg Oral Daily    aspirin  81 mg Oral Daily    sodium chloride flush  10 mL Intravenous 2 times per day     PRN Meds:  glucose, dextrose, glucagon (rDNA), dextrose, fentanNYL, midazolam, tetrahydrozoline, aluminum & magnesium hydroxide-simethicone, ondansetron, sodium chloride flush, acetaminophen **OR** acetaminophen    Results:  CBC:   Recent Labs     07/03/20  0543 07/04/20  0500 07/05/20  0510   WBC 15.4* 17.4* 14.3*   HGB 11.5* 11.8* 12.3*   HCT 35.9* 35.4* 38.5*   MCV 86.6 87.2 86.9    215 240     BMP:   Recent Labs     07/03/20  0543 07/04/20  0500   * 138   K 4.5 4.3   CL 95* 95*   CO2 29 33*   PHOS 3.3 2.4*   BUN 31* 45*   CREATININE 0.9 1.1     LIVER PROFILE:   Recent Labs     07/03/20  0543 07/04/20  0500 07/05/20  0510   AST 33 48* 57*   ALT 15 22 29   BILIDIR 0.3 <0.2 <0.2   BILITOT 0.4 0.4 0.4   ALKPHOS 57 56 57       Cultures:  6/24 Urine: MSSA  6/24 COVID 19 +  6/29 Trach Asp NRF    Films:  CXR was reviewed by me and it showed ETT in place and stable bilateral ASD      ASSESSMENT:  · Acute hypoxic respiratory failure due to COVID 19  · ARDS  · COVID 19 viral pneumona  · ANN -improved    PLAN:   Mechanical ventilation as per my orders. The ventilator was adjusted by me at the bedside for unstable, life threatening respiratory failure. IV Propofol and Fentanyl gtt for sedation, target RASS -2, with daily spontaneous awakening trial.   Head of bed 30 degrees or higher at all times  Daily spontaneous breathing trial once PEEP less than 8, FiO2 less than 55%. · Cefepime Stopped after 5 days and vanc after 3  · Decadron 6 mg IV daily  · Lovenox BID for DVT prophylaxis  · Lasix 40mg IV BID  · Remdesevir D#8/10. Monitor renal, cbc, hepatic function daily during therapy. with remdesivir;  Discontinue therapy in patients who develop ALT ? 5x ULN or ALT elevation accompanied by signs or symptoms of liver inflammation or increasing conjugated bilirubin, alkaline phosphatase, or INR. · S/p 2u CCP 7/2/20  · SSI  · ID consult appreciated  · TF's  · Total critical care time caring for this patient with life threatening, unstable organ failure, including direct patient contact, management of life support systems, review of data including imaging and labs, discussions with other team members and physicians at least 36 minutes so far today, excluding procedures.

## 2020-07-05 NOTE — PROGRESS NOTES
A: Ghulam's test performed on left radial wrist, test was positive, site prepped with alcohol, blood gas drawn then sterile gauze was applied to site and direct pressure was held for one full minute, secured gauze with tape.

## 2020-07-06 ENCOUNTER — APPOINTMENT (OUTPATIENT)
Dept: GENERAL RADIOLOGY | Age: 85
DRG: 870 | End: 2020-07-06
Payer: MEDICARE

## 2020-07-06 LAB
ALBUMIN SERPL-MCNC: 2.6 G/DL (ref 3.4–5)
ALP BLD-CCNC: 56 U/L (ref 40–129)
ALT SERPL-CCNC: 40 U/L (ref 10–40)
ANION GAP SERPL CALCULATED.3IONS-SCNC: 10 MMOL/L (ref 3–16)
ANISOCYTOSIS: ABNORMAL
AST SERPL-CCNC: 72 U/L (ref 15–37)
ATYPICAL LYMPHOCYTE RELATIVE PERCENT: 1 % (ref 0–6)
BANDED NEUTROPHILS RELATIVE PERCENT: 1 % (ref 0–7)
BASE EXCESS ARTERIAL: 7 MMOL/L (ref -3–3)
BASOPHILS ABSOLUTE: 0 K/UL (ref 0–0.2)
BASOPHILS RELATIVE PERCENT: 0 %
BILIRUB SERPL-MCNC: 0.4 MG/DL (ref 0–1)
BILIRUBIN DIRECT: <0.2 MG/DL (ref 0–0.3)
BILIRUBIN, INDIRECT: ABNORMAL MG/DL (ref 0–1)
BUN BLDV-MCNC: 59 MG/DL (ref 7–20)
CALCIUM SERPL-MCNC: 8 MG/DL (ref 8.3–10.6)
CARBOXYHEMOGLOBIN ARTERIAL: 0.3 % (ref 0–1.5)
CHLORIDE BLD-SCNC: 96 MMOL/L (ref 99–110)
CO2: 36 MMOL/L (ref 21–32)
CREAT SERPL-MCNC: 1 MG/DL (ref 0.8–1.3)
D DIMER: 543 NG/ML DDU (ref 0–229)
EOSINOPHILS ABSOLUTE: 0 K/UL (ref 0–0.6)
EOSINOPHILS RELATIVE PERCENT: 0 %
GFR AFRICAN AMERICAN: >60
GFR NON-AFRICAN AMERICAN: >60
GLUCOSE BLD-MCNC: 102 MG/DL (ref 70–99)
GLUCOSE BLD-MCNC: 123 MG/DL (ref 70–99)
GLUCOSE BLD-MCNC: 129 MG/DL (ref 70–99)
GLUCOSE BLD-MCNC: 136 MG/DL (ref 70–99)
GLUCOSE BLD-MCNC: 140 MG/DL (ref 70–99)
GLUCOSE BLD-MCNC: 166 MG/DL (ref 70–99)
GLUCOSE BLD-MCNC: 194 MG/DL (ref 70–99)
HCO3 ARTERIAL: 31.3 MMOL/L (ref 21–29)
HCT VFR BLD CALC: 38.2 % (ref 40.5–52.5)
HEMOGLOBIN, ART, EXTENDED: 13.1 G/DL (ref 13.5–17.5)
HEMOGLOBIN: 12.3 G/DL (ref 13.5–17.5)
HYPOCHROMIA: ABNORMAL
INR BLD: 1.11 (ref 0.86–1.14)
LYMPHOCYTES ABSOLUTE: 1.1 K/UL (ref 1–5.1)
LYMPHOCYTES RELATIVE PERCENT: 7 %
MCH RBC QN AUTO: 28.3 PG (ref 26–34)
MCHC RBC AUTO-ENTMCNC: 32.1 G/DL (ref 31–36)
MCV RBC AUTO: 88.1 FL (ref 80–100)
METHEMOGLOBIN ARTERIAL: 0.3 %
MONOCYTES ABSOLUTE: 0.8 K/UL (ref 0–1.3)
MONOCYTES RELATIVE PERCENT: 6 %
MYELOCYTE PERCENT: 1 %
NEUTROPHILS ABSOLUTE: 12 K/UL (ref 1.7–7.7)
NEUTROPHILS RELATIVE PERCENT: 84 %
O2 CONTENT ARTERIAL: 17 ML/DL
O2 SAT, ARTERIAL: 95.7 %
O2 THERAPY: ABNORMAL
PCO2 ARTERIAL: 43.3 MMHG (ref 35–45)
PDW BLD-RTO: 14.8 % (ref 12.4–15.4)
PERFORMED ON: ABNORMAL
PH ARTERIAL: 7.48 (ref 7.35–7.45)
PHOSPHORUS: 2.8 MG/DL (ref 2.5–4.9)
PLATELET # BLD: 229 K/UL (ref 135–450)
PLATELET SLIDE REVIEW: ADEQUATE
PMV BLD AUTO: 9.4 FL (ref 5–10.5)
PO2 ARTERIAL: 73.8 MMHG (ref 75–108)
POLYCHROMASIA: ABNORMAL
POTASSIUM SERPL-SCNC: 3.8 MMOL/L (ref 3.5–5.1)
PROTHROMBIN TIME: 12.9 SEC (ref 10–13.2)
RBC # BLD: 4.33 M/UL (ref 4.2–5.9)
SLIDE REVIEW: ABNORMAL
SODIUM BLD-SCNC: 142 MMOL/L (ref 136–145)
TCO2 ARTERIAL: 32.6 MMOL/L
TOTAL PROTEIN: 5.8 G/DL (ref 6.4–8.2)
WBC # BLD: 14 K/UL (ref 4–11)

## 2020-07-06 PROCEDURE — 2000000000 HC ICU R&B

## 2020-07-06 PROCEDURE — 71045 X-RAY EXAM CHEST 1 VIEW: CPT

## 2020-07-06 PROCEDURE — 94003 VENT MGMT INPAT SUBQ DAY: CPT

## 2020-07-06 PROCEDURE — 6370000000 HC RX 637 (ALT 250 FOR IP): Performed by: INTERNAL MEDICINE

## 2020-07-06 PROCEDURE — 87086 URINE CULTURE/COLONY COUNT: CPT

## 2020-07-06 PROCEDURE — 6360000002 HC RX W HCPCS: Performed by: INTERNAL MEDICINE

## 2020-07-06 PROCEDURE — 94640 AIRWAY INHALATION TREATMENT: CPT

## 2020-07-06 PROCEDURE — 80076 HEPATIC FUNCTION PANEL: CPT

## 2020-07-06 PROCEDURE — 2580000003 HC RX 258: Performed by: INTERNAL MEDICINE

## 2020-07-06 PROCEDURE — 94750 HC PULMONARY COMPLIANCE STUDY: CPT

## 2020-07-06 PROCEDURE — 85610 PROTHROMBIN TIME: CPT

## 2020-07-06 PROCEDURE — 99233 SBSQ HOSP IP/OBS HIGH 50: CPT | Performed by: INTERNAL MEDICINE

## 2020-07-06 PROCEDURE — 82803 BLOOD GASES ANY COMBINATION: CPT

## 2020-07-06 PROCEDURE — 99291 CRITICAL CARE FIRST HOUR: CPT | Performed by: INTERNAL MEDICINE

## 2020-07-06 PROCEDURE — C9113 INJ PANTOPRAZOLE SODIUM, VIA: HCPCS | Performed by: INTERNAL MEDICINE

## 2020-07-06 PROCEDURE — 94761 N-INVAS EAR/PLS OXIMETRY MLT: CPT

## 2020-07-06 PROCEDURE — 2580000003 HC RX 258

## 2020-07-06 PROCEDURE — 85379 FIBRIN DEGRADATION QUANT: CPT

## 2020-07-06 PROCEDURE — 80069 RENAL FUNCTION PANEL: CPT

## 2020-07-06 PROCEDURE — 2700000000 HC OXYGEN THERAPY PER DAY

## 2020-07-06 PROCEDURE — 85025 COMPLETE CBC W/AUTO DIFF WBC: CPT

## 2020-07-06 RX ORDER — SENNA AND DOCUSATE SODIUM 50; 8.6 MG/1; MG/1
2 TABLET, FILM COATED ORAL 2 TIMES DAILY PRN
Status: DISCONTINUED | OUTPATIENT
Start: 2020-07-06 | End: 2020-07-09 | Stop reason: DRUGHIGH

## 2020-07-06 RX ORDER — SODIUM CHLORIDE 9 MG/ML
INJECTION, SOLUTION INTRAVENOUS
Status: COMPLETED
Start: 2020-07-06 | End: 2020-07-06

## 2020-07-06 RX ADMIN — KETOTIFEN FUMARATE 1 DROP: 0.35 SOLUTION/ DROPS OPHTHALMIC at 22:36

## 2020-07-06 RX ADMIN — ENOXAPARIN SODIUM 40 MG: 40 INJECTION SUBCUTANEOUS at 22:29

## 2020-07-06 RX ADMIN — CHLORHEXIDINE GLUCONATE 0.12% ORAL RINSE 15 ML: 1.2 LIQUID ORAL at 09:03

## 2020-07-06 RX ADMIN — PROPOFOL 25 MCG/KG/MIN: 10 INJECTION, EMULSION INTRAVENOUS at 07:13

## 2020-07-06 RX ADMIN — SODIUM CHLORIDE 500 ML: 9 INJECTION, SOLUTION INTRAVENOUS at 23:43

## 2020-07-06 RX ADMIN — SENNOSIDES AND DOCUSATE SODIUM 2 TABLET: 8.6; 5 TABLET ORAL at 13:52

## 2020-07-06 RX ADMIN — IPRATROPIUM BROMIDE AND ALBUTEROL SULFATE 1 AMPULE: .5; 3 SOLUTION RESPIRATORY (INHALATION) at 03:22

## 2020-07-06 RX ADMIN — KETOTIFEN FUMARATE 1 DROP: 0.35 SOLUTION/ DROPS OPHTHALMIC at 09:04

## 2020-07-06 RX ADMIN — PANTOPRAZOLE SODIUM 40 MG: 40 INJECTION, POWDER, FOR SOLUTION INTRAVENOUS at 09:02

## 2020-07-06 RX ADMIN — PROPOFOL 35 MCG/KG/MIN: 10 INJECTION, EMULSION INTRAVENOUS at 22:29

## 2020-07-06 RX ADMIN — FUROSEMIDE 40 MG: 10 INJECTION, SOLUTION INTRAMUSCULAR; INTRAVENOUS at 09:02

## 2020-07-06 RX ADMIN — IPRATROPIUM BROMIDE AND ALBUTEROL SULFATE 1 AMPULE: .5; 3 SOLUTION RESPIRATORY (INHALATION) at 08:24

## 2020-07-06 RX ADMIN — CHLORHEXIDINE GLUCONATE 0.12% ORAL RINSE 15 ML: 1.2 LIQUID ORAL at 22:37

## 2020-07-06 RX ADMIN — DEXAMETHASONE SODIUM PHOSPHATE 6 MG: 4 INJECTION, SOLUTION INTRAMUSCULAR; INTRAVENOUS at 09:02

## 2020-07-06 RX ADMIN — ASPIRIN 81 MG 81 MG: 81 TABLET ORAL at 09:02

## 2020-07-06 RX ADMIN — ENOXAPARIN SODIUM 40 MG: 40 INJECTION SUBCUTANEOUS at 09:03

## 2020-07-06 RX ADMIN — ROSUVASTATIN CALCIUM 10 MG: 10 TABLET, FILM COATED ORAL at 09:03

## 2020-07-06 RX ADMIN — PROPOFOL 35 MCG/KG/MIN: 10 INJECTION, EMULSION INTRAVENOUS at 13:52

## 2020-07-06 RX ADMIN — ACYCLOVIR SODIUM 750 MG: 500 INJECTION, SOLUTION INTRAVENOUS at 12:48

## 2020-07-06 RX ADMIN — Medication 10 ML: at 22:38

## 2020-07-06 RX ADMIN — FENTANYL CITRATE 80 MCG/HR: 50 INJECTION, SOLUTION INTRAMUSCULAR; INTRAVENOUS at 23:43

## 2020-07-06 RX ADMIN — ACETAMINOPHEN 650 MG: 325 TABLET ORAL at 14:16

## 2020-07-06 RX ADMIN — ACYCLOVIR SODIUM 750 MG: 500 INJECTION, SOLUTION INTRAVENOUS at 23:43

## 2020-07-06 RX ADMIN — PROPOFOL 30 MCG/KG/MIN: 10 INJECTION, EMULSION INTRAVENOUS at 02:25

## 2020-07-06 RX ADMIN — IPRATROPIUM BROMIDE AND ALBUTEROL SULFATE 1 AMPULE: .5; 3 SOLUTION RESPIRATORY (INHALATION) at 12:02

## 2020-07-06 RX ADMIN — FENTANYL CITRATE 90 MCG/HR: 50 INJECTION, SOLUTION INTRAMUSCULAR; INTRAVENOUS at 14:06

## 2020-07-06 RX ADMIN — PROPOFOL 40 MCG/KG/MIN: 10 INJECTION, EMULSION INTRAVENOUS at 18:06

## 2020-07-06 RX ADMIN — FUROSEMIDE 40 MG: 10 INJECTION, SOLUTION INTRAMUSCULAR; INTRAVENOUS at 18:02

## 2020-07-06 RX ADMIN — IPRATROPIUM BROMIDE AND ALBUTEROL SULFATE 1 AMPULE: .5; 3 SOLUTION RESPIRATORY (INHALATION) at 23:22

## 2020-07-06 RX ADMIN — IPRATROPIUM BROMIDE AND ALBUTEROL SULFATE 1 AMPULE: .5; 3 SOLUTION RESPIRATORY (INHALATION) at 15:37

## 2020-07-06 RX ADMIN — IPRATROPIUM BROMIDE AND ALBUTEROL SULFATE 1 AMPULE: .5; 3 SOLUTION RESPIRATORY (INHALATION) at 19:57

## 2020-07-06 RX ADMIN — Medication 10 ML: at 09:03

## 2020-07-06 ASSESSMENT — PULMONARY FUNCTION TESTS
PIF_VALUE: 24
PIF_VALUE: 32
PIF_VALUE: 32
PIF_VALUE: 24
PIF_VALUE: 26
PIF_VALUE: 24
PIF_VALUE: 28
PIF_VALUE: 23
PIF_VALUE: 35
PIF_VALUE: 29
PIF_VALUE: 25
PIF_VALUE: 28
PIF_VALUE: 37
PIF_VALUE: 31
PIF_VALUE: 23
PIF_VALUE: 35
PIF_VALUE: 25
PIF_VALUE: 24
PIF_VALUE: 22
PIF_VALUE: 27
PIF_VALUE: 30
PIF_VALUE: 32
PIF_VALUE: 25
PIF_VALUE: 25
PIF_VALUE: 30
PIF_VALUE: 31
PIF_VALUE: 33
PIF_VALUE: 29
PIF_VALUE: 22
PIF_VALUE: 25

## 2020-07-06 ASSESSMENT — PAIN DESCRIPTION - PROGRESSION
CLINICAL_PROGRESSION: OTHER (COMMENT)

## 2020-07-06 ASSESSMENT — PAIN SCALES - GENERAL
PAINLEVEL_OUTOF10: 0
PAINLEVEL_OUTOF10: 4

## 2020-07-06 ASSESSMENT — PAIN DESCRIPTION - PAIN TYPE: TYPE: OTHER (COMMENT)

## 2020-07-06 ASSESSMENT — PAIN DESCRIPTION - DESCRIPTORS: DESCRIPTORS: PATIENT UNABLE TO DESCRIBE

## 2020-07-06 ASSESSMENT — PAIN DESCRIPTION - LOCATION: LOCATION: OTHER (COMMENT)

## 2020-07-06 ASSESSMENT — PAIN DESCRIPTION - ORIENTATION: ORIENTATION: OTHER (COMMENT)

## 2020-07-06 ASSESSMENT — PAIN DESCRIPTION - FREQUENCY: FREQUENCY: OTHER (COMMENT)

## 2020-07-06 NOTE — PROGRESS NOTES
Sedation lowered to Fentanyl 65 mcg/hr and Propofol 25 mcg/kg/min. Pt opens eyes, nodding head to questions. Denies pain at this time.

## 2020-07-06 NOTE — PROGRESS NOTES
07/05/20 2310   Vent Information   Vent Type 840   Vent Mode AC/VC   Vt Ordered 430 mL   Rate Set 16 bmp   Peak Flow 80 L/min   Pressure Support 0 cmH20   FiO2  60 %   SpO2 91 %   SpO2/FiO2 ratio 151.67   Sensitivity 3   PEEP/CPAP 12   I Time/ I Time % 0 s   Humidification Source Heated wire   Humidification Temp Measured 37   Circuit Condensation Drained   Vent Patient Data   High Peep/I Pressure 0   Peak Inspiratory Pressure 34 cmH2O   Mean Airway Pressure 19 cmH20   Rate Measured 22 br/min   Vt Exhaled 845 mL   Minute Volume 10.8 Liters   I:E Ratio 1:3.30   Cough/Sputum   Sputum How Obtained Endotracheal   Cough Non-productive   Sputum Amount None   Spontaneous Breathing Trial (SBT) RT Doc   Pulse 82   Breath Sounds   Right Upper Lobe Diminished   Right Middle Lobe Diminished   Right Lower Lobe Diminished   Left Upper Lobe Diminished   Left Lower Lobe Diminished   Additional Respiratory  Assessments   Resp 23   Alarm Settings   High Pressure Alarm 50 cmH2O   Low Minute Volume Alarm 5 L/min   Apnea (secs) 20 secs   High Respiratory Rate 40 br/min   Low Exhaled Vt  350 mL   Patient Observation   Observations 8ett 23 at lip

## 2020-07-06 NOTE — PROGRESS NOTES
Pulmonary & Critical Care Medicine ICU Progress Note      CC: Acute hypoxemic respiratory failure    Events of Last 24 hours:   Fentanyl 65 mcg/hr   Propofol 30 mcg/kg/min       Invasive Lines: IV: PICC     MV:   Vent Mode: AC/VC Rate Set: 16 bmp/Vt Ordered: 430 mL/ /FiO2 : 60 %  Recent Labs     20   PHART 7.480* 7.477*   CCA7IXB 45.0 43.3   PO2ART 115.4* 73.8*       IV:   dextrose      propofol 25 mcg/kg/min (20 07)    fentaNYL (SUBLIMAZE) infusion 65 mcg/hr (20 0430)       Vitals:  Blood pressure (!) 148/63, pulse 88, temperature 100.2 °F (37.9 °C), temperature source Temporal, resp. rate 24, height 5' 7\" (1.702 m), weight 184 lb 15.5 oz (83.9 kg), SpO2 91 %. on AC 16/430/+14 80%   Temp  Av.3 °F (37.4 °C)  Min: 98 °F (36.7 °C)  Max: 100.2 °F (37.9 °C)    Intake/Output Summary (Last 24 hours) at 2020 0801  Last data filed at 2020 0500  Gross per 24 hour   Intake 3287 ml   Output 3750 ml   Net -463 ml     EXAM:  General: ill appearing    Eyes: PERRL. No sclera icterus. No conjunctival injection. ENT: No discharge. Pharynx clear. Neck: Trachea midline. Normal thyroid. Resp: No accessory muscle use. Bilateral crackles. No wheezing. Few rhonchi. No dullness on percussion. CV: Regular rate. Regular rhythm. No mumur or rub. No edema. GI: Non-tender. Non-distended. No masses. No organomegaly. Normal bowel sounds. No hernia. Skin: Warm and dry. No nodule on exposed extremities. Vesicular rash in the buttock area  Lymph: No cervical LAD. No supraclavicular LAD. M/S: No cyanosis. No joint deformity. No clubbing. Neuro: Intubated sedated.  Patellar reflexes are symmetric  Psych: No agitation, no anxiety, affect is full     Scheduled Meds:   dexamethasone  6 mg Intravenous Daily    furosemide  40 mg Intravenous BID    insulin lispro  0-12 Units Subcutaneous Q4H    chlorhexidine  15 mL Mouth/Throat BID    pantoprazole  40 mg Intravenous Daily times  Daily spontaneous breathing trial once PEEP less than 8, FiO2 less than 55%  Closely monitory airways, clinical status, cardiac rhythm, vital signs, and urine output   Inhaled bronchodilators  Completed 5 days of cefepime and 3 days of vancomycin  Decadron 6 mg IV daily for now  Lasix 40 mg IV twice daily  Remdesevir  #8/10-monitor renal, CBC, hepatic function daily during therapy. Discontinue therapy in patients who develop ALT ? 5x ULN or ALT elevation accompanied by signs or symptoms of liver inflammation or increasing conjugated bilirubin, alkaline phosphatase, or INR. IV acyclovir per ID 7/6/2020  Post 2 units of CCP 7/2/2020  ID following  Nutrition: Tube feeding- start bowel regimen today  Blood sugar control, with goal 150-180  GI prophylaxis: Pepcid  DVT prophylaxis: Lovenox 40 BID   MRSA prophylaxis: Bactroban   Code status: Full code           Total critical care time caring for this patient with life threatening, unstable organ failure, including direct patient contact, management of life support systems, review of data including imaging and labs, discussions with other team members and physicians is 33 minutes so far today, excluding procedures.

## 2020-07-06 NOTE — PROGRESS NOTES
Spoke with pt's daughter on the phone. Daughter updated on pt's condition. O2 sat maintaining at 90-92% ON 60% pO2 AND 12 OF peep.

## 2020-07-06 NOTE — PROGRESS NOTES
Internal Medicine ICU Progress Note      Interval History:     Patient admitted with Isidro -19. On droplet plus precautions. Patient with persistent high fevers, progressive hypoxia. Initiated on Remdesivir, dexamethasone. Patient was intubated for worsening respiratory failure on 2020. Requiring high flow O2 . Had high fevers with T max of 104 F    20-- > S/P Convalescent plasma transfusion 2 units   7/3/2020--> fevers improved, hypoxia improving     2020  - Mostly afebrile T-max 99 °F.   - Remains on mechanical vent . - Positive fluid balance of 10 L,  getting IV Lasix  - Hypoxia is improving , FiO2 now down to 60 %,  PEEP is down to 12     2020:   -Worsening hypoxia  -FiO2 increased to 80% this morning, now down to 70%. PEEP up to 14.   Has developed new shingles,  started on acyclovir  Low-grade fevers, temperatures now up to 100.7 °F    On droplet plus precautions    Invasive Lines: PICC placed on 2020      MV: Intubated on 2020    Recent Labs     20  0520 20  0520   PHART 7.480* 7.477*   ANG8NMD 45.0 43.3   PO2ART 115.4* 73.8*       MV Settings:  Vent Mode: AC/VC Rate Set: 16 bmp/Vt Ordered: 430 mL/ /FiO2 : 70 %    IV:   dextrose      propofol 35 mcg/kg/min (20 1352)    fentaNYL (SUBLIMAZE) infusion 90 mcg/hr (20 1406)       Vitals:  Temp  Av.8 °F (37.7 °C)  Min: 99 °F (37.2 °C)  Max: 100.7 °F (38.2 °C)  Pulse  Av.5  Min: 79  Max: 101  BP  Min: 120/59  Max: 148/64  SpO2  Av.7 %  Min: 88 %  Max: 94 %  FiO2   Av.8 %  Min: 60 %  Max: 80 %  Patient Vitals for the past 4 hrs:   BP Temp Temp src Pulse Resp SpO2   20 1400 130/64 100.7 °F (38.2 °C) Temporal 101 25 91 %   20 1300 134/70 -- -- 99 22 93 %   20 1202 -- -- -- 91 16 94 %   20 1200 (!) 142/67 -- -- 91 17 94 %   20 1100 137/74 -- -- 90 25 94 %   20 1058 (!) 148/64 99.9 °F (37.7 °C) Temporal 90 25 --       CVP:        Intake/Output Summary (Last 24 hours) at 7/6/2020 1418  Last data filed at 7/6/2020 1200  Gross per 24 hour   Intake 3948.82 ml   Output 4950 ml   Net -1001.18 ml       Physical Exam:  General:  Pt is intubated, sedated, on mechanical vent. Skin:  Warm and dry  Neck:  JVD absent. Neck supple  Chest: Diminished breath sounds . No wheezes or rhonchi   Cardiovascular:  RRR ,S1S2 normal  Abdomen:  Soft, non tender, non distended, BS +  Extremities:  trace edema. Intact peripheral pulses.  Brisk cap refill, < 2 secs  Neuro: non focal            Medications:  Scheduled Meds:   acyclovir  10 mg/kg (Adjusted) Intravenous Q8H    dexamethasone  6 mg Intravenous Daily    furosemide  40 mg Intravenous BID    insulin lispro  0-12 Units Subcutaneous Q4H    chlorhexidine  15 mL Mouth/Throat BID    pantoprazole  40 mg Intravenous Daily    ipratropium-albuterol  1 ampule Inhalation Q4H    remdesivir IVPB  100 mg Intravenous Q24H    ketotifen  1 drop Both Eyes BID    enoxaparin  40 mg Subcutaneous BID    rosuvastatin  10 mg Oral Daily    aspirin  81 mg Oral Daily    sodium chloride flush  10 mL Intravenous 2 times per day       PRN Meds:  sennosides-docusate sodium, glucose, dextrose, glucagon (rDNA), dextrose, fentanNYL, midazolam, tetrahydrozoline, aluminum & magnesium hydroxide-simethicone, ondansetron, sodium chloride flush, acetaminophen **OR** acetaminophen    Results:  CBC:   Recent Labs     07/04/20  0500 07/05/20  0510 07/06/20  0506   WBC 17.4* 14.3* 14.0*   HGB 11.8* 12.3* 12.3*   HCT 35.4* 38.5* 38.2*   MCV 87.2 86.9 88.1    240 229     BMP:   Recent Labs     07/04/20  0500 07/05/20  0510 07/06/20  0506    143 142   K 4.3 4.0 3.8   CL 95* 98* 96*   CO2 33* 34* 36*   PHOS 2.4* 2.1* 2.8   BUN 45* 54* 59*   CREATININE 1.1 1.1 1.0     LIVER PROFILE:   Recent Labs     07/04/20  0500 07/05/20  0510 07/06/20  0506   AST 48* 57* 72*   ALT 22 29 40   BILIDIR <0.2 <0.2 <0.2   BILITOT 0.4 0.4 0.4   ALKPHOS 56 57 56     PT/INR: Recent Labs     07/04/20  0500 07/05/20  0510 07/06/20  0506   PROTIME 13.2 12.9 12.9   INR 1.14 1.11 1.11     Results for NEWTON BOWENS (MRN [id number]) as of 7/1/2020 09:46   Ref. Range 6/24/2020 21:35 6/26/2020 15:24 6/30/2020 04:20   D-Dimer, Quant Latest Ref Range: 0 - 229 ng/mL  (H) 682 (H) 653 (H)     Results for Winnie SMITH (MRN [id number]) as of 7/1/2020 09:46   Ref. Range 6/24/2020 21:35   Ferritin Latest Ref Range: 30.0 - 400.0 ng/mL 97.3       Cultures:  Results for Trevor BOWENS (MRN [id number]) as of 6/30/2020 09:20   Ref. Range 6/24/2020 22:00   SARS-CoV-2, PCR Latest Ref Range: Not Detected  DETECTED (A)     Susceptibility     Staphylococcus epidermidis (1)     Antibiotic Interpretation TARA Status    ciprofloxacin Sensitive <=0.5 mcg/mL     nitrofurantoin Sensitive <=16 mcg/mL     oxacillin Sensitive <=0.25 mcg/mL     tetracycline Sensitive <=1 mcg/mL     trimethoprim-sulfamethoxazole Sensitive <=10 mcg/mL           Films:    XR CHEST PORTABLE   Final Result   Mild increase in the amount of bibasilar opacity since yesterday. Large   bilateral pneumonias. XR CHEST PORTABLE   Final Result   Stable bilateral pulmonary opacities. XR CHEST PORTABLE   Final Result   Minimal increase in the amount of opacity in each lung consistent with slight   worsening of bilateral pneumonia. XR CHEST PORTABLE   Final Result   Supportive tubing is in normal position. Low lung volume study, with stable alveolar opacities in the mid and lower   lungs, pneumonia versus atelectasis. XR CHEST PORTABLE   Final Result   Increased in degree and extent of bilateral mid and lower lung pneumonia. The increased may be partially accentuated by low lung volumes. XR CHEST 1 VW   Final Result   Persistent bilateral airspace disease, similar to prior. IR PICC WO SQ PORT/PUMP > 5 YEARS   Final Result   Successful placement of PICC line.          XR CHEST

## 2020-07-06 NOTE — PROGRESS NOTES
Infectious Diseases   Progress Note      Admission Date: 6/24/2020  Hospital Day: Hospital Day: 13   Attending: Kandy Qiu MD  Date of service: 7/6/2020     Chief complaint/ Reason for consult:     · Acute respiratory failure with hypoxia  · Bilateral COVID 19 pneumonia  · Elevated d-dimer 653 on 6/30/2020  · Staph epidermidis in the urine culture on 6/24/2020, significance unclear, likely colonizer    Microbiology:        I have reviewed allavailable micro lab data and cultures    · Blood culture (2/2) - collected on 6/24/2020: Negative  · Tracheal aspirate culture culture  - collected on 6/29/2020: Negative so far      Antibiotics and immunizations:       Current antibiotics: All antibiotics and their doses were reviewed by me    Recent Abx Admin                   remdesivir 100 mg in sodium chloride 0.9 % 250 mL IVPB (mg) 100 mg New Bag 07/05/20 5996                  Immunization History: All immunization history was reviewed by me today. Immunization History   Administered Date(s) Administered    Influenza, High Dose (Fluzone 65 yrs and older) 11/30/2012, 10/09/2015, 11/20/2016, 11/07/2018, 09/23/2019    Pneumococcal Conjugate 13-valent ([id number]) 07/10/2015, 11/08/2018    Pneumococcal Polysaccharide ([id number]) 04/13/2012    Td, unspecified formulation 01/15/1996    Tdap (Boostrix, Adacel) 05/27/2015    Zoster Live (Zostavax) 07/10/2015    Zoster Recombinant (Shingrix) 11/07/2018, 09/23/2019       Known drug allergies: All allergies were reviewed and updated    Allergies   Allergen Reactions    Buspar [Buspirone] Other (See Comments)     Face,face numb    Codeine Other (See Comments)     Face,lips numb    Methylphenidate Other (See Comments)     Face numbness    Ritalin [Methylphenidate Hcl] Other (See Comments)     Face. lips numb    Zoloft [Sertraline Hcl] Other (See Comments)     Numbness of lips       Social history:     Social History:  All social andepidemiologic history was reviewed and updated by me today as needed. · Tobacco use:   reports that he has quit smoking. His smoking use included cigarettes. He started smoking about 29 years ago. He has a 25.00 pack-year smoking history. He has never used smokeless tobacco.  · Alcohol use:   reports current alcohol use of about 1.0 standard drinks of alcohol per week. · Currently lives in: [address] [place], [place] [id number]  ·  reports no history of drug use. Assessment:     The patient is a 82 y.o. old male who  has a past medical history of Arthritis, B12 deficiency (05/2014), COVID-19 (06/25/2020), and Hyperlipidemia. with following problems:    · Acute respiratory failure with hypoxia-ongoing  · Endotracheally intubated-remains intubated  · Requiring invasive mechanical ventilation  · ARDS - secondary to COVID 19-pneumonias ongoing  · Shingles involving the buttock area-this is a new finding  · Bilateral COVID 19 pneumonia-status post 2 units of convalescent plasma on 7/2/2020 under [facility] protocol-remains on IV remdesivir  · Elevated d-dimer 653 on 6/30/20208818-o-froyf is 543 today  · High interleukin-6 level, this is a new finding  · Staph epidermidis in the urine culture on 6/24/2020, significance unclear, likely colonizer  · Status post PICC line placement on 6/29/2020  · Obesity Class 1 due to excess calorie intake : Body mass index is 31.34 kg/m². Discussion:      Is on day 9 of IV remdesivir. Had received 2 units of convalescent plasma per [facility] protocol on 7/2/2020    T-max is 1.2 today. Tracheal aspirate culture from 7/2/2020 is growing only Candida albicans, which is on oral nikolay and does not need coverage. Blood cultures from 7/3/2020 remain negative. Serum creatinine is 1.0. AST is 40, ALT 72, serum bilirubin is 0.4. White cell count is 14,000, leukocytosis likely continue steroids    D-dimer is 543. Nasal MRSA culture negative.     Interleukin-6 level came back quite elevated at 43.9    Chest x-ray with ICU RN      Drug Monitoring:    · Continue monitoring for antibiotic toxicity as follows: CBC, CMP  · Continue to watch for following: new or worsening fever, new hypotension, hives, lip swelling and redness or purulence at vascular access sites. I/v access Management:    · Continue to monitor i.v access sites for erythema, induration, discharge or tenderness. · As always, continue efforts to minimize tubes/lines/drains as clinically appropriate to reduce chances of line associated infections. Risk of Complications/Morbidity/Mortality: High     · Patient is critically ill and has a potentially life threatening infection that poses threat to life/bodily function. · There is potential for worsening infection/ sudden clinically significant or life-threatening deterioration in the patient's condition without appropriate antimicrobial therapy. · Complex medical decision making process was involved to select appropriate antimicrobial agents to reverse the cause of patient's severe infection/ illness. · Antimicrobial therapy requires intensive monitoring for toxicity and frequent dose adjustments to prevent toxicity and permanent end-organ dysfunction. Critical care time: 31 minutes      Thank you for involving me in the care of your patient. I will continue to follow. If you have anyadditional questions, please do not hesitate to contact me. Subjective: Interval history: Interval history was obtained from chart review and RN. He remains critically ill. He remains intubated and on a ventilator. Requiring 80% FiO2      REVIEW OF SYSTEMS:     Review of Systems   Unable to perform ROS: Intubated         Past Medical History: All past medical history reviewed today. Past Medical History:   Diagnosis Date    Arthritis     B12 deficiency 05/2014    COVID-19 06/25/2020    Hyperlipidemia        Past Surgical History: All past surgical history was reviewed today.     Past Surgical History: Procedure Laterality Date    BACK SURGERY      CAROTID ENDARTERECTOMY Left     COLONOSCOPY      UPPER GASTROINTESTINAL ENDOSCOPY  07/12/14    Minimal chronic gastritis       Family History: All family history was reviewed today. Problem Relation Age of Onset    Cancer Neg Hx        Objective:       PHYSICAL EXAM:      Vitals:   Vitals:    07/06/20 1000 07/06/20 1058 07/06/20 1100 07/06/20 1202   BP: (!) 144/65 (!) 148/64 137/74    Pulse: 90 90 90 91   Resp: 24 25 25 16   Temp:  99.9 °F (37.7 °C)     TempSrc:  Temporal     SpO2: 92%  94% 94%   Weight:       Height:           Physical Exam      PHYSICAL EXAM:     In-person bedside physical examination deferred. Pursuant to the emergency declaration under the [address] [place] [address] authority and the [employer] Act, this clinical encounter was conducted to provide necessary medical care. (Also consistent with new provisions and guidance offered by [facility] on March 18, 2020 in setting of COVID 19 outbreak and in order to preserve personal protective equipment in accordance with the flexibilities announced by CMS on March 30, 2020)   References: [email or web address] [email or web address]/[email or web address]/Resources/COVID-19/3_18%20Telemed%20Guidance%20Updated%20March%2018. pdf?[email or web address]                      [email or web address]://[email or web address] [email or web address]/Portals/0/Resources/COVID-19/3_18%20Telemed%20Guidance%20Updated%20March%2018. pdf?[email or web address]                      [email or web address] pdf                            General: intubated and sedated, on ventilator, vitals reviewed in EPIC  HEENT: endotracheal tube in place per RN  Cardiovascular: Telemetry data reviewed, rest deferred   Pulmonary: deferred  Abdomen/GI: deferred  Neuro: deferred  Skin: Pictures reviewed for the rash on the buttocks, highly concerning for shingles  Musculoskeletal:  deferred  Genitourinary: Deferred  Psych: deferred  Lymphatic/Immunologic: deferred                    Intake and output:    I/O last 3 completed shifts: In: 5781 [I.V.:669; NG/GT:2044; IV Piggyback:574]  Out: 2994 [Urine:3750]    Lab Data:   All available labs and old records have been reviewed by me. CBC:  Recent Labs     07/04/20  0500 07/05/20  0510 07/06/20  0506   WBC 17.4* 14.3* 14.0*   RBC 4.06* 4.42 4.33   HGB 11.8* 12.3* 12.3*   HCT 35.4* 38.5* 38.2*    240 229   MCV 87.2 86.9 88.1   MCH 29.1 27.9 28.3   MCHC 33.3 32.0 32.1   RDW 14.7 14.6 14.8   BANDSPCT 4  --  1        BMP:  Recent Labs     07/04/20  0500 07/05/20  0510 07/06/20  0506    143 142   K 4.3 4.0 3.8   CL 95* 98* 96*   CO2 33* 34* 36*   BUN 45* 54* 59*   CREATININE 1.1 1.1 1.0   CALCIUM 8.1* 7.9* 8.0*   GLUCOSE 192* 160* 123*        Hepatic Function Panel:   Lab Results   Component Value Date    ALKPHOS 56 07/06/2020    ALT 40 07/06/2020    AST 72 07/06/2020    PROT 5.8 07/06/2020    BILITOT 0.4 07/06/2020    BILIDIR <0.2 07/06/2020    IBILI see below 07/06/2020    LABALBU 2.6 07/06/2020       CPK:   Lab Results   Component Value Date    CKTOTAL 71 07/05/2020     ESR:   Lab Results   Component Value Date    SEDRATE 4 09/17/2018     CRP: No results found for: CRP        Imaging: All pertinent images and reports for the current visit were reviewed by me during this visit. XR CHEST PORTABLE   Final Result   Mild increase in the amount of bibasilar opacity since yesterday. Large   bilateral pneumonias. XR CHEST PORTABLE   Final Result   Stable bilateral pulmonary opacities. XR CHEST PORTABLE   Final Result   Minimal increase in the amount of opacity in each lung consistent with slight   worsening of bilateral pneumonia. XR CHEST PORTABLE   Final Result   Supportive tubing is in normal position.       Low lung volume study, with stable alveolar opacities in the mid and lower lungs, pneumonia versus atelectasis. XR CHEST PORTABLE   Final Result   Increased in degree and extent of bilateral mid and lower lung pneumonia. The increased may be partially accentuated by low lung volumes. XR CHEST 1 VW   Final Result   Persistent bilateral airspace disease, similar to prior. IR PICC WO SQ PORT/PUMP > 5 YEARS   Final Result   Successful placement of PICC line. XR CHEST PORTABLE   Final Result   Appropriate endotracheal tube positioning. Multifocal airspace opacities and areas of atelectasis correlate with recent   CT chest.         XR ABDOMEN FOR NG/OG/NE TUBE PLACEMENT   Final Result   NG tube terminates over the stomach. CT CHEST PULMONARY EMBOLISM W CONTRAST   Final Result   1. No pulmonary embolism. 2. Multifocal pneumonia scattered throughout both lungs with minimal pleural   effusions. XR CHEST STANDARD (2 VW)   Final Result   No radiographic evidence of acute pulmonary disease. Medications: All current and past medications were reviewed.      acyclovir  10 mg/kg (Adjusted) Intravenous Q8H    dexamethasone  6 mg Intravenous Daily    furosemide  40 mg Intravenous BID    insulin lispro  0-12 Units Subcutaneous Q4H    chlorhexidine  15 mL Mouth/Throat BID    pantoprazole  40 mg Intravenous Daily    ipratropium-albuterol  1 ampule Inhalation Q4H    remdesivir IVPB  100 mg Intravenous Q24H    ketotifen  1 drop Both Eyes BID    enoxaparin  40 mg Subcutaneous BID    rosuvastatin  10 mg Oral Daily    aspirin  81 mg Oral Daily    sodium chloride flush  10 mL Intravenous 2 times per day        dextrose      propofol 35 mcg/kg/min (07/06/20 1058)    fentaNYL (SUBLIMAZE) infusion 65 mcg/hr (07/06/20 0430)       sennosides-docusate sodium, glucose, dextrose, glucagon (rDNA), dextrose, fentanNYL, midazolam, tetrahydrozoline, aluminum & magnesium hydroxide-simethicone, ondansetron, sodium chloride flush, acetaminophen **OR** acetaminophen      Problem list:       Patient Active Problem List   Diagnosis Code    Multifocal pneumonia J18.9    Orthostasis I95.1    COVID-19 U07.1    Sepsis ([facility] A41.9    Urinary tract infection without hematuria N39.0    ARDS (adult respiratory distress syndrome) ([facility] J80    Hyperlipidemia E78.5    Acute respiratory failure with hypoxia (HCC) J96.01    Class 1 obesity due to excess calories with body mass index (BMI) of 31.0 to 31.9 in adult E66.09, Z68.31    Peripherally inserted central catheter (PICC) in place Z45.2    Asymptomatic bacteriuria R82.71    Endotracheally intubated Z97.8    On mechanically assisted ventilation ([facility] Z99.11    Herpes zoster without complication B56.8       Please note that this chart was generated using Dragon dictation software. Although every effort was made to ensure the accuracy of this automated transcription, some errors in transcription may have occurred inadvertently. If you may need any clarification, please do not hesitate to contact me through EPIC or at the phone number provided below with my electronic signature. Any pictures or media included in this note were obtained after taking informed verbal consent from the patient and with their approval to include those in the patient's medical record.     Escobar Berry MD, MPH  7/6/2020 , 12:11 PM   [facility] Infectious Disease   Office: [phone]  Fax: [phone]  Tuesday AM clinic:   [address] [place] [address]  Thursday AM [id number] [facility]

## 2020-07-06 NOTE — PROGRESS NOTES
Pt bathed and blisters noted bilaterally across pt's sacral area and across lower back. Area left open to air. Photo taken and wound assessment started. Photos linked to wound assessment.

## 2020-07-06 NOTE — DISCHARGE INSTR - COC
11/30/2012, 10/09/2015, 11/20/2016, 11/07/2018, 09/23/2019    Pneumococcal Conjugate 13-valent ([id number]) 07/10/2015, 11/08/2018    Pneumococcal Polysaccharide ([id number]) 04/13/2012    Td, unspecified formulation 01/15/1996    Tdap (Boostrix, Adacel) 05/27/2015    Zoster Live (Zostavax) 07/10/2015    Zoster Recombinant (Shingrix) 11/07/2018, 09/23/2019       Active Problems:  Patient Active Problem List   Diagnosis Code    Multifocal pneumonia J18.9    Orthostasis I95.1    Pneumonia due to COVID-19 virus U07.1, J12.89    Sepsis ([facility] [address] A41.9    Urinary tract infection without hematuria N39.0    ARDS (adult respiratory distress syndrome) (HCC) J80    Hyperlipidemia E78.5    Acute respiratory failure with hypoxia (HCC) J96.01    Class 1 obesity due to excess calories with body mass index (BMI) of 31.0 to 31.9 in adult E66.09, Z68.31    Peripherally inserted central catheter (PICC) in place Z45.2    Asymptomatic bacteriuria R82.71    Endotracheally intubated Z97.8    On mechanically assisted ventilation (HCC) Z99.11       Isolation/Infection:   Isolation          Droplet Plus        Patient Infection Status     Infection Onset Added Last Indicated Last Indicated By Review Planned Expiration Resolved Resolved By    COVID-19 06/24/20 06/25/20 06/24/20 COVID-19  08/19/20      Resolved    COVID-19 Rule Out 06/24/20 06/24/20 06/24/20 COVID-19 (Ordered)   06/25/20 Rule-Out Test Resulted          Nurse Assessment:  Last Vital Signs: BP (!) 148/64   Pulse 90   Temp 99.9 °F (37.7 °C) (Temporal)   Resp 25   Ht 5' 7\" (1.702 m)   Wt 184 lb 15.5 oz (83.9 kg)   SpO2 92%   BMI 28.97 kg/m²     Last documented pain score (0-10 scale): Pain Level: 0  Last Weight:   Wt Readings from Last 1 Encounters:   07/05/20 184 lb 15.5 oz (83.9 kg)     Mental Status:  {IP PT MENTAL STATUS:20030}    IV Access:  {MH LUIZ IV ACCESS:531949084}    Nursing Mobility/ADLs:  Walking   {FREDIS YA ARAO:548145527}  Transfer  {[facility] DME JZQF:384720123}  Bathing  {[facility] DME HQCV:380678086}  Dressing  {[facility] DME HNSX:806392788}  Toileting  {[facility] DME TDZ}  Feeding  {[facility] DME AOXQ:814029930}  Med Admin  {[facility] DME XIAE:429685322}  Med Delivery   { LUIZ MED Delivery:195415202}    Wound Care Documentation and Therapy:  Wound 20 Back Lower;Medial blisters/ pustules scattered over sacral area bilaterally and over mid back (Active)   Wound Image    2020  2:25 AM   Wound Other 2020  8:00 AM   Dressing Status Other (Comment) 2020  8:00 AM   Dressing/Treatment Open to air 2020  8:00 AM   Vy-wound Assessment Clean;Dry; Intact 2020  8:00 AM   Number of days: 0        Elimination:  Continence:   · Bowel: {YES / UX:27022}  · Bladder: {YES / UB:94250}  Urinary Catheter: {Urinary Catheter:818247818}   Colostomy/Ileostomy/Ileal Conduit: {YES / NS:24941}       Date of Last BM: ***    Intake/Output Summary (Last 24 hours) at 2020 1104  Last data filed at 2020 1000  Gross per 24 hour   Intake 3733.04 ml   Output 4750 ml   Net -1016.96 ml     I/O last 3 completed shifts:   In: 3458 [I.V.:669; NG/GT:2044; IV Piggyback:574]  Out: 5356 [Urine:3750]    Safety Concerns:     [employer] Safety Concerns:556422246}    Impairments/Disabilities:      [employer] Playbook Impairments/Disabilities:437951966}    Nutrition Therapy:  Current Nutrition Therapy:   [employer] Diet List:184019396}    Routes of Feeding: {[facility] DME Other Feedings:438780654}  Liquids: {Slp liquid thickness:04122}  Daily Fluid Restriction: {[facility]P DME Yes amt example:804697441}  Last Modified Barium Swallow with Video (Video Swallowing Test): {Done Not Done TDUE:447975500}    Treatments at the Time of Hospital Discharge:   Respiratory Treatments: ***  Oxygen Therapy:  {Therapy; copd oxygen:13766}  Ventilator:    {PRADIP ADAMS Vent YPHX:027866533}    Rehab Therapies: {THERAPEUTIC INTERVENTION:4165996769}  Weight Bearing Status/Restrictions: {PRADIP ADAMS Weight Bearin}  Other Medical Equipment (for information only, NOT a DME order):  {EQUIPMENT:775644847}  Other Treatments: ***    Patient's personal belongings (please select all that are sent with patient):  {[facility] DME Belongings:876825282}    RN SIGNATURE:  {Esignature:192006420}    CASE MANAGEMENT/SOCIAL WORK SECTION    Inpatient Status Date: 06/24/20    Readmission Risk Assessment Score:  Readmission Risk              Risk of Unplanned Readmission:        22           Discharging to Facility/ Agency   · Name:[facility]   · Address: [address]  · Phone:[phone]  · Fax:      / signature: Electronically signed by Eugenie Medeiros RN on 7/20/20 at 12:50 PM EDT    PHYSICIAN SECTION    Prognosis: Fair    Condition at Discharge: Stable    Rehab Potential (if transferring to Rehab): Fair    Recommended Labs or Other Treatments After Discharge: ***    Physician Certification: I certify the above information and transfer of Eusebio Springer  is necessary for the continuing treatment of the diagnosis listed and that he requires [facility] for less 30 days.      Update Admission H&P: {[facility] DME Changes in [facility]:744777289}    PHYSICIAN SIGNATURE:  Electronically signed by Keke Myers MD on 7/20/20 at 1:15 PM EDT

## 2020-07-06 NOTE — PROGRESS NOTES
4 Eyes Skin Assessment     The patient is being assess for   Shift Handoff    I agree that 2 RN's have performed a thorough Head to Toe Skin Assessment on the patient. ALL assessment sites listed below have been assessed. Areas assessed by both nurses:   [x]   Head, Face, and Ears   [x]   Shoulders, Back, and Chest, Abdomen  [x]   Arms, Elbows, and Hands   [x]   Coccyx, Sacrum, and Ischium  [x]   Legs, Feet, and Heels        Large pustule/ blister type rash across sacral area and mid back. **SHARE this note so that the co-signing nurse is able to place an eSignature**    Co-signer eSignature: {Esignature:[id number]}    Does the Patient have Skin Breakdown?   No          Cliff Prevention initiated:  Yes   Wound Care Orders initiated:  No      WOC nurse consulted for Pressure Injury (Stage 3,4, Unstageable, DTI, NWPT, Complex wounds)and New or Established Ostomies:  No    Primary Nurse eSignature: Electronically signed by Daniel Damico RN on 7/6/20 at 7:23 AM EDT

## 2020-07-06 NOTE — PROGRESS NOTES
Spoke with Dr. Kari Weeks (ID) and he stated that in regards to the new shingles on his back that he wants to start acyclovir and maintain the patient on COVID 19 precautions and there is no need to cover up the sites as long as he remains in isolation and to obtain a urine culture. He stated that he is discontinuing the actemra drug related to possible immune suppression which would not be helpful with shingles. Orders were readback and verified.

## 2020-07-06 NOTE — PROGRESS NOTES
07/06/20 0322   Vent Information   Vent Type 840   Vent Mode AC/VC   Vt Ordered 430 mL   Rate Set 16 bmp   Peak Flow 80 L/min   Pressure Support 0 cmH20   FiO2  60 %   SpO2 93 %   SpO2/FiO2 ratio 155   Sensitivity 3   PEEP/CPAP 12   I Time/ I Time % 0 s   Humidification Source Heated wire   Humidification Temp Measured 37   Circuit Condensation Drained   Vent Patient Data   High Peep/I Pressure 0   Peak Inspiratory Pressure 30 cmH2O   Mean Airway Pressure 17 cmH20   Rate Measured 18 br/min   Vt Exhaled 462 mL   Minute Volume 8.22 Liters   I:E Ratio 1:3.30   Cough/Sputum   Sputum How Obtained Endotracheal   Cough Non-productive   Sputum Amount None   Spontaneous Breathing Trial (SBT) RT Doc   Pulse 81   Breath Sounds   Right Upper Lobe Diminished   Right Middle Lobe Diminished   Right Lower Lobe Diminished   Left Upper Lobe Diminished   Left Lower Lobe Diminished   Additional Respiratory  Assessments   Resp 19   Alarm Settings   High Pressure Alarm 50 cmH2O   Low Minute Volume Alarm 5 L/min   Apnea (secs) 20 secs   High Respiratory Rate 40 br/min   Low Exhaled Vt  350 mL   Patient Observation   Observations 8ett 23 at lip

## 2020-07-06 NOTE — PROGRESS NOTES
07/06/20 1202   Vent Information   Skin Assessment Clean, dry, & intact   Vent Type 840   Vent Mode AC/VC   Vt Ordered 430 mL   Rate Set 16 bmp   Peak Flow 70 L/min   Pressure Support 0 cmH20   FiO2  75 %   SpO2 94 %   SpO2/FiO2 ratio 125.33   Sensitivity 3   PEEP/CPAP 14   I Time/ I Time % 0 s   Humidification Source Heated wire   Humidification Temp 37   Humidification Temp Measured 36   Circuit Condensation Drained   Vent Patient Data   High Peep/I Pressure 0   Peak Inspiratory Pressure 24 cmH2O   Mean Airway Pressure 18 cmH20   Rate Measured 23 br/min   Vt Exhaled 529 mL   Minute Volume 9.98 Liters   I:E Ratio 1:1.50   Plateau Pressure 27 RHJ75   Static Compliance 24 mL/cmH2O   Dynamic Compliance 27 mL/cmH2O   Cough/Sputum   Sputum How Obtained Endotracheal   Cough Productive   Sputum Amount Moderate   Spontaneous Breathing Trial (SBT) RT Doc   Pulse 91   Additional Respiratory  Assessments   Resp 16   Position Semi-Harris's   Oral Care Completed? Yes   Alarm Settings   High Pressure Alarm 45 cmH2O   Low Minute Volume Alarm 5 L/min   Apnea (secs) 20 secs   High Respiratory Rate 40 br/min   Low Exhaled Vt  350 mL   ETT (adult)   Placement Date/Time: 06/29/20 0230   Preoxygenation: Yes  Mask Ventilation: Ventilated by mask (1)  Technique: Video laryngoscopy  Type: Cuffed  Tube Size: 8 mm  Laryngoscope: GlideScope  Blade Size: 4  Location: Oral  Insertion attempts: 1  Placement. ..    Secured at 23 cm   Measured From [address] [place] [address],[address] By Commercial tube leblanc   Site Condition Dry

## 2020-07-07 ENCOUNTER — APPOINTMENT (OUTPATIENT)
Dept: GENERAL RADIOLOGY | Age: 85
DRG: 870 | End: 2020-07-07
Payer: MEDICARE

## 2020-07-07 LAB
ALBUMIN SERPL-MCNC: 2.5 G/DL (ref 3.4–5)
ALP BLD-CCNC: 51 U/L (ref 40–129)
ALT SERPL-CCNC: 32 U/L (ref 10–40)
ANION GAP SERPL CALCULATED.3IONS-SCNC: 9 MMOL/L (ref 3–16)
ANISOCYTOSIS: ABNORMAL
AST SERPL-CCNC: 48 U/L (ref 15–37)
ATYPICAL LYMPHOCYTE RELATIVE PERCENT: 2 % (ref 0–6)
BANDED NEUTROPHILS RELATIVE PERCENT: 2 % (ref 0–7)
BASE EXCESS ARTERIAL: 10.4 MMOL/L (ref -3–3)
BASOPHILS ABSOLUTE: 0 K/UL (ref 0–0.2)
BASOPHILS RELATIVE PERCENT: 0 %
BILIRUB SERPL-MCNC: 0.6 MG/DL (ref 0–1)
BILIRUBIN DIRECT: 0.3 MG/DL (ref 0–0.3)
BILIRUBIN, INDIRECT: 0.3 MG/DL (ref 0–1)
BLOOD CULTURE, ROUTINE: NORMAL
BUN BLDV-MCNC: 64 MG/DL (ref 7–20)
CALCIUM SERPL-MCNC: 7.5 MG/DL (ref 8.3–10.6)
CARBOXYHEMOGLOBIN ARTERIAL: 0.2 % (ref 0–1.5)
CHLORIDE BLD-SCNC: 92 MMOL/L (ref 99–110)
CO2: 34 MMOL/L (ref 21–32)
CREAT SERPL-MCNC: 1.2 MG/DL (ref 0.8–1.3)
CULTURE, BLOOD 3: NORMAL
D DIMER: 752 NG/ML DDU (ref 0–229)
EOSINOPHILS ABSOLUTE: 0.4 K/UL (ref 0–0.6)
EOSINOPHILS RELATIVE PERCENT: 3 %
GFR AFRICAN AMERICAN: >60
GFR NON-AFRICAN AMERICAN: 58
GLUCOSE BLD-MCNC: 113 MG/DL (ref 70–99)
GLUCOSE BLD-MCNC: 133 MG/DL (ref 70–99)
GLUCOSE BLD-MCNC: 136 MG/DL (ref 70–99)
GLUCOSE BLD-MCNC: 142 MG/DL (ref 70–99)
GLUCOSE BLD-MCNC: 163 MG/DL (ref 70–99)
GLUCOSE BLD-MCNC: 171 MG/DL (ref 70–99)
GLUCOSE BLD-MCNC: 174 MG/DL (ref 70–99)
GLUCOSE BLD-MCNC: 218 MG/DL (ref 70–99)
HCO3 ARTERIAL: 36.1 MMOL/L (ref 21–29)
HCT VFR BLD CALC: 37.5 % (ref 40.5–52.5)
HEMOGLOBIN, ART, EXTENDED: 12.9 G/DL (ref 13.5–17.5)
HEMOGLOBIN: 12.1 G/DL (ref 13.5–17.5)
HYPOCHROMIA: ABNORMAL
INR BLD: 1.16 (ref 0.86–1.14)
LYMPHOCYTES ABSOLUTE: 0.7 K/UL (ref 1–5.1)
LYMPHOCYTES RELATIVE PERCENT: 3 %
MCH RBC QN AUTO: 28.6 PG (ref 26–34)
MCHC RBC AUTO-ENTMCNC: 32.3 G/DL (ref 31–36)
MCV RBC AUTO: 88.4 FL (ref 80–100)
METHEMOGLOBIN ARTERIAL: 0.3 %
MONOCYTES ABSOLUTE: 1.4 K/UL (ref 0–1.3)
MONOCYTES RELATIVE PERCENT: 10 %
NEUTROPHILS ABSOLUTE: 11.8 K/UL (ref 1.7–7.7)
NEUTROPHILS RELATIVE PERCENT: 80 %
O2 CONTENT ARTERIAL: 18 ML/DL
O2 SAT, ARTERIAL: 98.9 %
O2 THERAPY: ABNORMAL
PCO2 ARTERIAL: 52.9 MMHG (ref 35–45)
PDW BLD-RTO: 14.7 % (ref 12.4–15.4)
PERFORMED ON: ABNORMAL
PH ARTERIAL: 7.45 (ref 7.35–7.45)
PLATELET # BLD: 193 K/UL (ref 135–450)
PLATELET SLIDE REVIEW: ADEQUATE
PMV BLD AUTO: 9.1 FL (ref 5–10.5)
PO2 ARTERIAL: 143.7 MMHG (ref 75–108)
POIKILOCYTES: ABNORMAL
POLYCHROMASIA: ABNORMAL
POTASSIUM SERPL-SCNC: 4.2 MMOL/L (ref 3.5–5.1)
PROCALCITONIN: 0.34 NG/ML (ref 0–0.15)
PROTHROMBIN TIME: 13.5 SEC (ref 10–13.2)
RBC # BLD: 4.24 M/UL (ref 4.2–5.9)
SLIDE REVIEW: ABNORMAL
SODIUM BLD-SCNC: 135 MMOL/L (ref 136–145)
TCO2 ARTERIAL: 37.7 MMOL/L
TOTAL CK: 269 U/L (ref 39–308)
TOTAL PROTEIN: 5.4 G/DL (ref 6.4–8.2)
TRIGL SERPL-MCNC: 1127 MG/DL (ref 0–150)
WBC # BLD: 14.4 K/UL (ref 4–11)

## 2020-07-07 PROCEDURE — 2000000000 HC ICU R&B

## 2020-07-07 PROCEDURE — 6360000002 HC RX W HCPCS: Performed by: INTERNAL MEDICINE

## 2020-07-07 PROCEDURE — 84478 ASSAY OF TRIGLYCERIDES: CPT

## 2020-07-07 PROCEDURE — 85025 COMPLETE CBC W/AUTO DIFF WBC: CPT

## 2020-07-07 PROCEDURE — 85610 PROTHROMBIN TIME: CPT

## 2020-07-07 PROCEDURE — 6370000000 HC RX 637 (ALT 250 FOR IP): Performed by: INTERNAL MEDICINE

## 2020-07-07 PROCEDURE — 94640 AIRWAY INHALATION TREATMENT: CPT

## 2020-07-07 PROCEDURE — 2580000003 HC RX 258: Performed by: INTERNAL MEDICINE

## 2020-07-07 PROCEDURE — 99291 CRITICAL CARE FIRST HOUR: CPT | Performed by: INTERNAL MEDICINE

## 2020-07-07 PROCEDURE — 71045 X-RAY EXAM CHEST 1 VIEW: CPT

## 2020-07-07 PROCEDURE — 87086 URINE CULTURE/COLONY COUNT: CPT

## 2020-07-07 PROCEDURE — 84145 PROCALCITONIN (PCT): CPT

## 2020-07-07 PROCEDURE — 94750 HC PULMONARY COMPLIANCE STUDY: CPT

## 2020-07-07 PROCEDURE — 87040 BLOOD CULTURE FOR BACTERIA: CPT

## 2020-07-07 PROCEDURE — 80076 HEPATIC FUNCTION PANEL: CPT

## 2020-07-07 PROCEDURE — 82803 BLOOD GASES ANY COMBINATION: CPT

## 2020-07-07 PROCEDURE — 0BH17EZ INSERTION OF ENDOTRACHEAL AIRWAY INTO TRACHEA, VIA NATURAL OR ARTIFICIAL OPENING: ICD-10-PCS | Performed by: INTERNAL MEDICINE

## 2020-07-07 PROCEDURE — 2700000000 HC OXYGEN THERAPY PER DAY

## 2020-07-07 PROCEDURE — 87070 CULTURE OTHR SPECIMN AEROBIC: CPT

## 2020-07-07 PROCEDURE — C9113 INJ PANTOPRAZOLE SODIUM, VIA: HCPCS | Performed by: INTERNAL MEDICINE

## 2020-07-07 PROCEDURE — 85379 FIBRIN DEGRADATION QUANT: CPT

## 2020-07-07 PROCEDURE — 87205 SMEAR GRAM STAIN: CPT

## 2020-07-07 PROCEDURE — 80048 BASIC METABOLIC PNL TOTAL CA: CPT

## 2020-07-07 PROCEDURE — 82550 ASSAY OF CK (CPK): CPT

## 2020-07-07 PROCEDURE — 94761 N-INVAS EAR/PLS OXIMETRY MLT: CPT

## 2020-07-07 PROCEDURE — 99233 SBSQ HOSP IP/OBS HIGH 50: CPT | Performed by: INTERNAL MEDICINE

## 2020-07-07 PROCEDURE — 94003 VENT MGMT INPAT SUBQ DAY: CPT

## 2020-07-07 PROCEDURE — 36415 COLL VENOUS BLD VENIPUNCTURE: CPT

## 2020-07-07 RX ORDER — DIPHENHYDRAMINE HYDROCHLORIDE 50 MG/ML
50 INJECTION INTRAMUSCULAR; INTRAVENOUS ONCE
Status: COMPLETED | OUTPATIENT
Start: 2020-07-07 | End: 2020-07-07

## 2020-07-07 RX ORDER — SODIUM CHLORIDE 9 MG/ML
INJECTION, SOLUTION INTRAVENOUS
Status: DISPENSED
Start: 2020-07-07 | End: 2020-07-08

## 2020-07-07 RX ORDER — FUROSEMIDE 10 MG/ML
40 INJECTION INTRAMUSCULAR; INTRAVENOUS DAILY
Status: DISCONTINUED | OUTPATIENT
Start: 2020-07-08 | End: 2020-07-08

## 2020-07-07 RX ORDER — SODIUM CHLORIDE 0.9 % (FLUSH) 0.9 %
SYRINGE (ML) INJECTION
Status: DISPENSED
Start: 2020-07-07 | End: 2020-07-07

## 2020-07-07 RX ORDER — LINEZOLID 2 MG/ML
600 INJECTION, SOLUTION INTRAVENOUS EVERY 12 HOURS
Status: DISCONTINUED | OUTPATIENT
Start: 2020-07-07 | End: 2020-07-14

## 2020-07-07 RX ADMIN — ENOXAPARIN SODIUM 40 MG: 40 INJECTION SUBCUTANEOUS at 08:21

## 2020-07-07 RX ADMIN — PROPOFOL 40 MCG/KG/MIN: 10 INJECTION, EMULSION INTRAVENOUS at 04:24

## 2020-07-07 RX ADMIN — IPRATROPIUM BROMIDE AND ALBUTEROL SULFATE 1 AMPULE: .5; 3 SOLUTION RESPIRATORY (INHALATION) at 11:55

## 2020-07-07 RX ADMIN — PANTOPRAZOLE SODIUM 40 MG: 40 INJECTION, POWDER, FOR SOLUTION INTRAVENOUS at 08:22

## 2020-07-07 RX ADMIN — DEXAMETHASONE SODIUM PHOSPHATE 6 MG: 4 INJECTION, SOLUTION INTRAMUSCULAR; INTRAVENOUS at 08:21

## 2020-07-07 RX ADMIN — ENOXAPARIN SODIUM 40 MG: 40 INJECTION SUBCUTANEOUS at 20:50

## 2020-07-07 RX ADMIN — KETOTIFEN FUMARATE 1 DROP: 0.35 SOLUTION/ DROPS OPHTHALMIC at 08:24

## 2020-07-07 RX ADMIN — CEFEPIME 1 G: 1 INJECTION, POWDER, FOR SOLUTION INTRAMUSCULAR; INTRAVENOUS at 20:50

## 2020-07-07 RX ADMIN — IPRATROPIUM BROMIDE AND ALBUTEROL SULFATE 1 AMPULE: .5; 3 SOLUTION RESPIRATORY (INHALATION) at 15:20

## 2020-07-07 RX ADMIN — KETOTIFEN FUMARATE 1 DROP: 0.35 SOLUTION/ DROPS OPHTHALMIC at 21:01

## 2020-07-07 RX ADMIN — FENTANYL CITRATE 105 MCG/HR: 50 INJECTION, SOLUTION INTRAMUSCULAR; INTRAVENOUS at 13:59

## 2020-07-07 RX ADMIN — ACETAMINOPHEN 650 MG: 325 TABLET ORAL at 17:47

## 2020-07-07 RX ADMIN — CHLORHEXIDINE GLUCONATE 0.12% ORAL RINSE 15 ML: 1.2 LIQUID ORAL at 08:21

## 2020-07-07 RX ADMIN — IPRATROPIUM BROMIDE AND ALBUTEROL SULFATE 1 AMPULE: .5; 3 SOLUTION RESPIRATORY (INHALATION) at 07:51

## 2020-07-07 RX ADMIN — CHLORHEXIDINE GLUCONATE 0.12% ORAL RINSE 15 ML: 1.2 LIQUID ORAL at 20:50

## 2020-07-07 RX ADMIN — PROPOFOL 40 MCG/KG/MIN: 10 INJECTION, EMULSION INTRAVENOUS at 17:50

## 2020-07-07 RX ADMIN — IPRATROPIUM BROMIDE AND ALBUTEROL SULFATE 1 AMPULE: .5; 3 SOLUTION RESPIRATORY (INHALATION) at 23:02

## 2020-07-07 RX ADMIN — LINEZOLID 600 MG: 600 INJECTION, SOLUTION INTRAVENOUS at 20:58

## 2020-07-07 RX ADMIN — DIPHENHYDRAMINE HYDROCHLORIDE 50 MG: 50 INJECTION, SOLUTION INTRAMUSCULAR; INTRAVENOUS at 06:29

## 2020-07-07 RX ADMIN — IPRATROPIUM BROMIDE AND ALBUTEROL SULFATE 1 AMPULE: .5; 3 SOLUTION RESPIRATORY (INHALATION) at 02:11

## 2020-07-07 RX ADMIN — MAGNESIUM CITRATE 296 ML: 1.75 LIQUID ORAL at 15:36

## 2020-07-07 RX ADMIN — ASPIRIN 81 MG 81 MG: 81 TABLET ORAL at 08:22

## 2020-07-07 RX ADMIN — MIDAZOLAM HYDROCHLORIDE 1 MG/HR: 5 INJECTION, SOLUTION INTRAMUSCULAR; INTRAVENOUS at 19:30

## 2020-07-07 RX ADMIN — PROPOFOL 30 MCG/KG/MIN: 10 INJECTION, EMULSION INTRAVENOUS at 23:29

## 2020-07-07 RX ADMIN — SENNOSIDES AND DOCUSATE SODIUM 2 TABLET: 8.6; 5 TABLET ORAL at 14:10

## 2020-07-07 RX ADMIN — TETRAHYDROZOLINE HCL 1 DROP: 0.05 SOLUTION/ DROPS OPHTHALMIC at 21:01

## 2020-07-07 RX ADMIN — Medication 10 ML: at 21:03

## 2020-07-07 RX ADMIN — ACYCLOVIR SODIUM 750 MG: 500 INJECTION, SOLUTION INTRAVENOUS at 20:42

## 2020-07-07 RX ADMIN — MIDAZOLAM HYDROCHLORIDE 2 MG: 2 INJECTION, SOLUTION INTRAMUSCULAR; INTRAVENOUS at 04:24

## 2020-07-07 RX ADMIN — FUROSEMIDE 40 MG: 10 INJECTION, SOLUTION INTRAMUSCULAR; INTRAVENOUS at 08:21

## 2020-07-07 RX ADMIN — Medication 10 ML: at 08:21

## 2020-07-07 RX ADMIN — IPRATROPIUM BROMIDE AND ALBUTEROL SULFATE 1 AMPULE: .5; 3 SOLUTION RESPIRATORY (INHALATION) at 20:13

## 2020-07-07 RX ADMIN — ROSUVASTATIN CALCIUM 10 MG: 10 TABLET, FILM COATED ORAL at 08:22

## 2020-07-07 RX ADMIN — ACYCLOVIR SODIUM 750 MG: 500 INJECTION, SOLUTION INTRAVENOUS at 15:36

## 2020-07-07 RX ADMIN — FENTANYL CITRATE 125 MCG/HR: 50 INJECTION, SOLUTION INTRAMUSCULAR; INTRAVENOUS at 23:50

## 2020-07-07 ASSESSMENT — PULMONARY FUNCTION TESTS
PIF_VALUE: 36
PIF_VALUE: 37
PIF_VALUE: 12
PIF_VALUE: 33
PIF_VALUE: 36
PIF_VALUE: 28
PIF_VALUE: 30
PIF_VALUE: 28
PIF_VALUE: 40
PIF_VALUE: 39
PIF_VALUE: 27
PIF_VALUE: 28
PIF_VALUE: 32
PIF_VALUE: 39
PIF_VALUE: 28
PIF_VALUE: 44
PIF_VALUE: 31
PIF_VALUE: 35
PIF_VALUE: 33
PIF_VALUE: 36
PIF_VALUE: 11
PIF_VALUE: 27
PIF_VALUE: 27
PIF_VALUE: 41
PIF_VALUE: 25
PIF_VALUE: 26
PIF_VALUE: 27
PIF_VALUE: 32
PIF_VALUE: 24
PIF_VALUE: 31
PIF_VALUE: 30
PIF_VALUE: 29
PIF_VALUE: 32

## 2020-07-07 ASSESSMENT — PAIN SCALES - GENERAL
PAINLEVEL_OUTOF10: 0

## 2020-07-07 NOTE — PROCEDURES
Pt was emergently intubated secondary displacement of existing ETT (cuff above the cords). Pt was is already under sedation. An additional Versed 2 mg was given. Initially, tube cuff was deflated and maneuvered back into place digitally. It was decided that ETT was kinking and had narrowed areas 2/2 wear and it was decided to replaced over bougie. This proved unsuccessful and immediately converted to reintubation w/ Marion Scope. He was intubated in a single attempt using a Glide scope with a size 4 MAC in place. The tip of a size 8 ETT was inserted between the cords and then advanced off of the stylette into the air way to 25 cm from the lip. Cuff was inflated and secured. Good CO2 color change and bilateral breath sounds auscultated. Vent settings provided to respiratory staff. OG remains in place. CXR ordered and reviewed at bedside. ETT acceptably placed per my review. Official review by radiologist is pending. Brief episode of hypoxia down to 70's after intubation. Recovered steadily over several minutes.

## 2020-07-07 NOTE — PROGRESS NOTES
07/06/20 2322   Vent Information   Vent Type 840   Vent Mode AC/VC   Vt Ordered 430 mL   Rate Set 16 bmp   Peak Flow 70 L/min   Pressure Support 0 cmH20   FiO2  60 %   SpO2 95 %   SpO2/FiO2 ratio 158.33   Sensitivity 3   PEEP/CPAP 14   I Time/ I Time % 0 s   Humidification Source Heated wire   Humidification Temp 37   Humidification Temp Measured 36.9   Circuit Condensation Drained   Vent Patient Data   High Peep/I Pressure 0   Peak Inspiratory Pressure 35 cmH2O   Mean Airway Pressure 21 cmH20   Rate Measured 21 br/min   Vt Exhaled 13 mL   Minute Volume 8.83 Liters   I:E Ratio 4.10:1   Plateau Pressure 23 GPC00   Cough/Sputum   Sputum How Obtained Endotracheal;Spontaneous cough   Cough Non-productive   Sputum Amount None   Spontaneous Breathing Trial (SBT) RT Doc   Pulse 83   Breath Sounds   Right Upper Lobe Diminished   Right Middle Lobe Diminished   Right Lower Lobe Diminished   Left Upper Lobe Diminished   Left Lower Lobe Diminished   Additional Respiratory  Assessments   Resp 20   Position Semi-Harris's   Alarm Settings   High Pressure Alarm 63 cmH2O   Low Minute Volume Alarm 5 L/min   Apnea (secs) 20 secs   High Respiratory Rate 40 br/min   Low Exhaled Vt  350 mL   ETT (adult)   Placement Date/Time: 06/29/20 0230   Preoxygenation: Yes  Mask Ventilation: Ventilated by mask (1)  Technique: Video laryngoscopy  Type: Cuffed  Tube Size: 8 mm  Laryngoscope: GlideScope  Blade Size: 4  Location: Oral  Insertion attempts: 1  Placement. ..    Secured at 23 cm   Measured From Lips   ET Placement Left   Secured By Commercial tube leblanc   Site Condition Dry

## 2020-07-07 NOTE — PROGRESS NOTES
curve is trending downwards now. T-max is 99.2 today    Serum creatinine is 1.2. Procalcitonin 0.34. CK is 269. Liver enzymes are okay    Repeat urine culture from straight cath specimen was sent yesterday and is in process    Arterial blood gas from today shows arterial pH of 7.452 with PCO2 of 52.9, PO2 of 143.7 and bicarb of 36.1    Portable chest x-ray from today reviewed. Multifocal lung infiltrates noted again. Has a PICC line in place. Endotracheal tube in place. No obvious pneumothorax. Plan:     Diagnostic Workup:    · Continue to follow  fever curve, WBC count and blood cultures  · Follow up on liver and renal function  · Send tracheal aspirate for culture  · Will order b/l upper extremity dopplers to r/o DVT given the redness and swelling    Antimicrobials:    · Will continue IV acyclovir 10 mg/kg every 8 hour for shingles involving more than 2 dermatomes involving the buttock area. Lesions are starting to crust per RN  · Continue to watch for shingles area rash or any signs of secondary bacterial infection  · It is unclear if patient aspirated during the reintubation process. If starts having more fevers, will consider starting empiric IV Zosyn on meropenem  · Reportedly developing a rash on his arms. Pictures reveiwed. Watch for now. R/o upper extremity DVT  · We will continue IV remdesivir 100 mg every 24 hour. Today is day 10. Plan to stop it after the 10th dose today  · Continue to monitor liver and renal function closely while on remdesivir  · Endotracheal tube care and pulmonology  · Ventilator support to maintain oxygen saturation above 92%  · Pressure ulcer prevention precaution  · Fall precaution  · High risk of complications. Continue close monitoring in the COVID 19 ICU.   Overall prognosis remains guarded  · Discussed with ICU RN and Dr. Romero December      Drug Monitoring:    · Continue monitoring for antibiotic toxicity as follows: CBC, CMP  · Continue to watch for following: new or GASTROINTESTINAL ENDOSCOPY  07/12/14    Minimal chronic gastritis       Family History: All family history was reviewed today. Problem Relation Age of Onset    Cancer Neg Hx        Objective:       PHYSICAL EXAM:      Vitals:   Vitals:    07/07/20 0900 07/07/20 0942 07/07/20 1000 07/07/20 1100   BP: (!) 114/46  115/60 114/60   Pulse: 99 95 95 92   Resp: 17 21 26 22   Temp:    99.2 °F (37.3 °C)   TempSrc:    Temporal   SpO2: 95% 95% 95% 96%   Weight:       Height:           Physical Exam      PHYSICAL EXAM:     In-person bedside physical examination deferred. Pursuant to the emergency declaration under the [address] [place] [address] and the [employer] Act, this clinical encounter was conducted to provide necessary medical care. (Also consistent with new provisions and guidance offered by [facility] on March 18, 2020 in setting of COVID 19 outbreak and in order to preserve personal protective equipment in accordance with the flexibilities announced by CMS on March 30, 2020)   References: https://[facility]/Portals/0/Resources/COVID-19/3_18%20Telemed%20Guidance%20Updated%20March%2018. pdf?[email or web address]                      https://[facility]/Portals/0/Resources/COVID-19/3_18%20Telemed%20Guidance%20Updated%20March%2018. pdf?[email or web address]                      [email or web address] pdf                            General: intubated and sedated, on ventilator, vitals reviewed   HEENT: endotracheal tube in place   Cardiovascular: Telemetry data reviewed, rest deferred   Pulmonary: deferred  Abdomen/GI: deferred  Neuro: deferred  Skin: Bilateral upper extremity redness and swelling, pictures reviewed  Musculoskeletal:  deferred  Genitourinary: Deferred  Psych: deferred  Lymphatic/Immunologic: deferred                 Intake and ondansetron, sodium chloride flush, acetaminophen **OR** acetaminophen      Problem list:       Patient Active Problem List   Diagnosis Code    Multifocal pneumonia J18.9    Orthostasis I95.1    COVID-19 U07.1    Sepsis ([facility] [address] A41.9    Urinary tract infection without hematuria N39.0    ARDS (adult respiratory distress syndrome) ([facility] [address] J80    Hyperlipidemia E78.5    Acute respiratory failure with hypoxia (HCC) J96.01    Class 1 obesity due to excess calories with body mass index (BMI) of 31.0 to 31.9 in adult E66.09, Z68.31    Peripherally inserted central catheter (PICC) in place Z45.2    Asymptomatic bacteriuria R82.71    Endotracheally intubated Z97.8    On mechanically assisted ventilation ([facility] [address] Z99.11    Herpes zoster without complication X53.4       Please note that this chart was generated using Dragon dictation software. Although every effort was made to ensure the accuracy of this automated transcription, some errors in transcription may have occurred inadvertently. If you may need any clarification, please do not hesitate to contact me through EPIC or at the phone number provided below with my electronic signature. Any pictures or media included in this note were obtained after taking informed verbal consent from the patient and with their approval to include those in the patient's medical record.     Escobar Berry MD, MPH  7/7/2020 , 11:57 AM   [facility]   Office: [phone]  Fax: [phone]  Tuesday AM clinic:   [address] [place] [address], [facility] [address]  Thursday AM [facility] [facility]

## 2020-07-07 NOTE — PROGRESS NOTES
Spoke with infectious disease, Dr. Robi Arreaga regarding the patient's condition. Dr. Robi Arreaga stated to stop the remdesivir after today's dose and take a picture of rash on arms and groin and make available in the media. Also, in regards to ETT replacement monitor for signs of aspiration. Orders were readback and verified.

## 2020-07-07 NOTE — PROGRESS NOTES
Spoke with [facility] Pharmacist and Dr Radha Mckeon concerning rash over arms and trunk of body. Benedryl 50 mg IVP given X 1 for rash.

## 2020-07-07 NOTE — PROGRESS NOTES
Family consented to proning the patient. I spoke with Dr. Dave Mendieta regarding the consent but informed him that I gave the patient magnesium citrate and the patient is satting 96-97% currently. Dr. Dave Mendieta stated that he would hold the proning for today and will address tomorrow.

## 2020-07-07 NOTE — PROGRESS NOTES
Peep increased to 16cwp         07/07/20 1542   Vent Information   Vt Ordered 430 mL   Rate Set 16 bmp   Peak Flow 70 L/min   Pressure Support 0 cmH20   FiO2  65 %   SpO2 95 %   SpO2/FiO2 ratio 146.15   Sensitivity 2   PEEP/CPAP 14   I Time/ I Time % 0 s   Vent Patient Data   High Peep/I Pressure 0   Peak Inspiratory Pressure 28 cmH2O   Mean Airway Pressure 20 cmH20   Rate Measured 19 br/min   Vt Exhaled 817 mL   Minute Volume 9.75 Liters   I:E Ratio 1:2.80   Spontaneous Breathing Trial (SBT) RT Doc   Pulse 90   Additional Respiratory  Assessments   Resp 22   Alarm Settings   High Pressure Alarm 50 cmH2O   Low Minute Volume Alarm 5 L/min   High Respiratory Rate 40 br/min

## 2020-07-07 NOTE — PROGRESS NOTES
FiO2 decreased to 60%             07/07/20 1642   Vent Information   Vt Ordered 430 mL   Rate Set 16 bmp   Peak Flow 75 L/min   Pressure Support 0 cmH20   FiO2  60 %   SpO2 95 %   SpO2/FiO2 ratio 158.33   Sensitivity 2   PEEP/CPAP 28   I Time/ I Time % 0 s   Vent Patient Data   High Peep/I Pressure 0   Peak Inspiratory Pressure 44 cmH2O   Mean Airway Pressure 29 cmH20   Rate Measured 18 br/min   Vt Exhaled 428 mL   Minute Volume 6.39 Liters   I:E Ratio 1:3.10   Spontaneous Breathing Trial (SBT) RT Doc   Pulse 96   Additional Respiratory  Assessments   Resp 23   Alarm Settings   High Pressure Alarm 68 cmH2O   Low Minute Volume Alarm 5 L/min   High Respiratory Rate 40 br/min

## 2020-07-07 NOTE — PROGRESS NOTES
Internal Medicine ICU Progress Note      Interval History:     Patient admitted with Jeffrey Pear -19. On droplet plus precautions. Pt w/ persistent high fevers, progressive hypoxia. Initiated on Remdesivir, dexamethasone. Intubated for worsening resp failure on 20. Requiring HF O2 . Had high fevers w/ T max of 104 F    20: S/P Convalescent plasma transfusion 2 units     7/3/20: fevers improved, hypoxia improving     20  - Mostly afebrile T-max 99 °F.   - Remains on mechanical vent . - Positive fluid balance of 10 L,  getting IV Lasix  - Hypoxia is improving , FiO2 now down to 60 %,  PEEP is down to 12     20:   -Worsening hypoxia  -FiO2 increased to 80% this AM, now down to 70%. PEEP up to 14.   Has developed new shingles, started on acyclovir  Low-grade fevers, temps up to 100.7 °F    20: he is doing about the same  - low-grade fevers  - persistent hypoxia  Remains on FiO2 75% PEEP of 14  ET tube was changed yesterday      On droplet plus precautions    Invasive Lines: PICC placed on 2020      MV: Intubated on 2020    Recent Labs     20  0520 20  0520   PHART 7.477* 7.452*   YBC9MOK 43.3 52.9*   PO2ART 73.8* 143.7*       MV Settings:  Vent Mode: AC/VC Rate Set: 16 bmp/Vt Ordered: 430 mL/ /FiO2 : 70 %    IV:   dextrose      propofol 40 mcg/kg/min (20 1754)    fentaNYL (SUBLIMAZE) infusion 105 mcg/hr (20 1359)       Vitals:  Temp  Av.2 °F (37.3 °C)  Min: 98.1 °F (36.7 °C)  Max: 100.6 °F (38.1 °C)  Pulse  Av.4  Min: 83  Max: 104  BP  Min: 105/85  Max: 138/67  SpO2  Av.8 %  Min: 79 %  Max: 97 %  FiO2   Av.8 %  Min: 60 %  Max: 100 %  Patient Vitals for the past 4 hrs:   BP Temp Temp src Pulse Resp SpO2   20 1300 109/62 -- -- 96 17 96 %   20 1200 (!) 116/55 -- -- 92 24 97 %   20 1100 114/60 99.2 °F (37.3 °C) Temporal 92 22 96 %       CVP:        Intake/Output Summary (Last 24 hours) at 2020 1406  Last data filed at 7/7/2020 1000  Gross per 24 hour   Intake 2842.46 ml   Output 1625 ml   Net 1217.46 ml       Physical Exam:  General:  Pt is intubated, sedated, on mechanical vent. Skin:  Warm and dry  Neck:  JVD absent. Neck supple  Chest: Diminished breath sounds . No wheezes or rhonchi   Cardiovascular:  RRR ,S1S2 normal  Abdomen:  Soft, non tender, non distended, BS +  Extremities:  trace edema. Intact peripheral pulses.  Brisk cap refill, < 2 secs  Neuro: non focal                    Medications:  Scheduled Meds:   sodium chloride flush        acyclovir  10 mg/kg (Adjusted) Intravenous Q8H    dexamethasone  6 mg Intravenous Daily    furosemide  40 mg Intravenous BID    insulin lispro  0-12 Units Subcutaneous Q4H    chlorhexidine  15 mL Mouth/Throat BID    pantoprazole  40 mg Intravenous Daily    ipratropium-albuterol  1 ampule Inhalation Q4H    remdesivir IVPB  100 mg Intravenous Q24H    ketotifen  1 drop Both Eyes BID    enoxaparin  40 mg Subcutaneous BID    rosuvastatin  10 mg Oral Daily    aspirin  81 mg Oral Daily    sodium chloride flush  10 mL Intravenous 2 times per day       PRN Meds:  sennosides-docusate sodium, glucose, dextrose, glucagon (rDNA), dextrose, fentanNYL, midazolam, tetrahydrozoline, aluminum & magnesium hydroxide-simethicone, ondansetron, sodium chloride flush, acetaminophen **OR** acetaminophen    Results:  CBC:   Recent Labs     07/05/20  0510 07/06/20  0506 07/07/20  0500   WBC 14.3* 14.0* 14.4*   HGB 12.3* 12.3* 12.1*   HCT 38.5* 38.2* 37.5*   MCV 86.9 88.1 88.4    229 193     BMP:   Recent Labs     07/05/20  0510 07/06/20  0506 07/07/20  0845    142 135*   K 4.0 3.8 4.2   CL 98* 96* 92*   CO2 34* 36* 34*   PHOS 2.1* 2.8  --    BUN 54* 59* 64*   CREATININE 1.1 1.0 1.2     LIVER PROFILE:   Recent Labs     07/05/20  0510 07/06/20  0506 07/07/20  0500   AST 57* 72* 48*   ALT 29 40 32   BILIDIR <0.2 <0.2 0.3   BILITOT 0.4 0.4 0.6   ALKPHOS 57 56 51     PT/INR:   Recent Labs 07/05/20  0510 07/06/20  0506 07/07/20  0500   PROTIME 12.9 12.9 13.5*   INR 1.11 1.11 1.16*     Results for NEWTON BOWENS (MRN [id number]) as of 7/1/2020 09:46   Ref. Range 6/24/2020 21:35 6/26/2020 15:24 6/30/2020 04:20   D-Dimer, Quant Latest Ref Range: 0 - 229 ng/mL  (H) 682 (H) 653 (H)     Results for Felipe Gross T (MRN [id number]) as of 7/1/2020 09:46   Ref. Range 6/24/2020 21:35   Ferritin Latest Ref Range: 30.0 - 400.0 ng/mL 97.3       Cultures:  Results for Lydia BOWENS (MRN [id number]) as of 6/30/2020 09:20   Ref. Range 6/24/2020 22:00   SARS-CoV-2, PCR Latest Ref Range: Not Detected  DETECTED (A)     Susceptibility     Staphylococcus epidermidis (1)     Antibiotic Interpretation TARA Status    ciprofloxacin Sensitive <=0.5 mcg/mL     nitrofurantoin Sensitive <=16 mcg/mL     oxacillin Sensitive <=0.25 mcg/mL     tetracycline Sensitive <=1 mcg/mL     trimethoprim-sulfamethoxazole Sensitive <=10 mcg/mL           Films:    XR CHEST PORTABLE   Final Result   The endotracheal tube tip is approximately 3 cm above the thai. XR CHEST PORTABLE   Final Result   Mild increase in the amount of bibasilar opacity since yesterday. Large   bilateral pneumonias. XR CHEST PORTABLE   Final Result   Stable bilateral pulmonary opacities. XR CHEST PORTABLE   Final Result   Minimal increase in the amount of opacity in each lung consistent with slight   worsening of bilateral pneumonia. XR CHEST PORTABLE   Final Result   Supportive tubing is in normal position. Low lung volume study, with stable alveolar opacities in the mid and lower   lungs, pneumonia versus atelectasis. XR CHEST PORTABLE   Final Result   Increased in degree and extent of bilateral mid and lower lung pneumonia. The increased may be partially accentuated by low lung volumes. XR CHEST 1 VW   Final Result   Persistent bilateral airspace disease, similar to prior.          IR PICC WO SQ monitor liver and renal fxn  - He is on Decadron 6 mg IV daily.  - Remdesivir, day 10 of 10  - S/P transfusion of 2 units of convalescent plasma on 7/2/2020    Sepsis  - POA, due to PNA, COVID 19  - with leukocytosis, fevers, tachypnea  - COVID-19 detected  - Patient has been weaned off Levophed  - sepsis improving. Multifocal pneumonia  - related to COVID-19.    - Tracheal aspirate sent. - was on  broad-spectrum antibiotics, vanc and  Cefepime--> now off     Shingles  - on buttocks  - start IV Acyclovir D#2 per ID recs    Monitor renal function  - Patient's creatinine is 0.9--> 1.1 >1 > 1.2 today  - on IV Lasix. Elevated D dimer  - due to COVID 19  - CT chest neg for PE    UTI  - cx positive for staph, treated    Hyperlipidemia  - on statin. Lovenox twice daily dosing. DIET TUBE FEED CONTINUOUS/CYCLIC NPO; Semi-elemental (Vital AF 1.2 ); Orogastric; Continuous; 20 (initial ); 60 (goal); 20 (over 20 hours)  Full Code    Remains critically ill.       Leander Liao MD  7/7/2020

## 2020-07-07 NOTE — PROGRESS NOTES
Spoke with Sabiha Cervantes (daugther) and discussed Dr. Froilan Cazares recommendation to prone the patient and to use a paralytic. I gave education regarding the paralytic, increased risk of skin breakdown with proning, and possible improved oxygenation with proning a covid patient. Time for questions given. Lu stated that she would call me back after talk with her family. I informed Dr. Eloise Richardson and will order the roto-prone bed once consent is given.

## 2020-07-07 NOTE — PROGRESS NOTES
Pulmonary & Critical Care Medicine ICU Progress Note      CC: Acute hypoxemic respiratory failure    Events of Last 24 hours:   Patient was reintubated overnight, desaturation to the 70s during reintubation. Fentanyl 105 mcg/hr   Propofol 40 mcg/kg/min   Still requiring PEEP 14 and FiO2 75%   Plateau pressure of 23   PaO2/FiO2 Ratio 191       Invasive Lines: IV: PICC     MV:   Vent Mode: AC/VC Rate Set: 16 bmp/Vt Ordered: 430 mL/ /FiO2 : 100 %  Recent Labs     20   PHART 7.477* 7.452*   RRS5VCG 43.3 52.9*   PO2ART 73.8* 143.7*       IV:   dextrose      propofol 40 mcg/kg/min (20)    fentaNYL (SUBLIMAZE) infusion 105 mcg/hr (20)       Vitals:  Blood pressure 110/61, pulse 94, temperature 98.8 °F (37.1 °C), resp. rate 21, height 5' 7\" (1.702 m), weight 184 lb 15.5 oz (83.9 kg), SpO2 97 %. on AC 16/430/+14 75%   Temp  Av.8 °F (37.7 °C)  Min: 98.8 °F (37.1 °C)  Max: 100.7 °F (38.2 °C)    Intake/Output Summary (Last 24 hours) at 2020 0737  Last data filed at 2020 0600  Gross per 24 hour   Intake 2995.05 ml   Output 2350 ml   Net 645.05 ml     EXAM:  General: ill appearing    Eyes: PERRL. No sclera icterus. No conjunctival injection. ENT: No discharge. Pharynx clear. Neck: Trachea midline. Normal thyroid. Resp: No accessory muscle use. Bilateral crackles. No wheezing. Few rhonchi. No dullness on percussion. CV: Regular rate. Regular rhythm. No mumur or rub. No edema. GI: Non-tender. Non-distended. No masses. No organomegaly. Normal bowel sounds. No hernia. Skin: Warm and dry. No nodule on exposed extremities. Vesicular rash in the buttock area. Petechial rash around the calf pressure area with ecchymosis both arms medially  Lymph: No cervical LAD. No supraclavicular LAD. M/S: No cyanosis. No joint deformity. No clubbing. Neuro: Intubated deeply sedated. No response to painful stimuli.   Psych: No agitation, no anxiety, affect is full     Scheduled Meds:   acyclovir  10 mg/kg (Adjusted) Intravenous Q8H    dexamethasone  6 mg Intravenous Daily    furosemide  40 mg Intravenous BID    insulin lispro  0-12 Units Subcutaneous Q4H    chlorhexidine  15 mL Mouth/Throat BID    pantoprazole  40 mg Intravenous Daily    ipratropium-albuterol  1 ampule Inhalation Q4H    remdesivir IVPB  100 mg Intravenous Q24H    ketotifen  1 drop Both Eyes BID    enoxaparin  40 mg Subcutaneous BID    rosuvastatin  10 mg Oral Daily    aspirin  81 mg Oral Daily    sodium chloride flush  10 mL Intravenous 2 times per day     PRN Meds:  sennosides-docusate sodium, glucose, dextrose, glucagon (rDNA), dextrose, fentanNYL, midazolam, tetrahydrozoline, aluminum & magnesium hydroxide-simethicone, ondansetron, sodium chloride flush, acetaminophen **OR** acetaminophen    Results:  CBC:   Recent Labs     07/05/20  0510 07/06/20  0506 07/07/20  0500   WBC 14.3* 14.0* 14.4*   HGB 12.3* 12.3* 12.1*   HCT 38.5* 38.2* 37.5*   MCV 86.9 88.1 88.4    229 193     BMP:   Recent Labs     07/05/20  0510 07/06/20  0506    142   K 4.0 3.8   CL 98* 96*   CO2 34* 36*   PHOS 2.1* 2.8   BUN 54* 59*   CREATININE 1.1 1.0     LIVER PROFILE:   Recent Labs     07/05/20  0510 07/06/20  0506 07/07/20  0500   AST 57* 72* 48*   ALT 29 40 32   BILIDIR <0.2 <0.2 0.3   BILITOT 0.4 0.4 0.6   ALKPHOS 57 56 51       Cultures:  6/24 Urine Staphylococcus epidermidis  6/24 COVID 19 +  6/29 Trach Asp NRF  7/2 respiratory NRF  7/3 BC NGTD  7/6 UC NGTD  7/7 Resp CX     Films:  CXR 7/7 was reviewed by me and it showed   Bilateral ASD  Satisfactory ETT position   Satisfactory CVC line position           ASSESSMENT:  · Acute hypoxemic respiratory failure  · ARDS  · COVID-19 viral multifocal pneumonia  · ANN  · Elevated d-dimer  · Shingles  · ET tube displacement with emergent reintubation 7/7/2020 and brief hypoxia down to the 70s        PLAN:  Mechanical ventilation as per my orders.  The ventilator was adjusted by me at the bedside for unstable, life threatening respiratory failure  Follow ABG and chest x-ray while on the ventilator  Increase PEEP to 16 then 18 and an effort to lower FiO2  Will consider rotoproning if fails to improve  Supplemental oxygen to maintain SaO2 >92%; wean as tolerated  IV Propofol for sedation, target RASS -2, with daily spontaneous awakening trial   Fentanyl and Versed PRN, gtt as needed  Head of bed 30 degrees or higher at all times  Daily spontaneous breathing trial once PEEP less than 8, FiO2 less than 55%  Closely monitory airways, clinical status, cardiac rhythm, vital signs, and urine output   Inhaled bronchodilators  Completed 5 days of cefepime and 3 days of vancomycin  Decadron 6 mg IV daily D#12  Decrease Lasix 40 mg IV daily given rising creatinine  Remdesevir  #10/10-monitor renal, CBC, hepatic function daily during therapy. Discontinue therapy in patients who develop ALT ? 5x ULN or ALT elevation accompanied by signs or symptoms of liver inflammation or increasing conjugated bilirubin, alkaline phosphatase, or INR. IV acyclovir per ID D#2  Post 2 units of CCP 7/2/2020  Discussed with ID  Check respiratory Cx   Nutrition: Tube feeding  Blood sugar control, with goal 150-180  GI prophylaxis: Pepcid  DVT prophylaxis: Lovenox 40 BID   MRSA prophylaxis: Bactroban   Code status: Full code   D/W Lu, daughter [phone] and updated her on status. Total critical care time caring for this patient with life threatening, unstable organ failure, including direct patient contact, management of life support systems, review of data including imaging and labs, discussions with other team members and physicians is 35 minutes so far today, excluding procedures.

## 2020-07-07 NOTE — PROGRESS NOTES
REmdesivir-DAy 10  Bun/Srcr 64/1.2 CrCl 49ml/min, LFT 32/48  Last dose tonight.   Medina Garcia Pharm D 7/7/20201:29 PM  .

## 2020-07-07 NOTE — PLAN OF CARE
Problem: SAFETY  Goal: Free from accidental physical injury  Outcome: Ongoing     Problem: KNOWLEDGE DEFICIT  Goal: Patient/S.O. demonstrates understanding of disease process, treatment plan, medications, and discharge instructions. Outcome: Ongoing     Problem: Airway Clearance - Ineffective:  Goal: Clear lung sounds  Description: Clear lung sounds  Outcome: Ongoing  Goal: Ability to maintain a clear airway will improve  Description: Ability to maintain a clear airway will improve  Outcome: Ongoing     Problem: Gas Exchange - Impaired:  Goal: Levels of oxygenation will improve  Description: Levels of oxygenation will improve  Outcome: Ongoing     Problem: Hyperthermia:  Goal: Ability to maintain a body temperature in the normal range will improve  Description: Ability to maintain a body temperature in the normal range will improve  Outcome: Ongoing     Problem: Airway Clearance - Ineffective  Goal: Achieve or maintain patent airway  Outcome: Ongoing     Problem: Gas Exchange - Impaired  Goal: Absence of hypoxia  Outcome: Ongoing  Goal: Promote optimal lung function  Outcome: Ongoing     Problem: Breathing Pattern - Ineffective  Goal: Ability to achieve and maintain a regular respiratory rate  Outcome: Ongoing     Problem:  Body Temperature -  Risk of, Imbalanced  Goal: Ability to maintain a body temperature within defined limits  Outcome: Ongoing  Goal: Will regain or maintain usual level of consciousness  Outcome: Ongoing  Goal: Complications related to the disease process, condition or treatment will be avoided or minimized  Outcome: Ongoing     Problem: Isolation Precautions - Risk of Spread of Infection  Goal: Prevent transmission of infection  Outcome: Ongoing     Problem: Nutrition Deficits  Goal: Optimize nutrtional status  Outcome: Ongoing     Problem: Risk for Fluid Volume Deficit  Goal: Maintain normal heart rhythm  Outcome: Ongoing  Goal: Maintain absence of muscle cramping  Outcome: Ongoing  Goal: Maintain normal serum potassium, sodium, calcium, phosphorus, and pH  Outcome: Ongoing     Problem: Loneliness or Risk for Loneliness  Goal: Demonstrate positive use of time alone when socialization is not possible  Outcome: Ongoing     Problem: Fatigue  Goal: Verbalize increase energy and improved vitality  Outcome: Ongoing     Problem: Patient Education: Go to Patient Education Activity  Goal: Patient/Family Education  Outcome: Ongoing     Problem: Restraint Use - Nonviolent/Non-Self-Destructive Behavior:  Goal: Absence of restraint indications  Description: Absence of restraint indications  Outcome: Ongoing  Goal: Absence of restraint-related injury  Description: Absence of restraint-related injury  Outcome: Ongoing     Problem: Nutrition  Goal: Optimal nutrition therapy  Outcome: Ongoing  Goal: Understanding of nutritional guidelines  Outcome: Ongoing     Problem: Skin Integrity:  Goal: Will show no infection signs and symptoms  Description: Will show no infection signs and symptoms  Outcome: Ongoing  Goal: Absence of new skin breakdown  Description: Absence of new skin breakdown  Outcome: Ongoing     Problem: Falls - Risk of:  Goal: Will remain free from falls  Description: Will remain free from falls  Outcome: Ongoing  Goal: Absence of physical injury  Description: Absence of physical injury  Outcome: Ongoing     Problem: Urinary Elimination:  Goal: Signs and symptoms of infection will decrease  Description: Signs and symptoms of infection will decrease  Outcome: Ongoing  Goal: Complications related to the disease process, condition or treatment will be avoided or minimized  Description: Complications related to the disease process, condition or treatment will be avoided or minimized  Outcome: Ongoing     Problem: Pain:  Goal: Pain level will decrease  Description: Pain level will decrease  Outcome: Ongoing  Goal: Control of acute pain  Description: Control of acute pain  Outcome: Ongoing  Goal: Control of

## 2020-07-07 NOTE — PROGRESS NOTES
Pt bathed. Petichial rash noted on both arms and pink rash noted on trunk of body.  Will hold further antibiotics/ antivirals until seen by Dr thelma leon.

## 2020-07-07 NOTE — PROGRESS NOTES
07/06/20 1957   Vent Information   $Ventilation $Subsequent Day   Vent Type 840   Vent Mode AC/VC   Vt Ordered 430 mL   Rate Set 16 bmp   Peak Flow 70 L/min   Pressure Support 0 cmH20   FiO2  70 %   SpO2 97 %   SpO2/FiO2 ratio 138.57   Sensitivity 3   PEEP/CPAP 14   I Time/ I Time % 0 s   Humidification Source Heated wire   Humidification Temp 37   Humidification Temp Measured 36.8   Circuit Condensation Drained   Vent Patient Data   High Peep/I Pressure 0   Peak Inspiratory Pressure 25 cmH2O   Mean Airway Pressure 21 cmH20   Rate Measured 21 br/min   Vt Exhaled 363 mL   Minute Volume 9.75 Liters   I:E Ratio 1:2.80   Plateau Pressure 20 ZBL75   Static Compliance 61 mL/cmH2O   Dynamic Compliance 33 mL/cmH2O   Cough/Sputum   Sputum How Obtained Suctioned;Endotracheal   Cough Productive   Sputum Amount Small   Sputum Color Creamy   Tenacity Thick   Spontaneous Breathing Trial (SBT) RT Doc   Pulse 92   Breath Sounds   Right Upper Lobe Diminished   Right Middle Lobe Diminished   Right Lower Lobe Diminished   Left Upper Lobe Diminished   Left Lower Lobe Diminished   Additional Respiratory  Assessments   Resp 20   Position Semi-Harris's   Alarm Settings   High Pressure Alarm 63 cmH2O   Low Minute Volume Alarm 5 L/min   Apnea (secs) 20 secs   High Respiratory Rate 40 br/min   Low Exhaled Vt  350 mL   ETT (adult)   Placement Date/Time: 06/29/20 0230   Preoxygenation: Yes  Mask Ventilation: Ventilated by mask (1)  Technique: Video laryngoscopy  Type: Cuffed  Tube Size: 8 mm  Laryngoscope: GlideScope  Blade Size: 4  Location: Oral  Insertion attempts: 1  Placement. ..    Secured at 24 cm   Measured From Lips   ET Placement Left   Secured By Commercial tube leblanc   Site Condition Dry

## 2020-07-07 NOTE — PROGRESS NOTES
Spoke with pt's daughter earlier in the evening. Pt turned and repositioned. Pt continues with blistery rash on mid and lower back.

## 2020-07-07 NOTE — PROGRESS NOTES
Pt alarming ventilator. ETT out about 2 cm. CXR done. Dr Markell Ortiz called. ETT balloon above thai. ETT reinserted but very kinked per Dr Markell Ortiz. Decision made to reintubate pt. Attempted to reintubate using Bougie. Attempt failed. Pt bagged. Given 2 mg Versed. Pt reintubated with 8.0 ETT O2 sat dropped to 77% with intubation but recovered once back on ventilator. Fentanyl to 90 mcg/hr nad Propofol to 40 mcg/kg/min.

## 2020-07-07 NOTE — PROGRESS NOTES
Pt sounding gurgly. Dr Shikha Chou verified with glide scope the tube was out. Reintubated with 8 ett 25 lip line.  Increased o2 to 100% to keep sats >90%

## 2020-07-07 NOTE — PROGRESS NOTES
Family is unsure if they want the patient to be paralyzed and proned at this time & want to [facility] off\" spoke w/ Dr. Samantha Stoner & order for rotoprone bed held at this time. Call [facility] to cancel delivery- no confirmation number given.   Yuli Durmmond RN, BSN

## 2020-07-08 ENCOUNTER — APPOINTMENT (OUTPATIENT)
Dept: GENERAL RADIOLOGY | Age: 85
DRG: 870 | End: 2020-07-08
Payer: MEDICARE

## 2020-07-08 LAB
ALBUMIN SERPL-MCNC: 2.3 G/DL (ref 3.4–5)
ALP BLD-CCNC: 46 U/L (ref 40–129)
ALT SERPL-CCNC: 22 U/L (ref 10–40)
ANION GAP SERPL CALCULATED.3IONS-SCNC: 6 MMOL/L (ref 3–16)
AST SERPL-CCNC: 34 U/L (ref 15–37)
BASE EXCESS ARTERIAL: 13.5 MMOL/L (ref -3–3)
BASOPHILS ABSOLUTE: 0.1 K/UL (ref 0–0.2)
BASOPHILS RELATIVE PERCENT: 0.6 %
BILIRUB SERPL-MCNC: 0.5 MG/DL (ref 0–1)
BILIRUBIN DIRECT: 0.3 MG/DL (ref 0–0.3)
BILIRUBIN, INDIRECT: 0.2 MG/DL (ref 0–1)
BUN BLDV-MCNC: 67 MG/DL (ref 7–20)
CALCIUM SERPL-MCNC: 7.5 MG/DL (ref 8.3–10.6)
CARBOXYHEMOGLOBIN ARTERIAL: 0.4 % (ref 0–1.5)
CHLORIDE BLD-SCNC: 95 MMOL/L (ref 99–110)
CO2: 38 MMOL/L (ref 21–32)
CREAT SERPL-MCNC: 1.2 MG/DL (ref 0.8–1.3)
D DIMER: 687 NG/ML DDU (ref 0–229)
EOSINOPHILS ABSOLUTE: 0.2 K/UL (ref 0–0.6)
EOSINOPHILS RELATIVE PERCENT: 1.3 %
GFR AFRICAN AMERICAN: >60
GFR NON-AFRICAN AMERICAN: 58
GLUCOSE BLD-MCNC: 108 MG/DL (ref 70–99)
GLUCOSE BLD-MCNC: 116 MG/DL (ref 70–99)
GLUCOSE BLD-MCNC: 160 MG/DL (ref 70–99)
GLUCOSE BLD-MCNC: 179 MG/DL (ref 70–99)
GLUCOSE BLD-MCNC: 194 MG/DL (ref 70–99)
GLUCOSE BLD-MCNC: 205 MG/DL (ref 70–99)
GLUCOSE BLD-MCNC: 210 MG/DL (ref 70–99)
HCO3 ARTERIAL: 39.4 MMOL/L (ref 21–29)
HCT VFR BLD CALC: 37.3 % (ref 40.5–52.5)
HEMOGLOBIN, ART, EXTENDED: 12.1 G/DL (ref 13.5–17.5)
HEMOGLOBIN: 12 G/DL (ref 13.5–17.5)
INR BLD: 1.08 (ref 0.86–1.14)
LACTIC ACID: 1.3 MMOL/L (ref 0.4–2)
LIPASE: 19 U/L (ref 13–60)
LYMPHOCYTES ABSOLUTE: 0.6 K/UL (ref 1–5.1)
LYMPHOCYTES RELATIVE PERCENT: 3.3 %
MCH RBC QN AUTO: 28.3 PG (ref 26–34)
MCHC RBC AUTO-ENTMCNC: 32.1 G/DL (ref 31–36)
MCV RBC AUTO: 88.3 FL (ref 80–100)
METHEMOGLOBIN ARTERIAL: 0.3 %
MONOCYTES ABSOLUTE: 0.7 K/UL (ref 0–1.3)
MONOCYTES RELATIVE PERCENT: 3.6 %
NEUTROPHILS ABSOLUTE: 16.6 K/UL (ref 1.7–7.7)
NEUTROPHILS RELATIVE PERCENT: 91.2 %
O2 CONTENT ARTERIAL: 17 ML/DL
O2 SAT, ARTERIAL: 98.7 %
O2 THERAPY: ABNORMAL
PCO2 ARTERIAL: 56.2 MMHG (ref 35–45)
PDW BLD-RTO: 14.6 % (ref 12.4–15.4)
PERFORMED ON: ABNORMAL
PH ARTERIAL: 7.46 (ref 7.35–7.45)
PLATELET # BLD: 152 K/UL (ref 135–450)
PMV BLD AUTO: 9.7 FL (ref 5–10.5)
PO2 ARTERIAL: 130.3 MMHG (ref 75–108)
POTASSIUM SERPL-SCNC: 4.1 MMOL/L (ref 3.5–5.1)
PROTHROMBIN TIME: 12.5 SEC (ref 10–13.2)
RBC # BLD: 4.22 M/UL (ref 4.2–5.9)
SODIUM BLD-SCNC: 139 MMOL/L (ref 136–145)
TCO2 ARTERIAL: 41.2 MMOL/L
TOTAL PROTEIN: 5.5 G/DL (ref 6.4–8.2)
URINE CULTURE, ROUTINE: NORMAL
URINE CULTURE, ROUTINE: NORMAL
WBC # BLD: 18.2 K/UL (ref 4–11)

## 2020-07-08 PROCEDURE — 80048 BASIC METABOLIC PNL TOTAL CA: CPT

## 2020-07-08 PROCEDURE — 85025 COMPLETE CBC W/AUTO DIFF WBC: CPT

## 2020-07-08 PROCEDURE — 85610 PROTHROMBIN TIME: CPT

## 2020-07-08 PROCEDURE — 2580000003 HC RX 258: Performed by: INTERNAL MEDICINE

## 2020-07-08 PROCEDURE — 6360000002 HC RX W HCPCS: Performed by: INTERNAL MEDICINE

## 2020-07-08 PROCEDURE — 94640 AIRWAY INHALATION TREATMENT: CPT

## 2020-07-08 PROCEDURE — 99291 CRITICAL CARE FIRST HOUR: CPT | Performed by: INTERNAL MEDICINE

## 2020-07-08 PROCEDURE — 83690 ASSAY OF LIPASE: CPT

## 2020-07-08 PROCEDURE — 6370000000 HC RX 637 (ALT 250 FOR IP): Performed by: INTERNAL MEDICINE

## 2020-07-08 PROCEDURE — 2000000000 HC ICU R&B

## 2020-07-08 PROCEDURE — 71045 X-RAY EXAM CHEST 1 VIEW: CPT

## 2020-07-08 PROCEDURE — 83605 ASSAY OF LACTIC ACID: CPT

## 2020-07-08 PROCEDURE — 99233 SBSQ HOSP IP/OBS HIGH 50: CPT | Performed by: INTERNAL MEDICINE

## 2020-07-08 PROCEDURE — 94003 VENT MGMT INPAT SUBQ DAY: CPT

## 2020-07-08 PROCEDURE — 94761 N-INVAS EAR/PLS OXIMETRY MLT: CPT

## 2020-07-08 PROCEDURE — 80076 HEPATIC FUNCTION PANEL: CPT

## 2020-07-08 PROCEDURE — 82803 BLOOD GASES ANY COMBINATION: CPT

## 2020-07-08 PROCEDURE — 36415 COLL VENOUS BLD VENIPUNCTURE: CPT

## 2020-07-08 PROCEDURE — 36592 COLLECT BLOOD FROM PICC: CPT

## 2020-07-08 PROCEDURE — C9113 INJ PANTOPRAZOLE SODIUM, VIA: HCPCS | Performed by: INTERNAL MEDICINE

## 2020-07-08 PROCEDURE — 85379 FIBRIN DEGRADATION QUANT: CPT

## 2020-07-08 PROCEDURE — 94750 HC PULMONARY COMPLIANCE STUDY: CPT

## 2020-07-08 PROCEDURE — 2700000000 HC OXYGEN THERAPY PER DAY

## 2020-07-08 RX ORDER — DEXAMETHASONE SODIUM PHOSPHATE 4 MG/ML
4 INJECTION, SOLUTION INTRA-ARTICULAR; INTRALESIONAL; INTRAMUSCULAR; INTRAVENOUS; SOFT TISSUE DAILY
Status: DISCONTINUED | OUTPATIENT
Start: 2020-07-09 | End: 2020-07-09

## 2020-07-08 RX ORDER — SENNA AND DOCUSATE SODIUM 50; 8.6 MG/1; MG/1
2 TABLET, FILM COATED ORAL DAILY
Status: DISCONTINUED | OUTPATIENT
Start: 2020-07-08 | End: 2020-07-09

## 2020-07-08 RX ADMIN — ENOXAPARIN SODIUM 40 MG: 40 INJECTION SUBCUTANEOUS at 08:31

## 2020-07-08 RX ADMIN — CHLORHEXIDINE GLUCONATE 0.12% ORAL RINSE 15 ML: 1.2 LIQUID ORAL at 08:31

## 2020-07-08 RX ADMIN — ACYCLOVIR SODIUM 750 MG: 500 INJECTION, SOLUTION INTRAVENOUS at 21:04

## 2020-07-08 RX ADMIN — CHLORHEXIDINE GLUCONATE 0.12% ORAL RINSE 15 ML: 1.2 LIQUID ORAL at 21:12

## 2020-07-08 RX ADMIN — IPRATROPIUM BROMIDE AND ALBUTEROL SULFATE 1 AMPULE: .5; 3 SOLUTION RESPIRATORY (INHALATION) at 15:48

## 2020-07-08 RX ADMIN — KETOTIFEN FUMARATE 1 DROP: 0.35 SOLUTION/ DROPS OPHTHALMIC at 08:34

## 2020-07-08 RX ADMIN — ENOXAPARIN SODIUM 90 MG: 100 INJECTION SUBCUTANEOUS at 21:04

## 2020-07-08 RX ADMIN — MIDAZOLAM HYDROCHLORIDE 5 MG/HR: 5 INJECTION, SOLUTION INTRAMUSCULAR; INTRAVENOUS at 09:01

## 2020-07-08 RX ADMIN — LINEZOLID 600 MG: 600 INJECTION, SOLUTION INTRAVENOUS at 08:33

## 2020-07-08 RX ADMIN — ASPIRIN 81 MG 81 MG: 81 TABLET ORAL at 08:31

## 2020-07-08 RX ADMIN — Medication 10 ML: at 21:12

## 2020-07-08 RX ADMIN — ACYCLOVIR SODIUM 750 MG: 500 INJECTION, SOLUTION INTRAVENOUS at 12:19

## 2020-07-08 RX ADMIN — LINEZOLID 600 MG: 600 INJECTION, SOLUTION INTRAVENOUS at 21:04

## 2020-07-08 RX ADMIN — FENTANYL CITRATE 125 MCG/HR: 50 INJECTION, SOLUTION INTRAMUSCULAR; INTRAVENOUS at 14:57

## 2020-07-08 RX ADMIN — PROPOFOL 20 MCG/KG/MIN: 10 INJECTION, EMULSION INTRAVENOUS at 08:37

## 2020-07-08 RX ADMIN — IPRATROPIUM BROMIDE AND ALBUTEROL SULFATE 1 AMPULE: .5; 3 SOLUTION RESPIRATORY (INHALATION) at 23:03

## 2020-07-08 RX ADMIN — IPRATROPIUM BROMIDE AND ALBUTEROL SULFATE 1 AMPULE: .5; 3 SOLUTION RESPIRATORY (INHALATION) at 19:31

## 2020-07-08 RX ADMIN — ENOXAPARIN SODIUM 90 MG: 100 INJECTION SUBCUTANEOUS at 12:10

## 2020-07-08 RX ADMIN — Medication 10 ML: at 08:35

## 2020-07-08 RX ADMIN — IPRATROPIUM BROMIDE AND ALBUTEROL SULFATE 1 AMPULE: .5; 3 SOLUTION RESPIRATORY (INHALATION) at 11:37

## 2020-07-08 RX ADMIN — PIPERACILLIN SODIUM AND TAZOBACTAM SODIUM 3.38 G: 3; .375 INJECTION, POWDER, LYOPHILIZED, FOR SOLUTION INTRAVENOUS at 18:17

## 2020-07-08 RX ADMIN — CEFEPIME 1 G: 1 INJECTION, POWDER, FOR SOLUTION INTRAMUSCULAR; INTRAVENOUS at 04:51

## 2020-07-08 RX ADMIN — IPRATROPIUM BROMIDE AND ALBUTEROL SULFATE 1 AMPULE: .5; 3 SOLUTION RESPIRATORY (INHALATION) at 08:30

## 2020-07-08 RX ADMIN — ACYCLOVIR SODIUM 750 MG: 500 INJECTION, SOLUTION INTRAVENOUS at 04:52

## 2020-07-08 RX ADMIN — FENTANYL CITRATE 125 MCG/HR: 50 INJECTION, SOLUTION INTRAMUSCULAR; INTRAVENOUS at 09:02

## 2020-07-08 RX ADMIN — SENNOSIDES AND DOCUSATE SODIUM 2 TABLET: 8.6; 5 TABLET ORAL at 12:10

## 2020-07-08 RX ADMIN — TETRAHYDROZOLINE HCL 1 DROP: 0.05 SOLUTION/ DROPS OPHTHALMIC at 08:33

## 2020-07-08 RX ADMIN — KETOTIFEN FUMARATE 1 DROP: 0.35 SOLUTION/ DROPS OPHTHALMIC at 21:12

## 2020-07-08 RX ADMIN — FUROSEMIDE 40 MG: 10 INJECTION, SOLUTION INTRAMUSCULAR; INTRAVENOUS at 08:32

## 2020-07-08 RX ADMIN — DEXAMETHASONE SODIUM PHOSPHATE 6 MG: 4 INJECTION, SOLUTION INTRAMUSCULAR; INTRAVENOUS at 08:32

## 2020-07-08 RX ADMIN — PIPERACILLIN SODIUM AND TAZOBACTAM SODIUM 3.38 G: 3; .375 INJECTION, POWDER, LYOPHILIZED, FOR SOLUTION INTRAVENOUS at 12:10

## 2020-07-08 RX ADMIN — IPRATROPIUM BROMIDE AND ALBUTEROL SULFATE 1 AMPULE: .5; 3 SOLUTION RESPIRATORY (INHALATION) at 02:59

## 2020-07-08 RX ADMIN — MIDAZOLAM HYDROCHLORIDE 2 MG: 2 INJECTION, SOLUTION INTRAMUSCULAR; INTRAVENOUS at 08:31

## 2020-07-08 RX ADMIN — ROSUVASTATIN CALCIUM 10 MG: 10 TABLET, FILM COATED ORAL at 08:32

## 2020-07-08 RX ADMIN — PANTOPRAZOLE SODIUM 40 MG: 40 INJECTION, POWDER, FOR SOLUTION INTRAVENOUS at 08:31

## 2020-07-08 ASSESSMENT — PULMONARY FUNCTION TESTS
PIF_VALUE: 30
PIF_VALUE: 28
PIF_VALUE: 30
PIF_VALUE: 35
PIF_VALUE: 28
PIF_VALUE: 30
PIF_VALUE: 27
PIF_VALUE: 32
PIF_VALUE: 30
PIF_VALUE: 27
PIF_VALUE: 35
PIF_VALUE: 30
PIF_VALUE: 36
PIF_VALUE: 29
PIF_VALUE: 29
PIF_VALUE: 39
PIF_VALUE: 33
PIF_VALUE: 31
PIF_VALUE: 30
PIF_VALUE: 37
PIF_VALUE: 36
PIF_VALUE: 28
PIF_VALUE: 27
PIF_VALUE: 29
PIF_VALUE: 29
PIF_VALUE: 31
PIF_VALUE: 30
PIF_VALUE: 34
PIF_VALUE: 28
PIF_VALUE: 31

## 2020-07-08 ASSESSMENT — PAIN SCALES - GENERAL
PAINLEVEL_OUTOF10: 0

## 2020-07-08 NOTE — PROGRESS NOTES
Wasted 22 ml fentanyl from fentanyl gtt with Pedro Luis Pena RN   Electronically signed by Orlando Gilman RN on 7/8/20 at 2:59 PM EDT  Electronically signed by Jessica Espino RN on 7/8/2020 at 3:44 PM

## 2020-07-08 NOTE — PROGRESS NOTES
History  Past Surgical History:   Procedure Laterality Date    BACK SURGERY      CAROTID ENDARTERECTOMY Left     COLONOSCOPY      UPPER GASTROINTESTINAL ENDOSCOPY  07/12/14    Minimal chronic gastritis       Level of Consciousness: Lethargic = 3    Level of Activity: Bedridden, unresponsive or quadriplegic = 4    Respiratory Pattern: Regular Pattern; RR 8-20 = 0    Breath Sounds: Diminshed bilaterally and/or crackles = 2    Sputum  Sputum Color: Yellow, Red, Tenacity: Thick, Sputum How Obtained: Endotracheal, Suctioned  Cough: Strong, productive = 1    Vital Signs   BP (!) 108/54   Pulse 88   Temp 100.8 °F (38.2 °C) (Bladder)   Resp 18   Ht 5' 7\" (1.702 m)   Wt 184 lb 15.5 oz (83.9 kg)   SpO2 95%   BMI 28.97 kg/m²   SPO2 (COPD values may differ): Less than 86% on room air or greater than 92% on FiO2 greater than 50% = 4    Peak Flow (asthma only): not applicable = 0    RSI: 56-90 = Q4 (every four hours)        Plan       Goals: mobilize retained secretions, volume expansion and improve oxygenation    Patient/caregiver was educated on the proper method of use for Respiratory Care Devices:  Yes      Level of patient/caregiver understanding able to:   ? Verbalize understanding   ? Demonstrate understanding       ? Teach back        ? Needs reinforcement       ? No available caregiver               ? Other:     Response to education:  Poor     Is patient being placed on Home Treatment Regimen? No     Does the patient have everything they need prior to discharge? NA     Comments: pt assessed and chart reviewed    Plan of Care: duoneb q4    Electronically signed by Suleiman Kang RCP on 7/8/2020 at 2:09 AM    Respiratory Protocol Guidelines     1. Assessment and treatment by Respiratory Therapy will be initiated for medication and therapeutic interventions upon initiation of aerosolized medication. 2. Physician will be contacted for respiratory rate (RR) greater than 35 breaths per minute.  Therapy will be held for heart rate (HR) greater than 140 beats per minute, pending direction from physician. 3. Bronchodilators will be administered via Metered Dose Inhaler (MDI) with spacer when the following criteria are met:  a. Alert and cooperative     b. HR < 140 bpm  c. RR < 30 bpm                d. Can demonstrate a 2-3 second inspiratory hold  4. Bronchodilators will be administered via Hand Held Nebulizer [facility]) to patients when ANY of the following criteria are met  a. Incognizant or uncooperative          b. Patients treated with HHN at Home        c. Unable to demonstrate proper use of MDI with spacer     d. RR > 30 bpm   5. Bronchodilators will be delivered via Metered Dose Inhaler (MDI), HHN, Aerogen to intubated patients on mechanical ventilation. 6. Inhalation medication orders will be delivered and/or substituted as outlined below. Aerosolized Medications Ordering and Administration Guidelines:    1. All Medications will be ordered by a physician, and their frequency and/or modality will be adjusted as defined by the patients Respiratory Severity Index (RSI) score. 2. If the patient does not have documented COPD, consider discontinuing anticholinergics when RSI is less than 9.  3. If the bronchospasm worsens (increased RSI), then the bronchodilator frequency can be increased to a maximum of every 4 hours. If greater than every 4 hours is required, the physician will be contacted. 4. If the bronchospasm improves, the frequency of the bronchodilator can be decreased, based on the patient's RSI, but not less than home treatment regimen frequency. 5. Bronchodilator(s) will be discontinued if patient has a RSI less than 9 and has received no scheduled or as needed treatment for 72  Hrs. Patients Ordered on a Mucolytic Agent:    1. Must always be administered with a bronchodilator.     2. Discontinue if patient experiences worsened bronchospasm, or secretions have lessened to the point that the patient is

## 2020-07-08 NOTE — PLAN OF CARE
Problem: Restraint Use - Nonviolent/Non-Self-Destructive Behavior:  Goal: Absence of restraint-related injury  Description: Upper extremity soft wrist restraints intact. No S/S of restraint related injury . ROM performed Q2HR.    7/8/2020 0008 by Niles Zimmer RN  Outcome: Ongoing     Problem: Skin Integrity:  Goal: Absence of new skin breakdown  Description: Monitoring patient skin integrity for skin breakdown, turning and repositioning q2h per protocol.     7/8/2020 0008 by Niles Zimmer RN  Outcome: Ongoing

## 2020-07-08 NOTE — PROGRESS NOTES
Spoke with Lu in regards to an update and informed her that his FIO2 is decreased to 60% and is tolerating at 95-96% and that he spike a fever after giving acyclovir of 102. Lu appreciated the update.

## 2020-07-08 NOTE — PROGRESS NOTES
Face,face numb    Codeine Other (See Comments)     Face,lips numb    Methylphenidate Other (See Comments)     Face numbness    Ritalin [Methylphenidate Hcl] Other (See Comments)     Face. lips numb    Zoloft [Sertraline Hcl] Other (See Comments)     Numbness of lips       Social history:     Social History:  All social andepidemiologic history was reviewed and updated by me today as needed. · Tobacco use:   reports that he has quit smoking. His smoking use included cigarettes. He started smoking about 29 years ago. He has a 25.00 pack-year smoking history. He has never used smokeless tobacco.  · Alcohol use:   reports current alcohol use of about 1.0 standard drinks of alcohol per week. · Currently lives in: [address] [place] [phone]  ·  reports no history of drug use.          Assessment:     The patient is a 82 y.o. old male who  has a past medical history of Arthritis, B12 deficiency (05/2014), COVID-19 (06/25/2020), and Hyperlipidemia. with following problems:    · New high fever worsening leukocytosis-concern for new sepsis, rule out secondary bacterial infection  · acute respiratory failure with hypoxia-ongoing, currently on 55% FiO2  · Endotracheally intubated-he remains intubated  · Requiring invasive mechanical ventilation  · ARDS - secondary to COVID 19-remains on ventilator  · Shingles involving the buttock area-continue IV acyclovir  · Bilateral COVID 19 pneumonia-status post 2 units of convalescent plasma on 7/2/2020 under [place] protocol-has completed 10-day course of IV remdesivir  · Elevated d-dimer 653 on 6/30/2020-follow trends  · High interleukin-6 level, this is a new finding -decided to hold off on Actemra due to development of herpes zoster and now concern for secondary bacterial infection  · Staph epidermidis in the urine culture on 6/24/2020, significance unclear, likely colonizer-repeat urine cultures on 7/6/2020 negative so far  · Status post PICC line placement on 6/29/2020  · Obesity Class 1 due to excess calorie intake : Body mass index is 31.34 kg/m². Discussion:      The patient spiked a fever of 102.3 last night. White cell count is gone up to 18,200. Blood cultures and urine culture has been sent yesterday and patient has been started on IV linezolid and cefepime by night team    Patient also remains on IV acyclovir for herpes zoster. Serum creatinine is 1.2. Triglyceride is 1127 today. Lipase is 687    Chest x-ray reviewed. Shows stable central groundglass opacities. Plan:     Diagnostic Workup:    · Will add lipase and lactate to morning labs  · Agree with blood culture and urine culture  · Follow-up on tracheal aspirate culture from 7/7/2020. Gram stain showed gram-negative rods, can be pathogen or nikolay  · Continue to follow  fever curve, WBC count and blood cultures  · Follow up on liver and renal function closely  · Follow-up on bilateral upper extremity venous Doppler study    Antimicrobials:    · The patient is completed 10th dose of IV remdesivir yesterday. No evidence behind benefit of further doses  · Continue IV linezolid 600 mg every 12 hour for empiric coverage against MRSA, while cultures are in process. Platelet count is 556,041  · Will stop IV cefepime and start IV Zosyn 3.375 g every 8 hour  · Continue IV acyclovir at current dose. Continue to monitor renal function. Will decrease this dose once lesions are crusting. Expect a 7 to 10-day course of acyclovir  · Continue to monitor his vitals closely  · We will follow-up on the culture results and clinical progress and will make further recommendations accordingly  · Patient has been on oral Decadron since 6/27/2020. Recommend tapering and considering stopping Decadron.  RECOVERY trial indicated a 10-day course of Decadron in COVID 19 patients  · Endotracheal tube care and pulmonology  · Ventilator support to maintain oxygen saturation above 92%  · Pressure ulcer prevention precautions  · COVID 19 isolation precautions  · Anticoagulation per primary. In patients with a d-dimer 6 times above the normal limit, full dose IV anticoagulation has been recommended by some studies ( J Thromb Haemost. 2020 May;18(5):7200-9885. doi: 10.1111/jth.83467. Epub 2020 Apr 27.)  Potential benefits will have to be weighed against the risks of bleeding episodes. · Remains critically ill. High risk of complications. Continue close monitoring in the ICU setting  · Discussed all above with ICU RN      Drug Monitoring:    · Continue monitoring for antibiotic toxicity as follows: CBC, CMP  · Continue to watch for following: new or worsening fever, new hypotension, hives, lip swelling and redness or purulence at vascular access sites. I/v access Management:    · Continue to monitor i.v access sites for erythema, induration, discharge or tenderness. · As always, continue efforts to minimize tubes/lines/drains as clinically appropriate to reduce chances of line associated infections. Risk of Complications/Morbidity/Mortality: High     · Patient is critically ill and has a potentially life threatening infection that poses threat to life/bodily function. · There is potential for worsening infection/ sudden clinically significant or life-threatening deterioration in the patient's condition without appropriate antimicrobial therapy. · Complex medical decision making process was involved to select appropriate antimicrobial agents to reverse the cause of patient's severe infection/ illness. · Antimicrobial therapy requires intensive monitoring for toxicity and frequent dose adjustments to prevent toxicity and permanent end-organ dysfunction. Critical care time: 31 minutes      Thank you for involving me in the care of your patient. I will continue to follow. If you have anyadditional questions, please do not hesitate to contact me. Subjective:      Interval history: Interval history was obtained from chart review and RN. He spiked high fever last night currently on 55% FiO2. White cell count is gone up. REVIEW OF SYSTEMS:     Review of Systems   Unable to perform ROS: Intubated         Past Medical History: All past medical history reviewed today. Past Medical History:   Diagnosis Date    Arthritis     B12 deficiency 05/2014    COVID-19 06/25/2020    Hyperlipidemia        Past Surgical History: All past surgical history was reviewed today. Past Surgical History:   Procedure Laterality Date    BACK SURGERY      CAROTID ENDARTERECTOMY Left     COLONOSCOPY      UPPER GASTROINTESTINAL ENDOSCOPY  07/12/14    Minimal chronic gastritis       Family History: All family history was reviewed today. Problem Relation Age of Onset    Cancer Neg Hx        Objective:       PHYSICAL EXAM:      Vitals:   Vitals:    07/08/20 0830 07/08/20 0900 07/08/20 0904 07/08/20 1000   BP:   (!) 115/59 112/66   Pulse: 71 83 84 79   Resp:  15 18 17   Temp:   97.4 °F (36.3 °C)    TempSrc:       SpO2: 96% 95% 95% 96%   Weight:       Height:           Physical Exam      PHYSICAL EXAM:     In-person bedside physical examination deferred. Pursuant to the emergency declaration under the [address] [place] [address], [address] [place] [address] authority and the [employer] Act, this clinical encounter was conducted to provide necessary medical care. (Also consistent with new provisions and guidance offered by [facility] on March 18, 2020 in setting of COVID 19 outbreak and in order to preserve personal protective equipment in accordance with the flexibilities announced by CMS on March 30, 2020)   References: https://med. [place].[facility]/Portals/0/Resources/COVID-19/3_18%20Telemed%20Guidance%20Updated%20March%2018. pdf?oax=[email or web address] https://[facility]. [place].[facility]/Portals/0/Resources/COVID-19/3_18%20Telemed%20Guidance%20Updated%20March%2018. pdf?ftv=[email or web address]                      http://[employer]/. pdf                            General: intubated and sedated, on ventilator, vitals reviewed   HEENT: endotracheal tube in place   Cardiovascular: Telemetry data reviewed, rest deferred   Pulmonary: deferred  Abdomen/GI: deferred  Neuro: deferred  Skin: deferred, shingles rash per RN  Musculoskeletal:  deferred  Genitourinary: Deferred  Psych: deferred  Lymphatic/Immunologic: deferred            Intake and output:    I/O last 3 completed shifts: In: 3714.8 [I.V.:1799.8; NG/GT:1915]  Out: 6910 [Urine:1605]    Lab Data:   All available labs and old records have been reviewed by me. CBC:  Recent Labs     07/06/20  0506 07/07/20  0500 07/08/20  0505   WBC 14.0* 14.4* 18.2*   RBC 4.33 4.24 4.22   HGB 12.3* 12.1* 12.0*   HCT 38.2* 37.5* 37.3*    193 152   MCV 88.1 88.4 88.3   MCH 28.3 28.6 28.3   MCHC 32.1 32.3 32.1   RDW 14.8 14.7 14.6   BANDSPCT 1 2  --         BMP:  Recent Labs     07/06/20  0506 07/07/20  0845 07/08/20  0505    135* 139   K 3.8 4.2 4.1   CL 96* 92* 95*   CO2 36* 34* 38*   BUN 59* 64* 67*   CREATININE 1.0 1.2 1.2   CALCIUM 8.0* 7.5* 7.5*   GLUCOSE 123* 174* 116*        Hepatic Function Panel:   Lab Results   Component Value Date    ALKPHOS 46 07/08/2020    ALT 22 07/08/2020    AST 34 07/08/2020    PROT 5.5 07/08/2020    BILITOT 0.5 07/08/2020    BILIDIR 0.3 07/08/2020    IBILI 0.2 07/08/2020    LABALBU 2.3 07/08/2020       CPK:   Lab Results   Component Value Date    CKTOTAL 269 07/07/2020     ESR:   Lab Results   Component Value Date    SEDRATE 4 09/17/2018     CRP: No results found for: CRP        Imaging: All pertinent images and reports for the current visit were reviewed by me during this visit.     XR CHEST PORTABLE   Final Result   Stable central Intravenous Daily    linezolid  600 mg Intravenous Q12H    acyclovir  10 mg/kg (Adjusted) Intravenous Q8H    dexamethasone  6 mg Intravenous Daily    insulin lispro  0-12 Units Subcutaneous Q4H    chlorhexidine  15 mL Mouth/Throat BID    pantoprazole  40 mg Intravenous Daily    ipratropium-albuterol  1 ampule Inhalation Q4H    ketotifen  1 drop Both Eyes BID    enoxaparin  40 mg Subcutaneous BID    rosuvastatin  10 mg Oral Daily    aspirin  81 mg Oral Daily    sodium chloride flush  10 mL Intravenous 2 times per day        midazolam 5 mg/hr (07/08/20 0901)    dextrose      propofol 20 mcg/kg/min (07/08/20 0837)    fentaNYL (SUBLIMAZE) infusion 125 mcg/hr (07/08/20 0902)       sennosides-docusate sodium, glucose, dextrose, glucagon (rDNA), dextrose, fentanNYL, midazolam, tetrahydrozoline, aluminum & magnesium hydroxide-simethicone, ondansetron, sodium chloride flush, acetaminophen **OR** acetaminophen      Problem list:       Patient Active Problem List   Diagnosis Code    Multifocal pneumonia J18.9    Orthostasis I95.1    COVID-19 U07.1    Sepsis ([facility] [address] A41.9    Urinary tract infection without hematuria N39.0    ARDS (adult respiratory distress syndrome) ([address] J80    Hyperlipidemia E78.5    Acute respiratory failure with hypoxia (HCC) J96.01    Class 1 obesity due to excess calories with body mass index (BMI) of 31.0 to 31.9 in adult E66.09, Z68.31    Peripherally inserted central catheter (PICC) in place Z45.2    Asymptomatic bacteriuria R82.71    Endotracheally intubated Z97.8    On mechanically assisted ventilation ([facility] [address] Z99.11    Herpes zoster without complication N32.2       Please note that this chart was generated using Dragon dictation software. Although every effort was made to ensure the accuracy of this automated transcription, some errors in transcription may have occurred inadvertently.  If you may need any clarification, please do not hesitate to contact me through [facility] or at the phone number provided below with my electronic signature. Any pictures or media included in this note were obtained after taking informed verbal consent from the patient and with their approval to include those in the patient's medical record.     Grupo Rahman MD, MPH  7/8/2020 , 10:43 AM   [facility]   Office: [phone]  Fax: [phone]  Tuesday AM clinic:   [address] [place] [address]  [facility] [address] [place], [facility]

## 2020-07-08 NOTE — PROGRESS NOTES
Spoke with Dr. Erin Frey (infectious disease). Recommendations for decadron taper, full dose lovenox and to adjust acyclovir if the patients kidney function declines. Updated Dr. Dave Mendieta in [place].  Tony Angelo

## 2020-07-08 NOTE — PROGRESS NOTES
07/07/20 2302   Vent Information   Vent Type 840   Vent Mode AC/VC   Vt Ordered 430 mL   Rate Set 16 bmp   Peak Flow 75 L/min   Pressure Support 0 cmH20   FiO2  60 %   SpO2 96 %   SpO2/FiO2 ratio 160   Sensitivity 2   PEEP/CPAP 16   I Time/ I Time % 0 s   Humidification Source Heated wire   Humidification Temp 37   Humidification Temp Measured 36.9   Circuit Condensation Drained   Vent Patient Data   High Peep/I Pressure 0   Peak Inspiratory Pressure 36 cmH2O   Mean Airway Pressure 21 cmH20   Rate Measured 18 br/min   Vt Exhaled 467 mL   Minute Volume 8.09 Liters   I:E Ratio 1:3.10   Plateau Pressure 26 VTD60   Cough/Sputum   Sputum How Obtained Endotracheal;Suctioned   Cough Productive   Sputum Amount Small   Sputum Color Yellow;Red   Tenacity Thick   Spontaneous Breathing Trial (SBT) RT Doc   Pulse 88   Breath Sounds   Right Upper Lobe Diminished   Right Middle Lobe Diminished   Right Lower Lobe Diminished   Left Upper Lobe Diminished   Left Lower Lobe Diminished   Additional Respiratory  Assessments   Resp 17   Position Semi-Harris's   Alarm Settings   High Pressure Alarm 50 cmH2O   Low Minute Volume Alarm 5 L/min   Apnea (secs) 20 secs   High Respiratory Rate 40 br/min   Low Exhaled Vt  350 mL   ETT (adult)   Placement Date/Time: 06/29/20 0230   Preoxygenation: Yes  Mask Ventilation: Ventilated by mask (1)  Technique: Video laryngoscopy  Type: Cuffed  Tube Size: 8 mm  Laryngoscope: GlideScope  Blade Size: 4  Location: Oral  Insertion attempts: 1  Placement. ..    Secured at 23 cm   Measured From Lips   ET Placement Right  (moved to right)   Secured By Commercial tube leblanc   Site Condition Dry

## 2020-07-08 NOTE — PROGRESS NOTES
07/08/20 0830   Vent Information   Vent Type 840   Vent Mode AC/VC   Vt Ordered 430 mL   Rate Set 16 bmp   Peak Flow 75 L/min   Pressure Support 0 cmH20   FiO2  55 %   SpO2 96 %   SpO2/FiO2 ratio 174.55   Sensitivity 2   PEEP/CPAP 16   I Time/ I Time % 0 s   Humidification Source Heated wire   Humidification Temp Measured 36.7   Vent Patient Data   High Peep/I Pressure 0   Peak Inspiratory Pressure 27 cmH2O   Mean Airway Pressure 20 cmH20   Rate Measured 18 br/min   Vt Exhaled 6 mL   Minute Volume 7.09 Liters   I:E Ratio 4.40:1   Plateau Pressure 28 BYR53   Static Compliance 38 mL/cmH2O   Dynamic Compliance 30 mL/cmH2O   Cough/Sputum   Sputum How Obtained Endotracheal   Cough Productive   Sputum Amount Small   Sputum Color Creamy   Tenacity Thick   Spontaneous Breathing Trial (SBT) RT Doc   Pulse 71   Breath Sounds   Right Upper Lobe Diminished   Right Middle Lobe Diminished   Right Lower Lobe Diminished   Left Upper Lobe Diminished   Left Lower Lobe Diminished   Additional Respiratory  Assessments   Position Semi-Harris's   Alarm Settings   High Pressure Alarm 50 cmH2O   Low Minute Volume Alarm 5 L/min   Apnea (secs) 20 secs   High Respiratory Rate 40 br/min   Patient Observation   Observations ambu bs, h20 ok   ETT (adult)   Placement Date/Time: 06/29/20 0230   Preoxygenation: Yes  Mask Ventilation: Ventilated by mask (1)  Technique: Video laryngoscopy  Type: Cuffed  Tube Size: 8 mm  Laryngoscope: GlideScope  Blade Size: 4  Location: Oral  Insertion attempts: 1  Placement. ..    Secured at 23 cm   Measured From [address] [place] [address],[address] By Commercial tube leblanc   Site Condition Dry

## 2020-07-08 NOTE — PROGRESS NOTES
Pulmonary & Critical Care Medicine ICU Progress Note      CC: Acute hypoxemic respiratory failure    Events of Last 24 hours:   Fentanyl 125 mcg/hr   Propofol 20 mcg/kg/min   Versed 5 mg/hr   I decreased PEEP 14 and decreased FiO2 50%   Plateau pressure of 28  PaO2/FiO2 Ratio 236   Fever 102 overnight and started on Abx     Invasive Lines: IV: PICC     MV:   Vent Mode: AC/VC Rate Set: 16 bmp/Vt Ordered: 430 mL/ /FiO2 : 55 %  Recent Labs     20  05   PHART 7.452* 7.464*   GLY9UYL 52.9* 56.2*   PO2ART 143.7* 130.3*       IV:   midazolam 5 mg/hr (20 2230)    sodium chloride      dextrose      propofol 20 mcg/kg/min (20 0043)    fentaNYL (SUBLIMAZE) infusion 125 mcg/hr (20 2350)       Vitals:  Blood pressure (!) 104/57, pulse 72, temperature 98.7 °F (37.1 °C), temperature source Bladder, resp. rate 15, height 5' 7\" (1.702 m), weight 191 lb 12.8 oz (87 kg), SpO2 95 %. on AC 16/430/+14 55%   Temp  Av.4 °F (38 °C)  Min: 98.7 °F (37.1 °C)  Max: 102.3 °F (39.1 °C)    Intake/Output Summary (Last 24 hours) at 2020 0743  Last data filed at 2020 0610  Gross per 24 hour   Intake 3714.83 ml   Output 1605 ml   Net 2109.83 ml     EXAM:  General: ill appearing    Eyes: PERRL. No sclera icterus. No conjunctival injection. ENT: No discharge. Pharynx clear. Neck: Trachea midline. Normal thyroid. Resp: No accessory muscle use. Minimal crackles. Few wheezing. Few rhonchi. No dullness on percussion. CV: Regular rate. Regular rhythm. No mumur or rub. No edema. + UE edema   GI: Non-tender. Non-distended. No masses. No organomegaly. Normal bowel sounds. No hernia. Skin: Warm and dry. No nodule on exposed extremities. Vesicular rash in the buttock area. Petechial rash around the calf pressure area with ecchymosis both arms medially. Lymph: No cervical LAD. No supraclavicular LAD. M/S: No cyanosis. No joint deformity. No clubbing.    Neuro: Intubated deeply sedated. No response to painful stimuli. Psych: No agitation, no anxiety, affect is full     Scheduled Meds:   furosemide  40 mg Intravenous Daily    linezolid  600 mg Intravenous Q12H    cefepime  1 g Intravenous Q8H    acyclovir  10 mg/kg (Adjusted) Intravenous Q8H    dexamethasone  6 mg Intravenous Daily    insulin lispro  0-12 Units Subcutaneous Q4H    chlorhexidine  15 mL Mouth/Throat BID    pantoprazole  40 mg Intravenous Daily    ipratropium-albuterol  1 ampule Inhalation Q4H    ketotifen  1 drop Both Eyes BID    enoxaparin  40 mg Subcutaneous BID    rosuvastatin  10 mg Oral Daily    aspirin  81 mg Oral Daily    sodium chloride flush  10 mL Intravenous 2 times per day     PRN Meds:  sennosides-docusate sodium, glucose, dextrose, glucagon (rDNA), dextrose, fentanNYL, midazolam, tetrahydrozoline, aluminum & magnesium hydroxide-simethicone, ondansetron, sodium chloride flush, acetaminophen **OR** acetaminophen    Results:  CBC:   Recent Labs     07/06/20  0506 07/07/20  0500 07/08/20  0505   WBC 14.0* 14.4* 18.2*   HGB 12.3* 12.1* 12.0*   HCT 38.2* 37.5* 37.3*   MCV 88.1 88.4 88.3    193 152     BMP:   Recent Labs     07/06/20  0506 07/07/20  0845 07/08/20  0505    135* 139   K 3.8 4.2 4.1   CL 96* 92* 95*   CO2 36* 34* 38*   PHOS 2.8  --   --    BUN 59* 64* 67*   CREATININE 1.0 1.2 1.2     LIVER PROFILE:   Recent Labs     07/06/20  0506 07/07/20  0500 07/08/20  0505   AST 72* 48* 34   ALT 40 32 22   BILIDIR <0.2 0.3 0.3   BILITOT 0.4 0.6 0.5   ALKPHOS 56 51 46       Cultures:  6/24 Urine Staphylococcus epidermidis  6/24 COVID 19 +  6/29 Trach Asp NRF  7/2 respiratory NRF  7/3 BC NGTD  7/6 UC NGTD  7/7 Resp CX   7/7 BC  7/7 UC       Films:  CXR 7/8 was reviewed by me and it showed   Bilateral ASD no changes   Satisfactory ETT position   Satisfactory CVC line position           ASSESSMENT:  · Acute hypoxemic respiratory failure  · New fever ? PNA ? Line ?  DVT   · UE edema- rule out DVT   · Hypertriglyceridemia  · ARDS  · COVID-19 viral multifocal pneumonia. Post 2 units of CCP 7/2/2020. Completed 10 days of room to severe 7/7/2020  · ANN  · Elevated d-dimer  · Shingles  · ET tube displacement with emergent reintubation 7/7/2020 and brief hypoxia down to the 70s        PLAN:  Mechanical ventilation as per my orders. The ventilator was adjusted by me at the bedside for unstable, life threatening respiratory failure  Follow ABG and chest x-ray while on the ventilator  Supplemental oxygen to maintain SaO2 >92%; wean as tolerated  D/C IV Propofol due to elevated TG  Versed drip for sedation, target RASS -2, with daily spontaneous awakening trial   Fentanyl and Versed PRN, gtt as needed  Head of bed 30 degrees or higher at all times  Daily spontaneous breathing trial once PEEP less than 8, FiO2 less than 55%  Closely monitory airways, clinical status, cardiac rhythm, vital signs, and urine output   Inhaled bronchodilators  Zyvox D#2 Zosyn D#1 and IV acyclovir per ID D#3  Decadron 6 mg IV daily D#13- decrease 4 mg   Hold Lasix for now   Follow up repeat CX   UE doppler rule out DVT   Follow TG   Nutrition: Tube feeding  Blood sugar control, with goal 150-180  GI prophylaxis: Pepcid  DVT prophylaxis: change Lovenox therapeutic dose    MRSA prophylaxis: Bactroban   Code status: Full code   D/W Lu, daughter [phone] and updated her on status yesterday                    Total critical care time caring for this patient with life threatening, unstable organ failure, including direct patient contact, management of life support systems, review of data including imaging and labs, discussions with other team members and physicians is 38 minutes so far today, excluding procedures.

## 2020-07-08 NOTE — PROGRESS NOTES
New bags of fentanyl and versed hung. Patient is positive for CoVID 19. All bags and rates confirmed with Sandre Face. Both bags were empty. Labels removed and destroyed. Bags disposed of per hostpital guidelines.  Юлия Escalera

## 2020-07-08 NOTE — PROGRESS NOTES
Spoke with Lu and she consented for the patient to be paralyzed and prone. I called Dr. Ethel Mclaughlin in regards to her consent and that the patient has not had a BM in 10 days and received magnesium citrate. Dr. Ethel Mclaughlin stated that we will reassess tomorrow. No new orders noted.

## 2020-07-08 NOTE — PROGRESS NOTES
Internal Medicine ICU Progress Note      Interval History:     Patient admitted with Isidro -19. On droplet plus precautions. Pt w/ persistent high fevers, progressive hypoxia. Initiated on Remdesivir, dexamethasone. Intubated for worsening resp failure on 20. Requiring HF O2 . Had high fevers w/ T max of 104 F    20: S/P Convalescent plasma transfusion 2 units     7/3/20: fevers improved, hypoxia improving     20  - Mostly afebrile T-max 99 °F.   - Remains on mechanical vent . - Positive fluid balance of 10 L,  getting IV Lasix  - Hypoxia is improving , FiO2 now down to 60 %,  PEEP is down to 12     20:   -Worsening hypoxia  -FiO2 increased to 80% this AM, now down to 70%. PEEP up to 14.   Has developed new shingles, started on acyclovir  Low-grade fevers, temps up to 100.7 °F    20: he is doing about the same  - low-grade fevers  - persistent hypoxia  Remains on FiO2 75% PEEP of 14  ET tube was changed yesterday      On droplet plus precautions    Invasive Lines: PICC placed on 2020      MV: Intubated on 2020    Recent Labs     20  0520 20  0520   PHART 7.477* 7.452*   [id number] 43.3 52.9*   PO2ART 73.8* 143.7*       MV Settings:  Vent Mode: AC/VC Rate Set: 16 bmp/Vt Ordered: 430 mL/ /FiO2 : 60 %    IV:   midazolam 5 mg/hr (20 2230)    sodium chloride      dextrose      propofol 20 mcg/kg/min (20 0043)    fentaNYL (SUBLIMAZE) infusion 125 mcg/hr (20 2350)       Vitals:  Temp  Av.1 °F (37.8 °C)  Min: 98.1 °F (36.7 °C)  Max: 102.3 °F (39.1 °C)  Pulse  Av  Min: 87  Max: 104  BP  Min: 105/85  Max: 138/67  SpO2  Av %  Min: 79 %  Max: 97 %  FiO2   Av.1 %  Min: 60 %  Max: 100 %  Patient Vitals for the past 4 hrs:   BP Temp Temp src Pulse Resp SpO2   20 0000 (!) 109/50 100.8 °F (38.2 °C) Bladder 93 19 95 %   20 2302 -- -- -- 88 17 96 %   20 2300 (!) 112/59 -- -- 88 19 96 %   20 2200 (!) 106/55 -- -- 92 24 95 %   07/07/20 2100 125/60 -- -- 101 22 95 %       CVP:        Intake/Output Summary (Last 24 hours) at 7/8/2020 0050  Last data filed at 7/7/2020 2330  Gross per 24 hour   Intake 3624.69 ml   Output 1555 ml   Net 2069.69 ml       Physical Exam:  General:  Pt is intubated, sedated, on mechanical vent. Skin:  Warm and dry  Neck:  JVD absent. Neck supple  Chest: Diminished breath sounds . No wheezes or rhonchi   Cardiovascular:  RRR ,S1S2 normal  Abdomen:  Soft, non tender, non distended, BS +  Extremities:  trace edema. Intact peripheral pulses.  Brisk cap refill, < 2 secs  Neuro: non focal                    Medications:  Scheduled Meds:   furosemide  40 mg Intravenous Daily    linezolid  600 mg Intravenous Q12H    cefepime  1 g Intravenous Q8H    acyclovir  10 mg/kg (Adjusted) Intravenous Q8H    dexamethasone  6 mg Intravenous Daily    insulin lispro  0-12 Units Subcutaneous Q4H    chlorhexidine  15 mL Mouth/Throat BID    pantoprazole  40 mg Intravenous Daily    ipratropium-albuterol  1 ampule Inhalation Q4H    ketotifen  1 drop Both Eyes BID    enoxaparin  40 mg Subcutaneous BID    rosuvastatin  10 mg Oral Daily    aspirin  81 mg Oral Daily    sodium chloride flush  10 mL Intravenous 2 times per day       PRN Meds:  sennosides-docusate sodium, glucose, dextrose, glucagon (rDNA), dextrose, fentanNYL, midazolam, tetrahydrozoline, aluminum & magnesium hydroxide-simethicone, ondansetron, sodium chloride flush, acetaminophen **OR** acetaminophen    Results:  CBC:   Recent Labs     07/05/20  0510 07/06/20  0506 07/07/20  0500   WBC 14.3* 14.0* 14.4*   HGB 12.3* 12.3* 12.1*   HCT 38.5* 38.2* 37.5*   MCV 86.9 88.1 88.4    229 193     BMP:   Recent Labs     07/05/20  0510 07/06/20  0506 07/07/20  0845    142 135*   K 4.0 3.8 4.2   CL 98* 96* 92*   CO2 34* 36* 34*   PHOS 2.1* 2.8  --    BUN 54* 59* 64*   CREATININE 1.1 1.0 1.2     LIVER PROFILE:   Recent Labs     07/05/20  0510 07/06/20  0507 07/07/20  0500   AST 57* 72* 48*   ALT 29 40 32   BILIDIR <0.2 <0.2 0.3   BILITOT 0.4 0.4 0.6   ALKPHOS 57 56 51     PT/INR:   Recent Labs     07/05/20  0510 07/06/20  0506 07/07/20  0500   PROTIME 12.9 12.9 13.5*   INR 1.11 1.11 1.16*     Results for Griselda Martinez (MRN [id number]) as of 7/1/2020 09:46   Ref. Range 6/24/2020 21:35 6/26/2020 15:24 6/30/2020 04:20   D-Dimer, Quant Latest Ref Range: 0 - 229 ng/mL  (H) 682 (H) 653 (H)     Results for Frida SMITH (MRN [id number]) as of 7/1/2020 09:46   Ref. Range 6/24/2020 21:35   Ferritin Latest Ref Range: 30.0 - 400.0 ng/mL 97.3       Cultures:  Results for GOGO Cece Landaverde (MRN [id number]) as of 6/30/2020 09:20   Ref. Range 6/24/2020 22:00   SARS-CoV-2, PCR Latest Ref Range: Not Detected  DETECTED (A)     Susceptibility     Staphylococcus epidermidis (1)     Antibiotic Interpretation TARA Status    ciprofloxacin Sensitive <=0.5 mcg/mL     nitrofurantoin Sensitive <=16 mcg/mL     oxacillin Sensitive <=0.25 mcg/mL     tetracycline Sensitive <=1 mcg/mL     trimethoprim-sulfamethoxazole Sensitive <=10 mcg/mL           Films:    XR CHEST PORTABLE   Final Result   The endotracheal tube tip is approximately 3 cm above the thai. XR CHEST PORTABLE   Final Result   Mild increase in the amount of bibasilar opacity since yesterday. Large   bilateral pneumonias. XR CHEST PORTABLE   Final Result   Stable bilateral pulmonary opacities. XR CHEST PORTABLE   Final Result   Minimal increase in the amount of opacity in each lung consistent with slight   worsening of bilateral pneumonia. XR CHEST PORTABLE   Final Result   Supportive tubing is in normal position. Low lung volume study, with stable alveolar opacities in the mid and lower   lungs, pneumonia versus atelectasis. XR CHEST PORTABLE   Final Result   Increased in degree and extent of bilateral mid and lower lung pneumonia.    The increased may be partially accentuated ETT  -Pulmonologist has discussed changing pt to roto-prone bed, family wished to hold off at this time    Petechial Rash  - noted on arms and truck  - given benadryl IV  - discussed with ID - remains on IV acyclovir, monitor    COVID -19  - monitor liver and renal fxn  - He is on Decadron 6 mg IV daily.  - Remdesivir, day 10 of 10  - S/P transfusion of 2 units of convalescent plasma on 7/2/2020    Sepsis  - POA, due to PNA, COVID 19  - with leukocytosis, fevers, tachypnea  - COVID-19 detected  - Patient has been weaned off Levophed  - sepsis improving. Multifocal pneumonia  - related to COVID-19.    - Tracheal aspirate sent. - was on  broad-spectrum antibiotics, vanc and  Cefepime--> now off     Shingles  - on buttocks  - start IV Acyclovir D#2 per ID recs    Monitor renal function  - Patient's creatinine is 0.9--> 1.1 >1 > 1.2 today  - on IV Lasix. Elevated D dimer  - due to COVID 19  - CT chest neg for PE    UTI  - cx positive for staph, treated    Hyperlipidemia  - on statin. Constipation  -Add mag citrate    Lovenox twice daily dosing. DIET TUBE FEED CONTINUOUS/CYCLIC NPO; Semi-elemental (Vital AF 1.2 ); Orogastric; Continuous; 20 (initial ); 60 (goal); 20 (over 20 hours)  Full Code    Remains critically ill.       Jean Pierre Gonzalez MD  7/7/2020

## 2020-07-08 NOTE — PROGRESS NOTES
+  Extremities: decreasing  edema. Intact peripheral pulses. Brisk cap refill, < 2 secs  Neuro: non focal                    Medications:  Scheduled Meds:   piperacillin-tazobactam  3.375 g Intravenous Q8H    [START ON 7/9/2020] dexamethasone  4 mg Intravenous Daily    enoxaparin  1 mg/kg Subcutaneous BID    sennosides-docusate sodium  2 tablet Oral Daily    linezolid  600 mg Intravenous Q12H    acyclovir  10 mg/kg (Adjusted) Intravenous Q8H    insulin lispro  0-12 Units Subcutaneous Q4H    chlorhexidine  15 mL Mouth/Throat BID    pantoprazole  40 mg Intravenous Daily    ipratropium-albuterol  1 ampule Inhalation Q4H    ketotifen  1 drop Both Eyes BID    rosuvastatin  10 mg Oral Daily    aspirin  81 mg Oral Daily    sodium chloride flush  10 mL Intravenous 2 times per day       PRN Meds:  sennosides-docusate sodium, glucose, dextrose, glucagon (rDNA), dextrose, fentanNYL, midazolam, tetrahydrozoline, aluminum & magnesium hydroxide-simethicone, ondansetron, sodium chloride flush, acetaminophen **OR** acetaminophen    Results:  CBC:   Recent Labs     07/06/20  0506 07/07/20  0500 07/08/20  0505   WBC 14.0* 14.4* 18.2*   HGB 12.3* 12.1* 12.0*   HCT 38.2* 37.5* 37.3*   MCV 88.1 88.4 88.3    193 152     BMP:   Recent Labs     07/06/20  0506 07/07/20  0845 07/08/20  0505    135* 139   K 3.8 4.2 4.1   CL 96* 92* 95*   CO2 36* 34* 38*   PHOS 2.8  --   --    BUN 59* 64* 67*   CREATININE 1.0 1.2 1.2     LIVER PROFILE:   Recent Labs     07/06/20  0506 07/07/20  0500 07/08/20  0505   AST 72* 48* 34   ALT 40 32 22   LIPASE  --   --  19.0   BILIDIR <0.2 0.3 0.3   BILITOT 0.4 0.6 0.5   ALKPHOS 56 51 46     PT/INR:   Recent Labs     07/06/20  0506 07/07/20  0500 07/08/20  0505   PROTIME 12.9 13.5* 12.5   INR 1.11 1.16* 1.08     Results for GOGONEWTON (MRN [id number]) as of 7/1/2020 09:46   Ref.  Range 6/24/2020 21:35 6/26/2020 15:24 6/30/2020 04:20   D-Dimer, Maninder Albino Latest Ref Range: 0 - 229 ng/mL  (H) 682 (H) 653 (H)     Results for Carter Lopez (MRN [id number]) as of 7/1/2020 09:46   Ref. Range 6/24/2020 21:35   Ferritin Latest Ref Range: 30.0 - 400.0 ng/mL 97.3       Cultures:  Results for Apple BOWENS (MRN [id number]) as of 6/30/2020 09:20   Ref. Range 6/24/2020 22:00   SARS-CoV-2, PCR Latest Ref Range: Not Detected  DETECTED (A)     Susceptibility     Staphylococcus epidermidis (1)     Antibiotic Interpretation TARA Status    ciprofloxacin Sensitive <=0.5 mcg/mL     nitrofurantoin Sensitive <=16 mcg/mL     oxacillin Sensitive <=0.25 mcg/mL     tetracycline Sensitive <=1 mcg/mL     trimethoprim-sulfamethoxazole Sensitive <=10 mcg/mL           Films:    XR CHEST PORTABLE   Final Result   Stable central ground-glass airspace opacities. Supportive devices are   unchanged. XR CHEST PORTABLE   Final Result   The endotracheal tube tip is approximately 3 cm above the thai. XR CHEST PORTABLE   Final Result   Mild increase in the amount of bibasilar opacity since yesterday. Large   bilateral pneumonias. XR CHEST PORTABLE   Final Result   Stable bilateral pulmonary opacities. XR CHEST PORTABLE   Final Result   Minimal increase in the amount of opacity in each lung consistent with slight   worsening of bilateral pneumonia. XR CHEST PORTABLE   Final Result   Supportive tubing is in normal position. Low lung volume study, with stable alveolar opacities in the mid and lower   lungs, pneumonia versus atelectasis. XR CHEST PORTABLE   Final Result   Increased in degree and extent of bilateral mid and lower lung pneumonia. The increased may be partially accentuated by low lung volumes. XR CHEST 1 VW   Final Result   Persistent bilateral airspace disease, similar to prior. IR PICC WO SQ PORT/PUMP > 5 YEARS   Final Result   Successful placement of PICC line.          XR CHEST PORTABLE   Final Result   Appropriate endotracheal tube positioning. Multifocal airspace opacities and areas of atelectasis correlate with recent   CT chest.         XR ABDOMEN FOR NG/OG/NE TUBE PLACEMENT   Final Result   NG tube terminates over the stomach. CT CHEST PULMONARY EMBOLISM W CONTRAST   Final Result   1. No pulmonary embolism. 2. Multifocal pneumonia scattered throughout both lungs with minimal pleural   effusions. XR CHEST STANDARD (2 VW)   Final Result   No radiographic evidence of acute pulmonary disease. XR CHEST PORTABLE    (Results Pending)   VL Extremity Venous Bilateral    (Results Pending)          Assessment:    . Principal Problem:    COVID-19  Active Problems:    Multifocal pneumonia    Orthostasis    Sepsis ([address]    Urinary tract infection without hematuria    ARDS (adult respiratory distress syndrome) ([facility])    Hyperlipidemia    Acute respiratory failure with hypoxia ([facility])    Class 1 obesity due to excess calories with body mass index (BMI) of 31.0 to 31.9 in adult    Peripherally inserted central catheter (PICC) in place    Asymptomatic bacteriuria    Endotracheally intubated    On mechanically assisted ventilation ([facility])    Herpes zoster without complication    Fever    Arm edema    Hypertriglyceridemia  Resolved Problems:    * No resolved hospital problems. *         Plan:    Acute hypoxic respiratory failure due to COVID-19  ARDS  - intubated, on mech vent  - seen by Pulm critical care . - remains in positive fluid balance, getting IV Lasix. - Persistent hypoxia  - PEEP was down from 16-12, now back up to PEEP of 14.  - FiO2 was down to  60% -> back to  80 % -> 70%--> 75%.   Hypoxia is better today FiO2 is down to 55%  -Reintubated on 7/6 d/t displacement of existing ETT  -Pulmonologist has discussed changing pt to roto-prone bed, family wished to hold off at this time    Petechial Rash  - noted on arms and truck  - given benadryl IV  - discussed with ID - remains on IV acyclovir, monitor    COVID -19  - monitor

## 2020-07-08 NOTE — PROGRESS NOTES
07/08/20 1932   Vent Information   $Ventilation $Subsequent Day   Vent Type 840   Vent Mode AC/VC   Vt Ordered 430 mL   Rate Set 16 bmp   Peak Flow 75 L/min   Pressure Support 0 cmH20   FiO2  55 %   SpO2 96 %   SpO2/FiO2 ratio 174.55   Sensitivity 2   PEEP/CPAP 14   I Time/ I Time % 0 s   Humidification Source Heated wire   Humidification Temp 37   Humidification Temp Measured 36.8   Circuit Condensation Drained   Vent Patient Data   High Peep/I Pressure 0   Peak Inspiratory Pressure 28 cmH2O   Mean Airway Pressure 19 cmH20   Rate Measured 19 br/min   Vt Exhaled 550 mL   Minute Volume 8.91 Liters   I:E Ratio 1:3.10   Plateau Pressure 25 SQL52   Static Compliance 50 mL/cmH2O   Dynamic Compliance 39 mL/cmH2O   Cough/Sputum   Sputum How Obtained Endotracheal;Suctioned   Cough Productive   Sputum Amount Small   Sputum Color Creamy; Red   Tenacity Thick   Spontaneous Breathing Trial (SBT) RT Doc   Pulse 86   Breath Sounds   Right Upper Lobe Diminished   Right Middle Lobe Diminished   Right Lower Lobe Diminished   Left Upper Lobe Diminished   Left Lower Lobe Diminished   Additional Respiratory  Assessments   Resp 17   Position Semi-Harris's   Alarm Settings   High Pressure Alarm 50 cmH2O   Low Minute Volume Alarm 5 L/min   Apnea (secs) 20 secs   High Respiratory Rate 40 br/min   Low Exhaled Vt  350 mL   ETT (adult)   Placement Date/Time: 06/29/20 0230   Preoxygenation: Yes  Mask Ventilation: Ventilated by mask (1)  Technique: Video laryngoscopy  Type: Cuffed  Tube Size: 8 mm  Laryngoscope: GlideScope  Blade Size: 4  Location: Oral  Insertion attempts: 1  Placement. ..    Secured at 23 cm   Measured From Lips   ET Placement Left   Secured By Commercial tube leblanc   Site Condition Dry

## 2020-07-09 ENCOUNTER — APPOINTMENT (OUTPATIENT)
Dept: GENERAL RADIOLOGY | Age: 85
DRG: 870 | End: 2020-07-09
Payer: MEDICARE

## 2020-07-09 LAB
ALBUMIN SERPL-MCNC: 2.3 G/DL (ref 3.4–5)
ALP BLD-CCNC: 44 U/L (ref 40–129)
ALT SERPL-CCNC: 19 U/L (ref 10–40)
ANION GAP SERPL CALCULATED.3IONS-SCNC: 10 MMOL/L (ref 3–16)
AST SERPL-CCNC: 26 U/L (ref 15–37)
BASE EXCESS ARTERIAL: 11.1 MMOL/L (ref -3–3)
BASE EXCESS ARTERIAL: 13.2 MMOL/L (ref -3–3)
BASOPHILS ABSOLUTE: 0 K/UL (ref 0–0.2)
BASOPHILS RELATIVE PERCENT: 0 %
BILIRUB SERPL-MCNC: 0.5 MG/DL (ref 0–1)
BILIRUBIN DIRECT: <0.2 MG/DL (ref 0–0.3)
BILIRUBIN, INDIRECT: ABNORMAL MG/DL (ref 0–1)
BUN BLDV-MCNC: 64 MG/DL (ref 7–20)
CALCIUM SERPL-MCNC: 7.5 MG/DL (ref 8.3–10.6)
CARBOXYHEMOGLOBIN ARTERIAL: 0.3 % (ref 0–1.5)
CARBOXYHEMOGLOBIN ARTERIAL: 0.5 % (ref 0–1.5)
CHLORIDE BLD-SCNC: 92 MMOL/L (ref 99–110)
CO2: 36 MMOL/L (ref 21–32)
CREAT SERPL-MCNC: 1 MG/DL (ref 0.8–1.3)
CULTURE, RESPIRATORY: NORMAL
D DIMER: 568 NG/ML DDU (ref 0–229)
EOSINOPHILS ABSOLUTE: 0 K/UL (ref 0–0.6)
EOSINOPHILS RELATIVE PERCENT: 0 %
GFR AFRICAN AMERICAN: >60
GFR NON-AFRICAN AMERICAN: >60
GLUCOSE BLD-MCNC: 133 MG/DL (ref 70–99)
GLUCOSE BLD-MCNC: 139 MG/DL (ref 70–99)
GLUCOSE BLD-MCNC: 139 MG/DL (ref 70–99)
GLUCOSE BLD-MCNC: 153 MG/DL (ref 70–99)
GLUCOSE BLD-MCNC: 168 MG/DL (ref 70–99)
GLUCOSE BLD-MCNC: 183 MG/DL (ref 70–99)
GLUCOSE BLD-MCNC: 203 MG/DL (ref 70–99)
GRAM STAIN RESULT: NORMAL
HCO3 ARTERIAL: 35.7 MMOL/L (ref 21–29)
HCO3 ARTERIAL: 38.8 MMOL/L (ref 21–29)
HCT VFR BLD CALC: 34.9 % (ref 40.5–52.5)
HCT VFR BLD CALC: 35.4 % (ref 40.5–52.5)
HEMOGLOBIN, ART, EXTENDED: 11.9 G/DL (ref 13.5–17.5)
HEMOGLOBIN, ART, EXTENDED: 14.1 G/DL (ref 13.5–17.5)
HEMOGLOBIN: 11.2 G/DL (ref 13.5–17.5)
HEMOGLOBIN: 11.4 G/DL (ref 13.5–17.5)
INR BLD: 1.08 (ref 0.86–1.14)
LYMPHOCYTES ABSOLUTE: 1 K/UL (ref 1–5.1)
LYMPHOCYTES RELATIVE PERCENT: 5 %
MAGNESIUM: 2.8 MG/DL (ref 1.8–2.4)
MCH RBC QN AUTO: 27.7 PG (ref 26–34)
MCH RBC QN AUTO: 28.3 PG (ref 26–34)
MCHC RBC AUTO-ENTMCNC: 31.9 G/DL (ref 31–36)
MCHC RBC AUTO-ENTMCNC: 32.2 G/DL (ref 31–36)
MCV RBC AUTO: 86.9 FL (ref 80–100)
MCV RBC AUTO: 87.7 FL (ref 80–100)
METHEMOGLOBIN ARTERIAL: 0.3 %
METHEMOGLOBIN ARTERIAL: 0.3 %
MONOCYTES ABSOLUTE: 0.4 K/UL (ref 0–1.3)
MONOCYTES RELATIVE PERCENT: 2 %
NEUTROPHILS ABSOLUTE: 18.3 K/UL (ref 1.7–7.7)
NEUTROPHILS RELATIVE PERCENT: 93 %
O2 CONTENT ARTERIAL: 16 ML/DL
O2 CONTENT ARTERIAL: 19 ML/DL
O2 SAT, ARTERIAL: 96.5 %
O2 SAT, ARTERIAL: 97.8 %
O2 THERAPY: ABNORMAL
O2 THERAPY: ABNORMAL
PCO2 ARTERIAL: 46.3 MMHG (ref 35–45)
PCO2 ARTERIAL: 53.9 MMHG (ref 35–45)
PDW BLD-RTO: 14.2 % (ref 12.4–15.4)
PDW BLD-RTO: 14.5 % (ref 12.4–15.4)
PERFORMED ON: ABNORMAL
PH ARTERIAL: 7.47 (ref 7.35–7.45)
PH ARTERIAL: 7.5 (ref 7.35–7.45)
PLATELET # BLD: 159 K/UL (ref 135–450)
PLATELET # BLD: 163 K/UL (ref 135–450)
PLATELET SLIDE REVIEW: ADEQUATE
PMV BLD AUTO: 10 FL (ref 5–10.5)
PMV BLD AUTO: 9.9 FL (ref 5–10.5)
PO2 ARTERIAL: 100.5 MMHG (ref 75–108)
PO2 ARTERIAL: 79.3 MMHG (ref 75–108)
POTASSIUM SERPL-SCNC: 3.8 MMOL/L (ref 3.5–5.1)
PROCALCITONIN: 0.75 NG/ML (ref 0–0.15)
PROTHROMBIN TIME: 12.5 SEC (ref 10–13.2)
RBC # BLD: 4.02 M/UL (ref 4.2–5.9)
RBC # BLD: 4.04 M/UL (ref 4.2–5.9)
SLIDE REVIEW: ABNORMAL
SODIUM BLD-SCNC: 138 MMOL/L (ref 136–145)
TCO2 ARTERIAL: 37.1 MMOL/L
TCO2 ARTERIAL: 40.4 MMOL/L
TOTAL PROTEIN: 5.3 G/DL (ref 6.4–8.2)
TRIGL SERPL-MCNC: 70 MG/DL (ref 0–150)
WBC # BLD: 17.4 K/UL (ref 4–11)
WBC # BLD: 19.7 K/UL (ref 4–11)

## 2020-07-09 PROCEDURE — 2580000003 HC RX 258: Performed by: INTERNAL MEDICINE

## 2020-07-09 PROCEDURE — 83735 ASSAY OF MAGNESIUM: CPT

## 2020-07-09 PROCEDURE — 84145 PROCALCITONIN (PCT): CPT

## 2020-07-09 PROCEDURE — 85025 COMPLETE CBC W/AUTO DIFF WBC: CPT

## 2020-07-09 PROCEDURE — 94003 VENT MGMT INPAT SUBQ DAY: CPT

## 2020-07-09 PROCEDURE — 94761 N-INVAS EAR/PLS OXIMETRY MLT: CPT

## 2020-07-09 PROCEDURE — 94750 HC PULMONARY COMPLIANCE STUDY: CPT

## 2020-07-09 PROCEDURE — 2000000000 HC ICU R&B

## 2020-07-09 PROCEDURE — 80048 BASIC METABOLIC PNL TOTAL CA: CPT

## 2020-07-09 PROCEDURE — 6370000000 HC RX 637 (ALT 250 FOR IP): Performed by: INTERNAL MEDICINE

## 2020-07-09 PROCEDURE — 71045 X-RAY EXAM CHEST 1 VIEW: CPT

## 2020-07-09 PROCEDURE — 85027 COMPLETE CBC AUTOMATED: CPT

## 2020-07-09 PROCEDURE — 6360000002 HC RX W HCPCS: Performed by: INTERNAL MEDICINE

## 2020-07-09 PROCEDURE — 82803 BLOOD GASES ANY COMBINATION: CPT

## 2020-07-09 PROCEDURE — 99291 CRITICAL CARE FIRST HOUR: CPT | Performed by: INTERNAL MEDICINE

## 2020-07-09 PROCEDURE — 2700000000 HC OXYGEN THERAPY PER DAY

## 2020-07-09 PROCEDURE — 84478 ASSAY OF TRIGLYCERIDES: CPT

## 2020-07-09 PROCEDURE — C9113 INJ PANTOPRAZOLE SODIUM, VIA: HCPCS | Performed by: INTERNAL MEDICINE

## 2020-07-09 PROCEDURE — 85610 PROTHROMBIN TIME: CPT

## 2020-07-09 PROCEDURE — 94640 AIRWAY INHALATION TREATMENT: CPT

## 2020-07-09 PROCEDURE — 36592 COLLECT BLOOD FROM PICC: CPT

## 2020-07-09 PROCEDURE — 85379 FIBRIN DEGRADATION QUANT: CPT

## 2020-07-09 PROCEDURE — 99233 SBSQ HOSP IP/OBS HIGH 50: CPT | Performed by: INTERNAL MEDICINE

## 2020-07-09 PROCEDURE — 74018 RADEX ABDOMEN 1 VIEW: CPT

## 2020-07-09 PROCEDURE — 80076 HEPATIC FUNCTION PANEL: CPT

## 2020-07-09 RX ORDER — SODIUM CHLORIDE 9 MG/ML
INJECTION, SOLUTION INTRAVENOUS
Status: DISPENSED
Start: 2020-07-09 | End: 2020-07-09

## 2020-07-09 RX ORDER — DEXAMETHASONE SODIUM PHOSPHATE 4 MG/ML
3 INJECTION, SOLUTION INTRA-ARTICULAR; INTRALESIONAL; INTRAMUSCULAR; INTRAVENOUS; SOFT TISSUE DAILY
Status: DISCONTINUED | OUTPATIENT
Start: 2020-07-10 | End: 2020-07-10

## 2020-07-09 RX ORDER — SENNA AND DOCUSATE SODIUM 50; 8.6 MG/1; MG/1
2 TABLET, FILM COATED ORAL 2 TIMES DAILY
Status: DISCONTINUED | OUTPATIENT
Start: 2020-07-09 | End: 2020-07-10

## 2020-07-09 RX ADMIN — Medication 10 ML: at 08:27

## 2020-07-09 RX ADMIN — MUPIROCIN: 20 OINTMENT TOPICAL at 21:22

## 2020-07-09 RX ADMIN — DEXAMETHASONE SODIUM PHOSPHATE 4 MG: 4 INJECTION, SOLUTION INTRAMUSCULAR; INTRAVENOUS at 08:26

## 2020-07-09 RX ADMIN — MIDAZOLAM HYDROCHLORIDE 6 MG/HR: 5 INJECTION, SOLUTION INTRAMUSCULAR; INTRAVENOUS at 15:17

## 2020-07-09 RX ADMIN — PIPERACILLIN SODIUM AND TAZOBACTAM SODIUM 3.38 G: 3; .375 INJECTION, POWDER, LYOPHILIZED, FOR SOLUTION INTRAVENOUS at 11:30

## 2020-07-09 RX ADMIN — ACYCLOVIR SODIUM 750 MG: 500 INJECTION, SOLUTION INTRAVENOUS at 20:52

## 2020-07-09 RX ADMIN — CHLORHEXIDINE GLUCONATE 0.12% ORAL RINSE 15 ML: 1.2 LIQUID ORAL at 08:26

## 2020-07-09 RX ADMIN — CHLORHEXIDINE GLUCONATE 0.12% ORAL RINSE 15 ML: 1.2 LIQUID ORAL at 20:51

## 2020-07-09 RX ADMIN — IPRATROPIUM BROMIDE AND ALBUTEROL SULFATE 1 AMPULE: .5; 3 SOLUTION RESPIRATORY (INHALATION) at 11:34

## 2020-07-09 RX ADMIN — FENTANYL CITRATE 125 MCG/HR: 50 INJECTION, SOLUTION INTRAMUSCULAR; INTRAVENOUS at 08:49

## 2020-07-09 RX ADMIN — KETOTIFEN FUMARATE 1 DROP: 0.35 SOLUTION/ DROPS OPHTHALMIC at 08:27

## 2020-07-09 RX ADMIN — FENTANYL CITRATE 75 MCG/HR: 50 INJECTION, SOLUTION INTRAMUSCULAR; INTRAVENOUS at 16:07

## 2020-07-09 RX ADMIN — PANTOPRAZOLE SODIUM 40 MG: 40 INJECTION, POWDER, FOR SOLUTION INTRAVENOUS at 08:26

## 2020-07-09 RX ADMIN — ACYCLOVIR SODIUM 750 MG: 500 INJECTION, SOLUTION INTRAVENOUS at 13:00

## 2020-07-09 RX ADMIN — IPRATROPIUM BROMIDE AND ALBUTEROL SULFATE 1 AMPULE: .5; 3 SOLUTION RESPIRATORY (INHALATION) at 23:44

## 2020-07-09 RX ADMIN — ROSUVASTATIN CALCIUM 10 MG: 10 TABLET, FILM COATED ORAL at 08:27

## 2020-07-09 RX ADMIN — Medication 10 ML: at 21:22

## 2020-07-09 RX ADMIN — ENOXAPARIN SODIUM 90 MG: 100 INJECTION SUBCUTANEOUS at 08:26

## 2020-07-09 RX ADMIN — IPRATROPIUM BROMIDE AND ALBUTEROL SULFATE 1 AMPULE: .5; 3 SOLUTION RESPIRATORY (INHALATION) at 07:56

## 2020-07-09 RX ADMIN — SENNOSIDES AND DOCUSATE SODIUM 2 TABLET: 8.6; 5 TABLET ORAL at 20:51

## 2020-07-09 RX ADMIN — ASPIRIN 81 MG 81 MG: 81 TABLET ORAL at 08:27

## 2020-07-09 RX ADMIN — SENNOSIDES AND DOCUSATE SODIUM 2 TABLET: 8.6; 5 TABLET ORAL at 08:26

## 2020-07-09 RX ADMIN — PIPERACILLIN SODIUM AND TAZOBACTAM SODIUM 3.38 G: 3; .375 INJECTION, POWDER, LYOPHILIZED, FOR SOLUTION INTRAVENOUS at 01:52

## 2020-07-09 RX ADMIN — PIPERACILLIN SODIUM AND TAZOBACTAM SODIUM 3.38 G: 3; .375 INJECTION, POWDER, LYOPHILIZED, FOR SOLUTION INTRAVENOUS at 20:52

## 2020-07-09 RX ADMIN — LINEZOLID 600 MG: 600 INJECTION, SOLUTION INTRAVENOUS at 08:44

## 2020-07-09 RX ADMIN — LINEZOLID 600 MG: 600 INJECTION, SOLUTION INTRAVENOUS at 20:52

## 2020-07-09 RX ADMIN — MIDAZOLAM HYDROCHLORIDE 6 MG/HR: 5 INJECTION, SOLUTION INTRAMUSCULAR; INTRAVENOUS at 02:15

## 2020-07-09 RX ADMIN — IPRATROPIUM BROMIDE AND ALBUTEROL SULFATE 1 AMPULE: .5; 3 SOLUTION RESPIRATORY (INHALATION) at 03:14

## 2020-07-09 RX ADMIN — IPRATROPIUM BROMIDE AND ALBUTEROL SULFATE 1 AMPULE: .5; 3 SOLUTION RESPIRATORY (INHALATION) at 19:55

## 2020-07-09 RX ADMIN — IPRATROPIUM BROMIDE AND ALBUTEROL SULFATE 1 AMPULE: .5; 3 SOLUTION RESPIRATORY (INHALATION) at 15:27

## 2020-07-09 RX ADMIN — MUPIROCIN: 20 OINTMENT TOPICAL at 15:35

## 2020-07-09 RX ADMIN — ENOXAPARIN SODIUM 90 MG: 100 INJECTION SUBCUTANEOUS at 20:51

## 2020-07-09 RX ADMIN — ACYCLOVIR SODIUM 750 MG: 500 INJECTION, SOLUTION INTRAVENOUS at 05:14

## 2020-07-09 RX ADMIN — MINERAL OIL 330 ML: 1000 SOLUTION ORAL at 15:35

## 2020-07-09 RX ADMIN — KETOTIFEN FUMARATE 1 DROP: 0.35 SOLUTION/ DROPS OPHTHALMIC at 21:25

## 2020-07-09 ASSESSMENT — PULMONARY FUNCTION TESTS
PIF_VALUE: 25
PIF_VALUE: 29
PIF_VALUE: 26
PIF_VALUE: 24
PIF_VALUE: 32
PIF_VALUE: 28
PIF_VALUE: 27
PIF_VALUE: 24
PIF_VALUE: 31
PIF_VALUE: 24
PIF_VALUE: 34
PIF_VALUE: 25
PIF_VALUE: 31
PIF_VALUE: 50
PIF_VALUE: 15
PIF_VALUE: 31
PIF_VALUE: 30
PIF_VALUE: 25
PIF_VALUE: 28
PIF_VALUE: 28
PIF_VALUE: 24
PIF_VALUE: 27
PIF_VALUE: 24
PIF_VALUE: 29
PIF_VALUE: 23
PIF_VALUE: 32
PIF_VALUE: 30
PIF_VALUE: 23
PIF_VALUE: 23
PIF_VALUE: 28

## 2020-07-09 ASSESSMENT — PAIN SCALES - GENERAL
PAINLEVEL_OUTOF10: 0

## 2020-07-09 NOTE — PROGRESS NOTES
Called for patient desaturation, sp02 87%, increase 02 to 60% and peep to 8 to achieve sp02 of 92%, bs equal and diminished, suctioned once for small amount of red secretions after RN suctioned large amounts of red secretions.  Patient biting on tube and RN increased sedation and call out to Dr. Lilo Casanova

## 2020-07-09 NOTE — PLAN OF CARE
Problem: SAFETY  Goal: Free from accidental physical injury  Outcome: Ongoing     Problem: KNOWLEDGE DEFICIT  Goal: Patient/S.O. demonstrates understanding of disease process, treatment plan, medications, and discharge instructions. Outcome: Ongoing     Problem: Airway Clearance - Ineffective:  Goal: Clear lung sounds  Description: Clear lung sounds  Outcome: Ongoing  Goal: Ability to maintain a clear airway will improve  Description: Ability to maintain a clear airway will improve  Outcome: Ongoing     Problem: Gas Exchange - Impaired:  Goal: Levels of oxygenation will improve  Description: Levels of oxygenation will improve  Outcome: Ongoing     Problem: Hyperthermia:  Goal: Ability to maintain a body temperature in the normal range will improve  Description: Ability to maintain a body temperature in the normal range will improve  Outcome: Ongoing     Problem: Airway Clearance - Ineffective  Goal: Achieve or maintain patent airway  Outcome: Ongoing     Problem: Gas Exchange - Impaired  Goal: Absence of hypoxia  Outcome: Ongoing  Goal: Promote optimal lung function  Outcome: Ongoing     Problem: Breathing Pattern - Ineffective  Goal: Ability to achieve and maintain a regular respiratory rate  Outcome: Ongoing     Problem:  Body Temperature -  Risk of, Imbalanced  Goal: Ability to maintain a body temperature within defined limits  Outcome: Ongoing  Goal: Will regain or maintain usual level of consciousness  Outcome: Ongoing  Goal: Complications related to the disease process, condition or treatment will be avoided or minimized  Outcome: Ongoing     Problem: Isolation Precautions - Risk of Spread of Infection  Goal: Prevent transmission of infection  Outcome: Ongoing     Problem: Nutrition Deficits  Goal: Optimize nutrtional status  7/9/2020 1503 by Krish Costa RN  Outcome: Ongoing  7/9/2020 1217 by Joselin Christian RD, LD  Outcome: Met This Shift     Problem: Risk for Fluid Volume Deficit  Goal: Maintain normal heart rhythm  Outcome: Ongoing  Goal: Maintain absence of muscle cramping  Outcome: Ongoing  Goal: Maintain normal serum potassium, sodium, calcium, phosphorus, and pH  Outcome: Ongoing     Problem: Loneliness or Risk for Loneliness  Goal: Demonstrate positive use of time alone when socialization is not possible  Outcome: Ongoing     Problem: Fatigue  Goal: Verbalize increase energy and improved vitality  Outcome: Ongoing     Problem: Patient Education: Go to Patient Education Activity  Goal: Patient/Family Education  Outcome: Ongoing     Problem: Restraint Use - Nonviolent/Non-Self-Destructive Behavior:  Goal: Absence of restraint indications  Description: Absence of restraint indications  Outcome: Ongoing  Goal: Absence of restraint-related injury  Description: Absence of restraint-related injury  Outcome: Ongoing     Problem: Nutrition  Goal: Optimal nutrition therapy  7/9/2020 1503 by Sasha Perez RN  Outcome: Ongoing  7/9/2020 1217 by Grant Kaplan RD, LD  Outcome: Met This Shift  Goal: Understanding of nutritional guidelines  7/9/2020 1503 by Sasha Perez RN  Outcome: Ongoing  7/9/2020 1217 by Grant Kaplan RD, LD  Outcome: Met This Shift     Problem: Skin Integrity:  Goal: Will show no infection signs and symptoms  Description: Will show no infection signs and symptoms  Outcome: Ongoing  Goal: Absence of new skin breakdown  Description: Absence of new skin breakdown  Outcome: Ongoing     Problem: Falls - Risk of:  Goal: Will remain free from falls  Description: Will remain free from falls  Outcome: Ongoing  Goal: Absence of physical injury  Description: Absence of physical injury  Outcome: Ongoing     Problem: Urinary Elimination:  Goal: Signs and symptoms of infection will decrease  Description: Signs and symptoms of infection will decrease  Outcome: Ongoing  Goal: Complications related to the disease process, condition or treatment will be avoided or minimized  Description: Complications related to the disease process, condition or treatment will be avoided or minimized  Outcome: Ongoing     Problem: Pain:  Goal: Pain level will decrease  Description: Pain level will decrease  Outcome: Ongoing  Goal: Control of acute pain  Description: Control of acute pain  Outcome: Ongoing  Goal: Control of chronic pain  Description: Control of chronic pain  Outcome: Ongoing   Patient is on ventilator at a PEEP of 8 and Fio2 decreased to 50% and is tolerating very well. Patient is normal sinus rhythm with infrequent PVCs and BP is wnl. The patient is afebrile. Still no bowel movement and we are starting a smog enema. KUB of abdomen reported only gas and patient is tolerating tube feeding with low residuals. VAP care every 4 hours. Turned every 2 hours. Skin is dry with blisters to his bilateral back and a rash to his bilateral arms and groin. Restraint education provided to the patient and Venice Lux (daughter). Pain management with fentanyl. RASS -1 with versed. Daughter updated twice.

## 2020-07-09 NOTE — PROGRESS NOTES
Nutrition Assessment (Enteral Nutrition)    Type and Reason for Visit: Reassess    Nutrition Recommendations:   1. Continue Vital AF 1.2 at goal rate of 60 ml/hr x 20 hours + water flushes of 50 ml every 2 hours or per MD guidance. 2. Monitor TF rate, intake, and tolerance + water flushes. 3. Monitor results of KUB and results of smog enema (both ordered this am by Dr. Marilyn Hatchet). 4. Monitor vent status, sedation type/amount, and plan of care. 5. Monitor nutrition-related labs and weight trends. Nutrition Assessment: patient is improving from a nutritional standpoint AEB TF is at goal rate and meeting 100% of patient's current, estimated nutrition needs + patient is tolerating well, however, he remains at risk for further compromise d/t COVID-19 + status, intubation status, need for EN as sole source of nutrition while intubated, and no BM x 14 days admission; will continue Vital AF 1.2 at goal rate of 60 ml/hr x 20 hours + 50 ml water flushes every 2 hours or per MD guidance    Malnutrition Assessment:  · Malnutrition Status: At risk for malnutrition  · Context: Acute illness or injury  · Findings of the 6 clinical characteristics of malnutrition (Minimum of 2 out of 6 clinical characteristics is required to make the diagnosis of moderate or severe Protein Calorie Malnutrition based on AND/ASPEN Guidelines):  1. Energy Intake-Greater than 75% of estimated energy requirement, Greater than or equal to 7 days    2. Weight Loss-2% loss or greater(- 6# or 3% weight loss ), (x 14 days admission)  3. Fat Loss-Unable to assess(no NFPE d/t COVID-19 precautions),    4. Muscle Loss-Unable to assess(no NFPE d/t COVID-19 precautions),    5. Fluid Accumulation-No significant fluid accumulation, Extremities(trace in BLE)  6.  Strength-Not measured    Nutrition Risk Level:  Moderate    Nutrition Needs:  · Estimated Daily Total Kcal: 1700 - 1870 kcals based on 20-22 kcals/kg/CBW  · Estimated Daily Protein (g): 80 - 101 g protein based on 1.2-1.5 g/kg/IBW  · Estimated Daily Fluid (ml/day): 1700 - 1900 ml     Nutrition Diagnosis:   · Problem: Inadequate oral intake  · Etiology: related to Impaired respiratory function-inability to consume food     Signs and symptoms:  as evidenced by NPO status due to medical condition, Intubation    Objective Information:  · Nutrition-Focused Physical Findings: patient remains intubated and in droplet + precautions since patient is COVID-19 + during this admission; propofol was off this am; + ET tube displacement on 7/7/20 with emergent re-intubation; ID is following patient for shingles; + KUB ordered for today since patient has not had a BM x 14 days admission; Senokot-S was increased to 2 tabs BID this am; decadron amount decreased this am; Cl and Ca were low this am; BUN is elevated; on med-dose SSI for BS; + smog enema ordered by Dr. Salvador Prajapati today  · Wound Type: None  · Current Nutrition Therapies:  · Oral Diet Orders: NPO   · Oral Diet intake: NPO  · Oral Nutrition Supplement (ONS) Orders: None  · ONS intake: NPO  · Tube Feeding (TF) Orders:   · Feeding Route: Orogastric  · Formula: Semi-elemental  · Rate (ml/hr):60 ml/hr     · Volume (ml/day): 1200 ml   · Duration: Continuous  · Additives/Modulars: (none)  · Water Flushes: 50 ml water flushes every 2 hours  · Current TF & Flush Orders Provides: TF is infusing at goal rate and patient is tolerating well  · Goal TF & Flush Orders Provides: Vital AF 1.2 with a goal rate of 60 ml/hr x 20 hours = 1200 ml TV, 1440 kcals, 90 g protein, and 973 ml free water + water flushes of 50 ml every 2 hours or per MD guidance  · Additional Calories: Vital AF 1.2 at 60 ml/hr x 20 hours  · Anthropometric Measures:  · Ht: 5' 7\" (170.2 cm)   · Current Body Wt: 194 lb 7 oz (88.2 kg)(actual; bed scale; obtained on 7/9/20)  · Admission Body Wt: 200 lb 1 oz (90.7 kg)(actual; bed scale; obtained on 7/2/20)  · Usual Body Wt: (180's, per past PCP encounters)  · Weight Change: - 6# or 3% weight loss x 14 days admission (per I/O flow sheet, patient is + 14L of fluid since admission)   · Ideal Body Wt: 148 lb (67.1 kg), % Ideal Body 131%  · BMI Classification: BMI 30.0 - 34.9 Obese Class I    Nutrition Interventions:   Continue NPO, Continue current Tube Feeding  Continued Inpatient Monitoring, Coordination of Care, Coordination of Community Care    Nutrition Evaluation:   · Evaluation: (goal is ongoing)   · Goals: patient will tolerate Vital High-Protein at goal rate of 60 ml/hr x 20 hours without GI distress, without s/s of aspiration, and without additional lab/fluid disturbances   · Monitoring: TF Intake, TF Tolerance, Mental Status/Confusion, Weight, Pertinent Labs, Constipation, Monitor Bowel Function, I&O      Electronically signed by Beth Quintero RD, LD on 7/9/20 at 12:18 PM EDT    Contact Number: [phone]

## 2020-07-09 NOTE — PROGRESS NOTES
Internal Medicine ICU Progress Note      Interval History:     Patient admitted with Isidro -Eduardo. On droplet plus precautions. Pt w/ persistent high fevers, progressive hypoxia. Initiated on Remdesivir, dexamethasone. Intubated for worsening resp failure on 20. Requiring HF O2 . Had high fevers w/ T max of 104 F    20: S/P Convalescent plasma transfusion 2 units     7/3/20--> : fevers improved, persistent hypoxia  Has developed new shingles, started on acyclovir    ET tube changed on  20:  -Temperature to 102.3 °F.  -Infectious disease following .  -Hypoxia slightly improved but  FiO2 down from 75% to 55% today , PEEP remains at 14    20:  - pt improved. Afebrile . - sats improved , Fi o2 down to 50 %, PEEP down to 8   responding a little when sedation decreased .     On droplet plus precautions    Invasive Lines: PICC placed on 2020      MV: Intubated on 2020    Recent Labs     20  0520 20  05   PHART 7.464* 7.475*   [id number] 56.2* 53.9*   PO2ART 130.3* 100.5       MV Settings:  Vent Mode: AC/VC Rate Set: 16 bmp/Vt Ordered: 430 mL/ /FiO2 : 50 %    IV:   sodium chloride      midazolam 6 mg/hr (20 1517)    dextrose      fentaNYL (SUBLIMAZE) infusion 100 mcg/hr (20 1516)       Vitals:  Temp  Av.4 °F (36.9 °C)  Min: 97.5 °F (36.4 °C)  Max: 100 °F (37.8 °C)  Pulse  Av.3  Min: 73  Max: 93  BP  Min: 116/58  Max: 142/69  SpO2  Av.8 %  Min: 92 %  Max: 98 %  FiO2   Av.9 %  Min: 50 %  Max: 55 %  Patient Vitals for the past 4 hrs:   BP Temp Temp src Pulse Resp SpO2 Height   20 1527 -- -- -- -- 17 97 % --   20 1500 118/60 -- -- 75 20 98 % --   20 1400 130/64 98.7 °F (37.1 °C) Bladder 79 20 97 % --   20 1300 125/60 -- -- 81 19 97 % --   20 1204 -- -- -- -- -- -- 5' 7\" (1.702 m)   20 1200 128/61 98.5 °F (36.9 °C) Bladder 80 18 97 % --       CVP:        Intake/Output Summary (Last 24 hours) at 2020 [address] [place] [address] filed at 7/9/2020 1200  Gross per 24 hour   Intake 3879.34 ml   Output 2160 ml   Net 1719.34 ml       Physical Exam:  General:  Pt is intubated, sedated, on mechanical vent. Skin:  Warm and dry  Neck:  JVD absent. Neck supple  Chest: Diminished breath sounds . No wheezes or rhonchi   Cardiovascular:  RRR ,S1S2 normal  Abdomen:  Soft, non tender, non distended, BS +  Extremities: edema has decreased   Intact peripheral pulses.  Brisk cap refill, < 2 secs  Neuro: non focal                    Medications:  Scheduled Meds:   [START ON 7/10/2020] dexamethasone  3 mg Intravenous Daily    sennosides-docusate sodium  2 tablet Oral BID    mupirocin   Nasal BID    piperacillin-tazobactam  3.375 g Intravenous Q8H    enoxaparin  1 mg/kg Subcutaneous BID    linezolid  600 mg Intravenous Q12H    acyclovir  10 mg/kg (Adjusted) Intravenous Q8H    insulin lispro  0-12 Units Subcutaneous Q4H    chlorhexidine  15 mL Mouth/Throat BID    pantoprazole  40 mg Intravenous Daily    ipratropium-albuterol  1 ampule Inhalation Q4H    ketotifen  1 drop Both Eyes BID    rosuvastatin  10 mg Oral Daily    aspirin  81 mg Oral Daily    sodium chloride flush  10 mL Intravenous 2 times per day       PRN Meds:  glucose, dextrose, glucagon (rDNA), dextrose, fentanNYL, midazolam, tetrahydrozoline, aluminum & magnesium hydroxide-simethicone, ondansetron, sodium chloride flush, acetaminophen **OR** acetaminophen    Results:  CBC:   Recent Labs     07/07/20  0500 07/08/20  0505 07/09/20  0520   WBC 14.4* 18.2* 19.7*   HGB 12.1* 12.0* 11.2*   HCT 37.5* 37.3* 34.9*   MCV 88.4 88.3 86.9    152 163     BMP:   Recent Labs     07/07/20  0845 07/08/20  0505 07/09/20  0520   * 139 138   K 4.2 4.1 3.8   CL 92* 95* 92*   CO2 34* 38* 36*   BUN 64* 67* 64*   CREATININE 1.2 1.2 1.0     LIVER PROFILE:   Recent Labs     07/07/20  0500 07/08/20  0505 07/09/20  0520   AST 48* 34 26   ALT 32 22 19   LIPASE  --  19.0  --    BILIDIR 0.3 0.3 <0.2   BILITOT 0.6 0.5 0.5   ALKPHOS 51 46 44     PT/INR:   Recent Labs     07/07/20  0500 07/08/20  0505 07/09/20  0520   PROTIME 13.5* 12.5 12.5   INR 1.16* 1.08 1.08     Results for NEWTON BOWENS (MRN [id number]) as of 7/1/2020 09:46   Ref. Range 6/24/2020 21:35 6/26/2020 15:24 6/30/2020 04:20   D-Dimer, Quant Latest Ref Range: 0 - 229 ng/mL  (H) 682 (H) 653 (H)     Results for Renée SMITH (MRN [id number]) as of 7/1/2020 09:46   Ref. Range 6/24/2020 21:35   Ferritin Latest Ref Range: 30.0 - 400.0 ng/mL 97.3       Cultures:  Results for Parish BOWENS (MRN [id number]) as of 6/30/2020 09:20   Ref. Range 6/24/2020 22:00   SARS-CoV-2, PCR Latest Ref Range: Not Detected  DETECTED (A)     Susceptibility     Staphylococcus epidermidis (1)     Antibiotic Interpretation TARA Status    ciprofloxacin Sensitive <=0.5 mcg/mL     nitrofurantoin Sensitive <=16 mcg/mL     oxacillin Sensitive <=0.25 mcg/mL     tetracycline Sensitive <=1 mcg/mL     trimethoprim-sulfamethoxazole Sensitive <=10 mcg/mL           Films:    XR ABDOMEN (KUB) (SINGLE AP VIEW)   Final Result   1. Indeterminate bowel gas pattern         XR CHEST PORTABLE   Final Result   Stable bilateral pneumonia. XR CHEST PORTABLE   Final Result   Stable central ground-glass airspace opacities. Supportive devices are   unchanged. XR CHEST PORTABLE   Final Result   The endotracheal tube tip is approximately 3 cm above the thai. XR CHEST PORTABLE   Final Result   Mild increase in the amount of bibasilar opacity since yesterday. Large   bilateral pneumonias. XR CHEST PORTABLE   Final Result   Stable bilateral pulmonary opacities. XR CHEST PORTABLE   Final Result   Minimal increase in the amount of opacity in each lung consistent with slight   worsening of bilateral pneumonia. XR CHEST PORTABLE   Final Result   Supportive tubing is in normal position.       Low lung volume study, with stable alveolar opacities in the mid and lower   lungs, pneumonia versus atelectasis. XR CHEST PORTABLE   Final Result   Increased in degree and extent of bilateral mid and lower lung pneumonia. The increased may be partially accentuated by low lung volumes. XR CHEST 1 VW   Final Result   Persistent bilateral airspace disease, similar to prior. IR PICC WO SQ PORT/PUMP > 5 YEARS   Final Result   Successful placement of PICC line. XR CHEST PORTABLE   Final Result   Appropriate endotracheal tube positioning. Multifocal airspace opacities and areas of atelectasis correlate with recent   CT chest.         XR ABDOMEN FOR NG/OG/NE TUBE PLACEMENT   Final Result   NG tube terminates over the stomach. CT CHEST PULMONARY EMBOLISM W CONTRAST   Final Result   1. No pulmonary embolism. 2. Multifocal pneumonia scattered throughout both lungs with minimal pleural   effusions. XR CHEST STANDARD (2 VW)   Final Result   No radiographic evidence of acute pulmonary disease. VL Extremity Venous Bilateral    (Results Pending)          Assessment:    . Principal Problem:    Pneumonia due to COVID-19 virus  Active Problems:    Multifocal pneumonia    Orthostasis    Sepsis ([address]    Urinary tract infection without hematuria    ARDS (adult respiratory distress syndrome) ([facility])    Hyperlipidemia    Acute respiratory failure with hypoxia ([facility])    Class 1 obesity due to excess calories with body mass index (BMI) of 31.0 to 31.9 in adult    Peripherally inserted central catheter (PICC) in place    Asymptomatic bacteriuria    Endotracheally intubated    On mechanically assisted ventilation ([facility])    Herpes zoster without complication    Fever    Arm edema    Hypertriglyceridemia    COVID-19    Acute kidney injury ([address]  Resolved Problems:    * No resolved hospital problems.  *         Plan:    Acute hypoxic respiratory failure due to COVID-19  ARDS  [facility]  - [facility] . - remains in positive fluid balance, getting IV Lasix. - Persistent hypoxia for several days . PEEP was down from 16-12, now back up to PEEP of 14. Hypoxia improved today 7/9--> PEEP at 8  - FiO2 was down to  60% -> back to  80 % -> 70%--> 75%. Hypoxia is better today FiO2 is down to 55%--> 50 %  -Reintubated on 7/6 d/t displacement of existing ETT  -Pulmonologist has discussed changing pt to roto-prone bed, family wished to hold off at this time    COVID -19  - monitor liver and renal fxn  - He has been  on Decadron 6 mg IV daily. Received 10 days. Dose is now being decreased to 4 mg IV daily--> weaned off  -Completed 10 days of Remdesivir on 7/7/2020  - S/P transfusion of 2 units of convalescent plasma on 7/2/2020    Sepsis  - POA, due to PNA, COVID 19  - with leukocytosis, fevers, tachypnea  - COVID-19 detected  - Patient has been weaned off Levophed  -Spiking fevers again  -Antibiotics adjusted per infectious disease  Currently on acyclovir as above, continued on IV Zyvox and Zosyn    Multifocal pneumonia  - related to COVID-19.    - Tracheal aspirate sent. - was on  broad-spectrum antibiotics, vanc and  Cefepime--> now off . On IV Zyvox and Zosyn now    Shingles  - on buttocks  - start IV Acyclovir D#4 per ID recs    Petechial Rash  - noted on arms and truck  - given benadryl IV  - followed by ID - remains on IV acyclovir, monitor    + fluid balance  - on IV lasix   - Monitor renal function  - Patient's creatinine is 0.9--> 1.1 >1 > 1.2--> 1.0 today  -S/P IV Lasix. --> DCed Now. Edema resolved     Elevated D dimer  - due to COVID 19  - CT chest neg for PE    UTI  - cx positive for staph, treated    Hyperlipidemia  - on statin. Constipation  -Add mag citrate  - SMOG enema today     Lovenox twice daily dosing. DIET TUBE FEED CONTINUOUS/CYCLIC NPO; Semi-elemental (Vital AF 1.2 );  Orogastric; Continuous; 20 (initial ); 60 (goal); 20 (over 20 hours)  Full Code        Leander Liao,

## 2020-07-09 NOTE — PROGRESS NOTES
Infectious Diseases   Progress Note      Admission Date: 6/24/2020  Hospital Day: Hospital Day: 16   Attending: Chelsie Mendoza MD  Date of service: 7/9/2020     Chief complaint/ Reason for consult:     · Acute respiratory failure with hypoxia  · Bilateral COVID 19 pneumonia  · Elevated d-dimer 653 on 6/30/2020  · Staph epidermidis in the urine culture on 6/24/2020, significance unclear, likely colonizer    Microbiology:        I have reviewed allavailable micro lab data and cultures    · Blood culture (2/2) - collected on 6/24/2020: Negative  · Tracheal aspirate culture culture  - collected on 6/29/2020: Negative so far      Antibiotics and immunizations:       Current antibiotics: All antibiotics and their doses were reviewed by me    Recent Abx Admin                   linezolid (ZYVOX) IVPB 600 mg (mg) 600 mg New Bag 07/09/20 0844     600 mg New Bag 07/08/20 2104    acyclovir (ZOVIRAX) 750 mg in dextrose 5 % 250 mL IVPB (mg) 750 mg New Bag 07/09/20 0514     750 mg New Bag 07/08/20 2104     750 mg New Bag  1219    piperacillin-tazobactam (ZOSYN) 3.375 g in sodium chloride 0.9 % 100 mL IVPB extended infusion (mini-bag) (g) 3.375 g New Bag 07/09/20 0152     3.375 g New Bag 07/08/20 1817     3.375 g New Bag  1210                  Immunization History: All immunization history was reviewed by me today. Immunization History   Administered Date(s) Administered    Influenza, High Dose (Fluzone 65 yrs and older) 11/30/2012, 10/09/2015, 11/20/2016, 11/07/2018, 09/23/2019    Pneumococcal Conjugate 13-valent ([id number]) 07/10/2015, 11/08/2018    Pneumococcal Polysaccharide ([id number]) 04/13/2012    Td, unspecified formulation 01/15/1996    Tdap (Boostrix, Adacel) 05/27/2015    Zoster Live (Zostavax) 07/10/2015    Zoster Recombinant (Shingrix) 11/07/2018, 09/23/2019       Known drug allergies:      All allergies were reviewed and updated    Allergies   Allergen Reactions    Buspar [Buspirone] Other (See Comments)     Face,face numb    Codeine Other (See Comments)     Face,lips numb    Methylphenidate Other (See Comments)     Face numbness    Ritalin [Methylphenidate Hcl] Other (See Comments)     Face. lips numb    Zoloft [Sertraline Hcl] Other (See Comments)     Numbness of lips       Social history:     Social History:  All social andepidemiologic history was reviewed and updated by me today as needed. · Tobacco use:   reports that he has quit smoking. His smoking use included cigarettes. He started smoking about 29 years ago. He has a 25.00 pack-year smoking history. He has never used smokeless tobacco.  · Alcohol use:   reports current alcohol use of about 1.0 standard drinks of alcohol per week. · Currently lives in: [address] [place], [place] [id number]  ·  reports no history of drug use.          Assessment:     The patient is a 82 y.o. old male who  has a past medical history of Arthritis, B12 deficiency (05/2014), COVID-19 (06/25/2020), and Hyperlipidemia. with following problems:    · High fever and leukocytosis-on empiric antibiotics, white cell count seem to be going up  · acute respiratory failure with hypoxia-this is ongoing  · Endotracheally intubated-remains intubated  · Requiring invasive mechanical ventilation  · ARDS - secondary to COVID 19-remains on ventilator  · Shingles involving the buttock area-covered with acyclovir  · Bilateral COVID 19 pneumonia-status post 2 units of convalescent plasma on 7/2/2020 under [place] protocol-has completed 10-day course of IV remdesivir  · Elevated d-dimer 653 on 6/30/2020-continue to follow trends  · High interleukin-6 level, this is a new finding -decided to hold off on Actemra due to development of herpes zoster and now concern for secondary bacterial infection  · Staph epidermidis in the urine culture on 6/24/2020, significance unclear, likely colonizer-urine culture from 7/7/2020 running negative  · Status post PICC line placement on 6/29/2020  · Obesity Class 1 Height:           Physical Exam      PHYSICAL EXAM:     In-person bedside physical examination deferred. Pursuant to the emergency declaration under the [facility] [address] and the [employer] Act, this clinical encounter was conducted to provide necessary medical care. (Also consistent with new provisions and guidance offered by Carlos Leblanc on March 18, 2020 in setting of COVID 19 outbreak and in order to preserve personal protective equipment in accordance with the flexibilities announced by CMS on March 30, 2020)   References: [email or web address]://[facility]. [facility]/Portals/0/Resources/COVID-19/3_18%20Telemed%20Guidance%20Updated%20March%2018. pdf?[email or web address]                      https://[facility]. [facility]/Portals/0/Resources/COVID-19/3_18%20Telemed%20Guidance%20Updated%20March%2018. pdf?[email or web address]                      http://[employer]/. pdf                            General: intubated and sedated, on ventilator, vitals reviewed   HEENT: endotracheal tube in place   Cardiovascular: Telemetry data reviewed, rest deferred   Pulmonary: deferred  Abdomen/GI: deferred  Neuro: deferred  Skin: deferred, rash on the arms and buttocks stable per RN  Musculoskeletal:  deferred  Genitourinary: Deferred  Psych: deferred  Lymphatic/Immunologic: deferred           Intake and output:    I/O last 3 completed shifts: In: 3061 [I.V.:2284; NG/GT:777]  Out: 2050 [Urine:2050]    Lab Data:   All available labs and old records have been reviewed by me.     CBC:  Recent Labs     07/07/20  0500 07/08/20  0505 07/09/20  0520   WBC 14.4* 18.2* 19.7*   RBC 4.24 4.22 4.02*   HGB 12.1* 12.0* 11.2*   HCT 37.5* 37.3* 34.9*    152 163   MCV 88.4 88.3 86.9   MCH 28.6 28.3 27.7   MCHC 32.3 32.1 31.9   RDW 14.7 14.6 14.5   BANDSPCT 2  --   --         BMP:  Recent Labs     07/07/20  0845 07/08/20  0505 07/09/20  0520   * 139 138   K 4.2 4.1 3.8   CL 92* 95* 92*   CO2 34* 38* 36*   BUN 64* 67* 64*   CREATININE 1.2 1.2 1.0   CALCIUM 7.5* 7.5* 7.5*   GLUCOSE 174* 116* 133*        Hepatic Function Panel:   Lab Results   Component Value Date    ALKPHOS 44 07/09/2020    ALT 19 07/09/2020    AST 26 07/09/2020    PROT 5.3 07/09/2020    BILITOT 0.5 07/09/2020    BILIDIR <0.2 07/09/2020    IBILI see below 07/09/2020    LABALBU 2.3 07/09/2020       CPK:   Lab Results   Component Value Date    CKTOTAL 269 07/07/2020     ESR:   Lab Results   Component Value Date    SEDRATE 4 09/17/2018     CRP: No results found for: CRP        Imaging: All pertinent images and reports for the current visit were reviewed by me during this visit. XR ABDOMEN (KUB) (SINGLE AP VIEW)   Final Result   1. Indeterminate bowel gas pattern         XR CHEST PORTABLE   Final Result   Stable bilateral pneumonia. XR CHEST PORTABLE   Final Result   Stable central ground-glass airspace opacities. Supportive devices are   unchanged. XR CHEST PORTABLE   Final Result   The endotracheal tube tip is approximately 3 cm above the thai. XR CHEST PORTABLE   Final Result   Mild increase in the amount of bibasilar opacity since yesterday. Large   bilateral pneumonias. XR CHEST PORTABLE   Final Result   Stable bilateral pulmonary opacities. XR CHEST PORTABLE   Final Result   Minimal increase in the amount of opacity in each lung consistent with slight   worsening of bilateral pneumonia. XR CHEST PORTABLE   Final Result   Supportive tubing is in normal position. Low lung volume study, with stable alveolar opacities in the mid and lower   lungs, pneumonia versus atelectasis. XR CHEST PORTABLE   Final Result   Increased in degree and extent of bilateral mid and lower lung pneumonia. The increased may be partially accentuated by low lung volumes.          XR  Multifocal pneumonia J18.9    Orthostasis I95.1    COVID-19 U07.1    Sepsis ([facility]) A41.9    Urinary tract infection without hematuria N39.0    ARDS (adult respiratory distress syndrome) ([facility]) J80    Hyperlipidemia E78.5    Acute respiratory failure with hypoxia ([facility]) J96.01    Class 1 obesity due to excess calories with body mass index (BMI) of 31.0 to 31.9 in adult E66.09, Z68.31    Peripherally inserted central catheter (PICC) in place Z45.2    Asymptomatic bacteriuria R82.71    Endotracheally intubated Z97.8    On mechanically assisted ventilation ([facility]) Z99.11    Herpes zoster without complication L61.2    Fever R50.9    Arm edema R60.0    Hypertriglyceridemia E78.1       Please note that this chart was generated using Dragon dictation software. Although every effort was made to ensure the accuracy of this automated transcription, some errors in transcription may have occurred inadvertently. If you may need any clarification, please do not hesitate to contact me through [employer] or at the phone number provided below with my electronic signature. Any pictures or media included in this note were obtained after taking informed verbal consent from the patient and with their approval to include those in the patient's medical record.     Vania Young MD, MPH  7/9/2020 , 11:40 AM   [facility] Infectious Disease   Office: [phone]  Fax: [phone]  Tuesday AM clinic:   [address] [place] [address], [facility]  Thursday AM [id number] Brendan Wan, [facility]

## 2020-07-09 NOTE — PROGRESS NOTES
This note also relates to the following rows which could not be included:  SpO2 - Cannot attach notes to unvalidated device data  Resp - Cannot attach notes to unvalidated device data       07/09/20 0315   Vent Information   Vent Type 840   Vent Mode AC/VC   Vt Ordered 430 mL   Rate Set 16 bmp   Peak Flow 75 L/min   Pressure Support 0 cmH20   FiO2  50 %   Sensitivity 2   PEEP/CPAP 14  (decreased peep to12)   I Time/ I Time % 0 s   Humidification Source Heated wire   Humidification Temp 37   Humidification Temp Measured 36.8   Circuit Condensation Drained   Vent Patient Data   High Peep/I Pressure 0   Peak Inspiratory Pressure 29 cmH2O   Mean Airway Pressure 21 cmH20   Rate Measured 22 br/min   Vt Exhaled 509 mL   Minute Volume 9.44 Liters   I:E Ratio 1:3.10   Plateau Pressure 26 [id number]   Spontaneous Breathing Trial (SBT) RT Doc   Pulse 75   Breath Sounds   Right Upper Lobe Diminished   Right Middle Lobe Diminished   Right Lower Lobe Diminished   Left Upper Lobe Diminished   Left Lower Lobe Diminished   Additional Respiratory  Assessments   Position Semi-Harris's   Alarm Settings   High Pressure Alarm 50 cmH2O   Low Minute Volume Alarm 5 L/min   Apnea (secs) 20 secs   High Respiratory Rate 40 br/min   Low Exhaled Vt  350 mL   ETT (adult)   Placement Date/Time: 06/29/20 0230   Preoxygenation: Yes  Mask Ventilation: Ventilated by mask (1)  Technique: Video laryngoscopy  Type: Cuffed  Tube Size: 8 mm  Laryngoscope: GlideScope  Blade Size: 4  Location: Oral  Insertion attempts: 1  Placement. ..    Secured at 23 cm   Measured From [address] [facility] [address]  (moved to [place])   Secured By Commercial tube leblanc   Site Condition [employer]

## 2020-07-09 NOTE — PROGRESS NOTES
I contacted Dr. Nader Plunkett regarding the KUB reading of indeterminite bowel gas pattern and frequent unifocal PVCs. Dr. Nader Plunkett ordered a smog enema and a serum magnesium level. Orders were readback and verified.

## 2020-07-09 NOTE — PROGRESS NOTES
Left a message with vascular regarding the patient's venous doppler study of his bilateral arms that is not complete. Awaiting a call back.

## 2020-07-09 NOTE — PROGRESS NOTES
This note also relates to the following rows which could not be included:  SpO2 - Cannot attach notes to unvalidated device data  Resp - Cannot attach notes to unvalidated device data       07/08/20 2304   Vent Information   Vent Type 840   Vent Mode AC/VC   Vt Ordered 430 mL   Rate Set 16 bmp   Peak Flow 75 L/min   Pressure Support 0 cmH20   FiO2  55 %   Sensitivity 2   PEEP/CPAP 14   I Time/ I Time % 0 s   Humidification Source Heated wire   Humidification Temp 37   Humidification Temp Measured 36.9   Circuit Condensation Drained   Vent Patient Data   High Peep/I Pressure 0   Peak Inspiratory Pressure 27 cmH2O   Mean Airway Pressure 19 cmH20   Rate Measured 19 br/min   Vt Exhaled 514 mL   Minute Volume 8.96 Liters   I:E Ratio 1:3.10   Plateau Pressure 24 [id number]   Cough/Sputum   Sputum How Obtained Endotracheal;Suctioned   Cough Productive   Sputum Amount Small   Sputum Color Creamy   Tenacity Thick   Spontaneous Breathing Trial (SBT) RT Doc   Pulse 93   Breath Sounds   Right Upper Lobe Diminished   Right Middle Lobe Diminished   Right Lower Lobe Diminished   Left Upper Lobe Diminished   Left Lower Lobe Diminished   Additional Respiratory  Assessments   Position Semi-Harris's   Alarm Settings   High Pressure Alarm 50 cmH2O   Low Minute Volume Alarm 5 L/min   Apnea (secs) 20 secs   High Respiratory Rate 40 br/min   Low Exhaled Vt  350 mL   ETT (adult)   Placement Date/Time: 06/29/20 0230   Preoxygenation: Yes  Mask Ventilation: Ventilated by mask (1)  Technique: Video laryngoscopy  Type: Cuffed  Tube Size: 8 mm  Laryngoscope: GlideScope  Blade Size: 4  Location: Oral  Insertion attempts: 1  Placement. ..    Secured at 23 cm   Measured From Lips   ET Placement Left   Secured By Commercial tube leblanc   Site Condition Dry

## 2020-07-09 NOTE — PROGRESS NOTES
piperacillin-tazobactam  3.375 g Intravenous Q8H    dexamethasone  4 mg Intravenous Daily    enoxaparin  1 mg/kg Subcutaneous BID    sennosides-docusate sodium  2 tablet Oral Daily    linezolid  600 mg Intravenous Q12H    acyclovir  10 mg/kg (Adjusted) Intravenous Q8H    insulin lispro  0-12 Units Subcutaneous Q4H    chlorhexidine  15 mL Mouth/Throat BID    pantoprazole  40 mg Intravenous Daily    ipratropium-albuterol  1 ampule Inhalation Q4H    ketotifen  1 drop Both Eyes BID    rosuvastatin  10 mg Oral Daily    aspirin  81 mg Oral Daily    sodium chloride flush  10 mL Intravenous 2 times per day     PRN Meds:  sennosides-docusate sodium, glucose, dextrose, glucagon (rDNA), dextrose, fentanNYL, midazolam, tetrahydrozoline, aluminum & magnesium hydroxide-simethicone, ondansetron, sodium chloride flush, acetaminophen **OR** acetaminophen    Results:  CBC:   Recent Labs     07/07/20  0500 07/08/20  0505 07/09/20  0520   WBC 14.4* 18.2* 19.7*   HGB 12.1* 12.0* 11.2*   HCT 37.5* 37.3* 34.9*   MCV 88.4 88.3 86.9    152 163     BMP:   Recent Labs     07/07/20  0845 07/08/20  0505 07/09/20  0520   * 139 138   K 4.2 4.1 3.8   CL 92* 95* 92*   CO2 34* 38* 36*   BUN 64* 67* 64*   CREATININE 1.2 1.2 1.0     LIVER PROFILE:   Recent Labs     07/07/20  0500 07/08/20  0505 07/09/20  0520   AST 48* 34 26   ALT 32 22 19   LIPASE  --  19.0  --    BILIDIR 0.3 0.3 <0.2   BILITOT 0.6 0.5 0.5   ALKPHOS 51 46 44       Cultures:  6/24 Urine Staphylococcus epidermidis  6/24 COVID 19 +  6/29 Trach Asp NRF  7/2 respiratory NRF  7/3 BC NGTD  7/6 UC NGTD  7/7 Resp CX Pending   7/7 BC NGTD   7/7 UC NGTD       Films:  CXR 7/9 was reviewed by me and it showed   Bilateral ASD No changes   Satisfactory ETT position   Satisfactory CVC line position           ASSESSMENT:  · Acute hypoxemic respiratory failure  · New fever ? PNA ? Line ?  DVT   · UE edema- rule out DVT   · Hypertriglyceridemia  · ARDS  · COVID-19 viral multifocal pneumonia. Post 2 units of CCP 7/2/2020. Completed 10 days of room to severe 7/7/2020  · ANN  · Elevated d-dimer  · Shingles  · ET tube displacement with emergent reintubation 7/7/2020 and brief hypoxia down to the 70s        PLAN:  Mechanical ventilation as per my orders. The ventilator was adjusted by me at the bedside for unstable, life threatening respiratory failure  Follow ABG and chest x-ray while on the ventilator  Supplemental oxygen to maintain SaO2 >92%; wean as tolerated  Off Propofol due to elevated TG  Versed drip for sedation, target RASS -2, with daily spontaneous awakening trial   Wean off Fentanyl drip today   Fentanyl and Versed PRN, gtt as needed  Head of bed 30 degrees or higher at all times  Daily spontaneous breathing trial once PEEP less than 8, FiO2 less than 55%  Closely monitory airways, clinical status, cardiac rhythm, vital signs, and urine output   Inhaled bronchodilators  Zyvox D#3 Zosyn D#2 and IV acyclovir per ID D#4  Decadron 4 mg IV daily D#14- decrease to 3 mg   Hold Lasix for now   Follow up repeat CX   UE doppler rule out DVT pending   Follow TG   KUB   Senokot-S 2 tab PO BID   Nutrition: Tube feeding at target   Blood sugar control, with goal 150-180  GI prophylaxis: Pepcid  DVT prophylaxis: Lovenox therapeutic dose pending UE doppler   MRSA prophylaxis: Bactroban   Code status: Full code   D/W Lu, daughter [phone] and updated her on status yesterday                    Total critical care time caring for this patient with life threatening, unstable organ failure, including direct patient contact, management of life support systems, review of data including imaging and labs, discussions with other team members and physicians is 35 minutes so far today, excluding procedures.

## 2020-07-10 ENCOUNTER — APPOINTMENT (OUTPATIENT)
Dept: GENERAL RADIOLOGY | Age: 85
DRG: 870 | End: 2020-07-10
Payer: MEDICARE

## 2020-07-10 LAB
ALBUMIN SERPL-MCNC: 2.2 G/DL (ref 3.4–5)
ALP BLD-CCNC: 45 U/L (ref 40–129)
ALT SERPL-CCNC: 23 U/L (ref 10–40)
ANION GAP SERPL CALCULATED.3IONS-SCNC: 12 MMOL/L (ref 3–16)
AST SERPL-CCNC: 40 U/L (ref 15–37)
BASE EXCESS ARTERIAL: 11.8 MMOL/L (ref -3–3)
BASE EXCESS ARTERIAL: 12.5 MMOL/L (ref -3–3)
BASOPHILS ABSOLUTE: 0.1 K/UL (ref 0–0.2)
BASOPHILS RELATIVE PERCENT: 0.4 %
BILIRUB SERPL-MCNC: 0.6 MG/DL (ref 0–1)
BILIRUBIN DIRECT: <0.2 MG/DL (ref 0–0.3)
BILIRUBIN, INDIRECT: ABNORMAL MG/DL (ref 0–1)
BUN BLDV-MCNC: 46 MG/DL (ref 7–20)
CALCIUM SERPL-MCNC: 7.2 MG/DL (ref 8.3–10.6)
CARBOXYHEMOGLOBIN ARTERIAL: 0.1 % (ref 0–1.5)
CARBOXYHEMOGLOBIN ARTERIAL: 0.6 % (ref 0–1.5)
CHLORIDE BLD-SCNC: 93 MMOL/L (ref 99–110)
CO2: 33 MMOL/L (ref 21–32)
CREAT SERPL-MCNC: 0.8 MG/DL (ref 0.8–1.3)
D DIMER: 647 NG/ML DDU (ref 0–229)
EOSINOPHILS ABSOLUTE: 0.4 K/UL (ref 0–0.6)
EOSINOPHILS RELATIVE PERCENT: 2.3 %
GFR AFRICAN AMERICAN: >60
GFR NON-AFRICAN AMERICAN: >60
GLUCOSE BLD-MCNC: 120 MG/DL (ref 70–99)
GLUCOSE BLD-MCNC: 127 MG/DL (ref 70–99)
GLUCOSE BLD-MCNC: 132 MG/DL (ref 70–99)
GLUCOSE BLD-MCNC: 138 MG/DL (ref 70–99)
GLUCOSE BLD-MCNC: 142 MG/DL (ref 70–99)
GLUCOSE BLD-MCNC: 143 MG/DL (ref 70–99)
GLUCOSE BLD-MCNC: 170 MG/DL (ref 70–99)
HCO3 ARTERIAL: 36.7 MMOL/L (ref 21–29)
HCO3 ARTERIAL: 37 MMOL/L (ref 21–29)
HCT VFR BLD CALC: 35.7 % (ref 40.5–52.5)
HEMOGLOBIN, ART, EXTENDED: 12.2 G/DL (ref 13.5–17.5)
HEMOGLOBIN, ART, EXTENDED: 13 G/DL (ref 13.5–17.5)
HEMOGLOBIN: 11.4 G/DL (ref 13.5–17.5)
INR BLD: 1.03 (ref 0.86–1.14)
LYMPHOCYTES ABSOLUTE: 0.4 K/UL (ref 1–5.1)
LYMPHOCYTES RELATIVE PERCENT: 2.6 %
MCH RBC QN AUTO: 28.1 PG (ref 26–34)
MCHC RBC AUTO-ENTMCNC: 32 G/DL (ref 31–36)
MCV RBC AUTO: 87.9 FL (ref 80–100)
METHEMOGLOBIN ARTERIAL: 0.3 %
METHEMOGLOBIN ARTERIAL: 0.3 %
MONOCYTES ABSOLUTE: 0.6 K/UL (ref 0–1.3)
MONOCYTES RELATIVE PERCENT: 3.5 %
NEUTROPHILS ABSOLUTE: 15.4 K/UL (ref 1.7–7.7)
NEUTROPHILS RELATIVE PERCENT: 91.2 %
O2 CONTENT ARTERIAL: 17 ML/DL
O2 CONTENT ARTERIAL: 18 ML/DL
O2 SAT, ARTERIAL: 97.4 %
O2 SAT, ARTERIAL: 97.6 %
O2 THERAPY: ABNORMAL
O2 THERAPY: ABNORMAL
OCCULT BLOOD DIAGNOSTIC: ABNORMAL
PCO2 ARTERIAL: 46.4 MMHG (ref 35–45)
PCO2 ARTERIAL: 49.2 MMHG (ref 35–45)
PDW BLD-RTO: 14.3 % (ref 12.4–15.4)
PERFORMED ON: ABNORMAL
PH ARTERIAL: 7.49 (ref 7.35–7.45)
PH ARTERIAL: 7.52 (ref 7.35–7.45)
PLATELET # BLD: 164 K/UL (ref 135–450)
PMV BLD AUTO: 9.8 FL (ref 5–10.5)
PO2 ARTERIAL: 89.1 MMHG (ref 75–108)
PO2 ARTERIAL: 94.1 MMHG (ref 75–108)
POTASSIUM SERPL-SCNC: 3.3 MMOL/L (ref 3.5–5.1)
PROTHROMBIN TIME: 12 SEC (ref 10–13.2)
RBC # BLD: 4.06 M/UL (ref 4.2–5.9)
SODIUM BLD-SCNC: 138 MMOL/L (ref 136–145)
TCO2 ARTERIAL: 38.2 MMOL/L
TCO2 ARTERIAL: 38.4 MMOL/L
TOTAL PROTEIN: 4.8 G/DL (ref 6.4–8.2)
TRIGL SERPL-MCNC: 116 MG/DL (ref 0–150)
WBC # BLD: 16.8 K/UL (ref 4–11)

## 2020-07-10 PROCEDURE — 85025 COMPLETE CBC W/AUTO DIFF WBC: CPT

## 2020-07-10 PROCEDURE — 2580000003 HC RX 258: Performed by: INTERNAL MEDICINE

## 2020-07-10 PROCEDURE — 6360000002 HC RX W HCPCS: Performed by: INTERNAL MEDICINE

## 2020-07-10 PROCEDURE — 99291 CRITICAL CARE FIRST HOUR: CPT | Performed by: INTERNAL MEDICINE

## 2020-07-10 PROCEDURE — 2000000000 HC ICU R&B

## 2020-07-10 PROCEDURE — 71045 X-RAY EXAM CHEST 1 VIEW: CPT

## 2020-07-10 PROCEDURE — 94761 N-INVAS EAR/PLS OXIMETRY MLT: CPT

## 2020-07-10 PROCEDURE — 80048 BASIC METABOLIC PNL TOTAL CA: CPT

## 2020-07-10 PROCEDURE — 84478 ASSAY OF TRIGLYCERIDES: CPT

## 2020-07-10 PROCEDURE — 36600 WITHDRAWAL OF ARTERIAL BLOOD: CPT

## 2020-07-10 PROCEDURE — 99233 SBSQ HOSP IP/OBS HIGH 50: CPT | Performed by: INTERNAL MEDICINE

## 2020-07-10 PROCEDURE — 94003 VENT MGMT INPAT SUBQ DAY: CPT

## 2020-07-10 PROCEDURE — C9113 INJ PANTOPRAZOLE SODIUM, VIA: HCPCS | Performed by: INTERNAL MEDICINE

## 2020-07-10 PROCEDURE — 80076 HEPATIC FUNCTION PANEL: CPT

## 2020-07-10 PROCEDURE — 85379 FIBRIN DEGRADATION QUANT: CPT

## 2020-07-10 PROCEDURE — 94750 HC PULMONARY COMPLIANCE STUDY: CPT

## 2020-07-10 PROCEDURE — 2700000000 HC OXYGEN THERAPY PER DAY

## 2020-07-10 PROCEDURE — 94640 AIRWAY INHALATION TREATMENT: CPT

## 2020-07-10 PROCEDURE — 36415 COLL VENOUS BLD VENIPUNCTURE: CPT

## 2020-07-10 PROCEDURE — 6370000000 HC RX 637 (ALT 250 FOR IP): Performed by: INTERNAL MEDICINE

## 2020-07-10 PROCEDURE — G0328 FECAL BLOOD SCRN IMMUNOASSAY: HCPCS

## 2020-07-10 PROCEDURE — 82803 BLOOD GASES ANY COMBINATION: CPT

## 2020-07-10 PROCEDURE — 85610 PROTHROMBIN TIME: CPT

## 2020-07-10 RX ORDER — PANTOPRAZOLE SODIUM 40 MG/10ML
40 INJECTION, POWDER, LYOPHILIZED, FOR SOLUTION INTRAVENOUS 2 TIMES DAILY
Status: DISCONTINUED | OUTPATIENT
Start: 2020-07-10 | End: 2020-07-20

## 2020-07-10 RX ORDER — DEXAMETHASONE SODIUM PHOSPHATE 4 MG/ML
2 INJECTION, SOLUTION INTRA-ARTICULAR; INTRALESIONAL; INTRAMUSCULAR; INTRAVENOUS; SOFT TISSUE DAILY
Status: DISCONTINUED | OUTPATIENT
Start: 2020-07-11 | End: 2020-07-11

## 2020-07-10 RX ORDER — POTASSIUM CHLORIDE 29.8 MG/ML
20 INJECTION INTRAVENOUS
Status: COMPLETED | OUTPATIENT
Start: 2020-07-10 | End: 2020-07-10

## 2020-07-10 RX ORDER — FUROSEMIDE 10 MG/ML
40 INJECTION INTRAMUSCULAR; INTRAVENOUS DAILY
Status: DISCONTINUED | OUTPATIENT
Start: 2020-07-10 | End: 2020-07-11

## 2020-07-10 RX ORDER — SENNA AND DOCUSATE SODIUM 50; 8.6 MG/1; MG/1
2 TABLET, FILM COATED ORAL 2 TIMES DAILY PRN
Status: DISCONTINUED | OUTPATIENT
Start: 2020-07-10 | End: 2020-07-20 | Stop reason: HOSPADM

## 2020-07-10 RX ADMIN — KETOTIFEN FUMARATE 1 DROP: 0.35 SOLUTION/ DROPS OPHTHALMIC at 22:04

## 2020-07-10 RX ADMIN — ACYCLOVIR SODIUM 750 MG: 500 INJECTION, SOLUTION INTRAVENOUS at 06:02

## 2020-07-10 RX ADMIN — MIDAZOLAM HYDROCHLORIDE 3 MG/HR: 5 INJECTION, SOLUTION INTRAMUSCULAR; INTRAVENOUS at 13:10

## 2020-07-10 RX ADMIN — LINEZOLID 600 MG: 600 INJECTION, SOLUTION INTRAVENOUS at 20:44

## 2020-07-10 RX ADMIN — LINEZOLID 600 MG: 600 INJECTION, SOLUTION INTRAVENOUS at 08:44

## 2020-07-10 RX ADMIN — MUPIROCIN: 20 OINTMENT TOPICAL at 20:56

## 2020-07-10 RX ADMIN — PIPERACILLIN SODIUM AND TAZOBACTAM SODIUM 3.38 G: 3; .375 INJECTION, POWDER, LYOPHILIZED, FOR SOLUTION INTRAVENOUS at 12:04

## 2020-07-10 RX ADMIN — FUROSEMIDE 40 MG: 10 INJECTION, SOLUTION INTRAMUSCULAR; INTRAVENOUS at 13:01

## 2020-07-10 RX ADMIN — ENOXAPARIN SODIUM 90 MG: 100 INJECTION SUBCUTANEOUS at 08:51

## 2020-07-10 RX ADMIN — ACYCLOVIR SODIUM 365 MG: 50 INJECTION, SOLUTION INTRAVENOUS at 20:44

## 2020-07-10 RX ADMIN — PIPERACILLIN SODIUM AND TAZOBACTAM SODIUM 3.38 G: 3; .375 INJECTION, POWDER, LYOPHILIZED, FOR SOLUTION INTRAVENOUS at 18:15

## 2020-07-10 RX ADMIN — IPRATROPIUM BROMIDE AND ALBUTEROL SULFATE 1 AMPULE: .5; 3 SOLUTION RESPIRATORY (INHALATION) at 03:33

## 2020-07-10 RX ADMIN — PANTOPRAZOLE SODIUM 40 MG: 40 INJECTION, POWDER, FOR SOLUTION INTRAVENOUS at 08:42

## 2020-07-10 RX ADMIN — KETOTIFEN FUMARATE 1 DROP: 0.35 SOLUTION/ DROPS OPHTHALMIC at 08:56

## 2020-07-10 RX ADMIN — CHLORHEXIDINE GLUCONATE 0.12% ORAL RINSE 15 ML: 1.2 LIQUID ORAL at 08:45

## 2020-07-10 RX ADMIN — IPRATROPIUM BROMIDE AND ALBUTEROL SULFATE 1 AMPULE: .5; 3 SOLUTION RESPIRATORY (INHALATION) at 12:15

## 2020-07-10 RX ADMIN — Medication 10 ML: at 20:44

## 2020-07-10 RX ADMIN — IPRATROPIUM BROMIDE AND ALBUTEROL SULFATE 1 AMPULE: .5; 3 SOLUTION RESPIRATORY (INHALATION) at 07:18

## 2020-07-10 RX ADMIN — FENTANYL CITRATE 50 MCG/HR: 50 INJECTION, SOLUTION INTRAMUSCULAR; INTRAVENOUS at 13:10

## 2020-07-10 RX ADMIN — ROSUVASTATIN CALCIUM 10 MG: 10 TABLET, FILM COATED ORAL at 08:46

## 2020-07-10 RX ADMIN — POTASSIUM CHLORIDE 20 MEQ: 400 INJECTION, SOLUTION INTRAVENOUS at 13:01

## 2020-07-10 RX ADMIN — MUPIROCIN: 20 OINTMENT TOPICAL at 08:58

## 2020-07-10 RX ADMIN — IPRATROPIUM BROMIDE AND ALBUTEROL SULFATE 1 AMPULE: .5; 3 SOLUTION RESPIRATORY (INHALATION) at 15:22

## 2020-07-10 RX ADMIN — ASPIRIN 81 MG 81 MG: 81 TABLET ORAL at 08:43

## 2020-07-10 RX ADMIN — ACETAMINOPHEN 650 MG: 325 TABLET ORAL at 23:30

## 2020-07-10 RX ADMIN — CHLORHEXIDINE GLUCONATE 0.12% ORAL RINSE 15 ML: 1.2 LIQUID ORAL at 20:44

## 2020-07-10 RX ADMIN — ACYCLOVIR SODIUM 365 MG: 50 INJECTION, SOLUTION INTRAVENOUS at 13:02

## 2020-07-10 RX ADMIN — FENTANYL CITRATE 100 MCG/HR: 50 INJECTION, SOLUTION INTRAMUSCULAR; INTRAVENOUS at 05:28

## 2020-07-10 RX ADMIN — PANTOPRAZOLE SODIUM 40 MG: 40 INJECTION, POWDER, FOR SOLUTION INTRAVENOUS at 20:44

## 2020-07-10 RX ADMIN — POTASSIUM CHLORIDE 20 MEQ: 400 INJECTION, SOLUTION INTRAVENOUS at 13:48

## 2020-07-10 RX ADMIN — IPRATROPIUM BROMIDE AND ALBUTEROL SULFATE 1 AMPULE: .5; 3 SOLUTION RESPIRATORY (INHALATION) at 20:12

## 2020-07-10 RX ADMIN — Medication 10 ML: at 08:56

## 2020-07-10 RX ADMIN — PIPERACILLIN SODIUM AND TAZOBACTAM SODIUM 3.38 G: 3; .375 INJECTION, POWDER, LYOPHILIZED, FOR SOLUTION INTRAVENOUS at 04:56

## 2020-07-10 RX ADMIN — DEXAMETHASONE SODIUM PHOSPHATE 3 MG: 4 INJECTION, SOLUTION INTRAMUSCULAR; INTRAVENOUS at 08:42

## 2020-07-10 RX ADMIN — TETRAHYDROZOLINE HCL 1 DROP: 0.05 SOLUTION/ DROPS OPHTHALMIC at 20:56

## 2020-07-10 RX ADMIN — IPRATROPIUM BROMIDE AND ALBUTEROL SULFATE 1 AMPULE: .5; 3 SOLUTION RESPIRATORY (INHALATION) at 23:35

## 2020-07-10 ASSESSMENT — PULMONARY FUNCTION TESTS
PIF_VALUE: 15
PIF_VALUE: 12
PIF_VALUE: 12
PIF_VALUE: 20
PIF_VALUE: 32
PIF_VALUE: 21
PIF_VALUE: 20
PIF_VALUE: 14
PIF_VALUE: 32
PIF_VALUE: 32
PIF_VALUE: 36
PIF_VALUE: 16
PIF_VALUE: 22
PIF_VALUE: 21
PIF_VALUE: 20
PIF_VALUE: 23
PIF_VALUE: 24
PIF_VALUE: 19
PIF_VALUE: 23
PIF_VALUE: 14
PIF_VALUE: 31
PIF_VALUE: 16
PIF_VALUE: 14

## 2020-07-10 ASSESSMENT — PAIN SCALES - GENERAL
PAINLEVEL_OUTOF10: 0

## 2020-07-10 NOTE — PROGRESS NOTES
Infectious Diseases   Progress Note      Admission Date: 6/24/2020  Hospital Day: Hospital Day: 17   Attending: Sujata Ambrose MD  Date of service: 7/10/2020     Chief complaint/ Reason for consult:     · Acute respiratory failure with hypoxia  · Bilateral COVID 19 pneumonia  · Elevated d-dimer 653 on 6/30/2020  · Staph epidermidis in the urine culture on 6/24/2020, significance unclear, likely colonizer    Microbiology:        I have reviewed allavailable micro lab data and cultures    · Blood culture (2/2) - collected on 6/24/2020: Negative  · Tracheal aspirate culture culture  - collected on 6/29/2020: Negative so far      Antibiotics and immunizations:       Current antibiotics: All antibiotics and their doses were reviewed by me    Recent Abx Admin                   linezolid (ZYVOX) IVPB 600 mg (mg) 600 mg New Bag 07/10/20 0844     600 mg New Bag 07/09/20 2052    acyclovir (ZOVIRAX) 750 mg in dextrose 5 % 250 mL IVPB (mg) 750 mg New Bag 07/10/20 0602     750 mg New Bag 07/09/20 2052     750 mg New Bag  1300    piperacillin-tazobactam (ZOSYN) 3.375 g in sodium chloride 0.9 % 100 mL IVPB extended infusion (mini-bag) (g) 3.375 g New Bag 07/10/20 0456     3.375 g New Bag 07/09/20 2052                  Immunization History: All immunization history was reviewed by me today. Immunization History   Administered Date(s) Administered    Influenza, High Dose (Fluzone 65 yrs and older) 11/30/2012, 10/09/2015, 11/20/2016, 11/07/2018, 09/23/2019    Pneumococcal Conjugate 13-valent (Cpohrzo47) 07/10/2015, 11/08/2018    Pneumococcal Polysaccharide (Jruoxehww26) 04/13/2012    Td, unspecified formulation 01/15/1996    Tdap (Boostrix, Adacel) 05/27/2015    Zoster Live (Zostavax) 07/10/2015    Zoster Recombinant (Shingrix) 11/07/2018, 09/23/2019       Known drug allergies:      All allergies were reviewed and updated    Allergies   Allergen Reactions    Buspar [Buspirone] Other (See Comments)     Face,face numb  Codeine Other (See Comments)     Face,lips numb    Methylphenidate Other (See Comments)     Face numbness    Ritalin [Methylphenidate Hcl] Other (See Comments)     Face. lips numb    Zoloft [Sertraline Hcl] Other (See Comments)     Numbness of lips       Social history:     Social History:  All social andepidemiologic history was reviewed and updated by me today as needed. · Tobacco use:   reports that he has quit smoking. His smoking use included cigarettes. He started smoking about 29 years ago. He has a 25.00 pack-year smoking history. He has never used smokeless tobacco.  · Alcohol use:   reports current alcohol use of about 1.0 standard drinks of alcohol per week. · Currently lives in: [address] [place] [id number]  ·  reports no history of drug use.          Assessment:     The patient is a 82 y.o. old male who  has a past medical history of Arthritis, B12 deficiency (05/2014), COVID-19 (06/25/2020), and Hyperlipidemia. with following problems:    · High fever and leukocytosis-cultures negative so far, on empiric antibiotics  · acute respiratory failure with hypoxia-improving, tolerating spontaneous breathing trial today  · Endotracheally intubated-might possibly get extubated today according to RN  · Requiring invasive mechanical ventilation  · ARDS - secondary to COVID 19-remains on ventilator-this is improving  · Shingles involving the buttock area-has been covered with acyclovir  · Bilateral COVID 19 pneumonia-status post 2 units of convalescent plasma on 7/2/2020 under [place] protocol-has completed 10-day course of IV remdesivir  · Elevated d-dimer 653 on 6/30/2020-continue to follow trends  · High interleukin-6 level, this is a new finding -decided to hold off on Actemra due to development of herpes zoster and now concern for secondary bacterial infection  · Staph epidermidis in the urine culture on 6/24/2020, significance unclear, likely colonizer-urine culture from 7/7/2020 running negative  · Status post PICC line placement on 6/29/2020-continue PICC line care  · Obesity Class 1 due to excess calorie intake : Body mass index is 31.34 kg/m². Discussion:      The patient is on IV linezolid, acyclovir and IV Zosyn. The antibiotics are empiric. Acyclovir is for herpes zoster. 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 White cell count has shown some improvement and is 16,800 today. Platelet count is 034,032. T-max is 100.2. Serum creatinine 0.8. Liver enzymes are okay. Serum bilirubin is 0.6. White cell count 16,800. D-dimer is 647. Portable chest x-ray shows improving bilateral pneumonia    Plan:     Diagnostic Workup:      · Continue to follow  fever curve, WBC count and blood cultures  · Follow up on liver and renal function    Antimicrobials:    · Continue take IV linezolid 600 mg every 12 hour  · Continue IV Zosyn 3.375 g every 8 hour  · Continue to monitor his vitals closely  · Will decrease IV acyclovir dose from 10 mg/kg every 8-hour to 5 mg/kg every 8 hour to reduce the risk of nephrotoxicity as the zoster lesions are crusting  · The patient is on a spontaneous breathing trial at this time according to the RN. He might possibly get extubated later today which is great news  · Anticipate no changes in above antibiotics over the weekend  · We will follow-up on the culture results and clinical progress and will make further recommendations accordingly  · Discussed all above with ICU RN  · If any questions over the weekend, please contact ID physician on call      Drug Monitoring:    · Continue monitoring for antibiotic toxicity as follows: CBC, CMP  · Continue to watch for following: new or worsening fever, new hypotension, hives, lip swelling and redness or purulence at vascular access sites. I/v access Management:    · Continue to monitor i.v access sites for erythema, induration, discharge or tenderness.    · As always, continue efforts to 92   Resp: 20 26 28 24   Temp:   100.2 °F (37.9 °C)    TempSrc:   Bladder    SpO2: 95% 95% 94% 96%   Weight:       Height:           Physical Exam      PHYSICAL EXAM:     In-person bedside physical examination deferred. Pursuant to the emergency declaration under the [facility], [address] [place] [address] and the [employer] and [employer] Act, this clinical encounter was conducted to provide necessary medical care. (Also consistent with new provisions and guidance offered by [facility] on March 18, 2020 in setting of COVID 19 outbreak and in order to preserve personal protective equipment in accordance with the flexibilities announced by CMS on March 30, 2020)   References: [email or web address]/Resources/COVID-19/3_18%20Telemed%20Guidance%20Updated%20March%2018. pdf?[email or web address]                      [email or web address]/[email or web address]/Resources/COVID-19/3_18%20Telemed%20Guidance%20Updated%20March%2018. pdf?[email or web address]                      [email or web address] pdf                            General: intubated, no spontaneous breathing trial per RN on ventilator, vitals reviewed  HEENT: endotracheal tube in place   Cardiovascular: Telemetry data reviewed, rest deferred   Pulmonary: deferred  Abdomen/GI: deferred  Neuro: deferred  Skin: deferred  Musculoskeletal:  deferred  Genitourinary: Deferred  Psych: deferred  Lymphatic/Immunologic: deferred              Intake and output:    I/O last 3 completed shifts: In: 4021.1 [I.V.:1385. 1; NG/GT:1756; IV Piggyback:880]  Out: 1160 [Urine:1160]    Lab Data:   All available labs and old records have been reviewed by me.     CBC:  Recent Labs     07/09/20  0520 07/09/20  1740 07/10/20  0500   WBC 19.7* 17.4* 16.8*   RBC 4.02* 4.04* 4.06*   HGB 11.2* 11.4* 11.4*   HCT 34.9* 35.4* 35.7*    159 164   MCV 86.9 87.7 87.9   MCH 27.7 28.3 28.1   MCHC 31.9 32.2 32.0   RDW 14.5 14.2 14.3        BMP:  Recent Labs     07/08/20  0505 07/09/20  0520 07/10/20  0500    138 138   K 4.1 3.8 3.3*   CL 95* 92* 93*   CO2 38* 36* 33*   BUN 67* 64* 46*   CREATININE 1.2 1.0 0.8   CALCIUM 7.5* 7.5* 7.2*   GLUCOSE 116* 133* 127*        Hepatic Function Panel:   Lab Results   Component Value Date    ALKPHOS 45 07/10/2020    ALT 23 07/10/2020    AST 40 07/10/2020    PROT 4.8 07/10/2020    BILITOT 0.6 07/10/2020    BILIDIR <0.2 07/10/2020    IBILI see below 07/10/2020    LABALBU 2.2 07/10/2020       CPK:   Lab Results   Component Value Date    CKTOTAL 269 07/07/2020     ESR:   Lab Results   Component Value Date    SEDRATE 4 09/17/2018     CRP: No results found for: CRP        Imaging: All pertinent images and reports for the current visit were reviewed by me during this visit. XR CHEST PORTABLE   Final Result   Improving bilateral pneumonia versus edema. XR CHEST 1 VW   Final Result   Increasing patchy opacities throughout both lungs, most compatible with   multifocal pneumonia. Component of pulmonary edema should be considered. XR ABDOMEN (KUB) (SINGLE AP VIEW)   Final Result   1. Indeterminate bowel gas pattern         XR CHEST PORTABLE   Final Result   Stable bilateral pneumonia. XR CHEST PORTABLE   Final Result   Stable central ground-glass airspace opacities. Supportive devices are   unchanged. XR CHEST PORTABLE   Final Result   The endotracheal tube tip is approximately 3 cm above the thai. XR CHEST PORTABLE   Final Result   Mild increase in the amount of bibasilar opacity since yesterday. Large   bilateral pneumonias. XR CHEST PORTABLE   Final Result   Stable bilateral pulmonary opacities. XR CHEST PORTABLE   Final Result   Minimal increase in the amount of opacity in each lung consistent with slight   worsening of bilateral pneumonia.          XR CHEST PORTABLE Final Result   Supportive tubing is in normal position. Low lung volume study, with stable alveolar opacities in the mid and lower   lungs, pneumonia versus atelectasis. XR CHEST PORTABLE   Final Result   Increased in degree and extent of bilateral mid and lower lung pneumonia. The increased may be partially accentuated by low lung volumes. XR CHEST 1 VW   Final Result   Persistent bilateral airspace disease, similar to prior. IR PICC WO SQ PORT/PUMP > 5 YEARS   Final Result   Successful placement of PICC line. XR CHEST PORTABLE   Final Result   Appropriate endotracheal tube positioning. Multifocal airspace opacities and areas of atelectasis correlate with recent   CT chest.         XR ABDOMEN FOR NG/OG/NE TUBE PLACEMENT   Final Result   NG tube terminates over the stomach. CT CHEST PULMONARY EMBOLISM W CONTRAST   Final Result   1. No pulmonary embolism. 2. Multifocal pneumonia scattered throughout both lungs with minimal pleural   effusions. XR CHEST STANDARD (2 VW)   Final Result   No radiographic evidence of acute pulmonary disease. VL Extremity Venous Bilateral    (Results Pending)   XR CHEST PORTABLE    (Results Pending)       Medications: All current and past medications were reviewed.      furosemide  40 mg Intravenous Daily    [START ON 7/11/2020] dexamethasone  2 mg Intravenous Daily    potassium chloride  20 mEq Intravenous Q2H    mupirocin   Nasal BID    piperacillin-tazobactam  3.375 g Intravenous Q8H    enoxaparin  1 mg/kg Subcutaneous BID    linezolid  600 mg Intravenous Q12H    acyclovir  10 mg/kg (Adjusted) Intravenous Q8H    insulin lispro  0-12 Units Subcutaneous Q4H    chlorhexidine  15 mL Mouth/Throat BID    pantoprazole  40 mg Intravenous Daily    ipratropium-albuterol  1 ampule Inhalation Q4H    ketotifen  1 drop Both Eyes BID    rosuvastatin  10 mg Oral Daily    aspirin  81 mg Oral Daily    sodium chloride flush  10 mL Intravenous 2 times per day        midazolam Stopped (07/10/20 0934)    dextrose      fentaNYL (SUBLIMAZE) infusion Stopped (07/10/20 0914)       sennosides-docusate sodium, glucose, dextrose, glucagon (rDNA), dextrose, fentanNYL, midazolam, tetrahydrozoline, aluminum & magnesium hydroxide-simethicone, ondansetron, sodium chloride flush, acetaminophen **OR** acetaminophen      Problem list:       Patient Active Problem List   Diagnosis Code    Multifocal pneumonia J18.9    Orthostasis I95.1    Pneumonia due to COVID-19 virus U07.1, J12.89    Sepsis ([address] A41.9    Urinary tract infection without hematuria N39.0    ARDS (adult respiratory distress syndrome) ([address] J80    Hyperlipidemia E78.5    Acute respiratory failure with hypoxia (HCC) J96.01    Class 1 obesity due to excess calories with body mass index (BMI) of 31.0 to 31.9 in adult E66.09, Z68.31    Peripherally inserted central catheter (PICC) in place Z45.2    Asymptomatic bacteriuria R82.71    Endotracheally intubated Z97.8    On mechanically assisted ventilation (HCC) Z99.11    Herpes zoster without complication J19.6    Fever R50.9    Arm edema R60.0    Hypertriglyceridemia E78.1    COVID-19 U07.1    Acute kidney injury ([facility] N17.9       Please note that this chart was generated using Dragon dictation software. Although every effort was made to ensure the accuracy of this automated transcription, some errors in transcription may have occurred inadvertently. If you may need any clarification, please do not hesitate to contact me through EPIC or at the phone number provided below with my electronic signature. Any pictures or media included in this note were obtained after taking informed verbal consent from the patient and with their approval to include those in the patient's medical record.     Christopher Rodriguez MD, MPH  7/10/2020 , 12:15 PM   [facility] Infectious Disease   Office: [phone]  Fax: [phone]  Tuesday

## 2020-07-10 NOTE — PROGRESS NOTES
07/10/20 0334   Vent Information   Vent Type 840   Vent Mode AC/VC   Vt Ordered 430 mL   Rate Set 16 bmp   Peak Flow 75 L/min   Pressure Support 0 cmH20   FiO2  60 %   SpO2 97 %   SpO2/FiO2 ratio 161.67   Sensitivity 2   PEEP/CPAP 8   I Time/ I Time % 0 s   Humidification Source Heated wire   Humidification Temp 37   Humidification Temp Measured 36.3   Circuit Condensation Drained   Vent Patient Data   High Peep/I Pressure 0   Peak Inspiratory Pressure 36 cmH2O   Mean Airway Pressure 15 cmH20   Rate Measured 23 br/min   Vt Exhaled 0 mL   Minute Volume 9.22 Liters   I:E Ratio 4.50:1   Cough/Sputum   Sputum How Obtained Suctioned;Endotracheal   Sputum Amount Small   Sputum Color Creamy; Tan   Tenacity Thick   Spontaneous Breathing Trial (SBT) RT Doc   Pulse 82   Breath Sounds   Right Upper Lobe Diminished   Right Middle Lobe Diminished   Right Lower Lobe Diminished   Left Upper Lobe Diminished   Left Lower Lobe Diminished   Additional Respiratory  Assessments   Resp 14   Position Semi-Harris's   Alarm Settings   High Pressure Alarm 50 cmH2O   Low Minute Volume Alarm 5 L/min   High Respiratory Rate 40 br/min   ETT (adult)   Placement Date/Time: 06/29/20 0230   Preoxygenation: Yes  Mask Ventilation: Ventilated by mask (1)  Technique: Video laryngoscopy  Type: Cuffed  Tube Size: 8 mm  Laryngoscope: GlideScope  Blade Size: 4  Location: Oral  Insertion attempts: 1  Placement. ..    [facility]

## 2020-07-10 NOTE — PLAN OF CARE
Problem: Skin Integrity:  Goal: Absence of new skin breakdown  Description: Monitoring patient skin integrity for skin breakdown, turning and repositioning q2h per protocol. 7/10/2020 0035 by Juan Hennessy RN  Outcome: Ongoing     Problem: Restraint Use - Nonviolent/Non-Self-Destructive Behavior:  Goal: Absence of restraint-related injury  Description: Upper extremity soft wrist restraints intact. No S/S of restraint related injury . ROM performed Q2HR.    7/10/2020 0035 by Juan Hennessy RN  Outcome: Ongoing     Problem:  Body Temperature -  Risk of, Imbalanced  Goal: Ability to maintain a body temperature within defined limits  7/10/2020 0035 by Juan Hennessy RN  Outcome: Ongoing     Problem: Airway Clearance - Ineffective  Goal: Achieve or maintain patent airway  7/10/2020 0035 by Juan Hennessy RN  Outcome: Ongoing

## 2020-07-10 NOTE — PROGRESS NOTES
Spoke with Dr. Jasvir Clarke about the patients ABG. Orders obtained for the patient to remain intubated and restart sedation.  Pao Valverde

## 2020-07-10 NOTE — PROGRESS NOTES
Patient is lying in bed. Dr. Le Copeland at bedside. Discussed ventilator settings over the last 24 hours. Orders to hold sedation and attempt a SAT with potential SBT after sedation wears off. Julita Jackson RN    Vitals:    07/10/20 0700 07/10/20 0722 07/10/20 0800 07/10/20 0900   BP: (!) 118/55  (!) 124/54 (!) 124/58   Pulse: 85 87 90 88   Resp: 14 16 20   Temp: 100.4 °F (38 °C)  100.5 °F (38.1 °C)    TempSrc: Bladder      SpO2: 96% 97% 95% 95%   Weight:       Height:           BMP  Recent Labs     07/08/20  0505 07/09/20  0520 07/10/20  0500    138 138   K 4.1 3.8 3.3*   CO2 38* 36* 33*   BUN 67* 64* 46*   CREATININE 1.2 1.0 0.8   CALCIUM 7.5* 7.5* 7.2*   GLUCOSE 116* 133* 127*      Recent Labs     07/09/20  1200   MG 2.80*       CBC  Recent Labs     07/09/20  0520 07/09/20  1740 07/10/20  0500   WBC 19.7* 17.4* 16.8*   HGB 11.2* 11.4* 11.4*   HCT 34.9* 35.4* 35.7*    159 164        Blood Gas    Recent Labs     07/09/20  0520 07/09/20  1740 07/10/20  0615   PHART 7.475* 7.505* 7.491*   [id number] 53.9* 46.3* 49.2*   PO2ART 100.5 79.3 94.1   [id number] 38.8* 35.7* 36.7*         Intake/Output Summary (Last 24 hours) at 7/10/2020 0935  Last data filed at 7/10/2020 0455  Gross per 24 hour   Intake 3462. 11 ml   Output 1035 ml   Net 2427.11 ml       midazolam Last Rate: Stopped (07/10/20 0934)    dextrose    fentaNYL (SUBLIMAZE) infusion Last Rate: Stopped (07/10/20 0914)      ,

## 2020-07-10 NOTE — PROGRESS NOTES
Wasted approximately 10 mL of versed drip with Lori Watters RN. New fentanyl bag and hung with new IV Tubing (Tubing expires 7/10/20). Wasted approximately 88 ml from bag witnessed by Parveen Mosher. Resarted both versed and fentanyl drips at half the dose prior to SBT.  Electronically signed by Nobie Riedel, RN on 7/10/2020 at 1:16 PM

## 2020-07-10 NOTE — PROGRESS NOTES
Internal Medicine ICU Progress Note      Interval History:     Patient admitted with Joshua Olvera -19. On droplet plus precautions. Pt w/ persistent high fevers, progressive hypoxia. Initiated on Remdesivir, dexamethasone. Intubated for worsening resp failure on 20. Requiring HF O2 . Had high fevers w/ T max of 104 F    20: S/P Convalescent plasma transfusion 2 units     7/3/20--> : fevers improved, persistent hypoxia  Has developed new shingles, started on acyclovir    ET tube changed on  20:  Spiking temps, up to 102.3 °F.  -Infectious disease following .  -Hypoxia slightly improved but  FiO2 down from 75% to 55% today , PEEP remains at 14      7/10/20:  - pt improving now. -Temperatures are improving, low-grade fevers  - sats improved , Fi o2 down to 50 % for the last couple of days and remained stable. PEEP down to 8  -Spontaneous breathing trials initiated today and patient tolerated this well for 2 hours   -He is back on mechanical ventilation , responding a little when sedation decreased .     On droplet plus precautions    Invasive Lines: PICC placed on 2020      MV: Intubated on 2020    Recent Labs     07/10/20  0615 07/10/20  1220   PHART 7.491* 7.519*   APU5ARL 49.2* 46.4*   PO2ART 94.1 89.1       MV Settings:  Vent Mode: AC/VC Rate Set: 16 bmp/Vt Ordered: 430 mL/ /FiO2 : 50 %    IV:   midazolam 3 mg/hr (07/10/20 1310)    dextrose      fentaNYL (SUBLIMAZE) infusion 50 mcg/hr (07/10/20 1310)       Vitals:  Temp  Av.8 °F (37.7 °C)  Min: 98.8 °F (37.1 °C)  Max: 100.5 °F (38.1 °C)  Pulse  Av.4  Min: 78  Max: 93  BP  Min: 116/57  Max: 163/72  SpO2  Av %  Min: 93 %  Max: 99 %  FiO2   Av.5 %  Min: 50 %  Max: 60 %  Patient Vitals for the past 4 hrs:   BP Pulse Resp SpO2   07/10/20 1525 -- -- -- 95 %   07/10/20 1400 (!) 144/67 86 25 95 %   07/10/20 1300 (!) 147/48 90 28 94 %       CVP:        Intake/Output Summary (Last 24 hours) at 7/10/2020 1654  Last 07/10/20  0500   AST 34 26 40*   ALT 22 19 23   LIPASE 19.0  --   --    BILIDIR 0.3 <0.2 <0.2   BILITOT 0.5 0.5 0.6   ALKPHOS 46 44 45     PT/INR:   Recent Labs     07/08/20  0505 07/09/20  0520 07/10/20  0500   PROTIME 12.5 12.5 12.0   INR 1.08 1.08 1.03     Results for NEWTON BOWENS (MRN [id number]) as of 7/1/2020 09:46   Ref. Range 6/24/2020 21:35 6/26/2020 15:24 6/30/2020 04:20   D-Dimer, Quant Latest Ref Range: 0 - 229 ng/mL  (H) 682 (H) 653 (H)     Results for Calvin SMITH (MRN [id number]) as of 7/1/2020 09:46   Ref. Range 6/24/2020 21:35   Ferritin Latest Ref Range: 30.0 - 400.0 ng/mL 97.3       Cultures:  Results for Costa BOWENS (MRN [id number]) as of 6/30/2020 09:20   Ref. Range 6/24/2020 22:00   SARS-CoV-2, PCR Latest Ref Range: Not Detected  DETECTED (A)     Susceptibility     Staphylococcus epidermidis (1)     Antibiotic Interpretation TARA Status    ciprofloxacin Sensitive <=0.5 mcg/mL     nitrofurantoin Sensitive <=16 mcg/mL     oxacillin Sensitive <=0.25 mcg/mL     tetracycline Sensitive <=1 mcg/mL     trimethoprim-sulfamethoxazole Sensitive <=10 mcg/mL           Films:    XR CHEST PORTABLE   Final Result   Improving bilateral pneumonia versus edema. XR CHEST 1 VW   Final Result   Increasing patchy opacities throughout both lungs, most compatible with   multifocal pneumonia. Component of pulmonary edema should be considered. XR ABDOMEN (KUB) (SINGLE AP VIEW)   Final Result   1. Indeterminate bowel gas pattern         XR CHEST PORTABLE   Final Result   Stable bilateral pneumonia. XR CHEST PORTABLE   Final Result   Stable central ground-glass airspace opacities. Supportive devices are   unchanged. XR CHEST PORTABLE   Final Result   The endotracheal tube tip is approximately 3 cm above the thai. XR CHEST PORTABLE   Final Result   Mild increase in the amount of bibasilar opacity since yesterday. Large   bilateral pneumonias.          XR CHEST PORTABLE   Final Result   Stable bilateral pulmonary opacities. XR CHEST PORTABLE   Final Result   Minimal increase in the amount of opacity in each lung consistent with slight   worsening of bilateral pneumonia. XR CHEST PORTABLE   Final Result   Supportive tubing is in normal position. Low lung volume study, with stable alveolar opacities in the mid and lower   lungs, pneumonia versus atelectasis. XR CHEST PORTABLE   Final Result   Increased in degree and extent of bilateral mid and lower lung pneumonia. The increased may be partially accentuated by low lung volumes. XR CHEST 1 VW   Final Result   Persistent bilateral airspace disease, similar to prior. IR PICC WO SQ PORT/PUMP > 5 YEARS   Final Result   Successful placement of PICC line. XR CHEST PORTABLE   Final Result   Appropriate endotracheal tube positioning. Multifocal airspace opacities and areas of atelectasis correlate with recent   CT chest.         XR ABDOMEN FOR NG/OG/NE TUBE PLACEMENT   Final Result   NG tube terminates over the stomach. CT CHEST PULMONARY EMBOLISM W CONTRAST   Final Result   1. No pulmonary embolism. 2. Multifocal pneumonia scattered throughout both lungs with minimal pleural   effusions. XR CHEST STANDARD (2 VW)   Final Result   No radiographic evidence of acute pulmonary disease. VL Extremity Venous Bilateral    (Results Pending)   XR CHEST PORTABLE    (Results Pending)          Assessment:    . Principal Problem:    Pneumonia due to COVID-19 virus  Active Problems:    Multifocal pneumonia    Orthostasis    Sepsis ([address]    Urinary tract infection without hematuria    ARDS (adult respiratory distress syndrome) (HCC)    Hyperlipidemia    Acute respiratory failure with hypoxia (HCC)    Class 1 obesity due to excess calories with body mass index (BMI) of 31.0 to 31.9 in adult    Peripherally inserted central catheter (PICC) in place Asymptomatic bacteriuria    Endotracheally intubated    On mechanically assisted ventilation (HCC)    Herpes zoster without complication    Fever    Arm edema    Hypertriglyceridemia    COVID-19    Acute kidney injury ([address]  Resolved Problems:    * No resolved hospital problems. *         Plan:    Acute hypoxic respiratory failure due to COVID-19  ARDS  - intubated, on mech vent  - seen by Pulm critical care . - remains in positive fluid balance, getting IV Lasix. - Persistent hypoxia for several days . Pulmonologist had discussed changing pt to roto-prone bed, family wished to hold off at this time  - PEEP was up to 14. Hypoxia improved from 7/9--> PEEP at 8  - FiO2 was  upto 80 %--> slowly improving , 75%--> 70%--->   Hypoxia is better today FiO2 is down to 55%--> 50 %  -Reintubated on 7/6 d/t displacement of existing ETT  - 7/10 spontaneous breathing trials initiated and patient has tolerated that for a couple of hours today    COVID -19  - monitor liver and renal fxn  - He has been  on Decadron 6 mg IV daily. Received 10 days. Dose is now being decreased to 4 mg IV daily--> weaned off on 7/9  -Completed 10 days of Remdesivir on 7/7/2020  - S/P transfusion of 2 units of convalescent plasma on 7/2/2020    Sepsis  - POA, due to PNA, COVID 19  - with leukocytosis, fevers, tachypnea  - COVID-19 detected  - Patient has been weaned off Levophed  -Spiking fevers again  -Antibiotics adjusted per infectious disease  Currently on acyclovir as above, continued on IV Zyvox and Zosyn    Multifocal pneumonia  - related to COVID-19.    - Tracheal aspirate sent. - was on  broad-spectrum antibiotics, vanc and  Cefepime--> now off .   On IV Zyvox and Zosyn now    Shingles  - on buttocks  - start IV Acyclovir D#5  per ID recs    Petechial Rash  - noted on arms and truck  - given benadryl IV  - followed by ID - remains on IV acyclovir, monitor    + fluid balance  - on IV lasix   - Monitor renal function  - Patient's creatinine is 0.9--> 1.1 >1 > 1.2--> 1.0 today  -S/P IV Lasix. --> DCed Now. Edema resolved     Elevated D dimer  - due to COVID 19  - CT chest neg for PE    UTI  - cx positive for staph, treated    Hyperlipidemia  - on statin. Constipation  -Add mag citrate  - SMOG enemagiven 7/9 , with positive response. There was some rectal bleeding noted    Lovenox twice daily dosing. DIET TUBE FEED CONTINUOUS/CYCLIC NPO; Semi-elemental (Vital AF 1.2 );  Orogastric; Continuous; 20 (initial ); 60 (goal); 20 (over 20 hours)  Full Code        Stefan Howard MD

## 2020-07-10 NOTE — PROGRESS NOTES
Pulmonary & Critical Care Medicine ICU Progress Note      CC: Acute hypoxemic respiratory failure    Events of Last 24 hours:   Brief episode of hypoxemia when turning patient yesterday after a bowel movement. Multiple bowel movement post enema yesterday   FiO2 50% and PEEP 5  Plateau pressure of 15  On SBT this am  Fentanyl 125 mcg/hr - off now   Propofol off  Versed 7 mg/hr- off now    PaO2/FIO2 156        Invasive Lines: IV: PICC     MV:   Vent Mode: AC/VC Rate Set: 16 bmp/Vt Ordered: 430 mL/ /FiO2 : 60 %(to 50%)  Recent Labs     20  1740 07/10/20  0615   PHART 7.505* 7.491*   FRT8VYH 46.3* 49.2*   PO2ART 79.3 94.1       IV:   midazolam 7 mg/hr (20 1710)    dextrose      fentaNYL (SUBLIMAZE) infusion 100 mcg/hr (07/10/20 0528)       Vitals:  Blood pressure (!) 118/55, pulse 87, temperature 100.4 °F (38 °C), temperature source Bladder, resp. rate 14, height 5' 7\" (1.702 m), weight 191 lb 12.8 oz (87 kg), SpO2 97 %. on AC 16/430/+5 50%   Temp  Av °F (37.2 °C)  Min: 97.5 °F (36.4 °C)  Max: 100.4 °F (38 °C)    Intake/Output Summary (Last 24 hours) at 7/10/2020 0744  Last data filed at 7/10/2020 0455  Gross per 24 hour   Intake 4021.07 ml   Output 1160 ml   Net 2861.07 ml     EXAM:  General: ill appearing    Eyes: PERRL. No sclera icterus. No conjunctival injection. ENT: No discharge. Pharynx clear. Neck: Trachea midline. Normal thyroid. Resp: No accessory muscle use. Minimal crackles. No wheezing. Few rhonchi. No dullness on percussion. CV: Regular rate. Regular rhythm. No mumur or rub. No edema. + UE edema   GI: Non-tender. Non-distended. No masses. No organomegaly. Normal bowel sounds. No hernia. Skin: Warm and dry. No nodule on exposed extremities. Vesicular rash in the buttock area. Petechial rash around the calf pressure area with ecchymosis both arms medially. Lymph: No cervical LAD. No supraclavicular LAD. M/S: No cyanosis. No joint deformity. No clubbing.    Neuro: Intubated and sedated. Followed commands.    Psych: No agitation, no anxiety, affect is full       Scheduled Meds:   dexamethasone  3 mg Intravenous Daily    sennosides-docusate sodium  2 tablet Oral BID    mupirocin   Nasal BID    piperacillin-tazobactam  3.375 g Intravenous Q8H    enoxaparin  1 mg/kg Subcutaneous BID    linezolid  600 mg Intravenous Q12H    acyclovir  10 mg/kg (Adjusted) Intravenous Q8H    insulin lispro  0-12 Units Subcutaneous Q4H    chlorhexidine  15 mL Mouth/Throat BID    pantoprazole  40 mg Intravenous Daily    ipratropium-albuterol  1 ampule Inhalation Q4H    ketotifen  1 drop Both Eyes BID    rosuvastatin  10 mg Oral Daily    aspirin  81 mg Oral Daily    sodium chloride flush  10 mL Intravenous 2 times per day     PRN Meds:  glucose, dextrose, glucagon (rDNA), dextrose, fentanNYL, midazolam, tetrahydrozoline, aluminum & magnesium hydroxide-simethicone, ondansetron, sodium chloride flush, acetaminophen **OR** acetaminophen    Results:  CBC:   Recent Labs     07/09/20  0520 07/09/20  1740 07/10/20  0500   WBC 19.7* 17.4* 16.8*   HGB 11.2* 11.4* 11.4*   HCT 34.9* 35.4* 35.7*   MCV 86.9 87.7 87.9    159 164     BMP:   Recent Labs     07/08/20  0505 07/09/20  0520 07/10/20  0500    138 138   K 4.1 3.8 3.3*   CL 95* 92* 93*   CO2 38* 36* 33*   BUN 67* 64* 46*   CREATININE 1.2 1.0 0.8     LIVER PROFILE:   Recent Labs     07/08/20  0505 07/09/20  0520 07/10/20  0500   AST 34 26 40*   ALT 22 19 23   LIPASE 19.0  --   --    BILIDIR 0.3 <0.2 <0.2   BILITOT 0.5 0.5 0.6   ALKPHOS 46 44 45       Cultures:  6/24 Urine Staphylococcus epidermidis  6/24 COVID 19 +  6/29 Trach Asp NRF  7/2 respiratory NRF  7/3 BC NGTD  7/6 UC NGTD  7/7 Resp CX NRF   7/7 BC NGTD   7/7 UC NGTD         Films:  CXR 7/9 was reviewed by me and it showed   Bilateral ASD - somewhat better   Satisfactory ETT position   Satisfactory CVC line position           ASSESSMENT:  · Acute hypoxemic respiratory failure  · New fever ? PNA ? Line ? DVT   · UE edema- rule out DVT   · Hypertriglyceridemia- normalized   · ARDS  · COVID-19 viral multifocal pneumonia. Post 2 units of CCP 7/2/2020. Completed 10 days of Remdesevir  7/7/2020  · Elevated d-dimer  · Shingles  · ET tube displacement with emergent reintubation 7/7/2020 and brief hypoxia down to the 70s        PLAN:  Mechanical ventilation as per my orders. The ventilator was adjusted by me at the bedside for unstable, life threatening respiratory failure  Follow ABG and chest x-ray while on the ventilator  Supplemental oxygen to maintain SaO2 >92%; wean as tolerated  Off Propofol due to elevated TG  Versed drip for sedation, target RASS -2, with daily spontaneous awakening trial   Wean off Fentanyl drip today   Fentanyl and Versed PRN, gtt as needed  Head of bed 30 degrees or higher at all times  Spontaneous breathing trial for possible extubation   Zyvox D#4 Zosyn D#3 and IV acyclovir per ID D#5  Decadron 4 mg IV daily D#15- decrease to 2 mg   KCL 40 IV x 1   Retrial of Lasix 40 IV daily   Follow up repeat CX   UE doppler rule out DVT pending   Follow TG   Senokot-S 2 tab PO BID PRN   Nutrition: Tube feeding at target   Blood sugar control, with goal 150-180  GI prophylaxis: Pepcid  DVT prophylaxis: Lovenox therapeutic dose pending UE doppler   MRSA prophylaxis: Bactroban   Code status: Full code   D/W [facility], daughter [id number] was updated today by nursing staff                Total critical care time caring for this patient with life threatening, unstable organ failure, including direct patient contact, management of life support systems, review of data including imaging and labs, discussions with other team members and physicians is 35 minutes so far today, excluding procedures.

## 2020-07-10 NOTE — PROGRESS NOTES
D: Patient has been tolerating SAT well. Spoke with RT. They will be over to attempt SBT.  Suni Floyd

## 2020-07-10 NOTE — PROGRESS NOTES
Patient had [phone] a small bowel movement with redstreaks in it. He had a positive occult stool overnight. Updated hospitalist. Continue current POC.  López Singleton

## 2020-07-11 ENCOUNTER — APPOINTMENT (OUTPATIENT)
Dept: GENERAL RADIOLOGY | Age: 85
DRG: 870 | End: 2020-07-11
Payer: MEDICARE

## 2020-07-11 LAB
ALBUMIN SERPL-MCNC: 2.3 G/DL (ref 3.4–5)
ALP BLD-CCNC: 58 U/L (ref 40–129)
ALT SERPL-CCNC: 43 U/L (ref 10–40)
ANION GAP SERPL CALCULATED.3IONS-SCNC: 10 MMOL/L (ref 3–16)
APTT: 31 SEC (ref 24.2–36.2)
APTT: 32 SEC (ref 24.2–36.2)
AST SERPL-CCNC: 73 U/L (ref 15–37)
BASE EXCESS ARTERIAL: 10.3 MMOL/L (ref -3–3)
BASE EXCESS ARTERIAL: 12 MMOL/L (ref -3–3)
BASOPHILS ABSOLUTE: 0 K/UL (ref 0–0.2)
BASOPHILS RELATIVE PERCENT: 0.2 %
BILIRUB SERPL-MCNC: 0.7 MG/DL (ref 0–1)
BILIRUBIN DIRECT: 0.3 MG/DL (ref 0–0.3)
BILIRUBIN, INDIRECT: 0.4 MG/DL (ref 0–1)
BLOOD CULTURE, ROUTINE: NORMAL
BUN BLDV-MCNC: 44 MG/DL (ref 7–20)
CALCIUM SERPL-MCNC: 7.3 MG/DL (ref 8.3–10.6)
CARBOXYHEMOGLOBIN ARTERIAL: 0.5 % (ref 0–1.5)
CARBOXYHEMOGLOBIN ARTERIAL: 0.6 % (ref 0–1.5)
CHLORIDE BLD-SCNC: 91 MMOL/L (ref 99–110)
CO2: 33 MMOL/L (ref 21–32)
CREAT SERPL-MCNC: 0.8 MG/DL (ref 0.8–1.3)
CULTURE, BLOOD 2: NORMAL
D DIMER: 741 NG/ML DDU (ref 0–229)
EOSINOPHILS ABSOLUTE: 0.3 K/UL (ref 0–0.6)
EOSINOPHILS RELATIVE PERCENT: 1.8 %
GFR AFRICAN AMERICAN: >60
GFR NON-AFRICAN AMERICAN: >60
GLUCOSE BLD-MCNC: 138 MG/DL (ref 70–99)
GLUCOSE BLD-MCNC: 148 MG/DL (ref 70–99)
GLUCOSE BLD-MCNC: 154 MG/DL (ref 70–99)
GLUCOSE BLD-MCNC: 160 MG/DL (ref 70–99)
HCO3 ARTERIAL: 34.7 MMOL/L (ref 21–29)
HCO3 ARTERIAL: 36.6 MMOL/L (ref 21–29)
HCT VFR BLD CALC: 34.9 % (ref 40.5–52.5)
HCT VFR BLD CALC: 35.2 % (ref 40.5–52.5)
HCT VFR BLD CALC: 35.2 % (ref 40.5–52.5)
HCT VFR BLD CALC: 36.9 % (ref 40.5–52.5)
HCT VFR BLD CALC: 37.2 % (ref 40.5–52.5)
HEMOGLOBIN, ART, EXTENDED: 12.3 G/DL (ref 13.5–17.5)
HEMOGLOBIN, ART, EXTENDED: 13.2 G/DL (ref 13.5–17.5)
HEMOGLOBIN: 11.2 G/DL (ref 13.5–17.5)
HEMOGLOBIN: 11.2 G/DL (ref 13.5–17.5)
HEMOGLOBIN: 11.4 G/DL (ref 13.5–17.5)
HEMOGLOBIN: 11.7 G/DL (ref 13.5–17.5)
HEMOGLOBIN: 12 G/DL (ref 13.5–17.5)
INR BLD: 1 (ref 0.86–1.14)
LYMPHOCYTES ABSOLUTE: 0.4 K/UL (ref 1–5.1)
LYMPHOCYTES RELATIVE PERCENT: 2.7 %
MCH RBC QN AUTO: 28 PG (ref 26–34)
MCHC RBC AUTO-ENTMCNC: 32.2 G/DL (ref 31–36)
MCV RBC AUTO: 87 FL (ref 80–100)
METHEMOGLOBIN ARTERIAL: 0.3 %
METHEMOGLOBIN ARTERIAL: 0.3 %
MONOCYTES ABSOLUTE: 0.5 K/UL (ref 0–1.3)
MONOCYTES RELATIVE PERCENT: 2.9 %
NEUTROPHILS ABSOLUTE: 14.8 K/UL (ref 1.7–7.7)
NEUTROPHILS RELATIVE PERCENT: 92.4 %
O2 CONTENT ARTERIAL: 16 ML/DL
O2 CONTENT ARTERIAL: 17 ML/DL
O2 SAT, ARTERIAL: 90.7 %
O2 SAT, ARTERIAL: 95.2 %
O2 THERAPY: ABNORMAL
O2 THERAPY: ABNORMAL
PCO2 ARTERIAL: 45.4 MMHG (ref 35–45)
PCO2 ARTERIAL: 46.9 MMHG (ref 35–45)
PDW BLD-RTO: 14.2 % (ref 12.4–15.4)
PERFORMED ON: ABNORMAL
PH ARTERIAL: 7.5 (ref 7.35–7.45)
PH ARTERIAL: 7.51 (ref 7.35–7.45)
PLATELET # BLD: 165 K/UL (ref 135–450)
PMV BLD AUTO: 9.8 FL (ref 5–10.5)
PO2 ARTERIAL: 54.4 MMHG (ref 75–108)
PO2 ARTERIAL: 69.8 MMHG (ref 75–108)
POTASSIUM SERPL-SCNC: 3.2 MMOL/L (ref 3.5–5.1)
PROCALCITONIN: 0.41 NG/ML (ref 0–0.15)
PROTHROMBIN TIME: 11.6 SEC (ref 10–13.2)
RBC # BLD: 4.01 M/UL (ref 4.2–5.9)
SODIUM BLD-SCNC: 134 MMOL/L (ref 136–145)
TCO2 ARTERIAL: 36.1 MMOL/L
TCO2 ARTERIAL: 38 MMOL/L
TOTAL PROTEIN: 5 G/DL (ref 6.4–8.2)
WBC # BLD: 16 K/UL (ref 4–11)

## 2020-07-11 PROCEDURE — 6360000002 HC RX W HCPCS: Performed by: INTERNAL MEDICINE

## 2020-07-11 PROCEDURE — 2580000003 HC RX 258: Performed by: INTERNAL MEDICINE

## 2020-07-11 PROCEDURE — 94640 AIRWAY INHALATION TREATMENT: CPT

## 2020-07-11 PROCEDURE — 2000000000 HC ICU R&B

## 2020-07-11 PROCEDURE — 6370000000 HC RX 637 (ALT 250 FOR IP): Performed by: INTERNAL MEDICINE

## 2020-07-11 PROCEDURE — C9113 INJ PANTOPRAZOLE SODIUM, VIA: HCPCS | Performed by: INTERNAL MEDICINE

## 2020-07-11 PROCEDURE — 2700000000 HC OXYGEN THERAPY PER DAY

## 2020-07-11 PROCEDURE — 80076 HEPATIC FUNCTION PANEL: CPT

## 2020-07-11 PROCEDURE — 94003 VENT MGMT INPAT SUBQ DAY: CPT

## 2020-07-11 PROCEDURE — 85025 COMPLETE CBC W/AUTO DIFF WBC: CPT

## 2020-07-11 PROCEDURE — 82803 BLOOD GASES ANY COMBINATION: CPT

## 2020-07-11 PROCEDURE — 85379 FIBRIN DEGRADATION QUANT: CPT

## 2020-07-11 PROCEDURE — 94750 HC PULMONARY COMPLIANCE STUDY: CPT

## 2020-07-11 PROCEDURE — 85014 HEMATOCRIT: CPT

## 2020-07-11 PROCEDURE — 71045 X-RAY EXAM CHEST 1 VIEW: CPT

## 2020-07-11 PROCEDURE — 84145 PROCALCITONIN (PCT): CPT

## 2020-07-11 PROCEDURE — 85018 HEMOGLOBIN: CPT

## 2020-07-11 PROCEDURE — 80048 BASIC METABOLIC PNL TOTAL CA: CPT

## 2020-07-11 PROCEDURE — 85730 THROMBOPLASTIN TIME PARTIAL: CPT

## 2020-07-11 PROCEDURE — 99291 CRITICAL CARE FIRST HOUR: CPT | Performed by: INTERNAL MEDICINE

## 2020-07-11 PROCEDURE — 85610 PROTHROMBIN TIME: CPT

## 2020-07-11 PROCEDURE — 94761 N-INVAS EAR/PLS OXIMETRY MLT: CPT

## 2020-07-11 RX ORDER — POTASSIUM CHLORIDE 29.8 MG/ML
40 INJECTION INTRAVENOUS ONCE
Status: COMPLETED | OUTPATIENT
Start: 2020-07-11 | End: 2020-07-11

## 2020-07-11 RX ORDER — HEPARIN SODIUM 1000 [USP'U]/ML
5900 INJECTION, SOLUTION INTRAVENOUS; SUBCUTANEOUS PRN
Status: DISCONTINUED | OUTPATIENT
Start: 2020-07-12 | End: 2020-07-11

## 2020-07-11 RX ORDER — ACETAZOLAMIDE 250 MG/1
500 TABLET ORAL DAILY
Status: DISCONTINUED | OUTPATIENT
Start: 2020-07-11 | End: 2020-07-12

## 2020-07-11 RX ORDER — FUROSEMIDE 10 MG/ML
40 INJECTION INTRAMUSCULAR; INTRAVENOUS DAILY
Status: DISCONTINUED | OUTPATIENT
Start: 2020-07-12 | End: 2020-07-15

## 2020-07-11 RX ORDER — HEPARIN SODIUM 10000 [USP'U]/100ML
13.3 INJECTION, SOLUTION INTRAVENOUS CONTINUOUS
Status: DISCONTINUED | OUTPATIENT
Start: 2020-07-11 | End: 2020-07-11

## 2020-07-11 RX ORDER — FUROSEMIDE 10 MG/ML
40 INJECTION INTRAMUSCULAR; INTRAVENOUS 2 TIMES DAILY
Status: DISCONTINUED | OUTPATIENT
Start: 2020-07-11 | End: 2020-07-11

## 2020-07-11 RX ORDER — HEPARIN SODIUM 1000 [USP'U]/ML
3000 INJECTION, SOLUTION INTRAVENOUS; SUBCUTANEOUS PRN
Status: DISCONTINUED | OUTPATIENT
Start: 2020-07-12 | End: 2020-07-11

## 2020-07-11 RX ORDER — DEXAMETHASONE SODIUM PHOSPHATE 4 MG/ML
1 INJECTION, SOLUTION INTRA-ARTICULAR; INTRALESIONAL; INTRAMUSCULAR; INTRAVENOUS; SOFT TISSUE DAILY
Status: DISCONTINUED | OUTPATIENT
Start: 2020-07-12 | End: 2020-07-12

## 2020-07-11 RX ADMIN — PIPERACILLIN SODIUM AND TAZOBACTAM SODIUM 3.38 G: 3; .375 INJECTION, POWDER, LYOPHILIZED, FOR SOLUTION INTRAVENOUS at 12:57

## 2020-07-11 RX ADMIN — ACETAZOLAMIDE 500 MG: 250 TABLET ORAL at 12:56

## 2020-07-11 RX ADMIN — FENTANYL CITRATE 25 MCG: 50 INJECTION INTRAMUSCULAR; INTRAVENOUS at 17:14

## 2020-07-11 RX ADMIN — MUPIROCIN: 20 OINTMENT TOPICAL at 20:27

## 2020-07-11 RX ADMIN — Medication 10 ML: at 08:25

## 2020-07-11 RX ADMIN — IPRATROPIUM BROMIDE AND ALBUTEROL SULFATE 1 AMPULE: .5; 3 SOLUTION RESPIRATORY (INHALATION) at 11:12

## 2020-07-11 RX ADMIN — MIDAZOLAM HYDROCHLORIDE 2 MG: 2 INJECTION, SOLUTION INTRAMUSCULAR; INTRAVENOUS at 18:21

## 2020-07-11 RX ADMIN — MIDAZOLAM HYDROCHLORIDE 2 MG: 2 INJECTION, SOLUTION INTRAMUSCULAR; INTRAVENOUS at 17:14

## 2020-07-11 RX ADMIN — CHLORHEXIDINE GLUCONATE 0.12% ORAL RINSE 15 ML: 1.2 LIQUID ORAL at 19:56

## 2020-07-11 RX ADMIN — MUPIROCIN: 20 OINTMENT TOPICAL at 09:51

## 2020-07-11 RX ADMIN — KETOTIFEN FUMARATE 1 DROP: 0.35 SOLUTION/ DROPS OPHTHALMIC at 09:50

## 2020-07-11 RX ADMIN — IPRATROPIUM BROMIDE AND ALBUTEROL SULFATE 1 AMPULE: .5; 3 SOLUTION RESPIRATORY (INHALATION) at 07:40

## 2020-07-11 RX ADMIN — MIDAZOLAM HYDROCHLORIDE 2 MG: 2 INJECTION, SOLUTION INTRAMUSCULAR; INTRAVENOUS at 19:56

## 2020-07-11 RX ADMIN — MIDAZOLAM HYDROCHLORIDE 2 MG: 2 INJECTION, SOLUTION INTRAMUSCULAR; INTRAVENOUS at 14:47

## 2020-07-11 RX ADMIN — Medication 10 ML: at 19:56

## 2020-07-11 RX ADMIN — FUROSEMIDE 40 MG: 10 INJECTION, SOLUTION INTRAMUSCULAR; INTRAVENOUS at 08:25

## 2020-07-11 RX ADMIN — IPRATROPIUM BROMIDE AND ALBUTEROL SULFATE 1 AMPULE: .5; 3 SOLUTION RESPIRATORY (INHALATION) at 15:18

## 2020-07-11 RX ADMIN — IPRATROPIUM BROMIDE AND ALBUTEROL SULFATE 1 AMPULE: .5; 3 SOLUTION RESPIRATORY (INHALATION) at 23:27

## 2020-07-11 RX ADMIN — PANTOPRAZOLE SODIUM 40 MG: 40 INJECTION, POWDER, FOR SOLUTION INTRAVENOUS at 19:56

## 2020-07-11 RX ADMIN — LINEZOLID 600 MG: 600 INJECTION, SOLUTION INTRAVENOUS at 07:55

## 2020-07-11 RX ADMIN — ASPIRIN 81 MG 81 MG: 81 TABLET ORAL at 08:25

## 2020-07-11 RX ADMIN — LINEZOLID 600 MG: 600 INJECTION, SOLUTION INTRAVENOUS at 19:56

## 2020-07-11 RX ADMIN — DEXAMETHASONE SODIUM PHOSPHATE 2 MG: 4 INJECTION, SOLUTION INTRAMUSCULAR; INTRAVENOUS at 08:25

## 2020-07-11 RX ADMIN — ROSUVASTATIN CALCIUM 10 MG: 10 TABLET, FILM COATED ORAL at 08:25

## 2020-07-11 RX ADMIN — IPRATROPIUM BROMIDE AND ALBUTEROL SULFATE 1 AMPULE: .5; 3 SOLUTION RESPIRATORY (INHALATION) at 19:43

## 2020-07-11 RX ADMIN — MEROPENEM 1 G: 1 INJECTION, POWDER, FOR SOLUTION INTRAVENOUS at 17:15

## 2020-07-11 RX ADMIN — KETOTIFEN FUMARATE 1 DROP: 0.35 SOLUTION/ DROPS OPHTHALMIC at 20:27

## 2020-07-11 RX ADMIN — FENTANYL CITRATE 25 MCG: 50 INJECTION INTRAMUSCULAR; INTRAVENOUS at 18:20

## 2020-07-11 RX ADMIN — PIPERACILLIN SODIUM AND TAZOBACTAM SODIUM 3.38 G: 3; .375 INJECTION, POWDER, LYOPHILIZED, FOR SOLUTION INTRAVENOUS at 02:40

## 2020-07-11 RX ADMIN — ACYCLOVIR SODIUM 365 MG: 50 INJECTION, SOLUTION INTRAVENOUS at 17:14

## 2020-07-11 RX ADMIN — IPRATROPIUM BROMIDE AND ALBUTEROL SULFATE 1 AMPULE: .5; 3 SOLUTION RESPIRATORY (INHALATION) at 03:23

## 2020-07-11 RX ADMIN — FENTANYL CITRATE 75 MCG/HR: 50 INJECTION, SOLUTION INTRAMUSCULAR; INTRAVENOUS at 02:25

## 2020-07-11 RX ADMIN — ACYCLOVIR SODIUM 365 MG: 50 INJECTION, SOLUTION INTRAVENOUS at 08:32

## 2020-07-11 RX ADMIN — CHLORHEXIDINE GLUCONATE 0.12% ORAL RINSE 15 ML: 1.2 LIQUID ORAL at 08:26

## 2020-07-11 RX ADMIN — PANTOPRAZOLE SODIUM 40 MG: 40 INJECTION, POWDER, FOR SOLUTION INTRAVENOUS at 08:25

## 2020-07-11 RX ADMIN — MIDAZOLAM HYDROCHLORIDE 2 MG: 2 INJECTION, SOLUTION INTRAMUSCULAR; INTRAVENOUS at 22:20

## 2020-07-11 RX ADMIN — POTASSIUM CHLORIDE 40 MEQ: 400 INJECTION, SOLUTION INTRAVENOUS at 12:57

## 2020-07-11 ASSESSMENT — PULMONARY FUNCTION TESTS
PIF_VALUE: 13
PIF_VALUE: 25
PIF_VALUE: 13
PIF_VALUE: 17
PIF_VALUE: 19
PIF_VALUE: 12
PIF_VALUE: 32
PIF_VALUE: 21
PIF_VALUE: 17
PIF_VALUE: 17
PIF_VALUE: 15
PIF_VALUE: 15
PIF_VALUE: 24
PIF_VALUE: 18
PIF_VALUE: 18
PIF_VALUE: 14
PIF_VALUE: 20
PIF_VALUE: 18
PIF_VALUE: 19
PIF_VALUE: 18
PIF_VALUE: 13
PIF_VALUE: 16
PIF_VALUE: 13

## 2020-07-11 ASSESSMENT — PAIN SCALES - GENERAL
PAINLEVEL_OUTOF10: 6
PAINLEVEL_OUTOF10: 2
PAINLEVEL_OUTOF10: 0
PAINLEVEL_OUTOF10: 6
PAINLEVEL_OUTOF10: 3
PAINLEVEL_OUTOF10: 2
PAINLEVEL_OUTOF10: 0

## 2020-07-11 NOTE — PROGRESS NOTES
RESPIRATORY THERAPY ASSESSMENT    Name:  Armando Manjarrez  Medical Record Number:  [id number]  Age: 82 y.o. Gender: male  : 1935  Today's Date:  2020  Room:  3013/3013-01    Assessment     Is the patient being admitted for a COPD or Asthma exacerbation? No   (If yes the patient will be seen every 4 hours for the first 24 hours and then reassessed)    Patient Admission Diagnosis      Allergies  Allergies   Allergen Reactions    Buspar [Buspirone] Other (See Comments)     Face,face numb    Codeine Other (See Comments)     Face,lips numb    Methylphenidate Other (See Comments)     Face numbness    Ritalin [Methylphenidate Hcl] Other (See Comments)     Face. lips numb    Zoloft [Sertraline Hcl] Other (See Comments)     Numbness of lips       Minimum Predicted Vital Capacity:               Actual Vital Capacity:                    Pulmonary History:No history  Home Oxygen Therapy:  room air  Home Respiratory Therapy:None   Current Respiratory Therapy:  Duoneb Q4  Treatment Type: Aerosol generator  Medications: Albuterol/Ipratropium    Respiratory Severity Index(RSI)   Patients with orders for inhalation medications, oxygen, or any therapeutic treatment modality will be placed on Respiratory Protocol. They will be assessed with the first treatment and at least every 72 hours thereafter. The following severity scale will be used to determine frequency of treatment intervention. Smoking History: Pulmonary Disease or Smoking History, Greater than 15 pack year = 2    Social History  Social History     Tobacco Use    Smoking status: Former Smoker     Packs/day: 1.00     Years: 25.00     Pack years: 25.00     Types: Cigarettes     Start date: 1990    Smokeless tobacco: Never Used   Substance Use Topics    Alcohol use:  Yes     Alcohol/week: 1.0 standard drinks     Types: 1 Cans of beer per week     Comment: occasional    Drug use: No       Recent Surgical History: None = 0  Past Surgical History  Past Surgical History:   Procedure Laterality Date    BACK SURGERY      CAROTID ENDARTERECTOMY Left     COLONOSCOPY      UPPER GASTROINTESTINAL ENDOSCOPY  07/12/14    Minimal chronic gastritis       Level of Consciousness: Comatose = 4    Level of Activity: Bedridden, unresponsive or quadriplegic = 4    Respiratory Pattern: Regular Pattern; RR 8-20 = 0    Breath Sounds: Diminshed bilaterally and/or crackles = 2    Sputum  Sputum Color: Red, Creamy, Tenacity: Thick, Sputum How Obtained: Suctioned, Endotracheal  Cough: Strong, productive = 1    Vital Signs   BP (!) 125/58   Pulse 86   Temp 99.7 °F (37.6 °C) (Bladder)   Resp 20   Ht 5' 7\" (1.702 m)   Wt 191 lb 12.8 oz (87 kg)   SpO2 93%   BMI 30.04 kg/m²   SPO2 (COPD values may differ): Less than 86% on room air or greater than 92% on FiO2 greater than 50% = 4    Peak Flow (asthma only): not applicable = 0    RSI: 58-25 = Q4 (every four hours)        Plan       Goals: medication delivery, mobilize retained secretions, volume expansion and improve oxygenation    Patient/caregiver was educated on the proper method of use for Respiratory Care Devices:  Yes      Level of patient/caregiver understanding able to:   ? Verbalize understanding   ? Demonstrate understanding       ? Teach back        ? Needs reinforcement       ? No available caregiver               ? Other:     Response to education:  Very Good     Is patient being placed on Home Treatment Regimen? No     Does the patient have everything they need prior to discharge? NA     Comments: pt assessed & chart reviewed    Plan of Care: maintain current regimen    Electronically signed by Glenn Benton RCP on 7/11/2020 at 4:29 AM    Respiratory Protocol Guidelines     1. Assessment and treatment by Respiratory Therapy will be initiated for medication and therapeutic interventions upon initiation of aerosolized medication.   2. Physician will be contacted for respiratory rate (RR) greater than 35 breaths per minute. Therapy will be held for heart rate (HR) greater than 140 beats per minute, pending direction from physician. 3. Bronchodilators will be administered via Metered Dose Inhaler (MDI) with spacer when the following criteria are met:  a. Alert and cooperative     b. HR < 140 bpm  c. RR < 30 bpm                d. Can demonstrate a 2-3 second inspiratory hold  4. Bronchodilators will be administered via Hand Held Nebulizer J[facility]) to patients when ANY of the following criteria are met  a. Incognizant or uncooperative          b. Patients treated with HHN at Home        c. Unable to demonstrate proper use of MDI with spacer     d. RR > 30 bpm   5. Bronchodilators will be delivered via Metered Dose Inhaler (MDI), HHN, Aerogen to intubated patients on mechanical ventilation. 6. Inhalation medication orders will be delivered and/or substituted as outlined below. Aerosolized Medications Ordering and Administration Guidelines:    1. All Medications will be ordered by a physician, and their frequency and/or modality will be adjusted as defined by the patients Respiratory Severity Index (RSI) score. 2. If the patient does not have documented COPD, consider discontinuing anticholinergics when RSI is less than 9.  3. If the bronchospasm worsens (increased RSI), then the bronchodilator frequency can be increased to a maximum of every 4 hours. If greater than every 4 hours is required, the physician will be contacted. 4. If the bronchospasm improves, the frequency of the bronchodilator can be decreased, based on the patient's RSI, but not less than home treatment regimen frequency. 5. Bronchodilator(s) will be discontinued if patient has a RSI less than 9 and has received no scheduled or as needed treatment for 72  Hrs. Patients Ordered on a Mucolytic Agent:    1. Must always be administered with a bronchodilator.     2. Discontinue if patient experiences worsened bronchospasm, or secretions have lessened to the point that the patient is able to clear them with a cough. Anti-inflammatory and Combination Medications:    1. If the patient lacks prior history of lung disease, is not using inhaled anti-inflammatory medication at home, and lacks wheezing by examination or by history for at least 24 hours, contact physician for possible discontinuation.

## 2020-07-11 NOTE — PROGRESS NOTES
Called Dr. Ailyn Burns office and left a message for on call jaspreet to call back for input on changing antibiotics at Dr. James Seat request. Yeimy Penn

## 2020-07-11 NOTE — PROGRESS NOTES
07/11/20 1948   Vent Information   $Ventilation $Subsequent Day   Skin Assessment Clean, dry, & intact   Vent Type 840   Vent Mode AC/VC   Vt Ordered 400 mL   Rate Set 16 bmp   Peak Flow 75 L/min   Pressure Support 0 cmH20   FiO2  50 %   SpO2 94 %   SpO2/FiO2 ratio 188   Sensitivity 2   PEEP/CPAP 5   I Time/ I Time % 0 s   Humidification Source Heated wire   Humidification Temp 37   Humidification Temp Measured 36.8   Circuit Condensation Drained   Vent Patient Data   High Peep/I Pressure 0   Peak Inspiratory Pressure 32 cmH2O   Mean Airway Pressure 13 cmH20   Rate Measured 37 br/min   Vt Exhaled 377 mL   Minute Volume 16.1 Liters   I:E Ratio 1.30:1   Plateau Pressure 28 MCW05   Static Compliance 16 mL/cmH2O   Dynamic Compliance 14 mL/cmH2O   Cough/Sputum   Sputum How Obtained Suctioned;Endotracheal   Sputum Amount None   Spontaneous Breathing Trial (SBT) RT Doc   Pulse 93   Breath Sounds   Right Upper Lobe Diminished   Right Middle Lobe Diminished   Right Lower Lobe Diminished   Left Upper Lobe Diminished   Left Lower Lobe Diminished   Additional Respiratory  Assessments   Resp 29   Position Semi-Harris's   Alarm Settings   High Pressure Alarm 40 cmH2O   Low Minute Volume Alarm 5 L/min   High Respiratory Rate 40 br/min   ETT (adult)   Placement Date/Time: 06/29/20 0230   Preoxygenation: Yes  Mask Ventilation: Ventilated by mask (1)  Technique: Video laryngoscopy  Type: Cuffed  Tube Size: 8 mm  Laryngoscope: GlideScope  Blade Size: 4  Location: Oral  Insertion attempts: 1  Placement. ..    Secured at 24 cm   Measured From [address] [place] [address],[address] By [employer] tube leblanc   Site Condition Dry

## 2020-07-11 NOTE — PROGRESS NOTES
Wasted 76 mL of Fentanyl IV drip and 132 mL of IV versed drip with Areli Durant RN.  Electronically signed by Felisa Rodriguez RN on 7/11/2020 at 3:54 PM

## 2020-07-11 NOTE — PROGRESS NOTES
Prn acetaminophen given at this time for temp of 100.7 per medeiros probe. Will monitor for effectiveness.

## 2020-07-11 NOTE — PROGRESS NOTES
Spoke with Dr. Vaughn Urbina, (infectious disease MD) on call for Dr. Kari Weeks about Dr. Chelita Luu concerns with the progression of the patients rash to his abdominal folds, hands and feet. New orders obtained to discontinue Zosyn for Dr. Kari Weeks to sign.  Humphrey Mercado

## 2020-07-11 NOTE — PROGRESS NOTES
RT at bedside. Stopped versed and fentanyl for SBT/SAT. Patient is awake and following commands. Tamiko Walker RN    Vitals:    07/11/20 0742 07/11/20 0749 07/11/20 0805 07/11/20 0900   BP:   (!) 153/71 (!) 159/73   Pulse:   87 92   Resp:   29 (!) 35   Temp:   100.1 °F (37.8 °C)    TempSrc:       SpO2: 93% 93% 92% 92%   Weight:       Height:           BMP  Recent Labs     07/09/20  0520 07/10/20  0500 07/11/20  0550    138 134*   K 3.8 3.3* 3.2*   CO2 36* 33* 33*   BUN 64* 46* 44*   CREATININE 1.0 0.8 0.8   CALCIUM 7.5* 7.2* 7.3*   GLUCOSE 133* 127* 138*      Recent Labs     07/09/20  1200   MG 2.80*       CBC  Recent Labs     07/09/20  1740 07/10/20  0500 07/11/20  0230 07/11/20  0550 07/11/20  0840   WBC 17.4* 16.8*  --  16.0*  --    HGB 11.4* 11.4* 11.4* 11.2* 11.2*   HCT 35.4* 35.7* 35.2* 34.9* 35.2*    164  --  165  --         Blood Gas    No results for input(s): PHVEN, ZDW3XUA, PO2VEN, XVB2PMQ in the last 72 hours.     Recent Labs     07/10/20  0615 07/10/20  1220 07/11/20  0610   PHART 7.491* 7.519* 7.501*   SBQ2GKL 49.2* 46.4* 45.4*   PO2ART 94.1 89.1 69.8*   OJI4VWN 36.7* 37.0* 34.7*         Intake/Output Summary (Last 24 hours) at 7/11/2020 0954  Last data filed at 7/11/2020 0825  Gross per 24 hour   Intake 2896 ml   Output 2225 ml   Net 671 ml       midazolam Last Rate: Stopped (07/11/20 0802)    dextrose    fentaNYL (SUBLIMAZE) infusion Last Rate: Stopped (07/11/20 0801)

## 2020-07-11 NOTE — PROGRESS NOTES
COVID + pt, prolonged course in ICU. Was on Full dose Lovenox for COVID. Now w/ 24h recurrent BRBPR, HH stable. Lovenox held, PPI ordered, HH q6, GI c/s, SCD. D/w Dr. Gabrielle Correa over phone.

## 2020-07-11 NOTE — PROGRESS NOTES
Spoke with Dr. Jacques Yang about the patients ABG. He is at bedside and examinined the patient. Discontinue all sedation drips and administered PRN medications only at as needed. Orders for heparin drip to be started this afternoon if the patient does not show any signs of bleeding. Initiate the heparin drip within out the initial bolus but nursing and pharmacy may administer any subsequent bolus's based on the patient PTT.

## 2020-07-11 NOTE — CONSULTS
Gastroenterology Consult Note    Patient:   Sharmin Lilly   YOB: 1935   Facility:   [facility]   Referring/PCP: Yue Montero MD  Date:     7/11/2020  Consultant:   Gay Yu MD    Subjective: This 82 y.o. male was admitted 6/24/2020 with a diagnosis of \"Multifocal pneumonia [J18.9]\" and is seen in consultation regarding \"hematochezia\". Information was obtained from interview of  the patient, examination of the patient, and review of records. I did  update the past medical, surgical, social and / or family history. Hematochezia for 1 day mild in GI tract assoc w anticoagulation    Current status  Present  Diet Order: DIET TUBE FEED CONTINUOUS/CYCLIC NPO; Semi-elemental (Vital AF 1.2 ); Orogastric; Continuous; 20 (initial ); 60 (goal); 20 (over 20 hours) and he is tolerating diet. Recently, he has experienced no abdominal  Pain and he has not required Intravenous narcotic analgesics. The patient has also experienced no constipation, diarrhea, melena, nausea and vomiting      Prior to Admission medications    Medication Sig Start Date End Date Taking? Authorizing Provider   vitamin B-12 (CYANOCOBALAMIN) 1000 MCG tablet Take 1,000 mcg by mouth daily. Yes Historical Provider, MD   clorazepate (TRANXENE) 7.5 MG tablet Take 3.75 mg by mouth 2 times daily. .   Yes Historical Provider, MD   rosuvastatin (CRESTOR) 10 MG tablet Take 10 mg by mouth daily. Yes Historical Provider, MD   Omega 3 1000 MG CAPS Take  by mouth. Yes Historical Provider, MD   aspirin 81 MG tablet Take 81 mg by mouth daily.    Yes Historical Provider, MD      Scheduled Medications:    potassium chloride  40 mEq Intravenous Once    [START ON 7/12/2020] dexamethasone  1 mg Intravenous Daily    acetaZOLAMIDE  500 mg Oral Daily    [START ON 7/12/2020] furosemide  40 mg Intravenous Daily    acyclovir  5 mg/kg (Adjusted) Intravenous Q8H    pantoprazole  40 mg Intravenous BID    mupirocin   Nasal BID    piperacillin-tazobactam  3.375 g Intravenous Q8H    linezolid  600 mg Intravenous Q12H    insulin lispro  0-12 Units Subcutaneous Q4H    chlorhexidine  15 mL Mouth/Throat BID    ipratropium-albuterol  1 ampule Inhalation Q4H    ketotifen  1 drop Both Eyes BID    rosuvastatin  10 mg Oral Daily    aspirin  81 mg Oral Daily    sodium chloride flush  10 mL Intravenous 2 times per day     Infusions:    midazolam Stopped (07/11/20 0802)    dextrose       PRN Medications: sennosides-docusate sodium, glucose, dextrose, glucagon (rDNA), dextrose, fentanNYL, midazolam, tetrahydrozoline, aluminum & magnesium hydroxide-simethicone, ondansetron, sodium chloride flush, acetaminophen **OR** acetaminophen  Allergies: Allergies   Allergen Reactions    Buspar [Buspirone] Other (See Comments)     Face,face numb    Codeine Other (See Comments)     Face,lips numb    Methylphenidate Other (See Comments)     Face numbness    Ritalin [Methylphenidate Hcl] Other (See Comments)     Face. lips numb    Zoloft [Sertraline Hcl] Other (See Comments)     Numbness of lips       Past Medical History:   Diagnosis Date    Arthritis     B12 deficiency 05/2014    COVID-19 06/25/2020    Hyperlipidemia      Past Surgical History:   Procedure Laterality Date    BACK SURGERY      CAROTID ENDARTERECTOMY Left     COLONOSCOPY      UPPER GASTROINTESTINAL ENDOSCOPY  07/12/14    Minimal chronic gastritis       Social:   Social History     Tobacco Use    Smoking status: Former Smoker     Packs/day: 1.00     Years: 25.00     Pack years: 25.00     Types: Cigarettes     Start date: 8/5/1990    Smokeless tobacco: Never Used   Substance Use Topics    Alcohol use: Yes     Alcohol/week: 1.0 standard drinks     Types: 1 Cans of beer per week     Comment: occasional     Family:   Family History   Problem Relation Age of Onset    Cancer Neg Hx        ROS: Pertinent items are noted in HPI.     Objective:   Vital Signs:   Temp (24hrs), Av.2 °F (37.9 °C), Min:99.7 °F (37.6 °C), Max:100.7 °F (01.1 °C)     Systolic (57KDA), TXF:862 , Min:116 , DUU:641      Diastolic (80ZRG), PIV:86, Min:48, Max:90     Pulse  Av  Min: 81  Max: 92  BP (!) 159/73   Pulse 92   Temp 100.1 °F (37.8 °C)   Resp (!) 35   Ht 5' 7\" (1.702 m)   Wt 191 lb 12.8 oz (87 kg)   SpO2 92%   BMI 30.04 kg/m²      Physical Exam:   BP (!) 159/73   Pulse 92   Temp 100.1 °F (37.8 °C)   Resp (!) 35   Ht 5' 7\" (1.702 m)   Wt 191 lb 12.8 oz (87 kg)   SpO2 92%   BMI 30.04 kg/m²   General appearance: Intubated  Lungs: clear to auscultation bilaterally  Chest wall: no tenderness w ronchi  Heart: regular rate and rhythm, S1, S2 normal, no murmur, click, rub or gallop  Abdomen: soft, non-tender; bowel sounds normal; no masses,  no organomegaly  Extremities: extremities normal, atraumatic, no cyanosis or edema  Skin: Skin color, texture, turgor normal. No rashes or lesions  Neurologic: Intubated    Lab and Imaging Review   Recent Labs     20  0520 20  1200 20  1740 07/10/20  0500 20  0230 20  0550 20  0840   WBC 19.7*  --  17.4* 16.8*  --  16.0*  --    HGB 11.2*  --  11.4* 11.4* 11.4* 11.2* 11.2*   MCV 86.9  --  87.7 87.9  --  87.0  --      --  159 164  --  165  --    INR 1.08  --   --  1.03  --  1.00  --      --   --  138  --  134*  --    K 3.8  --   --  3.3*  --  3.2*  --    CL 92*  --   --  93*  --  91*  --    CO2 36*  --   --  33*  --  33*  --    BUN 64*  --   --  46*  --  44*  --    CREATININE 1.0  --   --  0.8  --  0.8  --    GLUCOSE 133*  --   --  127*  --  138*  --    CALCIUM 7.5*  --   --  7.2*  --  7.3*  --    PROT 5.3*  --   --  4.8*  --  5.0*  --    LABALBU 2.3*  --   --  2.2*  --  2.3*  --    AST 26  --   --  40*  --  73*  --    ALT 19  --   --  23  --  43*  --    ALKPHOS 44  --   --  45  --  58  --    BILITOT 0.5  --   --  0.6  --  0.7  --    BILIDIR <0.2  --   --  <0.2  --  0.3  --    MG  -- 2.80*  --   --   --   --   --        Assessment:     Patient Active Problem List    Diagnosis Date Noted    COVID-19     Acute kidney injury ([facility]     Fever     Arm edema     Hypertriglyceridemia     Herpes zoster without complication     Asymptomatic bacteriuria     Endotracheally intubated     On mechanically assisted ventilation (HCC)     Acute [facility] failure with hypoxia [facility]     Class 1 obesity due to excess calories with body mass index (BMI) of 31.0 to 31.9 in adult     Peripherally inserted central catheter (PICC) in place     Hyperlipidemia     ARDS (adult respiratory distress syndrome) ([facility] [facility]     Pneumonia due to COVID-19 virus 06/27/2020    [facility]epsis ([facility]     Urinary tract infection without hematuria     Multifocal pneumonia 06/25/2020    Orthostasis      81 yo admitted w resp failure due to Covid-19 ARDS/pneumonia, intubated, developed hematochezia on Lovenox/Heparin. H/H is stable at 11/35. Last colonoscopy by Dr Alejandro Gottron in 2104 revealed polyps. Plan:   1. Agree w holding Heparin/Lovenox  2. May resume when bleeding stops  3. Will consider colonoscopy if necessary  4.  Will follow    Lissa Ash MD       (C) [phone]

## 2020-07-11 NOTE — PROGRESS NOTES
Internal Medicine ICU Progress Note      Interval History:     Patient admitted with COVID -19 - tested positive . Pt w/ persistent high fevers, progressive hypoxia. Initiated on Remdesivir, dexamethasone. Intubated for worsening resp failure on 20. Requiring HF O2. Had high fevers w/ T max of 104 F    20: S/P Convalescent plasma transfusion 2 units     7/3/20--> : fevers improved, persistent hypoxia  Has developed new shingles, started on acyclovir  ET tube changed on  20:  Spiking temps, up to 102.3 °F.  -Infectious disease following .  -Hypoxia slightly improved but FiO2 down from 75% to 55% today , PEEP remains at 14      7/10/20:  - pt improving now. -Temperatures are improving, low-grade fevers  - sats improved , Fi o2 down to 50 % for the last couple of days and remained stable. PEEP down to 8  -Spontaneous breathing trials initiated today and patient tolerated this well for 2 hours   -He is back on mechanical ventilation , responding a little when sedation decreased . 20  Ongoing rash involves the whole body. Palms and soles spared. Passing blood per rectum - serial H&H and GI eval ordered last night.             On droplet plus precautions    Invasive Lines: PICC placed on 2020      MV: Intubated on 2020    Recent Labs     20  0610 20  1045   PHART 7.501* 7.510*   NFV2KIR 45.4* 46.9*   PO2ART 69.8* 54.4*       MV Settings:  Vent Mode: AC/VC Rate Set: 16 bmp/Vt Ordered: 430 mL/ /FiO2 : 50 %    IV:   heparin (porcine) Stopped (20 1257)    midazolam Stopped (20 0802)    dextrose         Vitals:  Temp  Av.1 °F (37.8 °C)  Min: 99.7 °F (37.6 °C)  Max: 100.7 °F (38.2 °C)  Pulse  Av.4  Min: 81  Max: 92  BP  Min: 116/58  Max: 159/73  SpO2  Av.4 %  Min: 91 %  Max: 98 %  FiO2   Av %  Min: 50 %  Max: 50 %  Patient Vitals for the past 4 hrs:   SpO2   20 1114 91 %       CVP:        Intake/Output Summary (Last 24 hours) at 7/11/2020 1452  Last data filed at 7/11/2020 0825  Gross per 24 hour   Intake 2136 ml   Output 1675 ml   Net 461 ml       Physical Exam:  General:  Pt is intubated, on mechanical vent. Sedation decreased and he is waking up and responding some now  Skin:  Warm and dry  Neck:  JVD absent. Neck supple  Chest: Diminished breath sounds . No wheezes or rhonchi   Cardiovascular:  RRR ,S1S2 normal  Abdomen:  Soft, non tender, non distended, BS +  Extremities: edema has significantly decreased   Intact peripheral pulses.  Brisk cap refill, < 2 secs  Neuro: non focal                    Medications:  Scheduled Meds:   [START ON 7/12/2020] dexamethasone  1 mg Intravenous Daily    acetaZOLAMIDE  500 mg Oral Daily    [START ON 7/12/2020] furosemide  40 mg Intravenous Daily    acyclovir  5 mg/kg (Adjusted) Intravenous Q8H    pantoprazole  40 mg Intravenous BID    mupirocin   Nasal BID    linezolid  600 mg Intravenous Q12H    insulin lispro  0-12 Units Subcutaneous Q4H    chlorhexidine  15 mL Mouth/Throat BID    ipratropium-albuterol  1 ampule Inhalation Q4H    ketotifen  1 drop Both Eyes BID    rosuvastatin  10 mg Oral Daily    aspirin  81 mg Oral Daily    sodium chloride flush  10 mL Intravenous 2 times per day       PRN Meds:  [START ON 7/12/2020] heparin (porcine), [START ON 7/12/2020] heparin (porcine), sennosides-docusate sodium, glucose, dextrose, glucagon (rDNA), dextrose, fentanNYL, midazolam, tetrahydrozoline, aluminum & magnesium hydroxide-simethicone, ondansetron, sodium chloride flush, acetaminophen **OR** acetaminophen    Results:  CBC:   Recent Labs     07/09/20  1740 07/10/20  0500 07/11/20  0230 07/11/20  0550 07/11/20  0840   WBC 17.4* 16.8*  --  16.0*  --    HGB 11.4* 11.4* 11.4* 11.2* 11.2*   HCT 35.4* 35.7* 35.2* 34.9* 35.2*   MCV 87.7 87.9  --  87.0  --     164  --  165  --      BMP:   Recent Labs     07/09/20  0520 07/10/20  0500 07/11/20  0550    138 138 XR CHEST PORTABLE   Final Result   The endotracheal tube tip is approximately 3 cm above the thai. XR CHEST PORTABLE   Final Result   Mild increase in the amount of bibasilar opacity since yesterday. Large   bilateral pneumonias. XR CHEST PORTABLE   Final Result   Stable bilateral pulmonary opacities. XR CHEST PORTABLE   Final Result   Minimal increase in the amount of opacity in each lung consistent with slight   worsening of bilateral pneumonia. XR CHEST PORTABLE   Final Result   Supportive tubing is in normal position. Low lung volume study, with stable alveolar opacities in the mid and lower   lungs, pneumonia versus atelectasis. XR CHEST PORTABLE   Final Result   Increased in degree and extent of bilateral mid and lower lung pneumonia. The increased may be partially accentuated by low lung volumes. XR CHEST 1 VW   Final Result   Persistent bilateral airspace disease, similar to prior. IR PICC WO SQ PORT/PUMP > 5 YEARS   Final Result   Successful placement of PICC line. XR CHEST PORTABLE   Final Result   Appropriate endotracheal tube positioning. Multifocal airspace opacities and areas of atelectasis correlate with recent   CT chest.         XR ABDOMEN FOR NG/OG/NE TUBE PLACEMENT   Final Result   NG tube terminates over the stomach. CT CHEST PULMONARY EMBOLISM W CONTRAST   Final Result   1. No pulmonary embolism. 2. Multifocal pneumonia scattered throughout both lungs with minimal pleural   effusions. XR CHEST STANDARD (2 VW)   Final Result   No radiographic evidence of acute pulmonary disease. VL Extremity Venous Bilateral    (Results Pending)   XR CHEST PORTABLE    (Results Pending)          Assessment:    . Principal Problem:    Pneumonia due to COVID-19 virus  Active Problems:    Multifocal pneumonia    Orthostasis    Sepsis ([address]    Urinary tract infection without hematuria    ARDS (adult respiratory distress syndrome) (HCC)    Hyperlipidemia    Acute respiratory failure with hypoxia (HCC)    Class 1 obesity due to excess calories with body mass index (BMI) of 31.0 to 31.9 in adult    Peripherally inserted central catheter (PICC) in place    Asymptomatic bacteriuria    Endotracheally intubated    On mechanically assisted ventilation (HCC)    Herpes zoster without complication    Fever    Arm edema    Hypertriglyceridemia    COVID-19    Acute kidney injury ([address]    Bright red blood per rectum  Resolved Problems:    * No resolved hospital problems. *         Plan:    Acute hypoxic respiratory failure due to COVID-19  - intubated, on mech vent  - remains positive fluid balance - getting IV Lasix. - Pulmonologist had discussed changing pt to roto-prone bed, family wished to hold off at this time  - Reintubated on 7/6 d/t displacement of existing ETT  - 7/10 spontaneous breathing trials initiated       COVID -19 PNA  - s/p Decadron - 10 days. - Completed 10 days of Remdesivir on 7/7/2020  - S/P transfusion of 2 units of convalescent plasma on 7/2/2020      Sepsis  - POA, due to PNA, COVID 19  - with leukocytosis, fevers, tachypnea  - COVID-19 detected  -Spiking fevers again  -Antibiotics adjusted per infectious disease  Currently on acyclovir, continued on IV Zyvox and Zosyn      Multifocal pneumonia  - related to COVID-19.    - Tracheal aspirate sent. - was on  broad-spectrum antibiotics, vanc and  Cefepime--> now off . On IV Zyvox and Zosyn now    Possible Shingles  Distrubution of rash concerning for drug eruption vs leukocytoclastic vasculitis. - generalized involvement of trunk, torso, and extremities without classic vesicular lesions make shingles less likely.    - started IV Acyclovir per ID recs      Petechial Rash  - noted on arms and trunk  - given benadryl IV  - followed by ID - remains on IV acyclovir, monitor      Elevated D dimer  - due to COVID 19  - CT chest neg for PE    UTI  - cx positive for staph, treated    Hyperlipidemia  - on statin. Rectal Bleeding -   lovenox on hold. GI to eval.   Hgb stable. DIET TUBE FEED CONTINUOUS/CYCLIC NPO; Semi-elemental (Vital AF 1.2 );  Orogastric; Continuous; 20 (initial ); 60 (goal); 20 (over 20 hours)  Full Code        Jackelyn Segura MD

## 2020-07-11 NOTE — PROGRESS NOTES
Pulmonary & Critical Care Medicine ICU Progress Note      CC: Acute hypoxemic respiratory failure    Events of Last 24 hours:   PRBPR at 3 pm and 7 pm yesterday- total maybe 60 ml and Lovenox was help  Stable H&H   No active bleed this am   FiO2 50% and PEEP 5  Plateau pressure of 16  On SBT this am  Fentanyl off   Propofol off  Versed 8 mg/hr- off now    PaO2/FIO2 139        Invasive Lines: IV: PICC     MV:   Vent Mode: CPAP Rate Set: 16 bmp/Vt Ordered: 430 mL/ /FiO2 : 50 %  Recent Labs     07/10/20  1220 20  0610   PHART 7.519* 7.501*   [id number] 46.4* 45.4*   PO2ART 89.1 69.8*       IV:   midazolam 8 mg/hr (20 0130)    dextrose      fentaNYL (SUBLIMAZE) infusion 75 mcg/hr (20 0225)       Vitals:  Blood pressure 126/63, pulse 82, temperature 99.7 °F (37.6 °C), temperature source Bladder, resp. rate 19, height 5' 7\" (1.702 m), weight 191 lb 12.8 oz (87 kg), SpO2 93 %. on AC 16/430/+5 50%   Temp  Av.2 °F (37.9 °C)  Min: 99.7 °F (37.6 °C)  Max: 100.7 °F (38.2 °C)    Intake/Output Summary (Last 24 hours) at 2020 0753  Last data filed at 2020 0600  Gross per 24 hour   Intake 2866 ml   Output 2225 ml   Net 641 ml     EXAM:  General: ill appearing    Eyes: PERRL. No sclera icterus. No conjunctival injection. ENT: No discharge. Pharynx clear. Neck: Trachea midline. Normal thyroid. Resp: No accessory muscle use. Minimal crackles. No wheezing. Few rhonchi. No dullness on percussion. CV: Regular rate. Regular rhythm. No mumur or rub. No edema. + UE edema   GI: Non-tender. Non-distended. No masses. No organomegaly. Normal bowel sounds. No hernia. Skin: Warm and dry. No nodule on exposed extremities. Vesicular rash in the buttock area. Maculopapular rash anterior abdomen and bilateral extremities-new from yesterday  Lymph: No cervical LAD. No supraclavicular LAD. M/S: No cyanosis. No joint deformity. No clubbing. Neuro: Intubated and sedated. Followed commands.   Poor cough.  Psych: No agitation, no anxiety, affect is full       Scheduled Meds:   furosemide  40 mg Intravenous Daily    dexamethasone  2 mg Intravenous Daily    acyclovir  5 mg/kg (Adjusted) Intravenous Q8H    pantoprazole  40 mg Intravenous BID    mupirocin   Nasal BID    piperacillin-tazobactam  3.375 g Intravenous Q8H    linezolid  600 mg Intravenous Q12H    insulin lispro  0-12 Units Subcutaneous Q4H    chlorhexidine  15 mL Mouth/Throat BID    ipratropium-albuterol  1 ampule Inhalation Q4H    ketotifen  1 drop Both Eyes BID    rosuvastatin  10 mg Oral Daily    aspirin  81 mg Oral Daily    sodium chloride flush  10 mL Intravenous 2 times per day     PRN Meds:  sennosides-docusate sodium, glucose, dextrose, glucagon (rDNA), dextrose, fentanNYL, midazolam, tetrahydrozoline, aluminum & magnesium hydroxide-simethicone, ondansetron, sodium chloride flush, acetaminophen **OR** acetaminophen    Results:  CBC:   Recent Labs     07/09/20  1740 07/10/20  0500 07/11/20  0230 07/11/20  0550   WBC 17.4* 16.8*  --  16.0*   HGB 11.4* 11.4* 11.4* 11.2*   HCT 35.4* 35.7* 35.2* 34.9*   MCV 87.7 87.9  --  87.0    164  --  165     BMP:   Recent Labs     07/09/20  0520 07/10/20  0500 07/11/20  0550    138 134*   K 3.8 3.3* 3.2*   CL 92* 93* 91*   CO2 36* 33* 33*   BUN 64* 46* 44*   CREATININE 1.0 0.8 0.8     LIVER PROFILE:   Recent Labs     07/09/20  0520 07/10/20  0500 07/11/20  0550   AST 26 40* 73*   ALT 19 23 43*   BILIDIR <0.2 <0.2 0.3   BILITOT 0.5 0.6 0.7   ALKPHOS 44 45 58       Cultures:  6/24 Urine Staphylococcus epidermidis  6/24 COVID 19 +  6/29 Trach Asp NRF  7/2 respiratory NRF  7/3 BC NGTD  7/6 UC NGTD  7/7 Resp CX NRF   7/7 BC NGTD   7/7 UC NGTD         Films:  CXR 7/11 was reviewed by me and it showed   Bilateral ASD - somewhat worse   Satisfactory ETT position   Satisfactory CVC line position           ASSESSMENT:  · Acute hypoxemic respiratory failure  · PRBPR ? LGIB   · New fever ? PNA ? Line ? DVT   · Elevated d-dimer with UE edema- rule out DVT   · Hypertriglyceridemia- normalized   · ARDS  · New maculopapular rash abdomen and extremities-favor drug reaction  · COVID-19 viral multifocal pneumonia. Post 2 units of CCP 7/2/2020. Completed 10 days of Remdesevir  7/7/2020  · Shingles  · Frequent PVCs   · ET tube displacement with emergent reintubation 7/7/2020 and brief hypoxia down to the 70s          PLAN:  Mechanical ventilation as per my orders. The ventilator was adjusted by me at the bedside for unstable, life threatening respiratory failure  Follow ABG and chest x-ray while on the ventilator  Decrease    Off Propofol due to elevated TG  Versed drip for sedation, target RASS -2, with daily spontaneous awakening trial   D/C Fentanyl drip today   Fentanyl and Versed PRN, gtt as needed  Head of bed 30 degrees or higher at all times  Spontaneous breathing trial for possible extubation   Zyvox D#5 Zosyn D#4 and IV acyclovir per ID D#6-consider changing Zosyn to meropenem  Decadron 2 mg IV daily D#15- decrease to 1 mg   KCL 40 IV x 1   Lasix 40 IV daily  Diamox 500×1  Follow up repeat CX   UE doppler rule out DVT pending   Follow TG - normalized   Senokot-S 2 tab PO BID PRN   Echo given frequent PVCs  H&H Q 6 hrs   GI consult  Nutrition: Tube feeding at target   Blood sugar control, with goal 150-180  GI prophylaxis: Protonix twice daily  DVT prophylaxis: restart Heparin drip today   MRSA prophylaxis: Bactroban   Code status: Full code   D/W Lu, daughter [phone] was updated today by nursing staff                Total critical care time caring for this patient with life threatening, unstable organ failure, including direct patient contact, management of life support systems, review of data including imaging and labs, discussions with other team members and physicians is 38 minutes so far today, excluding procedures.

## 2020-07-11 NOTE — CONSULTS
High dose Heparin Protocol:  No bolus per physician request, Begin the infusion at 13.3mL/hr based on ABW of 74.1kg. Check an aPTT 6hrs after starting infusion. Goal aPTT range is 49-76 seconds.   Kristi Garcia PharmD 7/11/2020 10:48 AM

## 2020-07-11 NOTE — PROGRESS NOTES
07/11/20 0328   Vent Information   Vent Type 840   Vent Mode AC/VC   Vt Ordered 430 mL   Rate Set 16 bmp   Peak Flow 75 L/min   Pressure Support 0 cmH20   FiO2  50 %   SpO2 98 %   SpO2/FiO2 ratio 196   Sensitivity 2   PEEP/CPAP 5   I Time/ I Time % 0 s   Humidification Source Heated wire   Humidification Temp 37   Humidification Temp Measured 37   Circuit Condensation Drained   Vent Patient Data   High Peep/I Pressure 0   Peak Inspiratory Pressure 18 cmH2O   Mean Airway Pressure 11 cmH20   Rate Measured 30 br/min   Vt Exhaled 552 mL   Minute Volume 14.5 Liters   I:E Ratio 1:1.30   Cough/Sputum   Sputum How Obtained Suctioned;Endotracheal   Sputum Amount Moderate   Sputum Color Red;Creamy   Tenacity Thick   Spontaneous Breathing Trial (SBT) RT Doc   Pulse 84   Breath Sounds   Right Upper Lobe Rhonchi   Right Middle Lobe Diminished   Right Lower Lobe Rhonchi   Left Upper Lobe Diminished   Left Lower Lobe Diminished   Additional Respiratory  Assessments   Resp 23   Position Semi-Harris's   Alarm Settings   High Pressure Alarm 40 cmH2O   Low Minute Volume Alarm 5 L/min   High Respiratory Rate 35 br/min   ETT (adult)   Placement Date/Time: 06/29/20 0230   Preoxygenation: Yes  Mask Ventilation: Ventilated by mask (1)  Technique: Video laryngoscopy  Type: Cuffed  Tube Size: 8 mm  Laryngoscope: GlideScope  Blade Size: 4  Location: Oral  Insertion attempts: 1  Placement. ..    ET Placement Center  (from Left)

## 2020-07-11 NOTE — PROGRESS NOTES
07/10/20 2014   Vent Information   $Ventilation $Subsequent Day   Skin Assessment Clean, dry, & intact   Vent Type 840   Vent Mode AC/VC   Vt Ordered 430 mL   Rate Set 16 bmp   Peak Flow 75 L/min   Pressure Support 0 cmH20   FiO2  50 %   SpO2 93 %   SpO2/FiO2 ratio 186   Sensitivity 2   PEEP/CPAP 5   I Time/ I Time % 0 s   Humidification Source Heated wire   Humidification Temp 37   Humidification Temp Measured 37   Circuit Condensation Drained   Vent Patient Data   High Peep/I Pressure 0   Peak Inspiratory Pressure 20 cmH2O   Mean Airway Pressure 12 cmH20   Rate Measured 31 br/min   Vt Exhaled 463 mL   Minute Volume 14.2 Liters   I:E Ratio 1:1.20   Plateau Pressure 17 [id number]   Static Compliance 39 mL/cmH2O   Dynamic Compliance 31 mL/cmH2O   Cough/Sputum   Sputum How Obtained Suctioned;Endotracheal   Sputum Amount Small   Sputum Color Creamy; Tan   Tenacity Thick   Spontaneous Breathing Trial (SBT) RT Doc   Pulse 89   Breath Sounds   Right Upper Lobe Diminished   Right Middle Lobe Diminished   Right Lower Lobe Diminished   Left Upper Lobe Diminished   Left Lower Lobe Diminished   Additional Respiratory  Assessments   Resp 30   Position Semi-Harris's   Alarm Settings   High Pressure Alarm 40 cmH2O   Low Minute Volume Alarm 5 L/min   High Respiratory Rate 35 br/min   ETT (adult)   Placement Date/Time: 06/29/20 0230   Preoxygenation: Yes  Mask Ventilation: Ventilated by mask (1)  Technique: Video laryngoscopy  Type: Cuffed  Tube Size: 8 mm  Laryngoscope: GlideScope  Blade Size: 4  Location: Oral  Insertion attempts: 1  Placement. ..    Secured at 24 cm   Measured From Lips   ET Placement Left   Secured By Commercial tube leblanc   Site Condition Dry

## 2020-07-11 NOTE — PROGRESS NOTES
Shift assessment completed, see flow sheets. VSS, mechanical ventilation continues: 16/430/50%/5. RASS -1 with fentanyl infusing at 50mcg/hr, and versed at 3 mg/h. Fischer in place/patent and secured with statlock. Bilateral soft wrist restraints in place for patient safety. Patient with new blood tinge to bowel movement and sputum. MD was notified at shift change, lovenox order put on hold, and writer placed SCDs. Patient noted to intermittently go in and out of vent bigeminy rhythm. Per dayshift RN, this has been ongoing for 2+ days. Monitoring closely.

## 2020-07-12 ENCOUNTER — APPOINTMENT (OUTPATIENT)
Dept: GENERAL RADIOLOGY | Age: 85
DRG: 870 | End: 2020-07-12
Payer: MEDICARE

## 2020-07-12 LAB
ALBUMIN SERPL-MCNC: 2.7 G/DL (ref 3.4–5)
ALP BLD-CCNC: 68 U/L (ref 40–129)
ALT SERPL-CCNC: 51 U/L (ref 10–40)
ANION GAP SERPL CALCULATED.3IONS-SCNC: 15 MMOL/L (ref 3–16)
APTT: 64.3 SEC (ref 24.2–36.2)
AST SERPL-CCNC: 71 U/L (ref 15–37)
BASE EXCESS ARTERIAL: 4.2 MMOL/L (ref -3–3)
BASE EXCESS ARTERIAL: 4.9 MMOL/L (ref -3–3)
BASOPHILS ABSOLUTE: 0.2 K/UL (ref 0–0.2)
BASOPHILS RELATIVE PERCENT: 0.7 %
BILIRUB SERPL-MCNC: 0.9 MG/DL (ref 0–1)
BILIRUBIN DIRECT: 0.4 MG/DL (ref 0–0.3)
BILIRUBIN URINE: NEGATIVE
BILIRUBIN, INDIRECT: 0.5 MG/DL (ref 0–1)
BLOOD, URINE: ABNORMAL
BUN BLDV-MCNC: 40 MG/DL (ref 7–20)
CALCIUM SERPL-MCNC: 7.9 MG/DL (ref 8.3–10.6)
CARBOXYHEMOGLOBIN ARTERIAL: 0.2 % (ref 0–1.5)
CARBOXYHEMOGLOBIN ARTERIAL: 0.5 % (ref 0–1.5)
CHLORIDE BLD-SCNC: 94 MMOL/L (ref 99–110)
CLARITY: ABNORMAL
CO2: 26 MMOL/L (ref 21–32)
COLOR: YELLOW
CREAT SERPL-MCNC: 0.9 MG/DL (ref 0.8–1.3)
D DIMER: 1031 NG/ML DDU (ref 0–229)
EOSINOPHILS ABSOLUTE: 0.4 K/UL (ref 0–0.6)
EOSINOPHILS RELATIVE PERCENT: 1.7 %
GFR AFRICAN AMERICAN: >60
GFR NON-AFRICAN AMERICAN: >60
GLUCOSE BLD-MCNC: 113 MG/DL (ref 70–99)
GLUCOSE BLD-MCNC: 128 MG/DL (ref 70–99)
GLUCOSE BLD-MCNC: 130 MG/DL (ref 70–99)
GLUCOSE BLD-MCNC: 133 MG/DL (ref 70–99)
GLUCOSE BLD-MCNC: 142 MG/DL (ref 70–99)
GLUCOSE BLD-MCNC: 152 MG/DL (ref 70–99)
GLUCOSE URINE: NEGATIVE MG/DL
HCO3 ARTERIAL: 26.6 MMOL/L (ref 21–29)
HCO3 ARTERIAL: 28.5 MMOL/L (ref 21–29)
HCT VFR BLD CALC: 35 % (ref 40.5–52.5)
HCT VFR BLD CALC: 36.2 % (ref 40.5–52.5)
HCT VFR BLD CALC: 37 % (ref 40.5–52.5)
HCT VFR BLD CALC: 37 % (ref 40.5–52.5)
HEMOGLOBIN, ART, EXTENDED: 12.7 G/DL (ref 13.5–17.5)
HEMOGLOBIN, ART, EXTENDED: 13.1 G/DL (ref 13.5–17.5)
HEMOGLOBIN: 11.4 G/DL (ref 13.5–17.5)
HEMOGLOBIN: 11.7 G/DL (ref 13.5–17.5)
HEMOGLOBIN: 12 G/DL (ref 13.5–17.5)
HEMOGLOBIN: 12 G/DL (ref 13.5–17.5)
INR BLD: 1.06 (ref 0.86–1.14)
KETONES, URINE: NEGATIVE MG/DL
LEUKOCYTE ESTERASE, URINE: ABNORMAL
LYMPHOCYTES ABSOLUTE: 0.8 K/UL (ref 1–5.1)
LYMPHOCYTES RELATIVE PERCENT: 3.5 %
MCH RBC QN AUTO: 28.6 PG (ref 26–34)
MCHC RBC AUTO-ENTMCNC: 32.5 G/DL (ref 31–36)
MCV RBC AUTO: 88.1 FL (ref 80–100)
METHEMOGLOBIN ARTERIAL: 0.3 %
METHEMOGLOBIN ARTERIAL: 0.3 %
MICROSCOPIC EXAMINATION: YES
MONOCYTES ABSOLUTE: 0.7 K/UL (ref 0–1.3)
MONOCYTES RELATIVE PERCENT: 3.1 %
NEUTROPHILS ABSOLUTE: 20.8 K/UL (ref 1.7–7.7)
NEUTROPHILS RELATIVE PERCENT: 91 %
NITRITE, URINE: NEGATIVE
O2 CONTENT ARTERIAL: 17 ML/DL
O2 CONTENT ARTERIAL: 17 ML/DL
O2 SAT, ARTERIAL: 95.2 %
O2 SAT, ARTERIAL: 96.5 %
O2 THERAPY: ABNORMAL
O2 THERAPY: ABNORMAL
PCO2 ARTERIAL: 32.8 MMHG (ref 35–45)
PCO2 ARTERIAL: 38.3 MMHG (ref 35–45)
PDW BLD-RTO: 14 % (ref 12.4–15.4)
PERFORMED ON: ABNORMAL
PH ARTERIAL: 7.49 (ref 7.35–7.45)
PH ARTERIAL: 7.53 (ref 7.35–7.45)
PH UA: 8.5 (ref 5–8)
PLATELET # BLD: 203 K/UL (ref 135–450)
PMV BLD AUTO: 9.2 FL (ref 5–10.5)
PO2 ARTERIAL: 69.7 MMHG (ref 75–108)
PO2 ARTERIAL: 75.2 MMHG (ref 75–108)
POTASSIUM SERPL-SCNC: 2.7 MMOL/L (ref 3.5–5.1)
PROTEIN UA: 30 MG/DL
PROTHROMBIN TIME: 12.3 SEC (ref 10–13.2)
RBC # BLD: 4.2 M/UL (ref 4.2–5.9)
RBC UA: >100 /HPF (ref 0–4)
SODIUM BLD-SCNC: 135 MMOL/L (ref 136–145)
SPECIFIC GRAVITY UA: 1.01 (ref 1–1.03)
TCO2 ARTERIAL: 27.6 MMOL/L
TCO2 ARTERIAL: 29.6 MMOL/L
TOTAL PROTEIN: 5.7 G/DL (ref 6.4–8.2)
URINE REFLEX TO CULTURE: ABNORMAL
URINE TYPE: ABNORMAL
UROBILINOGEN, URINE: 1 E.U./DL
WBC # BLD: 22.9 K/UL (ref 4–11)
WBC UA: ABNORMAL /HPF (ref 0–5)

## 2020-07-12 PROCEDURE — 6360000002 HC RX W HCPCS: Performed by: INTERNAL MEDICINE

## 2020-07-12 PROCEDURE — 99291 CRITICAL CARE FIRST HOUR: CPT | Performed by: INTERNAL MEDICINE

## 2020-07-12 PROCEDURE — 80076 HEPATIC FUNCTION PANEL: CPT

## 2020-07-12 PROCEDURE — 81001 URINALYSIS AUTO W/SCOPE: CPT

## 2020-07-12 PROCEDURE — 87205 SMEAR GRAM STAIN: CPT

## 2020-07-12 PROCEDURE — 87070 CULTURE OTHR SPECIMN AEROBIC: CPT

## 2020-07-12 PROCEDURE — 2000000000 HC ICU R&B

## 2020-07-12 PROCEDURE — 36592 COLLECT BLOOD FROM PICC: CPT

## 2020-07-12 PROCEDURE — 85018 HEMOGLOBIN: CPT

## 2020-07-12 PROCEDURE — 2580000003 HC RX 258

## 2020-07-12 PROCEDURE — 94003 VENT MGMT INPAT SUBQ DAY: CPT

## 2020-07-12 PROCEDURE — 85014 HEMATOCRIT: CPT

## 2020-07-12 PROCEDURE — 85730 THROMBOPLASTIN TIME PARTIAL: CPT

## 2020-07-12 PROCEDURE — 6370000000 HC RX 637 (ALT 250 FOR IP): Performed by: INTERNAL MEDICINE

## 2020-07-12 PROCEDURE — C9113 INJ PANTOPRAZOLE SODIUM, VIA: HCPCS | Performed by: INTERNAL MEDICINE

## 2020-07-12 PROCEDURE — 2700000000 HC OXYGEN THERAPY PER DAY

## 2020-07-12 PROCEDURE — 2580000003 HC RX 258: Performed by: INTERNAL MEDICINE

## 2020-07-12 PROCEDURE — 36415 COLL VENOUS BLD VENIPUNCTURE: CPT

## 2020-07-12 PROCEDURE — 82803 BLOOD GASES ANY COMBINATION: CPT

## 2020-07-12 PROCEDURE — 85025 COMPLETE CBC W/AUTO DIFF WBC: CPT

## 2020-07-12 PROCEDURE — 85379 FIBRIN DEGRADATION QUANT: CPT

## 2020-07-12 PROCEDURE — 87040 BLOOD CULTURE FOR BACTERIA: CPT

## 2020-07-12 PROCEDURE — 94640 AIRWAY INHALATION TREATMENT: CPT

## 2020-07-12 PROCEDURE — 94761 N-INVAS EAR/PLS OXIMETRY MLT: CPT

## 2020-07-12 PROCEDURE — 80048 BASIC METABOLIC PNL TOTAL CA: CPT

## 2020-07-12 PROCEDURE — 85610 PROTHROMBIN TIME: CPT

## 2020-07-12 PROCEDURE — 94750 HC PULMONARY COMPLIANCE STUDY: CPT

## 2020-07-12 PROCEDURE — 71045 X-RAY EXAM CHEST 1 VIEW: CPT

## 2020-07-12 RX ORDER — FENTANYL CITRATE 50 UG/ML
50 INJECTION, SOLUTION INTRAMUSCULAR; INTRAVENOUS
Status: DISCONTINUED | OUTPATIENT
Start: 2020-07-12 | End: 2020-07-17

## 2020-07-12 RX ORDER — PROPOFOL 10 MG/ML
10 INJECTION, EMULSION INTRAVENOUS
Status: DISCONTINUED | OUTPATIENT
Start: 2020-07-12 | End: 2020-07-16

## 2020-07-12 RX ORDER — HEPARIN SODIUM 1000 [USP'U]/ML
5900 INJECTION, SOLUTION INTRAVENOUS; SUBCUTANEOUS ONCE
Status: COMPLETED | OUTPATIENT
Start: 2020-07-12 | End: 2020-07-12

## 2020-07-12 RX ORDER — HEPARIN SODIUM 1000 [USP'U]/ML
3000 INJECTION, SOLUTION INTRAVENOUS; SUBCUTANEOUS PRN
Status: DISCONTINUED | OUTPATIENT
Start: 2020-07-12 | End: 2020-07-16

## 2020-07-12 RX ORDER — HEPARIN SODIUM 1000 [USP'U]/ML
5900 INJECTION, SOLUTION INTRAVENOUS; SUBCUTANEOUS PRN
Status: DISCONTINUED | OUTPATIENT
Start: 2020-07-12 | End: 2020-07-16

## 2020-07-12 RX ORDER — POTASSIUM CHLORIDE 29.8 MG/ML
20 INJECTION INTRAVENOUS
Status: COMPLETED | OUTPATIENT
Start: 2020-07-12 | End: 2020-07-12

## 2020-07-12 RX ORDER — SODIUM CHLORIDE 9 MG/ML
INJECTION, SOLUTION INTRAVENOUS
Status: DISPENSED
Start: 2020-07-12 | End: 2020-07-12

## 2020-07-12 RX ORDER — HEPARIN SODIUM 10000 [USP'U]/100ML
13.3 INJECTION, SOLUTION INTRAVENOUS CONTINUOUS
Status: DISCONTINUED | OUTPATIENT
Start: 2020-07-12 | End: 2020-07-16

## 2020-07-12 RX ADMIN — FENTANYL CITRATE 50 MCG: 50 INJECTION INTRAMUSCULAR; INTRAVENOUS at 12:15

## 2020-07-12 RX ADMIN — MEROPENEM 1 G: 1 INJECTION, POWDER, FOR SOLUTION INTRAVENOUS at 00:37

## 2020-07-12 RX ADMIN — LINEZOLID 600 MG: 600 INJECTION, SOLUTION INTRAVENOUS at 19:48

## 2020-07-12 RX ADMIN — MUPIROCIN: 20 OINTMENT TOPICAL at 19:38

## 2020-07-12 RX ADMIN — IPRATROPIUM BROMIDE AND ALBUTEROL SULFATE 1 AMPULE: .5; 3 SOLUTION RESPIRATORY (INHALATION) at 07:42

## 2020-07-12 RX ADMIN — MIDAZOLAM HYDROCHLORIDE 2 MG: 2 INJECTION, SOLUTION INTRAMUSCULAR; INTRAVENOUS at 14:33

## 2020-07-12 RX ADMIN — MIDAZOLAM HYDROCHLORIDE 2 MG: 2 INJECTION, SOLUTION INTRAMUSCULAR; INTRAVENOUS at 12:15

## 2020-07-12 RX ADMIN — POTASSIUM BICARBONATE 40 MEQ: 782 TABLET, EFFERVESCENT ORAL at 10:51

## 2020-07-12 RX ADMIN — LINEZOLID 600 MG: 600 INJECTION, SOLUTION INTRAVENOUS at 08:38

## 2020-07-12 RX ADMIN — FUROSEMIDE 40 MG: 10 INJECTION, SOLUTION INTRAMUSCULAR; INTRAVENOUS at 14:34

## 2020-07-12 RX ADMIN — ACYCLOVIR SODIUM 365 MG: 50 INJECTION, SOLUTION INTRAVENOUS at 17:15

## 2020-07-12 RX ADMIN — ACYCLOVIR SODIUM 365 MG: 50 INJECTION, SOLUTION INTRAVENOUS at 23:46

## 2020-07-12 RX ADMIN — FENTANYL CITRATE 25 MCG: 50 INJECTION INTRAMUSCULAR; INTRAVENOUS at 08:37

## 2020-07-12 RX ADMIN — IPRATROPIUM BROMIDE AND ALBUTEROL SULFATE 1 AMPULE: .5; 3 SOLUTION RESPIRATORY (INHALATION) at 11:18

## 2020-07-12 RX ADMIN — Medication 10 ML: at 19:48

## 2020-07-12 RX ADMIN — PANTOPRAZOLE SODIUM 40 MG: 40 INJECTION, POWDER, FOR SOLUTION INTRAVENOUS at 08:37

## 2020-07-12 RX ADMIN — FENTANYL CITRATE 50 MCG: 50 INJECTION INTRAMUSCULAR; INTRAVENOUS at 20:36

## 2020-07-12 RX ADMIN — MEROPENEM 1 G: 1 INJECTION, POWDER, FOR SOLUTION INTRAVENOUS at 08:37

## 2020-07-12 RX ADMIN — ACETAMINOPHEN 650 MG: 325 TABLET ORAL at 23:32

## 2020-07-12 RX ADMIN — MIDAZOLAM HYDROCHLORIDE 2 MG: 2 INJECTION, SOLUTION INTRAMUSCULAR; INTRAVENOUS at 00:33

## 2020-07-12 RX ADMIN — MIDAZOLAM HYDROCHLORIDE 2 MG: 2 INJECTION, SOLUTION INTRAMUSCULAR; INTRAVENOUS at 05:58

## 2020-07-12 RX ADMIN — PANTOPRAZOLE SODIUM 40 MG: 40 INJECTION, POWDER, FOR SOLUTION INTRAVENOUS at 19:48

## 2020-07-12 RX ADMIN — KETOTIFEN FUMARATE 1 DROP: 0.35 SOLUTION/ DROPS OPHTHALMIC at 08:38

## 2020-07-12 RX ADMIN — Medication 10 ML: at 08:38

## 2020-07-12 RX ADMIN — KETOTIFEN FUMARATE 1 DROP: 0.35 SOLUTION/ DROPS OPHTHALMIC at 19:38

## 2020-07-12 RX ADMIN — IPRATROPIUM BROMIDE AND ALBUTEROL SULFATE 1 AMPULE: .5; 3 SOLUTION RESPIRATORY (INHALATION) at 04:15

## 2020-07-12 RX ADMIN — MIDAZOLAM HYDROCHLORIDE 2 MG: 2 INJECTION, SOLUTION INTRAMUSCULAR; INTRAVENOUS at 19:48

## 2020-07-12 RX ADMIN — FENTANYL CITRATE 50 MCG: 50 INJECTION INTRAMUSCULAR; INTRAVENOUS at 16:42

## 2020-07-12 RX ADMIN — IPRATROPIUM BROMIDE AND ALBUTEROL SULFATE 1 AMPULE: .5; 3 SOLUTION RESPIRATORY (INHALATION) at 22:48

## 2020-07-12 RX ADMIN — ASPIRIN 81 MG 81 MG: 81 TABLET ORAL at 08:39

## 2020-07-12 RX ADMIN — MIDAZOLAM HYDROCHLORIDE 2 MG: 2 INJECTION, SOLUTION INTRAMUSCULAR; INTRAVENOUS at 02:10

## 2020-07-12 RX ADMIN — MIDAZOLAM HYDROCHLORIDE 2 MG: 2 INJECTION, SOLUTION INTRAMUSCULAR; INTRAVENOUS at 04:14

## 2020-07-12 RX ADMIN — POTASSIUM CHLORIDE 20 MEQ: 400 INJECTION, SOLUTION INTRAVENOUS at 10:51

## 2020-07-12 RX ADMIN — IPRATROPIUM BROMIDE AND ALBUTEROL SULFATE 1 AMPULE: .5; 3 SOLUTION RESPIRATORY (INHALATION) at 15:06

## 2020-07-12 RX ADMIN — ROSUVASTATIN CALCIUM 10 MG: 10 TABLET, FILM COATED ORAL at 08:40

## 2020-07-12 RX ADMIN — CHLORHEXIDINE GLUCONATE 0.12% ORAL RINSE 15 ML: 1.2 LIQUID ORAL at 08:40

## 2020-07-12 RX ADMIN — MIDAZOLAM HYDROCHLORIDE 2 MG: 2 INJECTION, SOLUTION INTRAMUSCULAR; INTRAVENOUS at 21:18

## 2020-07-12 RX ADMIN — PROPOFOL 10 MCG/KG/MIN: 10 INJECTION, EMULSION INTRAVENOUS at 23:29

## 2020-07-12 RX ADMIN — MEROPENEM 1 G: 1 INJECTION, POWDER, FOR SOLUTION INTRAVENOUS at 16:43

## 2020-07-12 RX ADMIN — MIDAZOLAM HYDROCHLORIDE 2 MG: 2 INJECTION, SOLUTION INTRAMUSCULAR; INTRAVENOUS at 16:42

## 2020-07-12 RX ADMIN — DEXAMETHASONE SODIUM PHOSPHATE 1 MG: 4 INJECTION, SOLUTION INTRAMUSCULAR; INTRAVENOUS at 08:37

## 2020-07-12 RX ADMIN — HEPARIN SODIUM 5900 UNITS: 1000 INJECTION INTRAVENOUS; SUBCUTANEOUS at 10:51

## 2020-07-12 RX ADMIN — ACETAMINOPHEN 650 MG: 325 TABLET ORAL at 00:37

## 2020-07-12 RX ADMIN — FENTANYL CITRATE 50 MCG: 50 INJECTION INTRAMUSCULAR; INTRAVENOUS at 14:32

## 2020-07-12 RX ADMIN — MIDAZOLAM HYDROCHLORIDE 2 MG: 2 INJECTION, SOLUTION INTRAMUSCULAR; INTRAVENOUS at 07:22

## 2020-07-12 RX ADMIN — POTASSIUM CHLORIDE 20 MEQ: 400 INJECTION, SOLUTION INTRAVENOUS at 09:15

## 2020-07-12 RX ADMIN — HEPARIN SODIUM 13.3 ML/HR: 10000 INJECTION, SOLUTION INTRAVENOUS at 10:52

## 2020-07-12 RX ADMIN — CHLORHEXIDINE GLUCONATE 0.12% ORAL RINSE 15 ML: 1.2 LIQUID ORAL at 19:48

## 2020-07-12 RX ADMIN — MIDAZOLAM HYDROCHLORIDE 2 MG: 2 INJECTION, SOLUTION INTRAMUSCULAR; INTRAVENOUS at 22:30

## 2020-07-12 RX ADMIN — MUPIROCIN: 20 OINTMENT TOPICAL at 08:38

## 2020-07-12 RX ADMIN — IPRATROPIUM BROMIDE AND ALBUTEROL SULFATE 1 AMPULE: .5; 3 SOLUTION RESPIRATORY (INHALATION) at 19:34

## 2020-07-12 RX ADMIN — ACYCLOVIR SODIUM 365 MG: 50 INJECTION, SOLUTION INTRAVENOUS at 08:37

## 2020-07-12 RX ADMIN — FENTANYL CITRATE 25 MCG: 50 INJECTION INTRAMUSCULAR; INTRAVENOUS at 07:22

## 2020-07-12 RX ADMIN — ACYCLOVIR SODIUM 365 MG: 50 INJECTION, SOLUTION INTRAVENOUS at 00:47

## 2020-07-12 ASSESSMENT — PULMONARY FUNCTION TESTS
PIF_VALUE: 20
PIF_VALUE: 22
PIF_VALUE: 20
PIF_VALUE: 20
PIF_VALUE: 23
PIF_VALUE: 17
PIF_VALUE: 20
PIF_VALUE: 18
PIF_VALUE: 12
PIF_VALUE: 20
PIF_VALUE: 24
PIF_VALUE: 17
PIF_VALUE: 15
PIF_VALUE: 21
PIF_VALUE: 19
PIF_VALUE: 20
PIF_VALUE: 12
PIF_VALUE: 21
PIF_VALUE: 20
PIF_VALUE: 18
PIF_VALUE: 12
PIF_VALUE: 17
PIF_VALUE: 16
PIF_VALUE: 18
PIF_VALUE: 21
PIF_VALUE: 23
PIF_VALUE: 21

## 2020-07-12 ASSESSMENT — PAIN SCALES - GENERAL
PAINLEVEL_OUTOF10: 0
PAINLEVEL_OUTOF10: 7
PAINLEVEL_OUTOF10: 2
PAINLEVEL_OUTOF10: 2
PAINLEVEL_OUTOF10: 1
PAINLEVEL_OUTOF10: 0
PAINLEVEL_OUTOF10: 6
PAINLEVEL_OUTOF10: 2
PAINLEVEL_OUTOF10: 5
PAINLEVEL_OUTOF10: 8
PAINLEVEL_OUTOF10: 7
PAINLEVEL_OUTOF10: 2
PAINLEVEL_OUTOF10: 6

## 2020-07-12 NOTE — PROGRESS NOTES
Shift assessment completed, see flow sheets. Mechanical ventilation continues: 16/400/50%/5. PRN versed given at this time, patient fighting with ventilator, RR increasing to low/mid 40's. Administration immediately effective. While very weak, patient demonstrated ability to follow commands and is nodding/gesturing appropriately. Bilateral soft wrist restraints in place for patient safety. Fischer in place/patent and secured with statlock. Bilateral SCDs on/in place. Monitoring closely.

## 2020-07-12 NOTE — PROGRESS NOTES
furosemide  40 mg Intravenous Daily    meropenem  1 g Intravenous Q8H    acyclovir  5 mg/kg (Adjusted) Intravenous Q8H    pantoprazole  40 mg Intravenous BID    mupirocin   Nasal BID    linezolid  600 mg Intravenous Q12H    insulin lispro  0-12 Units Subcutaneous Q4H    chlorhexidine  15 mL Mouth/Throat BID    ipratropium-albuterol  1 ampule Inhalation Q4H    ketotifen  1 drop Both Eyes BID    rosuvastatin  10 mg Oral Daily    aspirin  81 mg Oral Daily    sodium chloride flush  10 mL Intravenous 2 times per day     PRN Meds:  sennosides-docusate sodium, glucose, dextrose, glucagon (rDNA), dextrose, fentanNYL, midazolam, tetrahydrozoline, aluminum & magnesium hydroxide-simethicone, ondansetron, sodium chloride flush, acetaminophen **OR** acetaminophen    Results:  CBC:   Recent Labs     07/10/20  0500  07/11/20  0550  07/11/20  1445 07/11/20  2025 07/12/20  0540   WBC 16.8*  --  16.0*  --   --   --  22.9*   HGB 11.4*   < > 11.2*   < > 12.0* 11.7* 12.0*   HCT 35.7*   < > 34.9*   < > 37.2* 36.9* 37.0*   MCV 87.9  --  87.0  --   --   --  88.1     --  165  --   --   --  203    < > = values in this interval not displayed. 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 LGIB-none over past 24 hours   · New fever ? PNA ? Line ?  DVT   · Elevated d-dimer with UE edema- rule out DVT   · Hypertriglyceridemia- normalized   · ARDS  · Leukocytosis - worse today  · New maculopapular rash abdomen and extremities-favor drug reaction. Zosyn stopped 7/11/2020 with improvement today   · COVID-19 viral multifocal pneumonia. Post 2 units of CCP 7/2/2020. Completed 10 days of Remdesevir  7/7/2020  · Shingles  · Frequent PVCs   · ET tube displacement with emergent reintubation 7/7/2020 and brief hypoxia down to the 70s          PLAN:  Mechanical ventilation as per my orders. The ventilator was adjusted by me at the bedside for unstable, life threatening respiratory failure  Follow ABG and chest x-ray while on the ventilator  Fentanyl and Versed PRN, gtt as needed  Head of bed 30 degrees or higher at all times  Spontaneous breathing trial for possible extubation in a.m. Zyvox D#6 Meropenem D#2 and IV acyclovir per ID D#7. Completed 4 days of Zosyn stopped due to rash   Repeat Cx   ? Remove line if not better   D/C Decadron   Electrolytes replacement   Continue Lasix 40 IV daily  UE doppler rule out DVT pending   Echo given frequent PVCs  Resume heparin drip and monitor closely H&H Q 6 hrs   GI following   Nutrition: Tube feeding at target   Blood sugar control, with goal 150-180  GI prophylaxis: Protonix twice daily  DVT prophylaxis: restart Heparin drip today   MRSA prophylaxis: Bactroban   Code status: Full code   Alex Cee today, daughter [phone], left a message. Total critical care time caring for this patient with life threatening, unstable organ failure, including direct patient contact, management of life support systems, review of data including imaging and labs, discussions with other team members and physicians is 34 minutes so far today, excluding procedures.

## 2020-07-12 NOTE — PROGRESS NOTES
07/12/20 1940   Vent Information   $Ventilation $Subsequent Day   Skin Assessment Clean, dry, & intact   Vent Type 840   Vent Mode AC/VC   Vt Ordered 400 mL   Rate Set 16 bmp   Peak Flow 75 L/min   Pressure Support 0 cmH20   FiO2  50 %   SpO2 97 %   SpO2/FiO2 ratio 194   Sensitivity 2   PEEP/CPAP 5   I Time/ I Time % 0 s   Humidification Source Heated wire   Humidification Temp 37   Humidification Temp Measured 36.8   Circuit Condensation Drained   Vent Patient Data   High Peep/I Pressure 0   Peak Inspiratory Pressure 19 cmH2O   Mean Airway Pressure 9.19 cmH20   Rate Measured 36 br/min   Vt Exhaled 475 mL   Minute Volume 16.4 Liters   I:E Ratio 1.00:1   Plateau Pressure 23 KRP21   Static Compliance 26 mL/cmH2O   Dynamic Compliance 34 mL/cmH2O   Cough/Sputum   Sputum How Obtained Suctioned;Endotracheal   Sputum Amount Small   Sputum Color Creamy   Tenacity Thick   Spontaneous Breathing Trial (SBT) RT Doc   Pulse 90   Breath Sounds   Right Upper Lobe Diminished   Right Middle Lobe Diminished   Right Lower Lobe Diminished   Left Upper Lobe Diminished   Left Lower Lobe Diminished   Additional Respiratory  Assessments   Resp (!) 32   Position Semi-Harris's   Alarm Settings   High Pressure Alarm 40 cmH2O   Low Minute Volume Alarm 5 L/min   High Respiratory Rate 40 br/min   ETT (adult)   Placement Date/Time: 06/29/20 0230   Preoxygenation: Yes  Mask Ventilation: Ventilated by mask (1)  Technique: Video laryngoscopy  Type: Cuffed  Tube Size: 8 mm  Laryngoscope: GlideScope  Blade Size: 4  Location: Oral  Insertion attempts: 1  Placement. ..    Secured at 24 cm   Measured From [address]  (from left)   Secured By Commercial tube leblanc   Site Condition Dry

## 2020-07-12 NOTE — PROGRESS NOTES
Patient's medeiros removed. New Medeiros catheter placed and sterile urine sample obtained and sent to lab. Medeiros inserted under sterile technique. Patient tolerated well.  Rena Branham

## 2020-07-12 NOTE — PROGRESS NOTES
APTT (16:50) = 64.3. Therapeutic. Continue Heparin infusion at 13.3 ml/hr. Next APTT scheduled for 7/12, 23:00.   Francia Treadwell R.Ph.7/12/20205:41 PM

## 2020-07-12 NOTE — CONSULTS
High dose Heparin GTT protocol:  Give a 5,900 unit IV bolus dose of heparin if OK with Pulmonary and begin the infusion at 13.3mL/hr. Dosing based on ABW of 74.1kg. Check an aPTT 6 hrs after starting infusion. Therapeutic aPTT range is 49-76 seconds. Belle Mcfarlane PharmD 7/12/2020 9:48 AM    7/13  aPTT = 59.4 sec at 2330. Continue heparin gtt at 13.3 mL/hr. Check aPTT daily starting 7/13.   Nneka Mckeon, PharmD  7/13/2020 12:55 AM

## 2020-07-12 NOTE — PROGRESS NOTES
Caitie Samano is a 82 y.o. male patient.     Current Facility-Administered Medications   Medication Dose Route Frequency Provider Last Rate Last Dose    sodium chloride 0.9 % infusion             heparin 25,000 units in dextrose 5% 250 mL infusion  13.3 mL/hr Intravenous Continuous Danna Bolton MD 13.3 mL/hr at 07/12/20 1052 13.3 mL/hr at 07/12/20 1052    heparin (porcine) injection 3,000 Units  3,000 Units Intravenous PRN Danna Bolton MD        heparin (porcine) injection 5,900 Units  5,900 Units Intravenous PRN Danna Bolton MD        fentaNYL (SUBLIMAZE) injection 50 mcg  50 mcg Intravenous Q1H PRN Danna Bolton MD        furosemide (LASIX) injection 40 mg  40 mg Intravenous Daily Danna Bolton MD        meropenem (MERREM) 1 g in sodium chloride 0.9 % 100 mL IVPB (mini-bag)  1 g Intravenous Elías Brannon MD   Stopped at 07/12/20 0945    sennosides-docusate sodium (SENOKOT-S) 8.6-50 MG tablet 2 tablet  2 tablet Oral BID PRN Danna Bolton MD        acyclovir (ZOVIRAX) 365 mg in dextrose 5 % 100 mL IVPB  5 mg/kg (Adjusted) Intravenous Q8H Joselito Celeste MD   Stopped at 07/12/20 0952    pantoprazole (PROTONIX) injection 40 mg  40 mg Intravenous BID Nancy Rose MD   40 mg at 07/12/20 0837    mupirocin (BACTROBAN) 2 % ointment   Nasal BID Danna Bolton MD        linezolid (ZYVOX) IVPB 600 mg  600 mg Intravenous Q12H Danna Bolton MD   Stopped at 07/12/20 0952    insulin lispro (HUMALOG) injection vial 0-12 Units  0-12 Units Subcutaneous Q4H Regino Vick MD   Stopped at 07/12/20 0902    glucose (GLUTOSE) 40 % oral gel 15 g  15 g Oral PRN Regino Vick MD        dextrose 50 % IV solution  12.5 g Intravenous PRN Regino Vick MD        glucagon (rDNA) injection 1 mg  1 mg Intramuscular PRN Regino Vick MD        dextrose 5 % solution  100 mL/hr Intravenous PRN Regino Vick MD        chlorhexidine (PERIDEX) 0.12 % solution 15 mL  15 mL Mouth/Throat BID Nancy Rose MD   15 mL at 07/12/20 syndrome) ([facility] [address]    Hyperlipidemia    Acute respiratory failure with hypoxia (HCC)    Class 1 obesity due to excess calories with body mass index (BMI) of 31.0 to 31.9 in adult    Peripherally inserted central catheter (PICC) in place    Asymptomatic bacteriuria    Endotracheally intubated    On mechanically assisted ventilation (HCC)    Herpes zoster without complication    Fever    Arm edema    Hypertriglyceridemia    COVID-19    Acute kidney injury ([facility] [address]    Bright red blood per rectum  Resolved Problems:    * No resolved hospital problems. *    Blood pressure (!) 151/67, pulse 88, temperature 100 °F (37.8 °C), resp. rate (!) 36, height 5' 7\" (1.702 m), weight 191 lb 12.8 oz (87 kg), SpO2 94 %. Subjective:  Symptoms:  Stable. Pain:  He reports no pain. Objective:  General Appearance: In no acute distress and not in pain. Vital signs: (most recent): Blood pressure (!) 151/67, pulse 88, temperature 100 °F (37.8 °C), resp. rate (!) 36, height 5' 7\" (1.702 m), weight 191 lb 12.8 oz (87 kg), SpO2 94 %. Abdomen: Abdomen is soft. Bowel sounds are normal.   There is no abdominal tenderness. Assessment & Plan  81 yo admitted w resp failure due to Covid-19 ARDS/pneumonia, intubated, developed hematochezia on Lovenox/Heparin. H/H is stable at 11/35. Last colonoscopy by Dr Socrates So in 2104 revealed polyps.     Plan:   1. Agree w holding Heparin/Lovenox  2. May resume if necessary when bleeding has stopped for 48 hrs  3. Will consider colonoscopy when becomes necessary  4.  Will follow     Tonio Sexton MD       (O) [phone]    Tonio Sexton MD  7/12/2020

## 2020-07-12 NOTE — PROGRESS NOTES
Prn versed given at this time. Patient again fighting ventilator, respirations into 40's and patient moving legs back and forth in panicked manor. Versed administration very effective.

## 2020-07-12 NOTE — PROGRESS NOTES
07/11/20 2333   Vent Information   Vent Type 840   Vent Mode AC/VC   Vt Ordered 400 mL   Rate Set 16 bmp   Peak Flow 75 L/min   Pressure Support 0 cmH20   FiO2  50 %   SpO2 98 %   SpO2/FiO2 ratio 196   Sensitivity 2   PEEP/CPAP 5   I Time/ I Time % 0 s   Humidification Source Heated wire   Humidification Temp 37   Humidification Temp Measured 37   Circuit Condensation Drained   Vent Patient Data   High Peep/I Pressure 0   Peak Inspiratory Pressure 16 cmH2O   Mean Airway Pressure 7.8 cmH20   Rate Measured 34 br/min   Vt Exhaled 382 mL   Minute Volume 14.3 Liters   I:E Ratio 1.30:1   Cough/Sputum   Sputum How Obtained Suctioned;Endotracheal   Sputum Amount Small   Sputum Color Creamy   Tenacity Thick   Spontaneous Breathing Trial (SBT) RT Doc   Pulse 88   Breath Sounds   Right Upper Lobe Diminished   Right Middle Lobe Diminished   Right Lower Lobe Diminished   Left Upper Lobe Diminished   Left Lower Lobe Diminished   Additional Respiratory  Assessments   Resp (!) 36   Position Semi-Harris's   Alarm Settings   High Pressure Alarm 40 cmH2O   Low Minute Volume Alarm 5 L/min   High Respiratory Rate 40 br/min   ETT (adult)   Placement Date/Time: 06/29/20 0230   Preoxygenation: Yes  Mask Ventilation: Ventilated by mask (1)  Technique: Video laryngoscopy  Type: Cuffed  Tube Size: 8 mm  Laryngoscope: GlideScope  Blade Size: 4  Location: Oral  Insertion attempts: 1  Placement. ..    ET Placement Left

## 2020-07-12 NOTE — PROGRESS NOTES
07/12/20 0423   Vent Information   Vent Type 840   Vent Mode AC/VC   Vt Ordered 400 mL   Rate Set 16 bmp   Peak Flow 75 L/min   Pressure Support 0 cmH20   FiO2  50 %   SpO2 97 %   SpO2/FiO2 ratio 194   Sensitivity 2   PEEP/CPAP 5   I Time/ I Time % 0 s   Humidification Source Heated wire   Humidification Temp 37   Humidification Temp Measured 37.2   Circuit Condensation Drained   Vent Patient Data   High Peep/I Pressure 0   Peak Inspiratory Pressure 23 cmH2O   Mean Airway Pressure 12 cmH20   Rate Measured 37 br/min   Vt Exhaled 378 mL   Minute Volume 15.7 Liters   I:E Ratio 1.40:1   Cough/Sputum   Sputum How Obtained Suctioned;Endotracheal   Sputum Amount Small   Sputum Color Creamy   Tenacity Thick   Spontaneous Breathing Trial (SBT) RT Doc   Pulse 89   Breath Sounds   Right Upper Lobe Diminished   Right Middle Lobe Diminished   Right Lower Lobe Diminished   Left Upper Lobe Diminished   Left Lower Lobe Diminished   Additional Respiratory  Assessments   Resp (!) 33   Position Semi-Harris's   Alarm Settings   High Pressure Alarm 40 cmH2O   Low Minute Volume Alarm 5 L/min   High Respiratory Rate 40 br/min

## 2020-07-12 NOTE — PROGRESS NOTES
Internal Medicine ICU Progress Note      Interval History:     Patient admitted with COVID -19 - tested positive . Pt w/ persistent high fevers, progressive hypoxia. Initiated on Remdesivir, dexamethasone. Intubated for worsening resp failure on 20. Requiring HF O2. Had high fevers w/ T max of 104 F    20: S/P Convalescent plasma transfusion 2 units     7/3/20--> : fevers improved, persistent hypoxia  Has developed new shingles, started on acyclovir  ET tube changed on  20:  Spiking temps, up to 102.3 °F.  -Infectious disease following .  -Hypoxia slightly improved but FiO2 down from 75% to 55% today , PEEP remains at 14      7/10/20:  - pt improving now. -Temperatures are improving, low-grade fevers  - sats improved , Fi o2 down to 50 % for the last couple of days and remained stable. PEEP down to 8  -Spontaneous breathing trials initiated today and patient tolerated this well for 2 hours   -He is back on mechanical ventilation , responding a little when sedation decreased . 20  Ongoing rash involves the whole body. Palms and soles spared. Passing blood per rectum - serial H&H and GI eval ordered last night. 2020  Rash improved today. Pt is off sedation - more awake, follows commands. Denies pain.    Febrile last night Tmax 100.9    No signs of recurrent bleeding - he was resumed on heparin infusion this am.       On droplet plus precautions    Invasive Lines: PICC placed on 2020      MV: Intubated on 2020    Recent Labs     20  0550 20  1055   PHART 7.489* 7.527*   [id number] 38.3 32.8*   PO2ART 69.7* 75.2       MV Settings:  Vent Mode: AC/VC Rate Set: 16 bmp/Vt Ordered: 400 mL/ /FiO2 : 50 %    IV:   heparin (porcine) 13.3 mL/hr (20 1052)    dextrose         Vitals:  Temp  Av.2 °F (37.9 °C)  Min: 100 °F (37.8 °C)  Max: 100.9 °F (38.3 °C)  Pulse  Av.4  Min: 86  Max: 99  BP  Min: 130/59  Max: 37.0* 36.2*   MCV 87.9  --  87.0  --   --  88.1  --      --  165  --   --  203  --     < > = values in this interval not displayed. BMP:   Recent Labs     07/10/20  0500 07/11/20  0550 07/12/20  0540    134* 135*   K 3.3* 3.2* 2.7*   CL 93* 91* 94*   CO2 33* 33* 26   BUN 46* 44* 40*   CREATININE 0.8 0.8 0.9     LIVER PROFILE:   Recent Labs     07/10/20  0500 07/11/20  0550 07/12/20  0540   AST 40* 73* 71*   ALT 23 43* 51*   BILIDIR <0.2 0.3 0.4*   BILITOT 0.6 0.7 0.9   ALKPHOS 45 58 68     PT/INR:   Recent Labs     07/10/20  0500 07/11/20  0550 07/12/20  0540   PROTIME 12.0 11.6 12.3   INR 1.03 1.00 1.06     Results for LORINEWTON HUMPHREY T (MRN [id number]) as of 7/1/2020 09:46   Ref. Range 6/24/2020 21:35 6/26/2020 15:24 6/30/2020 04:20   D-Dimer, Quant Latest Ref Range: 0 - 229 ng/mL  (H) 682 (H) 653 (H)     Results for Shara Figueroa T (MRN [id number]) as of 7/1/2020 09:46   Ref. Range 6/24/2020 21:35   Ferritin Latest Ref Range: 30.0 - 400.0 ng/mL 97.3       Cultures:  Results for GOGO Mario Leathas (MRN [id number]) as of 6/30/2020 09:20   Ref. Range 6/24/2020 22:00   SARS-CoV-2, PCR Latest Ref Range: Not Detected  DETECTED (A)     Susceptibility     Staphylococcus epidermidis (1)     Antibiotic Interpretation TARA Status    ciprofloxacin Sensitive <=0.5 mcg/mL     nitrofurantoin Sensitive <=16 mcg/mL     oxacillin Sensitive <=0.25 mcg/mL     tetracycline Sensitive <=1 mcg/mL     trimethoprim-sulfamethoxazole Sensitive <=10 mcg/mL           Films:    XR CHEST PORTABLE   Preliminary Result   1. Stable chest with bilateral lung infiltrates. Findings are concerning for   pulmonary edema versus pneumonia. XR CHEST PORTABLE   Final Result   Patchy bilateral airspace disease, slightly increased as compared to prior. XR CHEST PORTABLE   Final Result   Improving bilateral pneumonia versus edema.          XR CHEST 1 VW   Final Result   Increasing patchy opacities throughout both lungs, Final Result   No radiographic evidence of acute pulmonary disease. VL Extremity Venous Bilateral    (Results Pending)   XR CHEST PORTABLE    (Results Pending)          Assessment:    . Principal Problem:    Pneumonia due to COVID-19 virus  Active Problems:    Multifocal pneumonia    Orthostasis    Sepsis ([address]    Urinary tract infection without hematuria    ARDS (adult respiratory distress syndrome) ([facility])    Hyperlipidemia    Acute respiratory failure with hypoxia ([facility])    Class 1 obesity due to excess calories with body mass index (BMI) of 31.0 to 31.9 in adult    Peripherally inserted central catheter (PICC) in place    Asymptomatic bacteriuria    Endotracheally intubated    On mechanically assisted ventilation ([facility])    Herpes zoster without complication    Fever    Arm edema    Hypertriglyceridemia    COVID-19    Acute kidney injury ([facility] [address]    Bright red blood per rectum  Resolved Problems:    * No resolved hospital problems. *         Plan:    Acute hypoxic respiratory failure due to COVID-19  - intubated, on mech vent  - remains positive fluid balance - getting IV Lasix. - Pulmonologist had discussed changing pt to roto-prone bed, family wished to hold off at this time  - Reintubated on 7/6 d/t displacement of existing ETT  - 7/10 spontaneous breathing trials initiated       COVID -19 PNA  - s/p Decadron - 10 days. - Completed 10 days of Remdesivir on 7/7/2020  - S/P transfusion of 2 units of convalescent plasma on 7/2/2020      Sepsis  - POA, due to PNA, COVID 19  - with leukocytosis, fevers, tachypnea  - COVID-19 detected  -Spiking fevers again  -Antibiotics adjusted per infectious disease  Currently on acyclovir, continued on IV Zyvox and Zosyn      Multifocal pneumonia  - related to COVID-19.    - Tracheal aspirate sent.     - was on  broad-spectrum antibiotics, vanc and  Cefepime--> now off .    - On IV Zyvox and Zosyn now      Possible Shingles  Distrubution of rash concerning for drug eruption vs leukocytoclastic vasculitis. - generalized involvement of trunk, torso, and extremities without classic vesicular lesions and bilateral distribution make shingles less likely. - started IV Acyclovir per ID recs      Petechial Rash  - noted on arms and trunk  - given benadryl IV  - followed by ID - remains on IV acyclovir, monitor      Elevated D dimer  - due to COVID 19  - CT chest neg for PE    UTI  - cx positive for staph, treated    Hyperlipidemia  - on statin. Rectal Bleeding -   lovenox on hold. GI to eval.   Hgb stable. Resumed on heparin gtt am of 7/12        DIET TUBE FEED CONTINUOUS/CYCLIC NPO; Semi-elemental (Vital AF 1.2 );  Orogastric; Continuous; 20 (initial ); 60 (goal); 20 (over 20 hours)  Full Code        Parker Whitley MD

## 2020-07-13 ENCOUNTER — APPOINTMENT (OUTPATIENT)
Dept: GENERAL RADIOLOGY | Age: 85
DRG: 870 | End: 2020-07-13
Payer: MEDICARE

## 2020-07-13 ENCOUNTER — APPOINTMENT (OUTPATIENT)
Dept: VASCULAR LAB | Age: 85
DRG: 870 | End: 2020-07-13
Payer: MEDICARE

## 2020-07-13 LAB
ALBUMIN SERPL-MCNC: 2.3 G/DL (ref 3.4–5)
ALP BLD-CCNC: 94 U/L (ref 40–129)
ALT SERPL-CCNC: 65 U/L (ref 10–40)
ANION GAP SERPL CALCULATED.3IONS-SCNC: 9 MMOL/L (ref 3–16)
APTT: 50.9 SEC (ref 24.2–36.2)
APTT: 59.4 SEC (ref 24.2–36.2)
AST SERPL-CCNC: 99 U/L (ref 15–37)
BASE EXCESS ARTERIAL: 1.9 MMOL/L (ref -3–3)
BILIRUB SERPL-MCNC: 0.6 MG/DL (ref 0–1)
BILIRUBIN DIRECT: 0.3 MG/DL (ref 0–0.3)
BILIRUBIN, INDIRECT: 0.3 MG/DL (ref 0–1)
BUN BLDV-MCNC: 36 MG/DL (ref 7–20)
CALCIUM SERPL-MCNC: 7.4 MG/DL (ref 8.3–10.6)
CARBOXYHEMOGLOBIN ARTERIAL: 0.1 % (ref 0–1.5)
CHLORIDE BLD-SCNC: 99 MMOL/L (ref 99–110)
CO2: 27 MMOL/L (ref 21–32)
CREAT SERPL-MCNC: 0.9 MG/DL (ref 0.8–1.3)
D DIMER: 1028 NG/ML DDU (ref 0–229)
GFR AFRICAN AMERICAN: >60
GFR NON-AFRICAN AMERICAN: >60
GLUCOSE BLD-MCNC: 101 MG/DL (ref 70–99)
GLUCOSE BLD-MCNC: 109 MG/DL (ref 70–99)
GLUCOSE BLD-MCNC: 112 MG/DL (ref 70–99)
GLUCOSE BLD-MCNC: 117 MG/DL (ref 70–99)
GLUCOSE BLD-MCNC: 166 MG/DL (ref 70–99)
GLUCOSE BLD-MCNC: 95 MG/DL (ref 70–99)
HCO3 ARTERIAL: 24.5 MMOL/L (ref 21–29)
HCT VFR BLD CALC: 32.1 % (ref 40.5–52.5)
HCT VFR BLD CALC: 34.4 % (ref 40.5–52.5)
HCT VFR BLD CALC: 35.3 % (ref 40.5–52.5)
HEMOGLOBIN, ART, EXTENDED: 12.6 G/DL (ref 13.5–17.5)
HEMOGLOBIN: 10.6 G/DL (ref 13.5–17.5)
HEMOGLOBIN: 11.2 G/DL (ref 13.5–17.5)
HEMOGLOBIN: 11.6 G/DL (ref 13.5–17.5)
INR BLD: 1.07 (ref 0.86–1.14)
MCH RBC QN AUTO: 28.7 PG (ref 26–34)
MCHC RBC AUTO-ENTMCNC: 32.7 G/DL (ref 31–36)
MCV RBC AUTO: 87.7 FL (ref 80–100)
METHEMOGLOBIN ARTERIAL: 0.3 %
O2 CONTENT ARTERIAL: 17 ML/DL
O2 SAT, ARTERIAL: 96.2 %
O2 THERAPY: ABNORMAL
PCO2 ARTERIAL: 32.2 MMHG (ref 35–45)
PDW BLD-RTO: 14.4 % (ref 12.4–15.4)
PERFORMED ON: ABNORMAL
PERFORMED ON: NORMAL
PH ARTERIAL: 7.5 (ref 7.35–7.45)
PLATELET # BLD: 219 K/UL (ref 135–450)
PMV BLD AUTO: 9.5 FL (ref 5–10.5)
PO2 ARTERIAL: 74.5 MMHG (ref 75–108)
POTASSIUM SERPL-SCNC: 3.3 MMOL/L (ref 3.5–5.1)
PROCALCITONIN: 0.24 NG/ML (ref 0–0.15)
PROTHROMBIN TIME: 12.4 SEC (ref 10–13.2)
RBC # BLD: 4.03 M/UL (ref 4.2–5.9)
SODIUM BLD-SCNC: 135 MMOL/L (ref 136–145)
TCO2 ARTERIAL: 25.5 MMOL/L
TOTAL PROTEIN: 5.2 G/DL (ref 6.4–8.2)
WBC # BLD: 20.4 K/UL (ref 4–11)

## 2020-07-13 PROCEDURE — 84145 PROCALCITONIN (PCT): CPT

## 2020-07-13 PROCEDURE — 6360000002 HC RX W HCPCS: Performed by: INTERNAL MEDICINE

## 2020-07-13 PROCEDURE — 94003 VENT MGMT INPAT SUBQ DAY: CPT

## 2020-07-13 PROCEDURE — 93970 EXTREMITY STUDY: CPT

## 2020-07-13 PROCEDURE — 2580000003 HC RX 258: Performed by: INTERNAL MEDICINE

## 2020-07-13 PROCEDURE — 94750 HC PULMONARY COMPLIANCE STUDY: CPT

## 2020-07-13 PROCEDURE — 99233 SBSQ HOSP IP/OBS HIGH 50: CPT | Performed by: INTERNAL MEDICINE

## 2020-07-13 PROCEDURE — 6370000000 HC RX 637 (ALT 250 FOR IP): Performed by: INTERNAL MEDICINE

## 2020-07-13 PROCEDURE — 85018 HEMOGLOBIN: CPT

## 2020-07-13 PROCEDURE — 2000000000 HC ICU R&B

## 2020-07-13 PROCEDURE — 85610 PROTHROMBIN TIME: CPT

## 2020-07-13 PROCEDURE — 85027 COMPLETE CBC AUTOMATED: CPT

## 2020-07-13 PROCEDURE — 85014 HEMATOCRIT: CPT

## 2020-07-13 PROCEDURE — 82803 BLOOD GASES ANY COMBINATION: CPT

## 2020-07-13 PROCEDURE — C9113 INJ PANTOPRAZOLE SODIUM, VIA: HCPCS | Performed by: INTERNAL MEDICINE

## 2020-07-13 PROCEDURE — 80076 HEPATIC FUNCTION PANEL: CPT

## 2020-07-13 PROCEDURE — 94761 N-INVAS EAR/PLS OXIMETRY MLT: CPT

## 2020-07-13 PROCEDURE — 85730 THROMBOPLASTIN TIME PARTIAL: CPT

## 2020-07-13 PROCEDURE — 85379 FIBRIN DEGRADATION QUANT: CPT

## 2020-07-13 PROCEDURE — 2700000000 HC OXYGEN THERAPY PER DAY

## 2020-07-13 PROCEDURE — 71045 X-RAY EXAM CHEST 1 VIEW: CPT

## 2020-07-13 PROCEDURE — 99291 CRITICAL CARE FIRST HOUR: CPT | Performed by: INTERNAL MEDICINE

## 2020-07-13 PROCEDURE — 36592 COLLECT BLOOD FROM PICC: CPT

## 2020-07-13 PROCEDURE — 94640 AIRWAY INHALATION TREATMENT: CPT

## 2020-07-13 PROCEDURE — 80048 BASIC METABOLIC PNL TOTAL CA: CPT

## 2020-07-13 RX ADMIN — KETOTIFEN FUMARATE 1 DROP: 0.35 SOLUTION/ DROPS OPHTHALMIC at 19:49

## 2020-07-13 RX ADMIN — ACYCLOVIR SODIUM 365 MG: 50 INJECTION, SOLUTION INTRAVENOUS at 09:16

## 2020-07-13 RX ADMIN — IPRATROPIUM BROMIDE AND ALBUTEROL SULFATE 1 AMPULE: .5; 3 SOLUTION RESPIRATORY (INHALATION) at 10:33

## 2020-07-13 RX ADMIN — Medication 10 ML: at 19:49

## 2020-07-13 RX ADMIN — PROPOFOL 25 MCG/KG/MIN: 10 INJECTION, EMULSION INTRAVENOUS at 17:42

## 2020-07-13 RX ADMIN — KETOTIFEN FUMARATE 1 DROP: 0.35 SOLUTION/ DROPS OPHTHALMIC at 09:15

## 2020-07-13 RX ADMIN — MUPIROCIN: 20 OINTMENT TOPICAL at 09:15

## 2020-07-13 RX ADMIN — IPRATROPIUM BROMIDE AND ALBUTEROL SULFATE 1 AMPULE: .5; 3 SOLUTION RESPIRATORY (INHALATION) at 08:09

## 2020-07-13 RX ADMIN — PROPOFOL 25 MCG/KG/MIN: 10 INJECTION, EMULSION INTRAVENOUS at 23:50

## 2020-07-13 RX ADMIN — MEROPENEM 1 G: 1 INJECTION, POWDER, FOR SOLUTION INTRAVENOUS at 17:42

## 2020-07-13 RX ADMIN — ROSUVASTATIN CALCIUM 10 MG: 10 TABLET, FILM COATED ORAL at 09:15

## 2020-07-13 RX ADMIN — Medication 10 ML: at 09:19

## 2020-07-13 RX ADMIN — ACYCLOVIR SODIUM 365 MG: 50 INJECTION, SOLUTION INTRAVENOUS at 17:42

## 2020-07-13 RX ADMIN — IPRATROPIUM BROMIDE AND ALBUTEROL SULFATE 1 AMPULE: .5; 3 SOLUTION RESPIRATORY (INHALATION) at 15:03

## 2020-07-13 RX ADMIN — IPRATROPIUM BROMIDE AND ALBUTEROL SULFATE 1 AMPULE: .5; 3 SOLUTION RESPIRATORY (INHALATION) at 04:19

## 2020-07-13 RX ADMIN — ASPIRIN 81 MG 81 MG: 81 TABLET ORAL at 09:15

## 2020-07-13 RX ADMIN — MUPIROCIN: 20 OINTMENT TOPICAL at 19:49

## 2020-07-13 RX ADMIN — CHLORHEXIDINE GLUCONATE 0.12% ORAL RINSE 15 ML: 1.2 LIQUID ORAL at 19:55

## 2020-07-13 RX ADMIN — CHLORHEXIDINE GLUCONATE 0.12% ORAL RINSE 15 ML: 1.2 LIQUID ORAL at 09:16

## 2020-07-13 RX ADMIN — IPRATROPIUM BROMIDE AND ALBUTEROL SULFATE 1 AMPULE: .5; 3 SOLUTION RESPIRATORY (INHALATION) at 19:02

## 2020-07-13 RX ADMIN — POTASSIUM BICARBONATE 40 MEQ: 782 TABLET, EFFERVESCENT ORAL at 15:09

## 2020-07-13 RX ADMIN — MEROPENEM 1 G: 1 INJECTION, POWDER, FOR SOLUTION INTRAVENOUS at 09:16

## 2020-07-13 RX ADMIN — FUROSEMIDE 40 MG: 10 INJECTION, SOLUTION INTRAMUSCULAR; INTRAVENOUS at 09:16

## 2020-07-13 RX ADMIN — PANTOPRAZOLE SODIUM 40 MG: 40 INJECTION, POWDER, FOR SOLUTION INTRAVENOUS at 09:16

## 2020-07-13 RX ADMIN — LINEZOLID 600 MG: 600 INJECTION, SOLUTION INTRAVENOUS at 19:59

## 2020-07-13 RX ADMIN — PANTOPRAZOLE SODIUM 40 MG: 40 INJECTION, POWDER, FOR SOLUTION INTRAVENOUS at 19:55

## 2020-07-13 RX ADMIN — MEROPENEM 1 G: 1 INJECTION, POWDER, FOR SOLUTION INTRAVENOUS at 01:41

## 2020-07-13 RX ADMIN — HEPARIN SODIUM 13.3 ML/HR: 10000 INJECTION, SOLUTION INTRAVENOUS at 05:41

## 2020-07-13 RX ADMIN — HEPARIN SODIUM 13.3 ML/HR: 10000 INJECTION, SOLUTION INTRAVENOUS at 23:34

## 2020-07-13 RX ADMIN — MEROPENEM 1 G: 1 INJECTION, POWDER, FOR SOLUTION INTRAVENOUS at 23:51

## 2020-07-13 RX ADMIN — IPRATROPIUM BROMIDE AND ALBUTEROL SULFATE 1 AMPULE: .5; 3 SOLUTION RESPIRATORY (INHALATION) at 22:52

## 2020-07-13 RX ADMIN — LINEZOLID 600 MG: 600 INJECTION, SOLUTION INTRAVENOUS at 09:16

## 2020-07-13 RX ADMIN — PROPOFOL 20 MCG/KG/MIN: 10 INJECTION, EMULSION INTRAVENOUS at 05:12

## 2020-07-13 ASSESSMENT — PULMONARY FUNCTION TESTS
PIF_VALUE: 17
PIF_VALUE: 18
PIF_VALUE: 13
PIF_VALUE: 14
PIF_VALUE: 13
PIF_VALUE: 16
PIF_VALUE: 17
PIF_VALUE: 20
PIF_VALUE: 17
PIF_VALUE: 19
PIF_VALUE: 18
PIF_VALUE: 28
PIF_VALUE: 19
PIF_VALUE: 19
PIF_VALUE: 21
PIF_VALUE: 17
PIF_VALUE: 14
PIF_VALUE: 19
PIF_VALUE: 17
PIF_VALUE: 17
PIF_VALUE: 19
PIF_VALUE: 18
PIF_VALUE: 18
PIF_VALUE: 16
PIF_VALUE: 18
PIF_VALUE: 19
PIF_VALUE: 29
PIF_VALUE: 15
PIF_VALUE: 18

## 2020-07-13 ASSESSMENT — PAIN SCALES - GENERAL
PAINLEVEL_OUTOF10: 6
PAINLEVEL_OUTOF10: 0
PAINLEVEL_OUTOF10: 0

## 2020-07-13 NOTE — PROGRESS NOTES
High dose Heparin Drip  Ptt= 50.9 secs, goal 49-76 secs  Continue with 13.3ml/hr.  Next Ptt in am and daily  Eden Lever D 7/13/202012:25 PM  .

## 2020-07-13 NOTE — PROGRESS NOTES
Internal Medicine ICU Progress Note      Interval History:     Patient admitted with COVID -19 - tested positive . Pt w/ persistent high fevers, progressive hypoxia. Initiated on Remdesivir, dexamethasone. Intubated for worsening resp failure on 20. Requiring HF O2. Had high fevers w/ T max of 104 F    20: S/P Convalescent plasma transfusion 2 units     7/3/20--> : fevers improved, persistent hypoxia  Has developed new shingles, started on acyclovir  ET tube changed on  20:  Spiking temps, up to 102.3 °F.  -Infectious disease following .  -Hypoxia slightly improved but FiO2 down from 75% to 55% today , PEEP remains at 14      7/10/20:  - pt improving now. -Temperatures are improving, low-grade fevers  - sats improved , Fi o2 down to 50 % for the last couple of days and remained stable. PEEP down to 8  -Spontaneous breathing trials initiated today and patient tolerated this well for 2 hours   -He is back on mechanical ventilation , responding a little when sedation decreased . 20  Ongoing rash involves the whole body. Palms and soles spared. Passing blood per rectum - serial H&H and GI eval ordered last night. 2020  Rash improved today. Pt is off sedation - more awake, follows commands. Denies pain.    Febrile last night Tmax 100.9    No signs of recurrent bleeding - he was resumed on heparin infusion this am.       - ongoing weaning trials  arousable on vent, following some commands      On droplet plus precautions    Invasive Lines: PICC placed on 2020      MV: Intubated on 2020    Recent Labs     20  1055 20  0615   PHART 7.527* 7.500*   [id number] 32.8* 32.2*   PO2ART 75.2 74.5*       MV Settings:  Vent Mode: AC/VC Rate Set: 16 bmp/Vt Ordered: 400 mL/ /FiO2 : 50 %    IV:   heparin (porcine) 13.3 mL/hr (20 7668)    propofol 25 mcg/kg/min (20 7521)    dextrose         Vitals:  Temp  Av.1 °F (37.8 °C)  Min: 99.7 °F (37.6 °C)  Max: 100.5 °F (38.1 °C)  Pulse  Av.7  Min: 79  Max: 96  BP  Min: 102/51  Max: 158/67  SpO2  Av.6 %  Min: 91 %  Max: 98 %  FiO2   Av %  Min: 50 %  Max: 50 %  Patient Vitals for the past 4 hrs:   BP Temp Pulse Resp SpO2   20 1904 -- -- 84 (!) 31 94 %   20 1800 (!) 107/54 99.7 °F (37.6 °C) 81 29 92 %   20 1700 (!) 104/55 -- 84 (!) 31 93 %   20 1600 (!) 118/58 -- 88 (!) 33 93 %       CVP:        Intake/Output Summary (Last 24 hours) at 2020 1929  Last data filed at 2020 1511  Gross per 24 hour   Intake 2958 ml   Output 1950 ml   Net 1008 ml       Physical Exam:  General:  Pt is intubated, on mechanical vent. Sedation decreased and he is waking up and responding some now  Skin:  Warm and dry  Neck:  JVD absent. Neck supple  Chest: Diminished breath sounds . No wheezes or rhonchi   Cardiovascular:  RRR ,S1S2 normal  Abdomen:  Soft, non tender, non distended, BS +  Extremities: edema has significantly decreased   Intact peripheral pulses.  Brisk cap refill, < 2 secs  Neuro:following simple commands                    Medications:  Scheduled Meds:   potassium bicarb-citric acid  40 mEq Oral Daily    furosemide  40 mg Intravenous Daily    meropenem  1 g Intravenous Q8H    acyclovir  5 mg/kg (Adjusted) Intravenous Q8H    pantoprazole  40 mg Intravenous BID    mupirocin   Nasal BID    linezolid  600 mg Intravenous Q12H    insulin lispro  0-12 Units Subcutaneous Q4H    chlorhexidine  15 mL Mouth/Throat BID    ipratropium-albuterol  1 ampule Inhalation Q4H    ketotifen  1 drop Both Eyes BID    rosuvastatin  10 mg Oral Daily    aspirin  81 mg Oral Daily    sodium chloride flush  10 mL Intravenous 2 times per day       PRN Meds:  heparin (porcine), heparin (porcine), fentanNYL, sennosides-docusate sodium, glucose, dextrose, glucagon (rDNA), dextrose, midazolam, tetrahydrozoline, aluminum & magnesium hydroxide-simethicone, ondansetron, sodium chloride flush, acetaminophen **OR** acetaminophen    Results:  CBC:   Recent Labs     07/11/20  0550  07/12/20  0540  07/13/20  0145 07/13/20  0530 07/13/20  1230   WBC 16.0*  --  22.9*  --   --  20.4*  --    HGB 11.2*   < > 12.0*   < > 10.6* 11.6* 11.2*   HCT 34.9*   < > 37.0*   < > 32.1* 35.3* 34.4*   MCV 87.0  --  88.1  --   --  87.7  --      --  203  --   --  219  --     < > = values in this interval not displayed. BMP:   Recent Labs     07/11/20  0550 07/12/20  0540 07/13/20  0530   * 135* 135*   K 3.2* 2.7* 3.3*   CL 91* 94* 99   CO2 33* 26 27   BUN 44* 40* 36*   CREATININE 0.8 0.9 0.9     LIVER PROFILE:   Recent Labs     07/11/20  0550 07/12/20  0540 07/13/20  0530   AST 73* 71* 99*   ALT 43* 51* 65*   BILIDIR 0.3 0.4* 0.3   BILITOT 0.7 0.9 0.6   ALKPHOS 58 68 94     PT/INR:   Recent Labs     07/11/20  0550 07/12/20  0540 07/13/20  0530   PROTIME 11.6 12.3 12.4   INR 1.00 1.06 1.07     Results for NEWTON BOWENS T (MRN [id number]) as of 7/1/2020 09:46   Ref. Range 6/24/2020 21:35 6/26/2020 15:24 6/30/2020 04:20   D-Dimer, Quant Latest Ref Range: 0 - 229 ng/mL  (H) 682 (H) 653 (H)     Results for Kandice Arana T (MRN [id number]) as of 7/1/2020 09:46   Ref. Range 6/24/2020 21:35   Ferritin Latest Ref Range: 30.0 - 400.0 ng/mL 97.3       Cultures:  Results for Darryl BOWENS (MRN [id number]) as of 6/30/2020 09:20   Ref. Range 6/24/2020 22:00   SARS-CoV-2, PCR Latest Ref Range: Not Detected  DETECTED (A)     Susceptibility     Staphylococcus epidermidis (1)     Antibiotic Interpretation TARA Status    ciprofloxacin Sensitive <=0.5 mcg/mL     nitrofurantoin Sensitive <=16 mcg/mL     oxacillin Sensitive <=0.25 mcg/mL     tetracycline Sensitive <=1 mcg/mL     trimethoprim-sulfamethoxazole Sensitive <=10 mcg/mL           Films:    VL Extremity Venous Bilateral         XR CHEST PORTABLE   Final Result   Stable appropriate life support device positioning.       No substantial change in bilateral airspace disease. XR CHEST PORTABLE   Final Result   1. Stable chest with bilateral lung infiltrates. Findings are concerning for   pulmonary edema versus pneumonia. XR CHEST PORTABLE   Final Result   Patchy bilateral airspace disease, slightly increased as compared to prior. XR CHEST PORTABLE   Final Result   Improving bilateral pneumonia versus edema. XR CHEST 1 VW   Final Result   Increasing patchy opacities throughout both lungs, most compatible with   multifocal pneumonia. Component of pulmonary edema should be considered. XR ABDOMEN (KUB) (SINGLE AP VIEW)   Final Result   1. Indeterminate bowel gas pattern         XR CHEST PORTABLE   Final Result   Stable bilateral pneumonia. XR CHEST PORTABLE   Final Result   Stable central ground-glass airspace opacities. Supportive devices are   unchanged. XR CHEST PORTABLE   Final Result   The endotracheal tube tip is approximately 3 cm above the thai. XR CHEST PORTABLE   Final Result   Mild increase in the amount of bibasilar opacity since yesterday. Large   bilateral pneumonias. XR CHEST PORTABLE   Final Result   Stable bilateral pulmonary opacities. XR CHEST PORTABLE   Final Result   Minimal increase in the amount of opacity in each lung consistent with slight   worsening of bilateral pneumonia. XR CHEST PORTABLE   Final Result   Supportive tubing is in normal position. Low lung volume study, with stable alveolar opacities in the mid and lower   lungs, pneumonia versus atelectasis. XR CHEST PORTABLE   Final Result   Increased in degree and extent of bilateral mid and lower lung pneumonia. The increased may be partially accentuated by low lung volumes. XR CHEST 1 VW   Final Result   Persistent bilateral airspace disease, similar to prior. IR PICC WO SQ PORT/PUMP > 5 YEARS   Final Result   Successful placement of PICC line.          XR CHEST PORTABLE   Final Result   Appropriate endotracheal tube positioning. Multifocal airspace opacities and areas of atelectasis correlate with recent   CT chest.         XR ABDOMEN FOR NG/OG/NE TUBE PLACEMENT   Final Result   NG tube terminates over the stomach. CT CHEST PULMONARY EMBOLISM W CONTRAST   Final Result   1. No pulmonary embolism. 2. Multifocal pneumonia scattered throughout both lungs with minimal pleural   effusions. XR CHEST STANDARD (2 VW)   Final Result   No radiographic evidence of acute pulmonary disease. XR CHEST PORTABLE    (Results Pending)          Assessment and Plan:    Acute hypoxic respiratory failure due to COVID-19 infection   ARDS    - intubated, on [facility] vent  - s.p remdesivir and convalescent plasma   - improving very slowly   - Reintubated on 7/6 d/t displacement of existing ETT  - 7/10 spontaneous breathing trials initiated       COVID -19 PNA  - s/p Decadron - 10 days. - Completed 10 days of Remdesivir on 7/7/2020  - S/P transfusion of 2 units of convalescent plasma on 7/2/2020  - imaging with ARDS initially and now improving     Sepsis  - POA, due to PNA, COVID 19  - with leukocytosis, fevers, tachypnea  - COVID-19 detected  -Spiking fevers again  -Antibiotics adjusted per infectious disease  Currently on acyclovir, continued on IV Zyvox and Zosyn      Multifocal pneumonia  - related to COVID-19.    - Tracheal aspirate sent. - was on  broad-spectrum antibiotics, vanc and  Cefepime--> now off .    - On IV Zyvox and Zosyn now      Possible Shingles  Distrubution of rash concerning for drug eruption vs covid related vs  leukocytoclastic vasculitis. - generalized involvement of trunk, torso, and extremities without classic vesicular lesions and bilateral distribution make shingles less likely.    - started IV Acyclovir per ID recs      Petechial Rash  - noted on arms and trunk  - given benadryl IV  - followed by ID - remains on IV acyclovir, monitor      Elevated D dimer  - due to COVID 19  - CT chest neg for PE  - holding lovenox for GI bleed    UTI  - cx positive for staph, treated    Hyperlipidemia  - on statin. Rectal Bleeding -   lovenox on hold. GI to eval.   Hgb stable. Resumed on heparin gtt am of 7/12        DIET TUBE FEED CONTINUOUS/CYCLIC NPO; Semi-elemental (Vital AF 1.2 );  Orogastric; Continuous; 20 (initial ); 60 (goal); 20 (over 20 hours)  Full Code      Bella Kenny MD 7/13/2020 7:32 PM

## 2020-07-13 NOTE — PROGRESS NOTES
Pulmonary & Critical Care Medicine ICU Progress Note      CC: Acute hypoxemic respiratory failure    Events of Last 24 hours:   No bleed overnight with stable H&H   FiO2 50% and PEEP 5  Plateau pressure of 28  Only PRN sedation    Intermittent PVCs         Invasive Lines: IV: PICC     MV:   Vent Mode: AC/VC Rate Set: 16 bmp/Vt Ordered: 400 mL/ /FiO2 : 50 %  Recent Labs     20  0550 20  1055   PHART 7.489* 7.527*   [id number] 38.3 32.8*   PO2ART 69.7* 75.2       IV:   heparin (porcine) 13.3 mL/hr (20 0541)    propofol 20 mcg/kg/min (20 05)    dextrose         Vitals:  Blood pressure (!) 150/73, pulse 96, temperature 100 °F (37.8 °C), temperature source Bladder, resp. rate (!) 37, height 5' 7\" (1.702 m), weight 191 lb 5.8 oz (86.8 kg), SpO2 93 %. on vent   Temp  Av.1 °F (37.8 °C)  Min: 99.7 °F (37.6 °C)  Max: 100.5 °F (38.1 °C)    Intake/Output Summary (Last 24 hours) at 2020 0617  Last data filed at 2020 0440  Gross per 24 hour   Intake 3371 ml   Output 1750 ml   Net 1621 ml     EXAM:  Blood pressure 124/63, pulse 83, temperature 100 °F (37.8 °C), temperature source Bladder, resp. rate 24, height 5' 7\" (1.702 m), weight 191 lb 5.8 oz (86.8 kg), SpO2 98 %.' on vent  Constitutional:  No acute distress. NCAT  Eyes: No sclera icterus. EOM intact. No visible discharge. HENT: Head is normocephalic and atraumatic. Mucus membranes are moist and the tongue appears normal. Normal appearing nose. External Ears normal.   Neck: No visualized mass. Erin Maillard is midline   Resp: + accessory muscle use. Respiratory effort increased. Cardiovascular: No LE edema ; no JVD  Musculoskeletal: No signs of ataxia. Normal range of motion of the neck.   Skin: No significant exanthematous lesions or discoloration noted on facial skin    Neuro: awake and follows commands      Scheduled Meds:   furosemide  40 mg Intravenous Daily    meropenem  1 g Intravenous Q8H    acyclovir  5 mg/kg (Adjusted) Intravenous Q8H    pantoprazole  40 mg Intravenous BID    mupirocin   Nasal BID    linezolid  600 mg Intravenous Q12H    insulin lispro  0-12 Units Subcutaneous Q4H    chlorhexidine  15 mL Mouth/Throat BID    ipratropium-albuterol  1 ampule Inhalation Q4H    ketotifen  1 drop Both Eyes BID    rosuvastatin  10 mg Oral Daily    aspirin  81 mg Oral Daily    sodium chloride flush  10 mL Intravenous 2 times per day     PRN Meds:  heparin (porcine), heparin (porcine), fentanNYL, sennosides-docusate sodium, glucose, dextrose, glucagon (rDNA), dextrose, midazolam, tetrahydrozoline, aluminum & magnesium hydroxide-simethicone, ondansetron, sodium chloride flush, acetaminophen **OR** acetaminophen    Results:  CBC:   Recent Labs     07/11/20  0550  07/12/20  0540  07/12/20  2020 07/13/20  0145 07/13/20  0530   WBC 16.0*  --  22.9*  --   --   --  20.4*   HGB 11.2*   < > 12.0*   < > 11.4* 10.6* 11.6*   HCT 34.9*   < > 37.0*   < > 35.0* 32.1* 35.3*   MCV 87.0  --  88.1  --   --   --  87.7     --  203  --   --   --  219    < > = values in this interval not displayed. BMP:   Recent Labs     07/11/20  0550 07/12/20  0540 07/13/20  0530   * 135* 135*   K 3.2* 2.7* 3.3*   CL 91* 94* 99   CO2 33* 26 27   BUN 44* 40* 36*   CREATININE 0.8 0.9 0.9     LIVER PROFILE:   Recent Labs     07/11/20  0550 07/12/20  0540 07/13/20  0530   AST 73* 71* 99*   ALT 43* 51* 65*   BILIDIR 0.3 0.4*  --    BILITOT 0.7 0.9 0.6   ALKPHOS 58 68 94       Cultures:  6/24 Urine Staphylococcus epidermidis  6/24 COVID 19 +  6/29 Trach Asp NRF  7/2 respiratory NRF  7/3 BC NGTD  7/6 UC NGTD  7/7 Resp CX NRF   7/7 BC NGTD   7/7 UC NGTD         Films:  CXR 7/13 was reviewed by me and it showed   Bilateral patchy airspace opacities, ETT in place        ASSESSMENT:  · Acute hypoxemic respiratory failure  · BRBPR ? LGIB-none over past 48 hours   · Elevated d-dimer with UE edema- rule out DVT   · Hypertriglyceridemia- normalized -? Lab erros  · ARDS  · Leukocytosis -  · New maculopapular rash abdomen and extremities-favor drug reaction. Zosyn stopped 7/11/2020 with improvement today   · COVID-19 viral multifocal pneumonia. Post 2 units of CCP 7/2/2020. Completed 10 days of Remdesevir  7/7/2020  · Shingles  · Frequent PVCs   · ET tube displacement with emergent reintubation 7/7/2020 and brief hypoxia down to the 70s          PLAN:  Mechanical ventilation as per my orders. The ventilator was adjusted by me at the bedside for unstable, life threatening respiratory failure  Fentanyl and Versed PRN, gtt as needed  Head of bed 30 degrees or higher at all times  Spontaneous breathing trial for possible extubation in a.m. Zyvox D#7 Meropenem D#3 and IV acyclovir per ID D#8. Completed 4 days of Zosyn stopped due to rash   Electrolytes replacement   Continue Lasix 40 IV daily  UE doppler rule out DVT pending   Echo given frequent PVCs  Resume heparin drip and monitor closely H&H Q 6 hrs   GI following   Nutrition: Tube feeding at target   Blood sugar control, with goal 150-180  GI prophylaxis: Protonix twice daily  DVT prophylaxis:  Heparin drip   MRSA prophylaxis: Bactroban   Code status: Full code   Laila Iglesias, daughter [phone]      Patient is critically ill with acute respiratory failure. Critical care time spent reviewing labs/films, examining patient, collaborating with other physicians but excluding procedures for life threatening organ failure is 32 minutes.

## 2020-07-13 NOTE — PROGRESS NOTES
Dc Cobb is a 82 y.o. male patient.     Current Facility-Administered Medications   Medication Dose Route Frequency Provider Last Rate Last Dose    heparin 25,000 units in dextrose 5% 250 mL infusion  13.3 mL/hr Intravenous Continuous Lorna Fitzgerald MD 13.3 mL/hr at 07/13/20 0541 13.3 mL/hr at 07/13/20 0541    heparin (porcine) injection 3,000 Units  3,000 Units Intravenous PRN Lorna Fitzgerald MD        heparin (porcine) injection 5,900 Units  5,900 Units Intravenous PRN Lorna Fitzgerald MD        fentaNYL (SUBLIMAZE) injection 50 mcg  50 mcg Intravenous Q1H PRN Lorna Fitzgerald MD   50 mcg at 07/12/20 2036    propofol injection  10 mcg/kg/min Intravenous Titrated Karol Glaser MD 10.4 mL/hr at 07/13/20 0512 20 mcg/kg/min at 07/13/20 0512    furosemide (LASIX) injection 40 mg  40 mg Intravenous Daily Lorna Fitzgerald MD   40 mg at 07/13/20 0916    meropenem (MERREM) 1 g in sodium chloride 0.9 % 100 mL IVPB (mini-bag)  1 g Intravenous Jacqui Summers MD   Stopped at 07/13/20 1111    sennosides-docusate sodium (SENOKOT-S) 8.6-50 MG tablet 2 tablet  2 tablet Oral BID PRN Lorna Fitzgerald MD        acyclovir (ZOVIRAX) 365 mg in dextrose 5 % 100 mL IVPB  5 mg/kg (Adjusted) Intravenous Q8H Joselito Celeste MD   Stopped at 07/13/20 1112    pantoprazole (PROTONIX) injection 40 mg  40 mg Intravenous BID Karol Glaser MD   40 mg at 07/13/20 0916    mupirocin (BACTROBAN) 2 % ointment   Nasal BID Lorna Fitzgerald MD        linezolid (ZYVOX) IVPB 600 mg  600 mg Intravenous Q12H Lorna Fitzgerald MD   Stopped at 07/13/20 1112    insulin lispro (HUMALOG) injection vial 0-12 Units  0-12 Units Subcutaneous Q4H Mathew Alamo MD   2 Units at 07/13/20 1328    glucose (GLUTOSE) 40 % oral gel 15 g  15 g Oral PRN Mathew Alamo MD        dextrose 50 % IV solution  12.5 g Intravenous PRN Mathew Alamo MD        glucagon (rDNA) injection 1 mg  1 mg Intramuscular PRN Mathew Alamo MD        dextrose 5 % solution  100 mL/hr Intravenous PRN Martínez Johnson MD        chlorhexidine (PERIDEX) 0.12 % solution 15 mL  15 mL Mouth/Throat BID Ilana Barber MD   15 mL at 07/13/20 0916    midazolam (VERSED) injection 2 mg  2 mg Intravenous Q1H PRN Ilana Barber MD   2 mg at 07/12/20 2230    ipratropium-albuterol (DUONEB) nebulizer solution 1 ampule  1 ampule Inhalation Q4H Jenni Lin MD   1 ampule at 07/13/20 1033    ketotifen (ZADITOR) 0.025 % ophthalmic solution 1 drop  1 drop Both Eyes BID Georgia Sanchez MD   1 drop at 07/13/20 0915    tetrahydrozoline 0.05 % ophthalmic solution 1 drop  1 drop Both Eyes PRN Georgia Sanchez MD   1 drop at 07/10/20 2056    aluminum & magnesium hydroxide-simethicone (MAALOX) 200-200-20 MG/5ML suspension 30 mL  30 mL Oral Q6H PRN Jenni Lin MD        ondansetron [facility]) injection 4 mg  4 mg Intravenous Q6H PRN GRACIA Hinojosa - CNP   4 mg at 06/26/20 7533    rosuvastatin (CRESTOR) tablet 10 mg  10 mg Oral Daily Ilana Barber MD   10 mg at 07/13/20 0915    aspirin chewable tablet 81 mg  81 mg Oral Daily Ilana Barber MD   81 mg at 07/13/20 0915    sodium chloride flush 0.9 % injection 10 mL  10 mL Intravenous 2 times per day Ilana Barber MD   10 mL at 07/13/20 0919    sodium chloride flush 0.9 % injection 10 mL  10 mL Intravenous PRN Ilana Barber MD        acetaminophen (TYLENOL) tablet 650 mg  650 mg Oral Q6H PRN Ilana Barber MD   650 mg at 07/12/20 2332    Or    acetaminophen (TYLENOL) suppository 650 mg  650 mg Rectal Q6H PRN Ilana Barber MD         Allergies   Allergen Reactions    Buspar [Buspirone] Other (See Comments)     Face,face numb    Codeine Other (See Comments)     Face,lips numb    Methylphenidate Other (See Comments)     Face numbness    Ritalin [Methylphenidate Hcl] Other (See Comments)     Face. lips numb    Zoloft [Sertraline Hcl] Other (See Comments)     Numbness of lips     Principal Problem:    Pneumonia due to COVID-19 virus  Active Problems:    Multifocal pneumonia    Orthostasis    Sepsis ([facility] [address]    Urinary tract infection without hematuria    ARDS (adult respiratory distress syndrome) (HCC)    Hyperlipidemia    Acute respiratory failure with hypoxia ([facility])    Class 1 obesity due to excess calories with body mass index (BMI) of 31.0 to 31.9 in adult    Peripherally inserted central catheter (PICC) in place    Asymptomatic bacteriuria    Endotracheally intubated    On mechanically assisted ventilation (HCC)    Herpes zoster without complication    Fever    Arm edema    Hypertriglyceridemia    COVID-19    Acute kidney injury ([facility] [address]    Bright red blood per rectum  Resolved Problems:    * No resolved hospital problems. *    Blood pressure 125/69, pulse 80, temperature 100 °F (37.8 °C), temperature source Bladder, resp. rate 29, height 5' 7\" (1.702 m), weight 191 lb 5.8 oz (86.8 kg), SpO2 94 %. Subjective:  Symptoms:  Stable. Pain:  He reports no pain. Objective:  General Appearance:  Not in pain. Vital signs: (most recent): Blood pressure 125/69, pulse 80, temperature 100 °F (37.8 °C), temperature source Bladder, resp. rate 29, height 5' 7\" (1.702 m), weight 191 lb 5.8 oz (86.8 kg), SpO2 94 %. Abdomen: Abdomen is soft. Bowel sounds are normal.   There is no abdominal tenderness. Assessment & Plan  83 yo admitted w resp failure due to Covid-19 ARDS/pneumonia, intubated, developed hematochezia on Lovenox/Heparin. H/H is stable at 11/35. Last colonoscopy by Dr Rhianna Goodman in 2104 revealed polyps.     Plan:   1. Agree w holding Heparin/Lovenox, resumed on 7/11  2. Will consider colonoscopy if becomes necessary  3.  Will follow  Caron Holliday MD       (O) [phone]    Vini Pham MD  7/13/2020

## 2020-07-13 NOTE — PROGRESS NOTES
07/12/20 2248   Vent Information   Vent Type 840   Vent Mode AC/VC   Vt Ordered 400 mL   Rate Set 16 bmp   Peak Flow 75 L/min   Pressure Support 0 cmH20   FiO2  50 %   SpO2 94 %   SpO2/FiO2 ratio 188   Sensitivity 2   PEEP/CPAP 5   I Time/ I Time % 0 s   Humidification Source Heated wire   Humidification Temp 37   Humidification Temp Measured 37   Circuit Condensation Drained   Vent Patient Data   High Peep/I Pressure 0   Peak Inspiratory Pressure 24 cmH2O   Mean Airway Pressure 11 cmH20   Rate Measured 39 br/min   Vt Exhaled 441 mL   Minute Volume 16.6 Liters   I:E Ratio 1.30:1   Cough/Sputum   Sputum How Obtained Suctioned;Endotracheal   Sputum Amount None   Spontaneous Breathing Trial (SBT) RT Doc   Pulse 88   Breath Sounds   Right Upper Lobe Diminished   Right Middle Lobe Diminished   Right Lower Lobe Diminished   Left Upper Lobe Diminished   Left Lower Lobe Diminished   Additional Respiratory  Assessments   Resp (!) 39   Alarm Settings   High Pressure Alarm 40 cmH2O   Low Minute Volume Alarm 5 L/min   High Respiratory Rate 40 br/min   ETT (adult)   Placement Date/Time: 06/29/20 0230   Preoxygenation: Yes  Mask Ventilation: Ventilated by mask (1)  Technique: Video laryngoscopy  Type: Cuffed  Tube Size: 8 mm  Laryngoscope: GlideScope  Blade Size: 4  Location: Oral  Insertion attempts: 1  Placement. ..    ET Placement Right

## 2020-07-13 NOTE — PROGRESS NOTES
07/13/20 0423   Vent Information   Vent Type 840   Vent Mode AC/VC   Vt Ordered 400 mL   Rate Set 16 bmp   Peak Flow 75 L/min   Pressure Support 0 cmH20   FiO2  50 %   SpO2 94 %   SpO2/FiO2 ratio 188   Sensitivity 2   PEEP/CPAP 5   I Time/ I Time % 0 s   Humidification Source Heated wire   Humidification Temp 37   Humidification Temp Measured 36.8   Circuit Condensation Drained   Vent Patient Data   High Peep/I Pressure 0   Peak Inspiratory Pressure 17 cmH2O   Mean Airway Pressure 9.6 cmH20   Rate Measured 35 br/min   Vt Exhaled 429 mL   Minute Volume 15.1 Liters   I:E Ratio 1.10:1   Cough/Sputum   Sputum Amount None   Spontaneous Breathing Trial (SBT) RT Doc   Pulse 88   Breath Sounds   Right Upper Lobe Diminished   Right Middle Lobe Diminished   Right Lower Lobe Diminished   Left Upper Lobe Diminished   Left Lower Lobe Diminished   Additional Respiratory  Assessments   Resp (!) 37   Position Semi-Harris's   Alarm Settings   High Pressure Alarm 40 cmH2O   Low Minute Volume Alarm 5 L/min   High Respiratory Rate 40 br/min   ETT (adult)   Placement Date/Time: 06/29/20 0230   Preoxygenation: Yes  Mask Ventilation: Ventilated by mask (1)  Technique: Video laryngoscopy  Type: Cuffed  Tube Size: 8 mm  Laryngoscope: GlideScope  Blade Size: 4  Location: Oral  Insertion attempts: 1  Placement. ..    [address] [facility]

## 2020-07-14 ENCOUNTER — APPOINTMENT (OUTPATIENT)
Dept: GENERAL RADIOLOGY | Age: 85
DRG: 870 | End: 2020-07-14
Payer: MEDICARE

## 2020-07-14 LAB
ALBUMIN SERPL-MCNC: 2.4 G/DL (ref 3.4–5)
ALP BLD-CCNC: 88 U/L (ref 40–129)
ALT SERPL-CCNC: 69 U/L (ref 10–40)
ANION GAP SERPL CALCULATED.3IONS-SCNC: 9 MMOL/L (ref 3–16)
APTT: 47.9 SEC (ref 24.2–36.2)
APTT: 73.1 SEC (ref 24.2–36.2)
AST SERPL-CCNC: 82 U/L (ref 15–37)
BASE EXCESS ARTERIAL: 4.3 MMOL/L (ref -3–3)
BASE EXCESS VENOUS: 5.7 MMOL/L (ref -3–3)
BILIRUB SERPL-MCNC: 0.6 MG/DL (ref 0–1)
BILIRUBIN DIRECT: 0.3 MG/DL (ref 0–0.3)
BILIRUBIN, INDIRECT: 0.3 MG/DL (ref 0–1)
BUN BLDV-MCNC: 31 MG/DL (ref 7–20)
CALCIUM SERPL-MCNC: 7.5 MG/DL (ref 8.3–10.6)
CARBOXYHEMOGLOBIN ARTERIAL: 0.2 % (ref 0–1.5)
CARBOXYHEMOGLOBIN: 1.9 % (ref 0–1.5)
CHLORIDE BLD-SCNC: 94 MMOL/L (ref 99–110)
CO2: 29 MMOL/L (ref 21–32)
CREAT SERPL-MCNC: 0.7 MG/DL (ref 0.8–1.3)
CULTURE, RESPIRATORY: NORMAL
D DIMER: 1646 NG/ML DDU (ref 0–229)
GFR AFRICAN AMERICAN: >60
GFR NON-AFRICAN AMERICAN: >60
GLUCOSE BLD-MCNC: 116 MG/DL (ref 70–99)
GLUCOSE BLD-MCNC: 117 MG/DL (ref 70–99)
GLUCOSE BLD-MCNC: 119 MG/DL (ref 70–99)
GLUCOSE BLD-MCNC: 123 MG/DL (ref 70–99)
GLUCOSE BLD-MCNC: 91 MG/DL (ref 70–99)
GLUCOSE BLD-MCNC: 92 MG/DL (ref 70–99)
GRAM STAIN RESULT: NORMAL
HCO3 ARTERIAL: 26.7 MMOL/L (ref 21–29)
HCO3 VENOUS: 29.3 MMOL/L (ref 23–29)
HCT VFR BLD CALC: 35.3 % (ref 40.5–52.5)
HEMOGLOBIN, ART, EXTENDED: 12 G/DL (ref 13.5–17.5)
HEMOGLOBIN: 11.5 G/DL (ref 13.5–17.5)
MCH RBC QN AUTO: 28.7 PG (ref 26–34)
MCHC RBC AUTO-ENTMCNC: 32.6 G/DL (ref 31–36)
MCV RBC AUTO: 88 FL (ref 80–100)
METHEMOGLOBIN ARTERIAL: 0.3 %
METHEMOGLOBIN VENOUS: 0.3 %
O2 CONTENT ARTERIAL: 16 ML/DL
O2 CONTENT, VEN: 16 VOL %
O2 SAT, ARTERIAL: 97.2 %
O2 SAT, VEN: 92 %
O2 THERAPY: ABNORMAL
O2 THERAPY: ABNORMAL
PCO2 ARTERIAL: 32.7 MMHG (ref 35–45)
PCO2, VEN: 39.2 MMHG (ref 40–50)
PDW BLD-RTO: 14.4 % (ref 12.4–15.4)
PERFORMED ON: ABNORMAL
PERFORMED ON: NORMAL
PERFORMED ON: NORMAL
PH ARTERIAL: 7.53 (ref 7.35–7.45)
PH VENOUS: 7.49 (ref 7.35–7.45)
PLATELET # BLD: 229 K/UL (ref 135–450)
PMV BLD AUTO: 8.8 FL (ref 5–10.5)
PO2 ARTERIAL: 82.5 MMHG (ref 75–108)
PO2, VEN: 57.7 MMHG (ref 25–40)
POTASSIUM SERPL-SCNC: 3.3 MMOL/L (ref 3.5–5.1)
RBC # BLD: 4.01 M/UL (ref 4.2–5.9)
SODIUM BLD-SCNC: 132 MMOL/L (ref 136–145)
TCO2 ARTERIAL: 27.7 MMOL/L
TCO2 CALC VENOUS: 31 MMOL/L
TOTAL PROTEIN: 5.3 G/DL (ref 6.4–8.2)
TRIGL SERPL-MCNC: 138 MG/DL (ref 0–150)
WBC # BLD: 15.4 K/UL (ref 4–11)

## 2020-07-14 PROCEDURE — 2700000000 HC OXYGEN THERAPY PER DAY

## 2020-07-14 PROCEDURE — 82803 BLOOD GASES ANY COMBINATION: CPT

## 2020-07-14 PROCEDURE — 2580000003 HC RX 258: Performed by: INTERNAL MEDICINE

## 2020-07-14 PROCEDURE — 80076 HEPATIC FUNCTION PANEL: CPT

## 2020-07-14 PROCEDURE — 99291 CRITICAL CARE FIRST HOUR: CPT | Performed by: INTERNAL MEDICINE

## 2020-07-14 PROCEDURE — 6370000000 HC RX 637 (ALT 250 FOR IP): Performed by: INTERNAL MEDICINE

## 2020-07-14 PROCEDURE — 85379 FIBRIN DEGRADATION QUANT: CPT

## 2020-07-14 PROCEDURE — 94003 VENT MGMT INPAT SUBQ DAY: CPT

## 2020-07-14 PROCEDURE — 6360000002 HC RX W HCPCS: Performed by: INTERNAL MEDICINE

## 2020-07-14 PROCEDURE — 85730 THROMBOPLASTIN TIME PARTIAL: CPT

## 2020-07-14 PROCEDURE — 85027 COMPLETE CBC AUTOMATED: CPT

## 2020-07-14 PROCEDURE — 71045 X-RAY EXAM CHEST 1 VIEW: CPT

## 2020-07-14 PROCEDURE — 99233 SBSQ HOSP IP/OBS HIGH 50: CPT | Performed by: INTERNAL MEDICINE

## 2020-07-14 PROCEDURE — 80048 BASIC METABOLIC PNL TOTAL CA: CPT

## 2020-07-14 PROCEDURE — 94761 N-INVAS EAR/PLS OXIMETRY MLT: CPT

## 2020-07-14 PROCEDURE — 2000000000 HC ICU R&B

## 2020-07-14 PROCEDURE — 84478 ASSAY OF TRIGLYCERIDES: CPT

## 2020-07-14 PROCEDURE — 94640 AIRWAY INHALATION TREATMENT: CPT

## 2020-07-14 PROCEDURE — 36592 COLLECT BLOOD FROM PICC: CPT

## 2020-07-14 PROCEDURE — C9113 INJ PANTOPRAZOLE SODIUM, VIA: HCPCS | Performed by: INTERNAL MEDICINE

## 2020-07-14 RX ORDER — IPRATROPIUM BROMIDE AND ALBUTEROL SULFATE 2.5; .5 MG/3ML; MG/3ML
1 SOLUTION RESPIRATORY (INHALATION) EVERY 4 HOURS PRN
Status: DISCONTINUED | OUTPATIENT
Start: 2020-07-14 | End: 2020-07-20 | Stop reason: HOSPADM

## 2020-07-14 RX ORDER — HEPARIN SODIUM 1000 [USP'U]/ML
3000 INJECTION, SOLUTION INTRAVENOUS; SUBCUTANEOUS ONCE
Status: COMPLETED | OUTPATIENT
Start: 2020-07-14 | End: 2020-07-14

## 2020-07-14 RX ADMIN — FUROSEMIDE 40 MG: 10 INJECTION, SOLUTION INTRAMUSCULAR; INTRAVENOUS at 09:32

## 2020-07-14 RX ADMIN — KETOTIFEN FUMARATE 1 DROP: 0.35 SOLUTION/ DROPS OPHTHALMIC at 20:49

## 2020-07-14 RX ADMIN — MEROPENEM 1 G: 1 INJECTION, POWDER, FOR SOLUTION INTRAVENOUS at 17:42

## 2020-07-14 RX ADMIN — Medication 30 ML: at 11:08

## 2020-07-14 RX ADMIN — PANTOPRAZOLE SODIUM 40 MG: 40 INJECTION, POWDER, FOR SOLUTION INTRAVENOUS at 20:49

## 2020-07-14 RX ADMIN — IPRATROPIUM BROMIDE AND ALBUTEROL SULFATE 1 AMPULE: .5; 3 SOLUTION RESPIRATORY (INHALATION) at 15:28

## 2020-07-14 RX ADMIN — HEPARIN SODIUM 3000 UNITS: 1000 INJECTION, SOLUTION INTRAVENOUS; SUBCUTANEOUS at 06:49

## 2020-07-14 RX ADMIN — Medication 10 ML: at 17:51

## 2020-07-14 RX ADMIN — IPRATROPIUM BROMIDE AND ALBUTEROL SULFATE 1 AMPULE: .5; 3 SOLUTION RESPIRATORY (INHALATION) at 07:48

## 2020-07-14 RX ADMIN — IPRATROPIUM BROMIDE AND ALBUTEROL SULFATE 1 AMPULE: .5; 3 SOLUTION RESPIRATORY (INHALATION) at 03:18

## 2020-07-14 RX ADMIN — Medication 10 ML: at 17:13

## 2020-07-14 RX ADMIN — LINEZOLID 600 MG: 600 INJECTION, SOLUTION INTRAVENOUS at 07:47

## 2020-07-14 RX ADMIN — Medication 10 ML: at 09:34

## 2020-07-14 RX ADMIN — PANTOPRAZOLE SODIUM 40 MG: 40 INJECTION, POWDER, FOR SOLUTION INTRAVENOUS at 09:34

## 2020-07-14 RX ADMIN — KETOTIFEN FUMARATE 1 DROP: 0.35 SOLUTION/ DROPS OPHTHALMIC at 09:36

## 2020-07-14 RX ADMIN — ACYCLOVIR SODIUM 365 MG: 50 INJECTION, SOLUTION INTRAVENOUS at 01:11

## 2020-07-14 RX ADMIN — IPRATROPIUM BROMIDE AND ALBUTEROL SULFATE 1 AMPULE: .5; 3 SOLUTION RESPIRATORY (INHALATION) at 11:44

## 2020-07-14 RX ADMIN — Medication 10 ML: at 18:23

## 2020-07-14 RX ADMIN — MEROPENEM 1 G: 1 INJECTION, POWDER, FOR SOLUTION INTRAVENOUS at 09:28

## 2020-07-14 RX ADMIN — CHLORHEXIDINE GLUCONATE 0.12% ORAL RINSE 15 ML: 1.2 LIQUID ORAL at 20:49

## 2020-07-14 RX ADMIN — HEPARIN SODIUM 14.8 ML/HR: 10000 INJECTION, SOLUTION INTRAVENOUS at 18:04

## 2020-07-14 RX ADMIN — ACYCLOVIR SODIUM 365 MG: 50 INJECTION, SOLUTION INTRAVENOUS at 09:29

## 2020-07-14 RX ADMIN — PROPOFOL 25 MCG/KG/MIN: 10 INJECTION, EMULSION INTRAVENOUS at 06:57

## 2020-07-14 RX ADMIN — ACYCLOVIR SODIUM 365 MG: 50 INJECTION, SOLUTION INTRAVENOUS at 17:11

## 2020-07-14 RX ADMIN — MEROPENEM 1 G: 1 INJECTION, POWDER, FOR SOLUTION INTRAVENOUS at 23:45

## 2020-07-14 RX ADMIN — Medication 10 ML: at 20:49

## 2020-07-14 ASSESSMENT — PULMONARY FUNCTION TESTS
PIF_VALUE: 16
PIF_VALUE: 12
PIF_VALUE: 17
PIF_VALUE: 12
PIF_VALUE: 20
PIF_VALUE: 16
PIF_VALUE: 12
PIF_VALUE: 14
PIF_VALUE: 16
PIF_VALUE: 18
PIF_VALUE: 19
PIF_VALUE: 19
PIF_VALUE: 12
PIF_VALUE: 15
PIF_VALUE: 13

## 2020-07-14 ASSESSMENT — PAIN SCALES - GENERAL
PAINLEVEL_OUTOF10: 0

## 2020-07-14 NOTE — PROGRESS NOTES
Sharmin Lilly is a 82 y.o. male patient.     Current Facility-Administered Medications   Medication Dose Route Frequency Provider Last Rate Last Dose    potassium bicarb-citric acid (EFFER-K) effervescent tablet 40 mEq  40 mEq Oral Daily Prema Riggs MD   Stopped at 07/14/20 0942    heparin 25,000 units in dextrose 5% 250 mL infusion  14.8 mL/hr Intravenous Continuous Gisselle Archer MD 14.8 mL/hr at 07/14/20 0648 14.8 mL/hr at 07/14/20 0648    heparin (porcine) injection 3,000 Units  3,000 Units Intravenous PRN Gisselle Archer MD        heparin (porcine) injection 5,900 Units  5,900 Units Intravenous PRN Gisselle Archer MD        fentaNYL (SUBLIMAZE) injection 50 mcg  50 mcg Intravenous Q1H PRN Gisselle Archer MD   50 mcg at 07/12/20 2036    propofol injection  10 mcg/kg/min Intravenous Titrated Terrence Colón MD 13.1 mL/hr at 07/14/20 0657 25 mcg/kg/min at 07/14/20 0657    furosemide (LASIX) injection 40 mg  40 mg Intravenous Daily Gisselle Archer MD   40 mg at 07/14/20 0932    meropenem (MERREM) 1 g in sodium chloride 0.9 % 100 mL IVPB (mini-bag)  1 g Intravenous Abbie Little MD   Stopped at 07/14/20 1015    sennosides-docusate sodium (SENOKOT-S) 8.6-50 MG tablet 2 tablet  2 tablet Oral BID PRN Gisselle Archer MD        acyclovir (ZOVIRAX) 365 mg in dextrose 5 % 100 mL IVPB  5 mg/kg (Adjusted) Intravenous Q8H Joselito Celeste MD   Stopped at 07/14/20 1053    pantoprazole (PROTONIX) injection 40 mg  40 mg Intravenous BID Terrence Colón MD   40 mg at 07/14/20 0934    linezolid (ZYVOX) IVPB 600 mg  600 mg Intravenous Q12H Gisselle Archer MD   Stopped at 07/14/20 0850    insulin lispro (HUMALOG) injection vial 0-12 Units  0-12 Units Subcutaneous Q4H Shanda Thornton MD   2 Units at 07/13/20 1328    glucose (GLUTOSE) 40 % oral gel 15 g  15 g Oral PRN Shanda Thornton MD        dextrose 50 % IV solution  12.5 g Intravenous PRN Shanda Thornton MD        glucagon (rDNA) injection 1 mg  1 mg Intramuscular PRN Kiran Harris MD        dextrose 5 % solution  100 mL/hr Intravenous PRN Kiran Harris MD        chlorhexidine (PERIDEX) 0.12 % solution 15 mL  15 mL Mouth/Throat BID Jimenez Rivas MD   Stopped at 07/14/20 0941    midazolam (VERSED) injection 2 mg  2 mg Intravenous Q1H PRN Jimenez Rivas MD   2 mg at 07/12/20 2230    ipratropium-albuterol (DUONEB) nebulizer solution 1 ampule  1 ampule Inhalation Q4H Georgette Lindsey MD   1 ampule at 07/14/20 0748    ketotifen (ZADITOR) 0.025 % ophthalmic solution 1 drop  1 drop Both Eyes BID Cass Gabriel MD   1 drop at 07/14/20 0936    tetrahydrozoline 0.05 % ophthalmic solution 1 drop  1 drop Both Eyes PRN Cass Gabriel MD   1 drop at 07/10/20 2056    aluminum & magnesium hydroxide-simethicone (MAALOX) 200-200-20 MG/5ML suspension 30 mL  30 mL Oral Q6H PRN Georgette Lindsey MD        ondansetron [facility]) injection 4 mg  4 mg Intravenous Q6H PRN GRACIA Walton - CNP   4 mg at 06/26/20 3126    rosuvastatin (CRESTOR) tablet 10 mg  10 mg Oral Daily Jimenez Rivas MD   Stopped at 07/14/20 9049    aspirin chewable tablet 81 mg  81 mg Oral Daily Jimenez Rivas MD   Stopped at 07/14/20 0941    sodium chloride flush 0.9 % injection 10 mL  10 mL Intravenous 2 times per day Jimenez Rivas MD   10 mL at 07/14/20 0934    sodium chloride flush 0.9 % injection 10 mL  10 mL Intravenous PRN Jimenez Rivas MD   30 mL at 07/14/20 1108    acetaminophen (TYLENOL) tablet 650 mg  650 mg Oral Q6H PRN Jimenez Rivas MD   650 mg at 07/12/20 2332    Or    acetaminophen (TYLENOL) suppository 650 mg  650 mg Rectal Q6H PRN Jimenez Rivas MD         Allergies   Allergen Reactions    Buspar [Buspirone] Other (See Comments)     Face,face numb    Codeine Other (See Comments)     Face,lips numb    Methylphenidate Other (See Comments)     Face numbness    Ritalin [Methylphenidate Hcl] Other (See Comments)     Face. lips numb    Zoloft [Sertraline Hcl] Other (See MD  7/14/2020

## 2020-07-14 NOTE — PROGRESS NOTES
Infectious Diseases   Progress Note      Admission Date: 6/24/2020  Hospital Day: Hospital Day: 21   Attending: Shauna Fuller MD  Date of service: 7/14/2020     Chief complaint/ Reason for consult:     · Acute respiratory failure with hypoxia  · Bilateral COVID 19 pneumonia  · Elevated d-dimer 653 on 6/30/2020  · Staph epidermidis in the urine culture on 6/24/2020, significance unclear, likely colonizer    Microbiology:        I have reviewed allavailable micro lab data and cultures    · Blood culture (2/2) - collected on 6/24/2020: Negative  · Tracheal aspirate culture culture  - collected on 6/29/2020: Negative so far      Antibiotics and immunizations:       Current antibiotics: All antibiotics and their doses were reviewed by me    Recent Abx Admin                   acyclovir (ZOVIRAX) 365 mg in dextrose 5 % 100 mL IVPB (mg) 365 mg New Bag 07/14/20 1711     365 mg New Bag  0929     365 mg New Bag  0111     365 mg New Bag 07/13/20 1742    meropenem (MERREM) 1 g in sodium chloride 0.9 % 100 mL IVPB (mini-bag) (g) 1 g New Bag 07/14/20 0928     1 g New Bag 07/13/20 2351     1 g New Bag  1742    linezolid (ZYVOX) IVPB 600 mg (mg) 600 mg New Bag 07/14/20 0747     600 mg New Bag 07/13/20 1959                  Immunization History: All immunization history was reviewed by me today. Immunization History   Administered Date(s) Administered    Influenza, High Dose (Fluzone 65 yrs and older) 11/30/2012, 10/09/2015, 11/20/2016, 11/07/2018, 09/23/2019    Pneumococcal Conjugate 13-valent ([id number]) 07/10/2015, 11/08/2018    Pneumococcal Polysaccharide ([id number]) 04/13/2012    Td, unspecified formulation 01/15/1996    Tdap (Boostrix, Adacel) 05/27/2015    Zoster Live (Zostavax) 07/10/2015    Zoster Recombinant (Shingrix) 11/07/2018, 09/23/2019       Known drug allergies:      All allergies were reviewed and updated    Allergies   Allergen Reactions    Buspar [Buspirone] Other (See Comments)     Face,face numb    Codeine Other (See Comments)     Face,lips numb    Methylphenidate Other (See Comments)     Face numbness    Ritalin [Methylphenidate Hcl] Other (See Comments)     Face. lips numb    Zoloft [Sertraline Hcl] Other (See Comments)     Numbness of lips       Social history:     Social History:  All social andepidemiologic history was reviewed and updated by me today as needed. · Tobacco use:   reports that he has quit smoking. His smoking use included cigarettes. He started smoking about 29 years ago. He has a 25.00 pack-year smoking history. He has never used smokeless tobacco.  · Alcohol use:   reports current alcohol use of about 1.0 standard drinks of alcohol per week. · Currently lives in: [address] [place] [address]  ·  reports no history of drug use. Assessment:     The patient is a 82 y.o. old male who  has a past medical history of Arthritis, B12 deficiency (05/2014), COVID-19 (06/25/2020), and Hyperlipidemia. with following problems:    · High fever and leukocytosis-improving, cultures remain negative  · acute respiratory failure with hypoxia-has been extubated today  · ARDS - secondary to COVID 19-slowly improving  · Shingles involving the buttock area-covered with acyclovir  · Bilateral COVID 19 pneumonia-status post 2 units of convalescent plasma on 7/2/2020 under [facility] protocol-has completed 10-day course of IV remdesivir-currently on empiric meropenem for empiric secondary bacterial coverage  · Elevated d-dimer 653 on 6/30/2020-continue to monitor trends  · High interleukin-6 level, this is a new finding -decided to hold off on Actemra due to development of herpes zoster and now concern for secondary bacterial infection  · Staph epidermidis in the urine culture on 6/24/2020, significance unclear, likely colonizer-urine culture from 7/7/2020 running negative  · Status post PICC line placement on 6/29/2020-continue PICC line care  · Obesity Class 1 due to excess calorie intake :  Body mass index is 31.34 kg/m². Discussion:      The patient is on IV acyclovir. Zoster rash is improving. His IV antibiotic was escalated from IV Zosyn to IV meropenem over the weekend by ICU team after discussion with Dr.d Canelo Coronel cell count is slowly improving and is 15,400. Nasal MRSA screen was negative. Tracheal aspirate culture from 7/12/2020 has been negative so far. Blood cultures from 7/20/2020 have been negative as well. Serum creatinine 0.7. Liver enzymes are okay. D-dimer 1646. Portable chest x-ray done today reviewed. Shows ongoing atypical pneumonia. He has been extubated earlier today. Plan:     Diagnostic Workup:    · Continue to follow  fever curve, WBC count and blood cultures  · Follow up on liver and renal function    Antimicrobials:    · Will continue IV meropenem at a renally adjusted dose. · No evidence of MRSA so far. I do not think further linezolid is necessary. We will stop it today   · We will continue IV acyclovir 5 mg/kg every 8 hour for now for herpes zoster  · Continue to monitor his vitals closely  · Continue high flow oxygen support to maintain oxygen saturation above 92%  · Aspiration precautions  · Cough and deep breathing exercises  · Fall precautions  · Anticoagulation per primary  · COVID 19 isolation precaution  · Continue close monitoring in COVID 19 ICU  · Remains at high risk of complications      Drug Monitoring:    · Continue monitoring for antibiotic toxicity as follows: CBC, CMP  · Continue to watch for following: new or worsening fever, new hypotension, hives, lip swelling and redness or purulence at vascular access sites. I/v access Management:    · Continue to monitor i.v access sites for erythema, induration, discharge or tenderness. · As always, continue efforts to minimize tubes/lines/drains as clinically appropriate to reduce chances of line associated infections.       Level of complexity of visit: High     Risk of Complications/Morbidity: High     · Illness(es)/ Infection present that pose threat to life/bodily function. · There is potential for severe exacerbation of infection/side effects of treatment. · Therapy requires intensive monitoring for antimicrobial agent toxicity. Thank you for involving me in the care of your patient. I will continue to follow. If you have anyadditional questions, please do not hesitate to contact me. Subjective: Interval history: Interval history was obtained from chart review and RN. He has been extubated earlier today. He is on 10 L oxygen via high flow nasal cannula. Has been tolerating antibiotics okay. REVIEW OF SYSTEMS:   Review of Systems   Unable to perform ROS: Acuity of condition       Past Medical History: All past medical history reviewed today. Past Medical History:   Diagnosis Date    Arthritis     B12 deficiency 05/2014    COVID-19 06/25/2020    Hyperlipidemia        Past Surgical History: All past surgical history was reviewed today. Past Surgical History:   Procedure Laterality Date    BACK SURGERY      CAROTID ENDARTERECTOMY Left     COLONOSCOPY      UPPER GASTROINTESTINAL ENDOSCOPY  07/12/14    Minimal chronic gastritis       Family History: All family history was reviewed today. Problem Relation Age of Onset    Cancer Neg Hx        Objective:       PHYSICAL EXAM:      Vitals:   Vitals:    07/14/20 1300 07/14/20 1400 07/14/20 1605 07/14/20 1705   BP: (!) 135/59 (!) 141/68 133/60 (!) 149/56   Pulse: 95 94 89 94   Resp: (!) 31 (!) 32 (!) 32 (!) 36   Temp:       TempSrc:       SpO2: 93% 94% 96% 94%   Weight:       Height:           Physical Exam      PHYSICAL EXAM:     In-person bedside physical examination deferred.   Pursuant to the emergency declaration under the [facility], [address] [place] [address] authority and the [employer] Act, this clinical encounter was conducted to provide necessary medical care. (Also consistent with new provisions and guidance offered by [facility] on March 18, 2020 in setting of COVID 19 outbreak and in order to preserve personal protective equipment in accordance with the flexibilities announced by CMS on March 30, 2020)   References: [email or web address]/Portals/0/Resources/COVID-19/3_18%20Telemed%20Guidance%20Updated%20March%2018. pdf?[email or web address]                      [email or web address] [email or web address]/Portals/0/Resources/COVID-19/3_18%20Telemed%20Guidance%20Updated%20March%2018. pdf?[email or web address]                      http://[employer]!/. pdf                            General: Awake, has been extubated, on high flow nasal cannula per RN  HEENT: Deferred  Cardiovascular: Telemetry data reviewed, rest deferred   Pulmonary: deferred  Abdomen/GI: deferred  Neuro: Awake, moves all extremities according to primary service  Skin: deferred  Musculoskeletal:  deferred  Genitourinary: Deferred  Psych: deferred  Lymphatic/Immunologic: deferred       Intake and output:    I/O last 3 completed shifts: In: 2926 [I.V.:1382.5; NG/GT:997; IV Piggyback:546.5]  Out: 9080 [Urine:3190]    Lab Data:   All available labs and old records have been reviewed by me. CBC:  Recent Labs     07/12/20  0540  07/13/20  0530 07/13/20  1230 07/14/20  0500   WBC 22.9*  --  20.4*  --  15.4*   RBC 4.20  --  4.03*  --  4.01*   HGB 12.0*   < > 11.6* 11.2* 11.5*   HCT 37.0*   < > 35.3* 34.4* 35.3*     --  219  --  229   MCV 88.1  --  87.7  --  88.0   MCH 28.6  --  28.7  --  28.7   MCHC 32.5  --  32.7  --  32.6   RDW 14.0  --  14.4  --  14.4    < > = values in this interval not displayed.         BMP:  Recent Labs     07/12/20  0540 07/13/20  0530 07/14/20  0500   * 135* 132*   K 2.7* 3.3* 3.3*   CL 94* 99 94*   CO2 26 27 29   BUN 40* 36* 31*   CREATININE 0.9 0.9 0.7*   CALCIUM 7.9* 7. 4* 7.5*   GLUCOSE 142* 117* 117*        Hepatic Function Panel:   Lab Results   Component Value Date    ALKPHOS 88 07/14/2020    ALT 69 07/14/2020    AST 82 07/14/2020    PROT 5.3 07/14/2020    BILITOT 0.6 07/14/2020    BILIDIR 0.3 07/14/2020    IBILI 0.3 07/14/2020    LABALBU 2.4 07/14/2020       CPK:   Lab Results   Component Value Date    CKTOTAL 269 07/07/2020     ESR:   Lab Results   Component Value Date    SEDRATE 4 09/17/2018     CRP: No results found for: CRP        Imaging: All pertinent images and reports for the current visit were reviewed by me during this visit. XR CHEST PORTABLE   Final Result   1. Stable low lung volumes with bilateral lung infiltrates, compatible with   pneumonia. VL Extremity Venous Bilateral   Final Result      XR CHEST PORTABLE   Final Result   Stable appropriate life support device positioning. No substantial change in bilateral airspace disease. XR CHEST PORTABLE   Final Result   1. Stable chest with bilateral lung infiltrates. Findings are concerning for   pulmonary edema versus pneumonia. XR CHEST PORTABLE   Final Result   Patchy bilateral airspace disease, slightly increased as compared to prior. XR CHEST PORTABLE   Final Result   Improving bilateral pneumonia versus edema. XR CHEST 1 VW   Final Result   Increasing patchy opacities throughout both lungs, most compatible with   multifocal pneumonia. Component of pulmonary edema should be considered. XR ABDOMEN (KUB) (SINGLE AP VIEW)   Final Result   1. Indeterminate bowel gas pattern         XR CHEST PORTABLE   Final Result   Stable bilateral pneumonia. XR CHEST PORTABLE   Final Result   Stable central ground-glass airspace opacities. Supportive devices are   unchanged. XR CHEST PORTABLE   Final Result   The endotracheal tube tip is approximately 3 cm above the thai.          XR CHEST PORTABLE   Final Result   Mild increase in the amount of bibasilar opacity since yesterday. Large   bilateral pneumonias. XR CHEST PORTABLE   Final Result   Stable bilateral pulmonary opacities. XR CHEST PORTABLE   Final Result   Minimal increase in the amount of opacity in each lung consistent with slight   worsening of bilateral pneumonia. XR CHEST PORTABLE   Final Result   Supportive tubing is in normal position. Low lung volume study, with stable alveolar opacities in the mid and lower   lungs, pneumonia versus atelectasis. XR CHEST PORTABLE   Final Result   Increased in degree and extent of bilateral mid and lower lung pneumonia. The increased may be partially accentuated by low lung volumes. XR CHEST 1 VW   Final Result   Persistent bilateral airspace disease, similar to prior. IR PICC WO SQ PORT/PUMP > 5 YEARS   Final Result   Successful placement of PICC line. XR CHEST PORTABLE   Final Result   Appropriate endotracheal tube positioning. Multifocal airspace opacities and areas of atelectasis correlate with recent   CT chest.         XR ABDOMEN FOR NG/OG/NE TUBE PLACEMENT   Final Result   NG tube terminates over the stomach. CT CHEST PULMONARY EMBOLISM W CONTRAST   Final Result   1. No pulmonary embolism. 2. Multifocal pneumonia scattered throughout both lungs with minimal pleural   effusions. XR CHEST STANDARD (2 VW)   Final Result   No radiographic evidence of acute pulmonary disease. XR CHEST PORTABLE    (Results Pending)       Medications: All current and past medications were reviewed.      potassium bicarb-citric acid  40 mEq Oral Daily    furosemide  40 mg Intravenous Daily    meropenem  1 g Intravenous Q8H    acyclovir  5 mg/kg (Adjusted) Intravenous Q8H    pantoprazole  40 mg Intravenous BID    linezolid  600 mg Intravenous Q12H    insulin lispro  0-12 Units Subcutaneous Q4H    chlorhexidine  15 mL Mouth/Throat BID    ketotifen  1 drop Both Eyes BID    rosuvastatin  10 mg Oral Daily    aspirin  81 mg Oral Daily    sodium chloride flush  10 mL Intravenous 2 times per day        heparin (porcine) 14.8 mL/hr (07/14/20 0231)    propofol 25 mcg/kg/min (07/14/20 0657)    dextrose         ipratropium-albuterol, heparin (porcine), heparin (porcine), fentanNYL, sennosides-docusate sodium, glucose, dextrose, glucagon (rDNA), dextrose, midazolam, tetrahydrozoline, aluminum & magnesium hydroxide-simethicone, ondansetron, sodium chloride flush, acetaminophen **OR** acetaminophen      Problem list:       Patient Active Problem List   Diagnosis Code    Multifocal pneumonia J18.9    Orthostasis I95.1    Pneumonia due to COVID-19 virus U07.1, J12.89    Sepsis ([facility] [address] A41.9    Urinary tract infection without hematuria N39.0    ARDS (adult respiratory distress syndrome) ([facility] [address] J80    Hyperlipidemia E78.5    Acute respiratory failure with hypoxia (HCC) J96.01    Class 1 obesity due to excess calories with body mass index (BMI) of 31.0 to 31.9 in adult E66.09, Z68.31    Peripherally inserted central catheter (PICC) in place Z45.2    Asymptomatic bacteriuria R82.71    Endotracheally intubated Z97.8    On mechanically assisted ventilation (HCC) Z99.11    Herpes zoster without complication I86.4    Fever R50.9    Arm edema R60.0    Hypertriglyceridemia E78.1    COVID-19 U07.1    Acute kidney injury ([facility] [address] N17.9    Bright red blood per rectum K62.5    Fatigue R53.83    Leukocytosis D72.829       Please note that this chart was generated using Dragon dictation software. Although every effort was made to ensure the accuracy of this automated transcription, some errors in transcription may have occurred inadvertently. If you may need any clarification, please do not hesitate to contact me through EPIC or at the phone number provided below with my electronic signature.   Any pictures or media included in this note were obtained after taking informed verbal consent from the patient and with their approval to include those in the patient's medical record.     Wei Taylor MD, MPH  7/14/2020 , 5:31 PM   [facility] Infectious Disease   Office: [phone]  Fax: [phone]  Tuesday AM clinic:   [address] [place] [address] 120  Thursday AM [id number] [place], [facility]

## 2020-07-14 NOTE — PROGRESS NOTES
Shift Summary    9936 Shift assessment completed and documented. Tube feeding off for SBT. Awake and follows commands. 0800 SBT begun. 0900 Multidisciplinary rounds with Dr. Mar Wei and team. Labs reviewed. Events of last 24 hours reviewed. Continuing SBT with venous blood gas at 1100 to determine extubation today. Is awake and following commands. 1100 Venous blood gas from distal port of PICC line drawn and sent to lab. Incontinent of large amt of stool. Pericare and medeiros care with chlorhexidine provided. 1255 Order received earlier from Dr. Mar Wei to extubate. Successfully extubated by RAYMOND Desouza, RT to 11L/min/high flow O2. Bilateral wrist restraints removed. 1325 States arms are \"too heavy to lift right now. \" Is moving legs, toes without difficulty. Daughter updated on condition and extubation. 1600 As charted. More talkative, voice becoming stronger. Shaved by RN. [address] with daughter, Don Peraza who she was and had a short conversation. Does not remember events of the last few weeks, yet able to tell his daughter that he loves her. ..  1800 PTT within range at 1645 lab draw. No changes to infusion. Remains on 10L/O2/high flow nasal cannula.   1900 Shift report and care hand off to Kimberley Sibley RN

## 2020-07-14 NOTE — PROGRESS NOTES
07/13/20 2253   Vent Information   Skin Assessment Clean, dry, & intact   Vent Type 840   Vent Mode AC/VC   Vt Ordered 400 mL   Rate Set 16 bmp   Peak Flow 75 L/min   Pressure Support 0 cmH20   FiO2  50 %   SpO2 95 %   SpO2/FiO2 ratio 190   Sensitivity 2   PEEP/CPAP 5   I Time/ I Time % 0 s   Humidification Source Heated wire   Humidification Temp 37   Humidification Temp Measured 37   Circuit Condensation Drained   Vent Patient Data   High Peep/I Pressure 0   Peak Inspiratory Pressure 17 cmH2O   Mean Airway Pressure 6.7 cmH20   Rate Measured 34 br/min   Vt Exhaled 411 mL   Minute Volume 14.3 Liters   I:E Ratio 1.00:1   Cough/Sputum   Sputum How Obtained Endotracheal;Suctioned   Cough Productive   Sputum Amount Moderate   Sputum Color Creamy; Red   Tenacity Thick   Spontaneous Breathing Trial (SBT) RT Doc   Pulse 82   Breath Sounds   Right Upper Lobe Diminished   Right Middle Lobe Diminished   Right Lower Lobe Diminished   Left Upper Lobe Diminished   Left Lower Lobe Diminished   Additional Respiratory  Assessments   Resp 30   Position Semi-Harris's   Alarm Settings   High Pressure Alarm 40 cmH2O   Low Minute Volume Alarm 5 L/min   Apnea (secs) 20 secs   High Respiratory Rate 40 br/min   Patient Observation   Observations ETT SIZE 8.0, SECURED AT 24 LIP LINE. AMBU BAG AT HEAD OF BED. WATER BAG GOOD. ETT (adult)   Placement Date/Time: 06/29/20 0230   Preoxygenation: Yes  Mask Ventilation: Ventilated by mask (1)  Technique: Video laryngoscopy  Type: Cuffed  Tube Size: 8 mm  Laryngoscope: GlideScope  Blade Size: 4  Location: Oral  Insertion attempts: 1  Placement. ..    Secured at 24 cm   Measured From Lips   ET Placement Left   Secured By Commercial tube leblanc   Site Condition Dry

## 2020-07-14 NOTE — PROGRESS NOTES
RESPIRATORY THERAPY ASSESSMENT    Name:  Sathya Hobson  Medical Record Number:  [id number]  Age: 82 y.o. Gender: male  : 1935  Today's Date:  2020  Room:  3013/3013-01    Assessment     Is the patient being admitted for a COPD or Asthma exacerbation? No   (If yes the patient will be seen every 4 hours for the first 24 hours and then reassessed)    Patient Admission Diagnosis      Allergies  Allergies   Allergen Reactions    Buspar [Buspirone] Other (See Comments)     Face,face numb    Codeine Other (See Comments)     Face,lips numb    Methylphenidate Other (See Comments)     Face numbness    Ritalin [Methylphenidate Hcl] Other (See Comments)     Face. lips numb    Zoloft [Sertraline Hcl] Other (See Comments)     Numbness of lips       Minimum Predicted Vital Capacity:               Actual Vital Capacity:                    Pulmonary History: PNA. Home Oxygen Therapy:  room air  Home Respiratory Therapy:None   Current Respiratory Therapy:  Duoneb Q4H. Treatment Type: N  Medications: Albuterol/Ipratropium    Respiratory Severity Index(RSI)   Patients with orders for inhalation medications, oxygen, or any therapeutic treatment modality will be placed on Respiratory Protocol. They will be assessed with the first treatment and at least every 72 hours thereafter. The following severity scale will be used to determine frequency of treatment intervention. Smoking History: Pulmonary Disease or Smoking History, Greater than 15 pack year = 2    Social History  Social History     Tobacco Use    Smoking status: Former Smoker     Packs/day: 1.00     Years: 25.00     Pack years: 25.00     Types: Cigarettes     Start date: 1990    Smokeless tobacco: Never Used   Substance Use Topics    Alcohol use:  Yes     Alcohol/week: 1.0 standard drinks     Types: 1 Cans of beer per week     Comment: occasional    Drug use: No       Recent Surgical History: None = 0  Past Surgical History  Past Surgical History:   Procedure Laterality Date    BACK SURGERY      CAROTID ENDARTERECTOMY Left     COLONOSCOPY      UPPER GASTROINTESTINAL ENDOSCOPY  07/12/14    Minimal chronic gastritis       Level of Consciousness: Comatose = 4    Level of Activity: Bedridden, unresponsive or quadriplegic = 4    Respiratory Pattern: Regular Pattern; RR 8-20 = 0    Breath Sounds: Diminshed bilaterally and/or crackles = 2    Sputum  Sputum Color: Creamy, Tenacity: Thick, Sputum How Obtained: Endotracheal, Suctioned  Cough: Strong, productive = 1    Vital Signs   BP (!) 127/57   Pulse 86   Temp 99.9 °F (37.7 °C) (Bladder)   Resp (!) 37   Ht 5' 7\" (1.702 m)   Wt 191 lb 5.8 oz (86.8 kg)   SpO2 96%   BMI 29.97 kg/m²   SPO2 (COPD values may differ): 86-87% on room air or greater than 92% on FiO2 35- 50% = 3    Peak Flow (asthma only): not applicable = 0    RSI: 00-99 = Q4 (every four hours)        Plan       Goals: medication delivery    Patient/caregiver was educated on the proper method of use for Respiratory Care Devices:        Level of patient/caregiver understanding able to:   ? Verbalize understanding   ? Demonstrate understanding       ? Teach back        ? Needs reinforcement       ? No available caregiver               ? Other:     Response to education:       Is patient being placed on Home Treatment Regimen? No     Does the patient have everything they need prior to discharge? NA     Comments: Chart reviewed and patient assessed. Plan of Care: Duoneb Q4H. Electronically signed by Rashmi Melgar RCP on 7/14/2020 at 4:04 AM    Respiratory Protocol Guidelines     1. Assessment and treatment by Respiratory Therapy will be initiated for medication and therapeutic interventions upon initiation of aerosolized medication. 2. Physician will be contacted for respiratory rate (RR) greater than 35 breaths per minute.  Therapy will be held for heart rate (HR) greater than 140 beats per minute, pending direction from physician. 3. Bronchodilators will be administered via Metered Dose Inhaler (MDI) with spacer when the following criteria are met:  a. Alert and cooperative     b. HR < 140 bpm  c. RR < 30 bpm                d. Can demonstrate a 2-3 second inspiratory hold  4. Bronchodilators will be administered via Hand Held Nebulizer [facility]) to patients when ANY of the following criteria are met  a. Incognizant or uncooperative          b. Patients treated with HHN at Home        c. Unable to demonstrate proper use of MDI with spacer     d. RR > 30 bpm   5. Bronchodilators will be delivered via Metered Dose Inhaler (MDI), HHN, Aerogen to intubated patients on mechanical ventilation. 6. Inhalation medication orders will be delivered and/or substituted as outlined below. Aerosolized Medications Ordering and Administration Guidelines:    1. All Medications will be ordered by a physician, and their frequency and/or modality will be adjusted as defined by the patients Respiratory Severity Index (RSI) score. 2. If the patient does not have documented COPD, consider discontinuing anticholinergics when RSI is less than 9.  3. If the bronchospasm worsens (increased RSI), then the bronchodilator frequency can be increased to a maximum of every 4 hours. If greater than every 4 hours is required, the physician will be contacted. 4. If the bronchospasm improves, the frequency of the bronchodilator can be decreased, based on the patient's RSI, but not less than home treatment regimen frequency. 5. Bronchodilator(s) will be discontinued if patient has a RSI less than 9 and has received no scheduled or as needed treatment for 72  Hrs. Patients Ordered on a Mucolytic Agent:    1. Must always be administered with a bronchodilator. 2. Discontinue if patient experiences worsened bronchospasm, or secretions have lessened to the point that the patient is able to clear them with a cough.     Anti-inflammatory and Combination Medications:    1. If the patient lacks prior history of lung disease, is not using inhaled anti-inflammatory medication at home, and lacks wheezing by examination or by history for at least 24 hours, contact physician for possible discontinuation.

## 2020-07-14 NOTE — PROGRESS NOTES
0087)    propofol 25 mcg/kg/min (20 0657)    dextrose         Vitals:  Temp  Av °F (37.8 °C)  Min: 99.7 °F (37.6 °C)  Max: 100.2 °F (37.9 °C)  Pulse  Av.9  Min: 79  Max: 93  BP  Min: 104/55  Max: 158/67  SpO2  Av.6 %  Min: 92 %  Max: 99 %  FiO2   Av %  Min: 50 %  Max: 50 %  Patient Vitals for the past 4 hrs:   BP Temp Temp src Pulse Resp SpO2 Weight   20 0700 (!) 125/56 -- Bladder 90 (!) 36 93 % --   20 0600 (!) 142/61 -- -- 90 24 95 % --   20 0500 (!) 120/53 -- -- 91 (!) 37 94 % --   20 0400 131/63 100 °F (37.8 °C) Bladder 93 (!) 31 95 % 189 lb 9.5 oz (86 kg)       CVP:        Intake/Output Summary (Last 24 hours) at 2020 0754  Last data filed at 2020 0426  Gross per 24 hour   Intake 2641 ml   Output 2125 ml   Net 516 ml       Physical Exam:  General:  Pt is awake , very fatigued off vent   Neck:  JVD absent. Neck supple  Chest: Diminished breath sounds . No wheezes or rhonchi   Cardiovascular:  RRR ,S1S2 normal  Abdomen:  Soft, non tender, non distended, BS +  Extremities: edema has significantly decreased   Intact peripheral pulses.  Brisk cap refill, < 2 secs  Rash on trunk and ext improving   Neuro:awake, drowsy , following simple commands            Medications:  Scheduled Meds:   potassium bicarb-citric acid  40 mEq Oral Daily    furosemide  40 mg Intravenous Daily    meropenem  1 g Intravenous Q8H    acyclovir  5 mg/kg (Adjusted) Intravenous Q8H    pantoprazole  40 mg Intravenous BID    linezolid  600 mg Intravenous Q12H    insulin lispro  0-12 Units Subcutaneous Q4H    chlorhexidine  15 mL Mouth/Throat BID    ipratropium-albuterol  1 ampule Inhalation Q4H    ketotifen  1 drop Both Eyes BID    rosuvastatin  10 mg Oral Daily    aspirin  81 mg Oral Daily    sodium chloride flush  10 mL Intravenous 2 times per day       PRN Meds:  heparin (porcine), heparin (porcine), fentanNYL, sennosides-docusate sodium, glucose, dextrose, glucagon (rDNA), dextrose, midazolam, tetrahydrozoline, aluminum & magnesium hydroxide-simethicone, ondansetron, sodium chloride flush, acetaminophen **OR** acetaminophen    Results:  CBC:   Recent Labs     07/12/20  0540  07/13/20  0530 07/13/20  1230 07/14/20  0500   WBC 22.9*  --  20.4*  --  15.4*   HGB 12.0*   < > 11.6* 11.2* 11.5*   HCT 37.0*   < > 35.3* 34.4* 35.3*   MCV 88.1  --  87.7  --  88.0     --  219  --  229    < > = values in this interval not displayed. BMP:   Recent Labs     07/12/20  0540 07/13/20  0530 07/14/20  0500   * 135* 132*   K 2.7* 3.3* 3.3*   CL 94* 99 94*   CO2 26 27 29   BUN 40* 36* 31*   CREATININE 0.9 0.9 0.7*     LIVER PROFILE:   Recent Labs     07/12/20  0540 07/13/20  0530 07/14/20  0500   AST 71* 99* 82*   ALT 51* 65* 69*   BILIDIR 0.4* 0.3 0.3   BILITOT 0.9 0.6 0.6   ALKPHOS 68 94 88     PT/INR:   Recent Labs     07/12/20  0540 07/13/20  0530   PROTIME 12.3 12.4   INR 1.06 1.07     Results for NEWTON BOWENS T (MRN [id number]) as of 7/1/2020 09:46   Ref. Range 6/24/2020 21:35 6/26/2020 15:24 6/30/2020 04:20   D-Dimer, Quant Latest Ref Range: 0 - 229 ng/mL  (H) 682 (H) 653 (H)     Results for Keira Sterling T (MRN [id number]) as of 7/1/2020 09:46   Ref. Range 6/24/2020 21:35   Ferritin Latest Ref Range: 30.0 - 400.0 ng/mL 97.3       Cultures:  Results for Martina BOWENS (MRN [id number]) as of 6/30/2020 09:20   Ref. Range 6/24/2020 22:00   SARS-CoV-2, PCR Latest Ref Range: Not Detected  DETECTED (A)     Susceptibility     Staphylococcus epidermidis (1)     Antibiotic Interpretation TARA Status    ciprofloxacin Sensitive <=0.5 mcg/mL     nitrofurantoin Sensitive <=16 mcg/mL     oxacillin Sensitive <=0.25 mcg/mL     tetracycline Sensitive <=1 mcg/mL     trimethoprim-sulfamethoxazole Sensitive <=10 mcg/mL           Films:    XR CHEST PORTABLE   Preliminary Result   1. Stable low lung volumes with bilateral lung infiltrates, compatible with   pneumonia.          VL Extremity Venous Bilateral         XR CHEST PORTABLE   Final Result   Stable appropriate life support device positioning. No substantial change in bilateral airspace disease. XR CHEST PORTABLE   Final Result   1. Stable chest with bilateral lung infiltrates. Findings are concerning for   pulmonary edema versus pneumonia. XR CHEST PORTABLE   Final Result   Patchy bilateral airspace disease, slightly increased as compared to prior. XR CHEST PORTABLE   Final Result   Improving bilateral pneumonia versus edema. XR CHEST 1 VW   Final Result   Increasing patchy opacities throughout both lungs, most compatible with   multifocal pneumonia. Component of pulmonary edema should be considered. XR ABDOMEN (KUB) (SINGLE AP VIEW)   Final Result   1. Indeterminate bowel gas pattern         XR CHEST PORTABLE   Final Result   Stable bilateral pneumonia. XR CHEST PORTABLE   Final Result   Stable central ground-glass airspace opacities. Supportive devices are   unchanged. XR CHEST PORTABLE   Final Result   The endotracheal tube tip is approximately 3 cm above the thai. XR CHEST PORTABLE   Final Result   Mild increase in the amount of bibasilar opacity since yesterday. Large   bilateral pneumonias. XR CHEST PORTABLE   Final Result   Stable bilateral pulmonary opacities. XR CHEST PORTABLE   Final Result   Minimal increase in the amount of opacity in each lung consistent with slight   worsening of bilateral pneumonia. XR CHEST PORTABLE   Final Result   Supportive tubing is in normal position. Low lung volume study, with stable alveolar opacities in the mid and lower   lungs, pneumonia versus atelectasis. XR CHEST PORTABLE   Final Result   Increased in degree and extent of bilateral mid and lower lung pneumonia. The increased may be partially accentuated by low lung volumes.          XR CHEST 1 VW   Final Result   Persistent bilateral airspace disease, similar to prior. IR PICC WO SQ PORT/PUMP > 5 YEARS   Final Result   Successful placement of PICC line. XR CHEST PORTABLE   Final Result   Appropriate endotracheal tube positioning. Multifocal airspace opacities and areas of atelectasis correlate with recent   CT chest.         XR ABDOMEN FOR NG/OG/NE TUBE PLACEMENT   Final Result   NG tube terminates over the stomach. CT CHEST PULMONARY EMBOLISM W CONTRAST   Final Result   1. No pulmonary embolism. 2. Multifocal pneumonia scattered throughout both lungs with minimal pleural   effusions. XR CHEST STANDARD (2 VW)   Final Result   No radiographic evidence of acute pulmonary disease. XR CHEST PORTABLE    (Results Pending)          Assessment and Plan:    Acute hypoxic respiratory failure due to COVID-19 infection   ARDS    - intubated, on [facility] vent  - s.p remdesivir and convalescent plasma   - improving very slowly   - Reintubated on 7/6 d/t displacement of existing ETT  - 7/14 , extubated to NC high flow  - might need bipap overnight  - wean as able      COVID -19 PNA  - s/p Decadron - 10 days. - Completed 10 days of Remdesivir on 7/7/2020  - S/P transfusion of 2 units of convalescent plasma on 7/2/2020  - imaging with ARDS initially and now improving   - off vent now     Sepsis  - POA, due to PNA, COVID 19  - with leukocytosis, fevers, tachypnea  - COVID-19 detected  -Spiking fevers again  -Antibiotics adjusted per infectious disease  Currently on acyclovir, continued on IV Zyvox and Zosyn      Multifocal pneumonia  - related to COVID-19.    - Tracheal aspirate sent. - was on  broad-spectrum antibiotics, vanc and  Cefepime--> now off .    - On IV Zyvox and Zosyn now      Possible Shingles  Distrubution of rash concerning for drug eruption vs covid related vs  leukocytoclastic vasculitis.    - generalized involvement of trunk, torso, and extremities without classic vesicular lesions and

## 2020-07-14 NOTE — PROGRESS NOTES
Pulmonary & Critical Care Medicine ICU Progress Note      CC: Acute hypoxemic respiratory failure    Events of Last 24 hours:   No bleed overnight with stable H&H   FiO2 50% and PEEP 5  Plateau pressure not measured. Only PRN sedation    Intermittent PVCs         Invasive Lines: IV: PICC     MV:   Vent Mode: AC/VC Rate Set: 16 bmp/Vt Ordered: 400 mL/ /FiO2 : 50 %  Recent Labs     20  0615 20  0534   PHART 7.500* 7.530*   QDM3RPT 32.2* 32.7*   PO2ART 74.5* 82.5       IV:   heparin (porcine) 13.3 mL/hr (20 2334)    propofol 25 mcg/kg/min (20 2350)    dextrose         Vitals:  Blood pressure (!) 142/61, pulse 90, temperature 100 °F (37.8 °C), temperature source Bladder, resp. rate 24, height 5' 7\" (1.702 m), weight 189 lb 9.5 oz (86 kg), SpO2 95 %. on vent   Temp  Av °F (37.8 °C)  Min: 99.7 °F (37.6 °C)  Max: 100.2 °F (37.9 °C)    Intake/Output Summary (Last 24 hours) at 2020 0645  Last data filed at 2020 0426  Gross per 24 hour   Intake 2641 ml   Output 2125 ml   Net 516 ml     EXAM:  Blood pressure (!) 142/61, pulse 90, temperature 100 °F (37.8 °C), temperature source Bladder, resp. rate 24, height 5' 7\" (1.702 m), weight 189 lb 9.5 oz (86 kg), SpO2 95 %.' on vent  Constitutional:  No acute distress. NCAT  Eyes: No sclera icterus. EOM intact. No visible discharge. HENT: Head is normocephalic and atraumatic. Mucus membranes are moist and the tongue appears normal. Normal appearing nose. External Ears normal.   Neck: No visualized mass. G[place]and Endo is midline   Resp: + accessory muscle use. Respiratory effort increased. Cardiovascular: No LE edema ; no JVD  Musculoskeletal: No signs of ataxia. Normal range of motion of the neck.   Skin: No significant exanthematous lesions or discoloration noted on facial skin    Neuro: awake and follows commands      Scheduled Meds:   heparin (porcine)  3,000 Units Intravenous Once    potassium bicarb-citric acid  40 mEq Oral Daily    furosemide  40 mg Intravenous Daily    meropenem  1 g Intravenous Q8H    acyclovir  5 mg/kg (Adjusted) Intravenous Q8H    pantoprazole  40 mg Intravenous BID    linezolid  600 mg Intravenous Q12H    insulin lispro  0-12 Units Subcutaneous Q4H    chlorhexidine  15 mL Mouth/Throat BID    ipratropium-albuterol  1 ampule Inhalation Q4H    ketotifen  1 drop Both Eyes BID    rosuvastatin  10 mg Oral Daily    aspirin  81 mg Oral Daily    sodium chloride flush  10 mL Intravenous 2 times per day     PRN Meds:  heparin (porcine), heparin (porcine), fentanNYL, sennosides-docusate sodium, glucose, dextrose, glucagon (rDNA), dextrose, midazolam, tetrahydrozoline, aluminum & magnesium hydroxide-simethicone, ondansetron, sodium chloride flush, acetaminophen **OR** acetaminophen    Results:  CBC:   Recent Labs     07/12/20  0540  07/13/20  0530 07/13/20  1230 07/14/20  0500   WBC 22.9*  --  20.4*  --  15.4*   HGB 12.0*   < > 11.6* 11.2* 11.5*   HCT 37.0*   < > 35.3* 34.4* 35.3*   MCV 88.1  --  87.7  --  88.0     --  219  --  229    < > = values in this interval not displayed. BMP:   Recent Labs     07/12/20  0540 07/13/20  0530 07/14/20  0500   * 135* 132*   K 2.7* 3.3* 3.3*   CL 94* 99 94*   CO2 26 27 29   BUN 40* 36* 31*   CREATININE 0.9 0.9 0.7*     LIVER PROFILE:   Recent Labs     07/12/20  0540 07/13/20  0530 07/14/20  0500   AST 71* 99* 82*   ALT 51* 65* 69*   BILIDIR 0.4* 0.3 0.3   BILITOT 0.9 0.6 0.6   ALKPHOS 68 94 88       Cultures:  6/24 Urine Staphylococcus epidermidis  6/24 COVID 19 +  6/29 Trach Asp NRF  7/2 respiratory NRF  7/3 BC NGTD  7/6 UC NGTD  7/7 Resp CX NRF   7/7 BC NGTD   7/7 UC NGTD         Films:  CXR 7/14 was reviewed by me and it showed   Similar Bilateral patchy airspace opacities, ETT in place        ASSESSMENT:  · Acute hypoxemic respiratory failure  · BRBPR ?  LGIB-none over past 48 hours   · Elevated d-dimer with UE edema- rule out DVT   · Hypertriglyceridemia- normalized -? Lab erros  · ARDS  · Leukocytosis -  · New maculopapular rash abdomen and extremities-favor drug reaction. Zosyn stopped 7/11/2020 with improvement today   · COVID-19 viral multifocal pneumonia. Post 2 units of CCP 7/2/2020. Completed 10 days of Remdesevir  7/7/2020  · Shingles  · Frequent PVCs   · ET tube displacement with emergent reintubation 7/7/2020 and brief hypoxia down to the 70s          PLAN:  Mechanical ventilation as per my orders. The ventilator was adjusted by me at the bedside for unstable, life threatening respiratory failure  Fentanyl and Versed PRN, gtt as needed  Head of bed 30 degrees or higher at all times  Spontaneous breathing trial for possible extubation in a.m. Zyvox D#8  Meropenem D#4 and IV acyclovir per ID D#9. Completed 4 days of Zosyn stopped due to rash   Electrolytes replacement   Continue Lasix 40 IV daily  UE doppler rule out DVT negative 7/13   Echo given frequent PVCs    heparin drip   GI following   Nutrition: Tube feeding at target   Blood sugar control, with goal 150-180  GI prophylaxis: Protonix twice daily  DVT prophylaxis:  Heparin drip   MRSA prophylaxis: Bactroban   Code status: Full code   Zhang Ferguson, daughter [phone]      Patient is critically ill with acute respiratory failure. Critical care time spent reviewing labs/films, examining patient, collaborating with other physicians but excluding procedures for life threatening organ failure is 31 minutes.

## 2020-07-14 NOTE — PROGRESS NOTES
07/14/20 0318   Vent Information   Vent Type 840   Vent Mode AC/VC   Vt Ordered 400 mL   Rate Set 16 bmp   Peak Flow 75 L/min   Pressure Support 0 cmH20   FiO2  50 %   SpO2 96 %   SpO2/FiO2 ratio 192   Sensitivity 2   PEEP/CPAP 5   I Time/ I Time % 0 s   Humidification Source Heated wire   Humidification Temp 37   Humidification Temp Measured 37   Circuit Condensation Drained   Vent Patient Data   High Peep/I Pressure 0   Peak Inspiratory Pressure 17 cmH2O   Mean Airway Pressure 7.9 cmH20   Rate Measured 27 br/min   Vt Exhaled 398 mL   Minute Volume 11.3 Liters   I:E Ratio 1:1.70   Cough/Sputum   Sputum How Obtained Endotracheal;Suctioned   Cough Productive   Sputum Amount Moderate   Sputum Color Creamy   Tenacity Thick   Spontaneous Breathing Trial (SBT) RT Doc   Pulse 86   Breath Sounds   Right Upper Lobe Diminished   Right Middle Lobe Diminished   Right Lower Lobe Diminished   Left Upper Lobe Diminished   Left Lower Lobe Diminished   Additional Respiratory  Assessments   Resp (!) 31   Position Semi-Harris's   Alarm Settings   High Pressure Alarm 40 cmH2O   Low Minute Volume Alarm 5 L/min   Apnea (secs) 20 secs   High Respiratory Rate 40 br/min   Patient Observation   Observations ETT SIZE 8.0 SECURED AT 24 LIP LINE. AMBU BAG AT HEAD OF BED. WATER BAG GOOD. ETT (adult)   Placement Date/Time: 06/29/20 0230   Preoxygenation: Yes  Mask Ventilation: Ventilated by mask (1)  Technique: Video laryngoscopy  Type: Cuffed  Tube Size: 8 mm  Laryngoscope: GlideScope  Blade Size: 4  Location: Oral  Insertion attempts: 1  Placement. ..    Secured at 24 cm   Measured From Lips   ET Placement Left   Secured By Commercial tube leblanc   Site Condition Dry

## 2020-07-14 NOTE — PROGRESS NOTES
Pharmacy - RE:  High-dose Heparin drip  Current rate = 13.3 ml/h  (1330 units/h)  aPTT drawn @ 0500 = 47.9 sec. Goal aPTT = 49 - 76 sec. Per protocol, give a 3000 unit bolus and increase drip to 14.8 ml/h (1480 units/h). Obtain another aPTT 7/14 @ 1500.

## 2020-07-15 ENCOUNTER — APPOINTMENT (OUTPATIENT)
Dept: GENERAL RADIOLOGY | Age: 85
DRG: 870 | End: 2020-07-15
Payer: MEDICARE

## 2020-07-15 LAB
ALBUMIN SERPL-MCNC: 2.5 G/DL (ref 3.4–5)
ALP BLD-CCNC: 82 U/L (ref 40–129)
ALT SERPL-CCNC: 46 U/L (ref 10–40)
ANION GAP SERPL CALCULATED.3IONS-SCNC: 11 MMOL/L (ref 3–16)
APTT: 67.6 SEC (ref 24.2–36.2)
APTT: 68.3 SEC (ref 24.2–36.2)
AST SERPL-CCNC: 36 U/L (ref 15–37)
BILIRUB SERPL-MCNC: 0.9 MG/DL (ref 0–1)
BILIRUBIN DIRECT: 0.4 MG/DL (ref 0–0.3)
BILIRUBIN, INDIRECT: 0.5 MG/DL (ref 0–1)
BUN BLDV-MCNC: 25 MG/DL (ref 7–20)
CALCIUM SERPL-MCNC: 7.8 MG/DL (ref 8.3–10.6)
CHLORIDE BLD-SCNC: 96 MMOL/L (ref 99–110)
CO2: 28 MMOL/L (ref 21–32)
CREAT SERPL-MCNC: 0.7 MG/DL (ref 0.8–1.3)
GFR AFRICAN AMERICAN: >60
GFR NON-AFRICAN AMERICAN: >60
GLUCOSE BLD-MCNC: 105 MG/DL (ref 70–99)
GLUCOSE BLD-MCNC: 120 MG/DL (ref 70–99)
GLUCOSE BLD-MCNC: 237 MG/DL (ref 70–99)
GLUCOSE BLD-MCNC: 90 MG/DL (ref 70–99)
GLUCOSE BLD-MCNC: 99 MG/DL (ref 70–99)
HCT VFR BLD CALC: 35.5 % (ref 40.5–52.5)
HEMOGLOBIN: 11.4 G/DL (ref 13.5–17.5)
LV EF: 55 %
LVEF MODALITY: NORMAL
MCH RBC QN AUTO: 27.9 PG (ref 26–34)
MCHC RBC AUTO-ENTMCNC: 32.1 G/DL (ref 31–36)
MCV RBC AUTO: 87.1 FL (ref 80–100)
PDW BLD-RTO: 14.4 % (ref 12.4–15.4)
PERFORMED ON: ABNORMAL
PERFORMED ON: ABNORMAL
PERFORMED ON: NORMAL
PERFORMED ON: NORMAL
PLATELET # BLD: 256 K/UL (ref 135–450)
PMV BLD AUTO: 8.5 FL (ref 5–10.5)
POTASSIUM SERPL-SCNC: 3 MMOL/L (ref 3.5–5.1)
RBC # BLD: 4.08 M/UL (ref 4.2–5.9)
SODIUM BLD-SCNC: 135 MMOL/L (ref 136–145)
TOTAL PROTEIN: 5.4 G/DL (ref 6.4–8.2)
TRIGL SERPL-MCNC: 118 MG/DL (ref 0–150)
WBC # BLD: 14.3 K/UL (ref 4–11)

## 2020-07-15 PROCEDURE — 80076 HEPATIC FUNCTION PANEL: CPT

## 2020-07-15 PROCEDURE — 97163 PT EVAL HIGH COMPLEX 45 MIN: CPT

## 2020-07-15 PROCEDURE — 99291 CRITICAL CARE FIRST HOUR: CPT | Performed by: INTERNAL MEDICINE

## 2020-07-15 PROCEDURE — 84478 ASSAY OF TRIGLYCERIDES: CPT

## 2020-07-15 PROCEDURE — 2000000000 HC ICU R&B

## 2020-07-15 PROCEDURE — 2580000003 HC RX 258: Performed by: INTERNAL MEDICINE

## 2020-07-15 PROCEDURE — 6360000002 HC RX W HCPCS: Performed by: INTERNAL MEDICINE

## 2020-07-15 PROCEDURE — 99233 SBSQ HOSP IP/OBS HIGH 50: CPT | Performed by: INTERNAL MEDICINE

## 2020-07-15 PROCEDURE — 85730 THROMBOPLASTIN TIME PARTIAL: CPT

## 2020-07-15 PROCEDURE — 92526 ORAL FUNCTION THERAPY: CPT

## 2020-07-15 PROCEDURE — 97166 OT EVAL MOD COMPLEX 45 MIN: CPT

## 2020-07-15 PROCEDURE — 92610 EVALUATE SWALLOWING FUNCTION: CPT

## 2020-07-15 PROCEDURE — 94761 N-INVAS EAR/PLS OXIMETRY MLT: CPT

## 2020-07-15 PROCEDURE — 97112 NEUROMUSCULAR REEDUCATION: CPT

## 2020-07-15 PROCEDURE — 97110 THERAPEUTIC EXERCISES: CPT

## 2020-07-15 PROCEDURE — 85027 COMPLETE CBC AUTOMATED: CPT

## 2020-07-15 PROCEDURE — 93306 TTE W/DOPPLER COMPLETE: CPT

## 2020-07-15 PROCEDURE — C9113 INJ PANTOPRAZOLE SODIUM, VIA: HCPCS | Performed by: INTERNAL MEDICINE

## 2020-07-15 PROCEDURE — 97530 THERAPEUTIC ACTIVITIES: CPT

## 2020-07-15 PROCEDURE — 80048 BASIC METABOLIC PNL TOTAL CA: CPT

## 2020-07-15 PROCEDURE — 36592 COLLECT BLOOD FROM PICC: CPT

## 2020-07-15 PROCEDURE — 2700000000 HC OXYGEN THERAPY PER DAY

## 2020-07-15 RX ORDER — FUROSEMIDE 10 MG/ML
40 INJECTION INTRAMUSCULAR; INTRAVENOUS 2 TIMES DAILY
Status: DISCONTINUED | OUTPATIENT
Start: 2020-07-15 | End: 2020-07-20 | Stop reason: HOSPADM

## 2020-07-15 RX ORDER — POTASSIUM CHLORIDE 29.8 MG/ML
20 INJECTION INTRAVENOUS PRN
Status: DISCONTINUED | OUTPATIENT
Start: 2020-07-15 | End: 2020-07-20 | Stop reason: HOSPADM

## 2020-07-15 RX ADMIN — MEROPENEM 1 G: 1 INJECTION, POWDER, FOR SOLUTION INTRAVENOUS at 18:38

## 2020-07-15 RX ADMIN — Medication 10 ML: at 09:51

## 2020-07-15 RX ADMIN — POTASSIUM CHLORIDE 20 MEQ: 400 INJECTION, SOLUTION INTRAVENOUS at 13:15

## 2020-07-15 RX ADMIN — MEROPENEM 1 G: 1 INJECTION, POWDER, FOR SOLUTION INTRAVENOUS at 09:51

## 2020-07-15 RX ADMIN — POTASSIUM CHLORIDE 20 MEQ: 400 INJECTION, SOLUTION INTRAVENOUS at 12:23

## 2020-07-15 RX ADMIN — POTASSIUM CHLORIDE 20 MEQ: 400 INJECTION, SOLUTION INTRAVENOUS at 12:55

## 2020-07-15 RX ADMIN — Medication 10 ML: at 18:40

## 2020-07-15 RX ADMIN — PANTOPRAZOLE SODIUM 40 MG: 40 INJECTION, POWDER, FOR SOLUTION INTRAVENOUS at 20:57

## 2020-07-15 RX ADMIN — Medication 10 ML: at 12:27

## 2020-07-15 RX ADMIN — FUROSEMIDE 40 MG: 10 INJECTION, SOLUTION INTRAMUSCULAR; INTRAVENOUS at 09:51

## 2020-07-15 RX ADMIN — HEPARIN SODIUM 14.8 ML/HR: 10000 INJECTION, SOLUTION INTRAVENOUS at 12:23

## 2020-07-15 RX ADMIN — PANTOPRAZOLE SODIUM 40 MG: 40 INJECTION, POWDER, FOR SOLUTION INTRAVENOUS at 09:51

## 2020-07-15 RX ADMIN — KETOTIFEN FUMARATE 1 DROP: 0.35 SOLUTION/ DROPS OPHTHALMIC at 20:54

## 2020-07-15 RX ADMIN — FUROSEMIDE 40 MG: 10 INJECTION, SOLUTION INTRAMUSCULAR; INTRAVENOUS at 18:20

## 2020-07-15 RX ADMIN — TETRAHYDROZOLINE HCL 1 DROP: 0.05 SOLUTION/ DROPS OPHTHALMIC at 09:52

## 2020-07-15 RX ADMIN — ACYCLOVIR SODIUM 365 MG: 50 INJECTION, SOLUTION INTRAVENOUS at 00:19

## 2020-07-15 RX ADMIN — KETOTIFEN FUMARATE 1 DROP: 0.35 SOLUTION/ DROPS OPHTHALMIC at 09:52

## 2020-07-15 RX ADMIN — ACYCLOVIR SODIUM 365 MG: 50 INJECTION, SOLUTION INTRAVENOUS at 18:16

## 2020-07-15 RX ADMIN — ACYCLOVIR SODIUM 365 MG: 50 INJECTION, SOLUTION INTRAVENOUS at 08:21

## 2020-07-15 RX ADMIN — Medication 10 ML: at 10:06

## 2020-07-15 RX ADMIN — Medication 10 ML: at 20:57

## 2020-07-15 RX ADMIN — Medication 10 ML: at 16:54

## 2020-07-15 ASSESSMENT — PAIN SCALES - GENERAL
PAINLEVEL_OUTOF10: 0

## 2020-07-15 NOTE — PROGRESS NOTES
Inpatient Occupational Therapy  Evaluation and Treatment    Unit: ICU  Date:  7/15/2020  Patient Name:    Linda Velazquez  Admitting diagnosis:  Multifocal pneumonia [J18.9]  Admit Date:  6/24/2020  Precautions/Restrictions/WB Status/ Lines/ Wounds/ Oxygen: Fall risk, Bed/chair alarm, Lines -IV, Supplemental O2 (4L) and Fischer catheter and Isolation Precautions: Droplet Plus - COVID    Treatment Time:  4333-4538  Treatment Number: 1   Timed code treatment minutes 50 minutes   Total Treatment minutes:   60   minutes    Patient Goals for Therapy:  \" do whatever I have to do, got to try \"      Discharge Recommendations: SNF  DME needs for discharge: defer to facility       Therapy recommendations for staff:   Assist of 2 with for EOB, Assist of 2 with Kirstie Celestin for OOB    History of Present Illness: per Dr Caitlin Puckett H&P 6/25/20  \" The patient is a 82 y.o. male with PMH below, presents with SOB/PINEDA, fatigue, lightheadedness, dyspnea on exertion, fever. Patient reports that for the last few months he is had increasing problems with fatigue and lightheadedness. He reports he gets lightheaded especially when he stands up too fast.  He also describes dyspnea on exertion which appears to be worsening even with a few steps. He does not have shortness of breath at rest.  He says he feels like \"I am running out of gas much faster than I used to. \"  He reports that he went to a corn hole tournament last week. There were approximately 200 teams there. He does not know of any sick contacts but was in close contact with many people there. He also notes that he has had a little bit of chest pressure but denies that it is actually vinod pain. He denies urinary symptoms. He has had an occasional cough but does not believe it has been productive. He reports that he is normally fairly active and independent but has been less so the last several days. He reports he measured his temperature at 101.7 at home today.   His temp was 99.3 and 99.5 in the ER. \"    [id number] positive, intubated -, PICC        Home Health S4 Level Recommendation:  NA  AM-PAC Score: AM-PAC Inpatient Daily Activity Raw Score: 7    Preadmission Environment    Pt. Lives with spouse  Home environment:  unknown   Patient confused by orientation questions and requests to continue preadmission questions another day. Plans to go to [facility] after d/c. Preadmission Status:  Pt. Able to drive: Unknown  Pt Fully independent with ADLs: Unknown  Pt. Required assistance from family for: unknown  Pt. independent for transfers and gait and walked with No Device  History of falls No    Pain  Yes  Rating:moderate  Location:low back at EOB, digit extension/wrist extension positions  Pain Medicine Status: No request made , improved at rest     Cognition    A&O Person only (for  stated correct month and year but unable to state date), stated \"Cristal Lopez\" for place and required cues to reorient to correct place and current month/year, \"I'm all mixed up\"  Able to follow 1 step commands inconsistently    Subjective  Patient lying supine in bed with no family present. Pt agreeable to this OT eval & tx. Upper Extremity Strength/ROM:    BUEs edematous and with 1/5 to 2/5 strength, limited movement (digits only) against gravity, able to move through range with AAROM in a gravity reduced position    Upper Extremity Sensation    Impaired-hands with edema and some disturbed sensation, continue to assess    Upper Extremity Proprioception:  Impaired    Coordination and Tone  Impaired    Balance  Functional Sitting Balance:  Impaired - MAX A of 1-2 at EOB; sat EOB 7-10 min and returned to supine due to fatigue  Functional Standing Balance:NT    Bed mobility:    Supine to sit: Max A  and 2 persons  Sit to supine:   Max A  and 2 persons  Rolling: Max A or 1-2 B directions  Scooting in sitting:   Max A   Scooting to head of bed:   Max A  and 2 persons    Bridging:   Not goals include:  Complexity of condition, Weakness and Cognition     Plan: To be seen 3-5 x/wk while in acute care setting for therapeutic exercises, bed mobility, transfers, dressing, bathing, family/patient education, ADL/IADL retraining, energy conservation training.      Rachel Gabriel, OTR/L [id number]            If patient discharges from this facility prior to next visit, this note will serve as the Discharge Summary

## 2020-07-15 NOTE — PROGRESS NOTES
Pulmonary & Critical Care Medicine ICU Progress Note      CC: Acute hypoxemic respiratory failure    Events of Last 24 hours:   Patient extubated. Remains on HF O2. Invasive Lines: IV: PICC     MV: -  Vent Mode: AC/VC Rate Set: 0 bmp/Vt Ordered: 400 mL/ /FiO2 : 50 %  Recent Labs     20  0615 20  0534   PHART 7.500* 7.530*   [id number] 32.2* 32.7*   PO2ART 74.5* 82.5       IV:   heparin (porcine) 14.8 mL/hr (20 180)    propofol 25 mcg/kg/min (20 0657)    dextrose         Vitals:  Blood pressure 132/63, pulse 86, temperature 99.7 °F (37.6 °C), temperature source Bladder, resp. rate 30, height 5' 7\" (1.702 m), weight 189 lb 9.5 oz (86 kg), SpO2 94 %. On 8L-> 6l NC  Temp  Av.8 °F (37.7 °C)  Min: 99 °F (37.2 °C)  Max: 100.1 °F (37.8 °C)    Intake/Output Summary (Last 24 hours) at 7/15/2020 0801  Last data filed at 7/15/2020 0549  Gross per 24 hour   Intake 1291.49 ml   Output 2060 ml   Net -768.51 ml       Constitutional:  No acute distress. NCAT  Eyes: No sclera icterus. EOM intact. No visible discharge. HENT: Head is normocephalic and atraumatic. Mucus membranes are moist and the tongue appears normal. Normal appearing nose. External Ears normal.   Neck: No visualized mass. Pearlean Dionte is midline   Resp: No accessory muscle use. Respiratory effort normal  Cardiovascular: No LE edema ; no JVD  Musculoskeletal: No signs of ataxia. Normal range of motion of the neck.   Skin: No significant exanthematous lesions or discoloration noted on facial skin    Neuro: awake and follows commands, moves all extremities      Scheduled Meds:   potassium bicarb-citric acid  40 mEq Oral Daily    furosemide  40 mg Intravenous Daily    meropenem  1 g Intravenous Q8H    acyclovir  5 mg/kg (Adjusted) Intravenous Q8H    pantoprazole  40 mg Intravenous BID    insulin lispro  0-12 Units Subcutaneous Q4H    ketotifen  1 drop Both Eyes BID    rosuvastatin  10 mg Oral Daily    aspirin  81 mg Oral Daily    sodium chloride flush  10 mL Intravenous 2 times per day     PRN Meds:  ipratropium-albuterol, heparin (porcine), heparin (porcine), fentanNYL, sennosides-docusate sodium, glucose, dextrose, glucagon (rDNA), dextrose, tetrahydrozoline, aluminum & magnesium hydroxide-simethicone, ondansetron, sodium chloride flush, acetaminophen **OR** acetaminophen    Results:  CBC:   Recent Labs     07/13/20  0530 07/13/20  1230 07/14/20  0500 07/15/20  0556   WBC 20.4*  --  15.4* 14.3*   HGB 11.6* 11.2* 11.5* 11.4*   HCT 35.3* 34.4* 35.3* 35.5*   MCV 87.7  --  88.0 87.1     --  229 256     BMP:   Recent Labs     07/13/20  0530 07/14/20  0500 07/15/20  0556   * 132* 135*   K 3.3* 3.3* 3.0*   CL 99 94* 96*   CO2 27 29 28   BUN 36* 31* 25*   CREATININE 0.9 0.7* 0.7*     LIVER PROFILE:   Recent Labs     07/13/20  0530 07/14/20  0500 07/15/20  0556   AST 99* 82* 36   ALT 65* 69* 46*   BILIDIR 0.3 0.3 0.4*   BILITOT 0.6 0.6 0.9   ALKPHOS 94 88 82       Cultures:  6/24 Urine Staphylococcus epidermidis  6/24 COVID 19 +  6/29 Trach Asp NRF  7/2 respiratory NRF  7/3 BC NGTD  7/6 UC NGTD  7/7 Resp CX NRF   7/7 BC NGTD   7/7 UC NGTD         Films:  CXR 7/14 was reviewed by me and it showed   Similar Bilateral patchy airspace opacities, ETT in place        ASSESSMENT:  · Acute hypoxemic respiratory failure  · Elevated d-dimer with UE edema- no dvt  · Hypertriglyceridemia- normalized -? Lab error  · ARDS  · Leukocytosis -  · maculopapular rash abdomen and extremities-favor drug reaction. Zosyn stopped 7/11/2020 with improvement today   · COVID-19 viral multifocal pneumonia. Post 2 units of CCP 7/2/2020. Completed 10 days of Remdesevir  7/7/2020  · Shingles  · Frequent PVCs         PLAN:  Mechanical ventilation as per my orders.  The ventilator was adjusted by me at the bedside for unstable, life threatening respiratory failure  Fentanyl and Versed PRN, gtt as needed  Head of bed 30 degrees or higher at all times  Spontaneous breathing trial for possible extubation in a.m.    Meropenem D#5 and IV acyclovir per ID D#10. Completed 4 days of Zosyn stopped due to rash ; zyvox stopped after 8 days  Continue Lasix 40 IV bid  KCL replacement  Echo pending   heparin drip   GI following   Nutrition: Tube feeding at target   Blood sugar control, with goal 150-180  GI prophylaxis: Protonix twice daily  DVT prophylaxis:  Heparin drip   MRSA prophylaxis: Bactroban   Code status: Full code   PT/OT  Lu, daughter [phone]      Patient is critically ill with acute respiratory failure. Critical care time spent reviewing labs/films, examining patient, collaborating with other physicians but excluding procedures for life threatening organ failure is 33 minutes.

## 2020-07-15 NOTE — PROGRESS NOTES
Inpatient Physical Therapy Evaluation and Treatment    Unit: ICU  Date:  7/15/2020  Patient Name:    Armando Manjarrez  Admitting diagnosis:  Multifocal pneumonia [J18.9]  Admit Date:  6/24/2020  Precautions/Restrictions/WB Status/ Lines/ Wounds/ Oxygen: Fall risk, Bed/chair alarm, Lines -IV, Supplemental O2 (4), Fischer catheter and Covid19 (+ve), Droplet plus precautions, Confusion, Telemetry, Continuous pulse oximetry and Isolation Precautions: Droplet Plus - COVID    Treatment Time: 7123 - 3247  Treatment Number:  1   Timed Code Treatment Minutes: 41 minutes + Eval  Total Treatment Minutes:  51  minutes    Patient Goals for Therapy: \" To get better \"          Discharge Recommendations: SNF  DME needs for discharge: defer to facility       Therapy recommendation for EMS Transport: requires transport by cot due to total assistance needed for functional transfers. Therapy recommendations for staff:   Assist of 2 with use of Alok-Lift for all transfers to/from chair   Assist x 2 for bed mobility and sitting at the EOB. History of Present Illness: per Dr Ender Thorpe H&P 6/25/20  \" The patient is a 82 y. o. male with PMH below, presents with SOB/PIENDA, fatigue, lightheadedness, dyspnea on exertion, fever.  Patient reports that for the last few months he is had increasing problems with fatigue and lightheadedness.  He reports he gets lightheaded especially when he stands up too fast.  He also describes dyspnea on exertion which appears to be worsening even with a few steps.  He does not have shortness of breath at rest. [facility] says he feels like \"I am running out of gas much faster than I used to. \" [facility] reports that he went to a corn hole tournament last week. Jennifer Holliday were approximately 200 teams there. [facility] does not know of any sick contacts but was in close contact with many people there. [facility] also notes that he has had a little bit of chest pressure but denies that it is actually vinod pain.  He denies urinary symptoms.  He has had an occasional cough but does not believe it has been productive. [facility] reports that he is normally fairly active and independent but has been less so the last several days. [facility] reports he measured his temperature at 101.7 at home today.  His temp was 99.3 and 99.5 in the ER. \"     COVID-19 positive, intubated -, PICC     Home Health S4 Level Recommendation:  NA  AM-PAC Mobility Score    AM-PAC Inpatient Mobility Raw Score : 8     Preadmission Environment    Pt. Lives with spouse  Home environment:    unknown   Patient confused by orientation questions and requests to continue preadmission questions another day. Plans to go to [facility] after d/c.     Preadmission Status:  Pt. Able to drive: Unknown  Pt Fully independent with ADLs: Unknown  Pt. Required assistance from family for: unknown  Pt. independent for transfers and gait and walked with No Device  History of falls No    Pain   Yes  Location: lower back and bilateral wrist with movement. Rating: mild /10  Pain Medicine Status: RN notified    Cognition    A&O Person (Patient was aware about Covid 19 positive test but had lost track of time so could not mention the date. Patient was able to report month and year of the his  correctly. Patient unaware about the location he was in). Able to follow 1 step commands    Subjective  Patient lying supine in bed with no family present. Pt agreeable to this PT eval & tx. Upper Extremity ROM/Strength  Please see OT evaluation. Lower Extremity ROM / Strength   AROM WFL: Yes  ROM limitations: N/A    Strength Assessment (measured on a 0-5 scale):  R LE   Quad   2+   Ant Tib  2+   Hamstring 2+   Iliopsoas 2+  L LE  Quad   2+   Ant Tib  2+   Hamstring 2+   Iliopsoas 2+    Lower Extremity Sensation    WFL    Lower Extremity Proprioception:   WFL    Coordination and Tone  WFL    Balance  Sitting:  Poor; Max A   Comments: for 7/10 min.  (LOB on the R side and in retrograde direction)    Standing: Not tested; Not Tested  Comments: Unsafe to attempt standing due to difficulty to tolerate sitting at the EOB. Bed Mobility   Supine to Sit:    Max A  and 2 persons  Sit to Supine:   Max A  and 2 persons  Rolling: Max A  and 2 persons  Scooting in sitting: Max A  and 2 persons  Scooting in supine:  Max A  and 2 persons    Transfer Training     Sit to stand:   Not Tested (unsafe to attempt)  Stand to sit:   Not Tested (unsafe to attempt)  Bed to Chair:   Total A with use of Alok-Lift    Gait gait deferred due to difficulty with transfers; pt ambulated 0 ft. Stair Training deferred, pt unsafe/ not appropriate to complete stairs at this time    Activity Tolerance   Pt completed therapy session with Pain noted with activity  SOB noted with activity  Spo2 = 87-98%    Positioning Needs   Pt in bed, alarm set, positioned in proper neutral alignment and pressure relief provided. Call light provided and all needs within reach    Exercises Initiated  all completed bilaterally unless indicated  Ankle Pumps x 10 reps  Heel slides x 10 reps  SLR x 10 reps  Hip abduction: x 10 reps    Other  None. Patient/Family Education   Pt educated on role of inpatient PT, POC, importance of continued activity, DC recommendations, safety awareness, transfer techniques, pursed lip breathing, energy conservation, pacing activity and calling for assist with mobility. Assessment  Pt seen for Physical Therapy evaluation in acute care setting. Patient presented with bilateral UE weakness and unable to use UE for maintaining balance while sitting at the EOB, and impaired sitting balance and poor trunk control. Patient was able to participate in rolling in the bed with one person at the end of the session with ability to clear the LEs in hook lying position. Pt demonstrated decreased Activity tolerance, Balance, Safety and Strength as well as decreased independence with Ambulation, Bed Mobility  and Transfers.      Recommending SNF upon discharge as patient functioning well below baseline, demonstrates good rehab potential and unable to return home due to limited or no family support, burden of care beyond caregiver ability, home environment not conducive to patient recovery, limited safety awareness and significant decline in functional capacity as per his PLOF. .    Goals : To be met in 3 visits:  1). Independent with LE Ex x 10 reps    To be met in 6 visits:  1). Supine to/from sit: Max A   2). Sit to/from stand: Max A   3). Bed to chair: Max A   4). Gait: Ambulate 5 ft.   with  Max A  and use of LRAD (least restrictive assistive device)  5). Tolerate B LE exercises 3 sets of 10-15 reps    Rehabilitation Potential: Fair  Strengths for achieving goals include:   Pt motivated, PLOF and Pt cooperative   Barriers to achieving goals include:    Complexity of condition    Plan    To be seen 3-5 x / week  while in acute care setting for therapeutic exercises, bed mobility, transfers, progressive gait training, balance training, and family/patient education. Signature: Tobi Rhoades MS PT, # [id number]      If patient discharges from this facility prior to next visit, this note will serve as the Discharge Summary.

## 2020-07-15 NOTE — PROGRESS NOTES
Speech Language Pathology  Facility/Department: [facility]   CLINICAL BEDSIDE SWALLOW EVALUATION      Recommended Diet and Intervention  Diet Solids Recommendation: NPO exception of pleasure feeds of puree (~2 oz, 3x/day)  Liquid Consistency Recommendation: NPO exception of pleasure feeds of honey-thick (moderately thick) liquids VIA TSP (~2 oz, 3x/day)  Recommended Form of Meds: Crushed in puree as able  *If pt has overt s/s of aspiration with PO intake or worsening respiratory status, recommend downgrade to strict NPO pending re-assessment by ST      NAME: Juni Wakefield  : 1935  MRN: [id number]    ADMISSION DATE: 2020  ADMITTING DIAGNOSIS: has Multifocal pneumonia; Orthostasis; Pneumonia due to COVID-19 virus; Sepsis ([address] Urinary tract infection without hematuria; ARDS (adult respiratory distress syndrome) ([address] Hyperlipidemia; Acute respiratory failure with hypoxia ([address] Class 1 obesity due to excess calories with body mass index (BMI) of 31.0 to 31.9 in adult; Peripherally inserted central catheter (PICC) in place; Asymptomatic bacteriuria; Endotracheally intubated; On mechanically assisted ventilation ([address] Herpes zoster without complication; Fever; Arm edema; Hypertriglyceridemia; COVID-19 virus infection; Acute kidney injury ([address] Bright red blood per rectum; Fatigue; and Leukocytosis on their problem list.  ONSET DATE: Pt admitted to [facility] on 20    Recent Chest Xray (20): Impression    1. Stable low lung volumes with bilateral lung infiltrates, compatible with    pneumonia.             Date of Eval: 7/15/2020  Evaluating Therapist: David Callahan    Current Diet level:  Current Diet : NPO  Current Liquid Diet : NPO      Primary Complaint  Patient Complaint: Per MD H&P, \"The patient is a 82 y.o. male with PMH below, presents with SOB/PINEDA, fatigue, lightheadedness, dyspnea on exertion, fever.   Patient reports that for the last few months he is had increasing problems with fatigue and lightheadedness. He reports he gets lightheaded especially when he stands up too fast.  He also describes dyspnea on exertion which appears to be worsening even with a few steps. He does not have shortness of breath at rest.  He says he feels like \"I am running out of gas much faster than I used to. \"  He reports that he went to a corn hole tournament last week. There were approximately 200 teams there. He does not know of any sick contacts but was in close contact with many people there. He also notes that he has had a little bit of chest pressure but denies that it is actually vinod pain. He denies urinary symptoms. He has had an occasional cough but does not believe it has been productive. He reports that he is normally fairly active and independent but has been less so the last several days. He reports he measured his temperature at 101.7 at home today. His temp was 99.3 and 99.5 in the ER\". Pain:  Pain Assessment  Pain Assessment: 0-10  Pain Level: 0  Patient's Stated Pain Goal: No pain  Pain Type: Other (Comment)(BALAJI)  Pain Location: Other (Comment)(BALAJI)  Pain Orientation: Other (Comment)(BALAJI)  Pain Descriptors: Patient unable to describe  Pain Frequency: Other (Comment)  Pain Onset: (BALAJI)  Clinical Progression: Other (Comment)(BALAJI)  Non-Pharmaceutical Pain Intervention(s): Emotional support, Distraction, Repositioned, Relaxation techniques(Medication increased )  Response to Pain Intervention: Asleep with RR greater than 10  RASS Score: Alert and calm    Reason for Referral  Dc Cobb was referred for a bedside swallow evaluation to assess the efficiency of his swallow function, identify signs and symptoms of aspiration and make recommendations regarding safe dietary consistencies, effective compensatory strategies, and safe eating environment. Impression  Dysphagia Diagnosis: Moderate pharyngeal stage dysphagia; Suspected needs further assessment; Moderate oral stage dysphagia  Dysphagia Outcome Severity Scale: Level 2: Moderate Severe dysphagia- Maximum assistance or maximum use of strategies with partial PO only   Pt seen upright in bed, alert and agreeable to evaluation, although somewhat fatigued. RN OK'd SLP entry and evaluation. Noted generalized weakness with OME, weak, hoarse vocal quality and weak volitional cough. Pt is currently on 8 L HF NC (does not wear O2 at baseline per pt). Pt was observed with ice chips x3, NTL via tsp, HTL via tsp, and puree trials this date. Weak lingual manipulation and reduced bolus control observed. Anterior spillage from L side of oral cavity with NTL and HTL trials via tsp at times. Pt required total feed assistance with all PO trials. Pt's O2 sats consistent at 91-93% throughout BSE. Pt's RR 28/min at baseline and consistent throughout majority of PO trials, exception of increase to 32-36/min after minimal NTL trials via tsp. Immediate weak throat clear with wet vocal quality post-swallow with NTL via tsp. No overt s/s of aspiration/penetration noted with puree or HTL via tsp trials. Puree, NTL via tsp, and HTL via tsp only trialed this date d/t pt's overall high aspiration risk s/p lengthy intubation, overall weakness (*however, multiple trials of both puree and HTL via tsp observed d/t pt's tolerance, no overt s/s of aspiration/penetration noted). Recommend that pt continue to be NPO exception of pleasure feeds of puree and HTL via TSP, ~2 oz 3x/day. *If pt has overt s/s of aspiration with PO intake or worsening respiratory status, recommend downgrade to strict NPO pending re-assessment by ST.  RN aware of recs. ST to continue to follow. Treatment Plan  Requires SLP Intervention: Yes  Duration/Frequency of Treatment: 3-5x/week for LOS  D/C Recommendations:  To be determined       Recommended Diet and Intervention  Diet Solids Recommendation: NPO exception of pleasure feeds of puree (~2 oz, 3x/day)  Liquid Consistency Recommendation: NPO exception of pleasure feeds of honey-thick (moderately thick) liquids VIA TSP (~2 oz, 3x/day)  Recommended Form of Meds: Crushed in puree as able  *If pt has overt s/s of aspiration with PO intake or worsening respiratory status, recommend downgrade to strict NPO pending re-assessment by ST    Recommendations: Dysphagia treatment; Therapeutic feeds with SLP only;Assist feed  Therapeutic Interventions: Oral care; Patient/Family education; Therapeutic PO trials with SLP; Diet tolerance monitoring    Compensatory Swallowing Strategies  Compensatory Swallowing Strategies: Remain upright for 30-45 minutes after meals;Small bites/sips;Swallow 2 times per bite/sip; No straws;Upright as possible for all oral intake;Eat/Feed slowly; Alternate solids and liquids;Assist feed    Treatment/Goals  Short-term Goals  Timeframe for Short-term Goals: 10 days (7/25/20)  Long-term Goals  Timeframe for Long-term Goals: 14 days (7/29/20)  Goal 1: The pt will tolerate safest and least restrictive diet without s/s of aspiration. Dysphagia Goals: The patient will tolerate recommended diet without observed clinical signs of aspiration; The patient/caregiver will demonstrate understanding of compensatory strategies for improved swallowing safety. ;The patient will tolerate instrumental swallowing procedure; The patient will tolerate mechanical soft foods 10/10. ;The patient will tolerate nectar thick liquids without signs and symptoms of aspiration 10/10 via cup. General  Chart Reviewed: Yes  Comments: Pt admitted d/t multifocal PNA, COVID-19, fatigue, leukocytosis. Pt was intubated from 6/29-7/14 (had to be emergently re-intubated on 7/7 per chart). Behavior/Cognition: Alert;Pleasant mood; Cooperative  Respiratory Status: O2 via nasual cannula  O2 Device: Nasal cannula  Liters of Oxygen: 8 L  Communication Observation: Functional  Follows Directions: Simple  Dentition: Adequate  Patient Positioning: Upright in bed  Baseline Vocal Quality: Dysphonic;Weak;Hoarse  Volitional Cough: Weak  Volitional Swallow: Delayed  Prior Dysphagia History: No hx of dysphagia per chart review. Consistencies Administered: Dysphagia Pureed (Dysphagia I); Ice Chips; Nectar - teaspoon;Honey - teaspoon           Vision/Hearing  Vision  Vision: Within Functional Limits  Hearing  Hearing: Exceptions to [facility]  Hearing Exceptions: Hard of hearing/hearing concerns    Oral Motor Deficits  Oral/Motor  Oral Motor: Exceptions to WFL(Generalized weakness noted)  Labial ROM: Reduced left; Reduced right  Labial Strength: Reduced  Labial Coordination: Reduced  Lingual ROM: Reduced left; Reduced right  Lingual Strength: Reduced  Lingual Coordination: Reduced    Oral Phase Dysfunction  Oral Phase  Oral Phase: Exceptions  Oral Phase Dysfunction  Spillage Left: Honey - teaspoon;Nectar - teaspoon  Suspected Premature Bolus Loss: All  Oral Phase  Oral Phase - Comment: Weak lingual manipulation and reduced bolus control observed. Anterior spillage from L side of oral cavity with NTL and HTL trials via tsp at times. Indicators of Pharyngeal Phase Dysfunction   Pharyngeal Phase  Pharyngeal Phase: Exceptions  Indicators of Pharyngeal Phase Dysfunction  Delayed Swallow: All  Decreased Laryngeal Elevation: All  Wet Vocal Quality: Nectar - teaspoon  Throat Clearing - Immediate: Nectar - teaspoon  Change in Vital Signs: Nectar - teaspoon  Pharyngeal Phase   Pharyngeal: Pt's o2 sats consistent at 91-93% throughout BSE. Pt's RR 28/min at baseline and consistent throughout majority of PO trials, exception of increase to 32-36/min after minimal NTL trials via tsp. Immediate weak throat clear with wet vocal quality post-swallow with NTL via tsp. Pt required total feed assistance with all PO trials. Puree, NTL via tsp, and HTL via tsp only trialed this date d/t pt's overall high aspiration risk s/p lengthy intubation, overall weakness.     Prognosis  Prognosis  Prognosis for safe diet advancement: good  Barriers to reach goals: age;time post onset;severity of dysphagia;fatigue;other (comment)  Barriers/Prognosis Comment: Lengthy intubation, COVID-19  Individuals consulted  Consulted and agree with results and recommendations: Patient;RN    Education  Patient Education: Pt educated on role of ST, reason for referral, assessment results and recommendations.   Patient Education Response: Verbalizes understanding;Needs reinforcement  Safety Devices in place: Yes  Type of devices: Left in bed;Bed alarm in place;Call light within reach;Nurse notified       Therapy Time  SLP Individual Minutes  Time In: 1034  Time Out: 2083  Minutes: 24  Dysphagia eusebio Dickson M.S. [facility]  Speech-language pathologist  [phone]

## 2020-07-15 NOTE — PLAN OF CARE
Nutrition Problem #1: Inadequate oral intake  Intervention: Food and/or Nutrient Delivery: Continue NPO  Nutritional Goals: patient will adhere to NPO status until medically cleared for po diet advancement

## 2020-07-15 NOTE — PROGRESS NOTES
heparin (porcine), fentanNYL, sennosides-docusate sodium, glucose, dextrose, glucagon (rDNA), dextrose, tetrahydrozoline, aluminum & magnesium hydroxide-simethicone, ondansetron, sodium chloride flush, acetaminophen **OR** acetaminophen    Results:  CBC:   Recent Labs     07/13/20  0530 07/13/20  1230 07/14/20  0500 07/15/20  0556   WBC 20.4*  --  15.4* 14.3*   HGB 11.6* 11.2* 11.5* 11.4*   HCT 35.3* 34.4* 35.3* 35.5*   MCV 87.7  --  88.0 87.1     --  229 256     BMP:   Recent Labs     07/13/20  0530 07/14/20  0500 07/15/20  0556   * 132* 135*   K 3.3* 3.3* 3.0*   CL 99 94* 96*   CO2 27 29 28   BUN 36* 31* 25*   CREATININE 0.9 0.7* 0.7*     LIVER PROFILE:   Recent Labs     07/13/20  0530 07/14/20  0500 07/15/20  0556   AST 99* 82* 36   ALT 65* 69* 46*   BILIDIR 0.3 0.3 0.4*   BILITOT 0.6 0.6 0.9   ALKPHOS 94 88 82     PT/INR:   Recent Labs     07/13/20  0530   PROTIME 12.4   INR 1.07     Results for GOGONEWTON SARAH (MRN [id number]) as of 7/1/2020 09:46   Ref. Range 6/24/2020 21:35 6/26/2020 15:24 6/30/2020 04:20   D-Dimer, Quant Latest Ref Range: 0 - 229 ng/mL  (H) 682 (H) 653 (H)     Results for Brigid Ser T (MRN [id number]) as of 7/1/2020 09:46   Ref. Range 6/24/2020 21:35   Ferritin Latest Ref Range: 30.0 - 400.0 ng/mL 97.3       Cultures:  Results for Cam BOWENS (MRN [id number]) as of 6/30/2020 09:20   Ref. Range 6/24/2020 22:00   SARS-CoV-2, PCR Latest Ref Range: Not Detected  DETECTED (A)     Susceptibility     Staphylococcus epidermidis (1)     Antibiotic Interpretation TARA Status    ciprofloxacin Sensitive <=0.5 mcg/mL     nitrofurantoin Sensitive <=16 mcg/mL     oxacillin Sensitive <=0.25 mcg/mL     tetracycline Sensitive <=1 mcg/mL     trimethoprim-sulfamethoxazole Sensitive <=10 mcg/mL           Films:    XR CHEST PORTABLE   Final Result   1. Stable low lung volumes with bilateral lung infiltrates, compatible with   pneumonia.          VL Extremity Venous Bilateral   Final Result      XR CHEST PORTABLE   Final Result   Stable appropriate life support device positioning. No substantial change in bilateral airspace disease. XR CHEST PORTABLE   Final Result   1. Stable chest with bilateral lung infiltrates. Findings are concerning for   pulmonary edema versus pneumonia. XR CHEST PORTABLE   Final Result   Patchy bilateral airspace disease, slightly increased as compared to prior. XR CHEST PORTABLE   Final Result   Improving bilateral pneumonia versus edema. XR CHEST 1 VW   Final Result   Increasing patchy opacities throughout both lungs, most compatible with   multifocal pneumonia. Component of pulmonary edema should be considered. XR ABDOMEN (KUB) (SINGLE AP VIEW)   Final Result   1. Indeterminate bowel gas pattern         XR CHEST PORTABLE   Final Result   Stable bilateral pneumonia. XR CHEST PORTABLE   Final Result   Stable central ground-glass airspace opacities. Supportive devices are   unchanged. XR CHEST PORTABLE   Final Result   The endotracheal tube tip is approximately 3 cm above the thai. XR CHEST PORTABLE   Final Result   Mild increase in the amount of bibasilar opacity since yesterday. Large   bilateral pneumonias. XR CHEST PORTABLE   Final Result   Stable bilateral pulmonary opacities. XR CHEST PORTABLE   Final Result   Minimal increase in the amount of opacity in each lung consistent with slight   worsening of bilateral pneumonia. XR CHEST PORTABLE   Final Result   Supportive tubing is in normal position. Low lung volume study, with stable alveolar opacities in the mid and lower   lungs, pneumonia versus atelectasis. XR CHEST PORTABLE   Final Result   Increased in degree and extent of bilateral mid and lower lung pneumonia. The increased may be partially accentuated by low lung volumes.          XR CHEST 1 VW   Final Result   Persistent bilateral airspace leukocytoclastic vasculitis. - generalized involvement of trunk, torso, and extremities without classic vesicular lesions and bilateral distribution make shingles less likely. - started IV Acyclovir per ID recs- can stop       Petechial Rash  - noted on arms and trunk  - given benadryl IV  - followed by ID - remains on IV acyclovir, monitor      Elevated D dimer  - due to COVID 19  - CT chest neg for PE  - holding lovenox for GI bleed- can resume now     UTI  - cx positive for staph, treated    Hyperlipidemia  - on statin. Rectal Bleeding -   GI to eval.   Hgb stable.    Resumed on heparin gtt am of 7/12          Full Code  Start PT in am  Repeat SLP    Mounika Castillo MD 7/15/2020 7:33 AM

## 2020-07-15 NOTE — PROGRESS NOTES
Pharmacy - RE:  High-dose Heparin drip  Current rate = 14.8 ml/h  (1480 units/h)  aPTT drawn @ 0556 = 68.3 sec. Goal aPTT = 49 - 76 sec. Per protocol, continue same rate, and daily aPTT draws. Next aPTT due 7/16 with AM labs.

## 2020-07-15 NOTE — PROGRESS NOTES
Comprehensive Nutrition Assessment    Type and Reason for Visit:  Reassess    Nutrition Recommendations/Plan:   1. Continue NPO status until patient is medically cleared for po diet advancement. 2. Recommend a swallow evaluation prior to po diet advancement since patient was intubated for a significant period of time. 3. Monitor respiratory status, mental status, and plan of care. 4. Monitor nutrition-related labs, bowel function, and weight trends. Nutrition Assessment:  (improving - pt was receiving TF at goal for most of admit)    Malnutrition Assessment:  Malnutrition Status: At risk for malnutrition (Comment)    Context:  Acute Illness     Findings of the 6 clinical characteristics of malnutrition:  Energy Intake:  No significant decrease in energy intake  Weight Loss:  7 - Greater than 5% over 1 month(- 11# or 5.5% weight loss x 16 days admission)     Body Fat Loss:  Unable to assess(no NFPE d/t COVID-19 precautions)     Muscle Mass Loss:  Unable to assess(COVID-19 patient)    Fluid Accumulation:  Unable to assess(COVID-19 patient) Extremities(trace in BLE)   Strength:  Not Performed    Estimated Daily Nutrient Needs:  Energy (kcal):  1720 - 1978 kcals based on 20-23 kcals/kg/CBW; Weight Used for Energy Requirements:  Current     Protein (g):  80 - 101 g protein based on 1.2-1.5 g/kg/IBW; Weight Used for Protein Requirements:  Ideal        Fluid (ml/day):  1700 - 2000 ml ; Weight Used for Fluid Requirements:  Current      Nutrition Related Findings:  (extubated on 7/14/20; A & O x 3; + BM 7/14; NPO status)      Wounds:  (shingles on lower-mid back)       Current Nutrition Therapies:    Diet NPO Effective Now    Anthropometric Measures:  · Height: (5' 7\")  · Current Body Weight: (189# 9 oz; obtained on 7/15/20)   · Admission Body Weight: (200# 1 oz; obtained on 7/2/20)    · Usual Body Weight: (180's, per past PCP encounters)     · Ideal Body Weight: 148 lbs.   · BMI: 29.69  · BMI Categories: Overweight (BMI 25.0-29. 9)       Nutrition Diagnosis:   · Inadequate oral intake related to impaired respiratory funtion, swallowing difficulty as evidenced by NPO or clear liquid status due to medical condition, weight loss greater than or equal to 5% in 1 month      Nutrition Interventions:   Food and/or Nutrient Delivery:  Continue NPO  Nutrition Education/Counseling:  No recommendation at this time   Coordination of Nutrition Care:  Continued Inpatient Monitoring, Speech Therapy, Swallow Evaluation, Interdisciplinary Rounds    Goals:  patient will adhere to NPO status until medically cleared for po diet advancement       Nutrition Monitoring and Evaluation:   Behavioral-Environmental Outcomes:  (N/A)   Food/Nutrient Intake Outcomes:  Diet Advancement/Tolerance  Physical Signs/Symptoms Outcomes:  Biochemical Data, Chewing or Swallowing, Nutrition Focused Physical Findings, Weight     Discharge Planning:     Too soon to determine     Electronically signed by Mark Jackson RD, LD on 7/15/20 at 12:09 PM EDT    Contact: [phone]

## 2020-07-15 NOTE — PLAN OF CARE
Problem: Restraint Use - Nonviolent/Non-Self-Destructive Behavior:  Goal: Absence of restraint indications  Description: Absence of restraint indications  Outcome: Met This Shift     Problem: Restraint Use - Nonviolent/Non-Self-Destructive Behavior:  Goal: Absence of restraint-related injury  Description: Absence of restraint-related injury  Outcome: Met This Shift     Problem: Skin Integrity:  Goal: Absence of new skin breakdown  Description: Monitoring patient skin integrity for skin breakdown, turning and repositioning q2h per protocol. Outcome: Ongoing     Problem: Falls - Risk of:  Goal: Will remain free from falls  Description: Fall risk precautions in place. Call light within reach. Frequent visual monitoring in place q1h. Bed alarm activated.     Outcome: Ongoing

## 2020-07-15 NOTE — PROGRESS NOTES
Infectious Diseases   Progress Note      Admission Date: 6/24/2020  Hospital Day: Hospital Day: 22   Attending: Bishop Voss MD  Date of service: 7/15/2020     Chief complaint/ Reason for consult:     · Acute respiratory failure with hypoxia  · Bilateral COVID 19 pneumonia  · Elevated d-dimer 653 on 6/30/2020  · Staph epidermidis in the urine culture on 6/24/2020, significance unclear, likely colonizer    Microbiology:        I have reviewed allavailable micro lab data and cultures    · Blood culture (2/2) - collected on 6/24/2020: Negative  · Tracheal aspirate culture culture  - collected on 6/29/2020: Negative so far      Antibiotics and immunizations:       Current antibiotics: All antibiotics and their doses were reviewed by me    Recent Abx Admin                   meropenem (MERREM) 1 g in sodium chloride 0.9 % 100 mL IVPB (mini-bag) (g) 1 g New Bag 07/15/20 0951     1 g New Bag 07/14/20 2345     1 g New Bag  1742    acyclovir (ZOVIRAX) 365 mg in dextrose 5 % 100 mL IVPB (mg) 365 mg New Bag 07/15/20 0821     365 mg New Bag  0019     365 mg New Bag 07/14/20 1711                  Immunization History: All immunization history was reviewed by me today. Immunization History   Administered Date(s) Administered    Influenza, High Dose (Fluzone 65 yrs and older) 11/30/2012, 10/09/2015, 11/20/2016, 11/07/2018, 09/23/2019    Pneumococcal Conjugate 13-valent ([id number]) 07/10/2015, 11/08/2018    Pneumococcal Polysaccharide ([id number]) 04/13/2012    Td, unspecified formulation 01/15/1996    Tdap (Boostrix, Adacel) 05/27/2015    Zoster Live (Zostavax) 07/10/2015    Zoster Recombinant (Shingrix) 11/07/2018, 09/23/2019       Known drug allergies:      All allergies were reviewed and updated    Allergies   Allergen Reactions    Buspar [Buspirone] Other (See Comments)     Face,face numb    Codeine Other (See Comments)     Face,lips numb    Methylphenidate Other (See Comments)     Face numbness    Ritalin remains on empiric IV meropenem and IV acyclovir. He is currently on 8 L oxygen via high flow nasal cannula. White cell count is 14,300. He is afebrile. Leukocytosis likely mainly secondary to steroids. Serum creatinine 0.7. Triglyceride is 118. Liver enzymes are okay    He had a 2D echo done today. Shows ejection fraction 55%. No valvular vegetations. Plan:     Diagnostic Workup:    · Continue to follow  fever curve, WBC count and blood cultures  · Follow up on liver and renal function    Antimicrobials:    · Will leave him on empiric IV meropenem for now  · We will continue IV acyclovir at current dose  · If all cultures remain negative, plan to stop IV meropenem by Friday  · And also plan to stop acyclovir after the dose is on Friday, if the lesions are fully crusted with them  · Continue high flow oxygen support to maintain oxygen saturation above 92%  · Cough and deep breathing exercises  · Continue to watch for new fever or diarrhea  · Aspiration precaution  · Pressure ulcer prevention precaution  · Anticoagulation per primary  · Discussed with ICU RN. I will be signing off. Please call with questions. If there are any changes in patient's condition      Drug Monitoring:    · Continue monitoring for antibiotic toxicity as follows: CBC, CMP  · Continue to watch for following: new or worsening fever, new hypotension, hives, lip swelling and redness or purulence at vascular access sites. I/v access Management:    · Continue to monitor i.v access sites for erythema, induration, discharge or tenderness. · As always, continue efforts to minimize tubes/lines/drains as clinically appropriate to reduce chances of line associated infections.       Level of complexity of visit: High     TIME SPENT TODAY:     - Spent over  36  minutes on visit (including interval history, physical exam, review of data including labs, cultures, imaging, development and implementation of treatment plan and setting of COVID 23 outbreak and in order to preserve personal protective equipment in accordance with the flexibilities announced by CMS on March 30, 2020)   References: [email or web address]/3_18%20Telemed%20Guidance%20Updated%20March%2018. pdf?[email or web address]                      [email or web address]/3_18%20Telemed%20Guidance%20Updated%20March%2018. pdf?[email or web address]                      [email or web address] pdf                            General: Awake and alert according to primary service, currently on 8 L oxygen via high flow nasal cannula, vitals reviewed  HEENT: Deferred  Cardiovascular: Telemetry data reviewed, rest deferred   Pulmonary: deferred  Abdomen/GI: deferred  Neuro: Awake, moves all extremities according to primary service  Skin: deferred  Musculoskeletal:  deferred  Genitourinary: Deferred  Psych: deferred  Lymphatic/Immunologic: deferred       Intake and output:    I/O last 3 completed shifts: In: 689.5 [I.V.:388; IV Piggyback:301.5]  Out: 65 [Urine:635]    Lab Data:   All available labs and old records have been reviewed by me.     CBC:  Recent Labs     07/13/20  0530 07/13/20  1230 07/14/20  0500 07/15/20  0556   WBC 20.4*  --  15.4* 14.3*   RBC 4.03*  --  4.01* 4.08*   HGB 11.6* 11.2* 11.5* 11.4*   HCT 35.3* 34.4* 35.3* 35.5*     --  229 256   MCV 87.7  --  88.0 87.1   MCH 28.7  --  28.7 27.9   MCHC 32.7  --  32.6 32.1   RDW 14.4  --  14.4 14.4        BMP:  Recent Labs     07/13/20  0530 07/14/20  0500 07/15/20  0556   * 132* 135*   K 3.3* 3.3* 3.0*   CL 99 94* 96*   CO2 27 29 28   BUN 36* 31* 25*   CREATININE 0.9 0.7* 0.7*   CALCIUM 7.4* 7.5* 7.8*   GLUCOSE 117* 117* 105*        Hepatic Function Panel:   Lab Results   Component Value Date    ALKPHOS 82 07/15/2020    ALT 46 07/15/2020    AST 36 07/15/2020    PROT 5.4 07/15/2020    BILITOT 0.9 07/15/2020    BILIDIR 0.4 07/15/2020    IBILI 0.5 07/15/2020    LABALBU 2.5 07/15/2020       CPK:   Lab Results   Component Value Date    CKTOTAL 269 07/07/2020     ESR:   Lab Results   Component Value Date    SEDRATE 4 09/17/2018     CRP: No results found for: CRP        Imaging: All pertinent images and reports for the current visit were reviewed by me during this visit. XR CHEST PORTABLE   Final Result   1. Stable low lung volumes with bilateral lung infiltrates, compatible with   pneumonia. VL Extremity Venous Bilateral   Final Result      XR CHEST PORTABLE   Final Result   Stable appropriate life support device positioning. No substantial change in bilateral airspace disease. XR CHEST PORTABLE   Final Result   1. Stable chest with bilateral lung infiltrates. Findings are concerning for   pulmonary edema versus pneumonia. XR CHEST PORTABLE   Final Result   Patchy bilateral airspace disease, slightly increased as compared to prior. XR CHEST PORTABLE   Final Result   Improving bilateral pneumonia versus edema. XR CHEST 1 VW   Final Result   Increasing patchy opacities throughout both lungs, most compatible with   multifocal pneumonia. Component of pulmonary edema should be considered. XR ABDOMEN (KUB) (SINGLE AP VIEW)   Final Result   1. Indeterminate bowel gas pattern         XR CHEST PORTABLE   Final Result   Stable bilateral pneumonia. XR CHEST PORTABLE   Final Result   Stable central ground-glass airspace opacities. Supportive devices are   unchanged. XR CHEST PORTABLE   Final Result   The endotracheal tube tip is approximately 3 cm above the thai. XR CHEST PORTABLE   Final Result   Mild increase in the amount of bibasilar opacity since yesterday. Large   bilateral pneumonias. XR CHEST PORTABLE   Final Result   Stable bilateral pulmonary opacities.          XR CHEST PORTABLE   Final Result   Minimal increase in the amount of opacity in each lung consistent with slight   worsening of bilateral pneumonia. XR CHEST PORTABLE   Final Result   Supportive tubing is in normal position. Low lung volume study, with stable alveolar opacities in the mid and lower   lungs, pneumonia versus atelectasis. XR CHEST PORTABLE   Final Result   Increased in degree and extent of bilateral mid and lower lung pneumonia. The increased may be partially accentuated by low lung volumes. XR CHEST 1 VW   Final Result   Persistent bilateral airspace disease, similar to prior. IR PICC WO SQ PORT/PUMP > 5 YEARS   Final Result   Successful placement of PICC line. XR CHEST PORTABLE   Final Result   Appropriate endotracheal tube positioning. Multifocal airspace opacities and areas of atelectasis correlate with recent   CT chest.         XR ABDOMEN FOR NG/OG/NE TUBE PLACEMENT   Final Result   NG tube terminates over the stomach. CT CHEST PULMONARY EMBOLISM W CONTRAST   Final Result   1. No pulmonary embolism. 2. Multifocal pneumonia scattered throughout both lungs with minimal pleural   effusions. XR CHEST STANDARD (2 VW)   Final Result   No radiographic evidence of acute pulmonary disease. XR CHEST PORTABLE    (Results Pending)       Medications: All current and past medications were reviewed.      furosemide  40 mg Intravenous BID    meropenem  1 g Intravenous Q8H    acyclovir  5 mg/kg (Adjusted) Intravenous Q8H    pantoprazole  40 mg Intravenous BID    insulin lispro  0-12 Units Subcutaneous Q4H    ketotifen  1 drop Both Eyes BID    rosuvastatin  10 mg Oral Daily    aspirin  81 mg Oral Daily    sodium chloride flush  10 mL Intravenous 2 times per day        heparin (porcine) 14.8 mL/hr (07/15/20 1223)    propofol 25 mcg/kg/min (07/14/20 0657)    dextrose         potassium chloride, ipratropium-albuterol, heparin (porcine), heparin (porcine), fentanNYL, sennosides-docusate

## 2020-07-16 ENCOUNTER — APPOINTMENT (OUTPATIENT)
Dept: GENERAL RADIOLOGY | Age: 85
DRG: 870 | End: 2020-07-16
Payer: MEDICARE

## 2020-07-16 LAB
ALBUMIN SERPL-MCNC: 2.7 G/DL (ref 3.4–5)
ALP BLD-CCNC: 82 U/L (ref 40–129)
ALT SERPL-CCNC: 39 U/L (ref 10–40)
ANION GAP SERPL CALCULATED.3IONS-SCNC: 11 MMOL/L (ref 3–16)
APTT: 84.8 SEC (ref 24.2–36.2)
AST SERPL-CCNC: 35 U/L (ref 15–37)
BILIRUB SERPL-MCNC: 0.8 MG/DL (ref 0–1)
BILIRUBIN DIRECT: 0.3 MG/DL (ref 0–0.3)
BILIRUBIN, INDIRECT: 0.5 MG/DL (ref 0–1)
BLOOD CULTURE, ROUTINE: NORMAL
BUN BLDV-MCNC: 26 MG/DL (ref 7–20)
CALCIUM SERPL-MCNC: 7.9 MG/DL (ref 8.3–10.6)
CHLORIDE BLD-SCNC: 95 MMOL/L (ref 99–110)
CO2: 29 MMOL/L (ref 21–32)
CREAT SERPL-MCNC: 0.7 MG/DL (ref 0.8–1.3)
CULTURE, BLOOD 2: NORMAL
GFR AFRICAN AMERICAN: >60
GFR NON-AFRICAN AMERICAN: >60
GLUCOSE BLD-MCNC: 102 MG/DL (ref 70–99)
GLUCOSE BLD-MCNC: 105 MG/DL (ref 70–99)
GLUCOSE BLD-MCNC: 157 MG/DL (ref 70–99)
GLUCOSE BLD-MCNC: 220 MG/DL (ref 70–99)
GLUCOSE BLD-MCNC: 81 MG/DL (ref 70–99)
GLUCOSE BLD-MCNC: 93 MG/DL (ref 70–99)
HCT VFR BLD CALC: 35.4 % (ref 40.5–52.5)
HEMOGLOBIN: 11.5 G/DL (ref 13.5–17.5)
INR BLD: 1.13 (ref 0.86–1.14)
MCH RBC QN AUTO: 28.7 PG (ref 26–34)
MCHC RBC AUTO-ENTMCNC: 32.6 G/DL (ref 31–36)
MCV RBC AUTO: 88.3 FL (ref 80–100)
PDW BLD-RTO: 14.4 % (ref 12.4–15.4)
PERFORMED ON: ABNORMAL
PERFORMED ON: NORMAL
PERFORMED ON: NORMAL
PLATELET # BLD: 232 K/UL (ref 135–450)
PMV BLD AUTO: 7.9 FL (ref 5–10.5)
POTASSIUM SERPL-SCNC: 3.3 MMOL/L (ref 3.5–5.1)
PROTHROMBIN TIME: 13.1 SEC (ref 10–13.2)
RBC # BLD: 4.01 M/UL (ref 4.2–5.9)
SODIUM BLD-SCNC: 135 MMOL/L (ref 136–145)
TOTAL PROTEIN: 5.4 G/DL (ref 6.4–8.2)
TRIGL SERPL-MCNC: 118 MG/DL (ref 0–150)
WBC # BLD: 12.4 K/UL (ref 4–11)

## 2020-07-16 PROCEDURE — 97110 THERAPEUTIC EXERCISES: CPT

## 2020-07-16 PROCEDURE — 97535 SELF CARE MNGMENT TRAINING: CPT

## 2020-07-16 PROCEDURE — 2700000000 HC OXYGEN THERAPY PER DAY

## 2020-07-16 PROCEDURE — 92526 ORAL FUNCTION THERAPY: CPT

## 2020-07-16 PROCEDURE — 6370000000 HC RX 637 (ALT 250 FOR IP): Performed by: INTERNAL MEDICINE

## 2020-07-16 PROCEDURE — 6360000002 HC RX W HCPCS: Performed by: INTERNAL MEDICINE

## 2020-07-16 PROCEDURE — 84478 ASSAY OF TRIGLYCERIDES: CPT

## 2020-07-16 PROCEDURE — 80076 HEPATIC FUNCTION PANEL: CPT

## 2020-07-16 PROCEDURE — 85730 THROMBOPLASTIN TIME PARTIAL: CPT

## 2020-07-16 PROCEDURE — 2580000003 HC RX 258: Performed by: INTERNAL MEDICINE

## 2020-07-16 PROCEDURE — 99232 SBSQ HOSP IP/OBS MODERATE 35: CPT | Performed by: INTERNAL MEDICINE

## 2020-07-16 PROCEDURE — 97530 THERAPEUTIC ACTIVITIES: CPT

## 2020-07-16 PROCEDURE — 85610 PROTHROMBIN TIME: CPT

## 2020-07-16 PROCEDURE — C9113 INJ PANTOPRAZOLE SODIUM, VIA: HCPCS | Performed by: INTERNAL MEDICINE

## 2020-07-16 PROCEDURE — 94761 N-INVAS EAR/PLS OXIMETRY MLT: CPT

## 2020-07-16 PROCEDURE — 2000000000 HC ICU R&B

## 2020-07-16 PROCEDURE — 2580000003 HC RX 258

## 2020-07-16 PROCEDURE — 99291 CRITICAL CARE FIRST HOUR: CPT | Performed by: INTERNAL MEDICINE

## 2020-07-16 PROCEDURE — 80048 BASIC METABOLIC PNL TOTAL CA: CPT

## 2020-07-16 PROCEDURE — 85027 COMPLETE CBC AUTOMATED: CPT

## 2020-07-16 PROCEDURE — 97112 NEUROMUSCULAR REEDUCATION: CPT

## 2020-07-16 RX ORDER — SODIUM CHLORIDE 9 MG/ML
INJECTION, SOLUTION INTRAVENOUS
Status: COMPLETED
Start: 2020-07-16 | End: 2020-07-16

## 2020-07-16 RX ADMIN — Medication 10 ML: at 09:45

## 2020-07-16 RX ADMIN — Medication 10 ML: at 10:09

## 2020-07-16 RX ADMIN — Medication 10 ML: at 18:06

## 2020-07-16 RX ADMIN — POTASSIUM CHLORIDE 20 MEQ: 400 INJECTION, SOLUTION INTRAVENOUS at 08:36

## 2020-07-16 RX ADMIN — ACYCLOVIR SODIUM 365 MG: 50 INJECTION, SOLUTION INTRAVENOUS at 09:37

## 2020-07-16 RX ADMIN — MEROPENEM 1 G: 1 INJECTION, POWDER, FOR SOLUTION INTRAVENOUS at 17:12

## 2020-07-16 RX ADMIN — Medication 10 ML: at 18:00

## 2020-07-16 RX ADMIN — PANTOPRAZOLE SODIUM 40 MG: 40 INJECTION, POWDER, FOR SOLUTION INTRAVENOUS at 21:35

## 2020-07-16 RX ADMIN — SODIUM CHLORIDE 250 ML: 9 INJECTION, SOLUTION INTRAVENOUS at 02:02

## 2020-07-16 RX ADMIN — MEROPENEM 1 G: 1 INJECTION, POWDER, FOR SOLUTION INTRAVENOUS at 01:05

## 2020-07-16 RX ADMIN — ENOXAPARIN SODIUM 40 MG: 40 INJECTION SUBCUTANEOUS at 21:35

## 2020-07-16 RX ADMIN — PANTOPRAZOLE SODIUM 40 MG: 40 INJECTION, POWDER, FOR SOLUTION INTRAVENOUS at 09:45

## 2020-07-16 RX ADMIN — HEPARIN SODIUM 14.8 ML/HR: 10000 INJECTION, SOLUTION INTRAVENOUS at 05:28

## 2020-07-16 RX ADMIN — Medication 10 ML: at 09:48

## 2020-07-16 RX ADMIN — Medication 10 ML: at 21:35

## 2020-07-16 RX ADMIN — KETOTIFEN FUMARATE 1 DROP: 0.35 SOLUTION/ DROPS OPHTHALMIC at 21:36

## 2020-07-16 RX ADMIN — ASPIRIN 81 MG 81 MG: 81 TABLET ORAL at 09:49

## 2020-07-16 RX ADMIN — FUROSEMIDE 40 MG: 10 INJECTION, SOLUTION INTRAMUSCULAR; INTRAVENOUS at 09:43

## 2020-07-16 RX ADMIN — ACYCLOVIR SODIUM 365 MG: 50 INJECTION, SOLUTION INTRAVENOUS at 02:02

## 2020-07-16 RX ADMIN — TETRAHYDROZOLINE HCL 1 DROP: 0.05 SOLUTION/ DROPS OPHTHALMIC at 09:49

## 2020-07-16 RX ADMIN — ROSUVASTATIN CALCIUM 10 MG: 10 TABLET, FILM COATED ORAL at 09:49

## 2020-07-16 RX ADMIN — MEROPENEM 1 G: 1 INJECTION, POWDER, FOR SOLUTION INTRAVENOUS at 10:04

## 2020-07-16 RX ADMIN — KETOTIFEN FUMARATE 1 DROP: 0.35 SOLUTION/ DROPS OPHTHALMIC at 09:49

## 2020-07-16 RX ADMIN — ACYCLOVIR SODIUM 365 MG: 50 INJECTION, SOLUTION INTRAVENOUS at 17:41

## 2020-07-16 RX ADMIN — POTASSIUM CHLORIDE 20 MEQ: 400 INJECTION, SOLUTION INTRAVENOUS at 09:35

## 2020-07-16 RX ADMIN — FUROSEMIDE 40 MG: 10 INJECTION, SOLUTION INTRAMUSCULAR; INTRAVENOUS at 18:06

## 2020-07-16 ASSESSMENT — PAIN SCALES - GENERAL
PAINLEVEL_OUTOF10: 0
PAINLEVEL_OUTOF10: 0

## 2020-07-16 NOTE — PROGRESS NOTES
Intravenous 2 times per day     PRN Meds:  potassium chloride, ipratropium-albuterol, heparin (porcine), heparin (porcine), fentanNYL, sennosides-docusate sodium, glucose, dextrose, glucagon (rDNA), dextrose, tetrahydrozoline, aluminum & magnesium hydroxide-simethicone, ondansetron, sodium chloride flush, acetaminophen **OR** acetaminophen    Results:  CBC:   Recent Labs     07/14/20  0500 07/15/20  0556 07/16/20  0530   WBC 15.4* 14.3* 12.4*   HGB 11.5* 11.4* 11.5*   HCT 35.3* 35.5* 35.4*   MCV 88.0 87.1 88.3    256 232     BMP:   Recent Labs     07/14/20  0500 07/15/20  0556 07/16/20  0530   * 135* 135*   K 3.3* 3.0* 3.3*   CL 94* 96* 95*   CO2 29 28 29   BUN 31* 25* 26*   CREATININE 0.7* 0.7* 0.7*     LIVER PROFILE:   Recent Labs     07/14/20  0500 07/15/20  0556 07/16/20  0530   AST 82* 36 35   ALT 69* 46* 39   BILIDIR 0.3 0.4* 0.3   BILITOT 0.6 0.9 0.8   ALKPHOS 88 82 82       Cultures:  6/24 Urine Staphylococcus epidermidis  6/24 COVID 19 +  6/29 Trach Asp NRF  7/2 respiratory NRF  7/3 BC NGTD  7/6 UC NGTD  7/7 Resp CX NRF   7/7 BC NGTD   7/7 UC NGTD         Films:  CXR 7/14 was reviewed by me and it showed   Similar Bilateral patchy airspace opacities, ETT in place    Echo 7/15: Conclusions      Summary   LV systolic function is normal with a visually estimated EF of 55%. Endocardium not entirely well visualized but no obvious segmental wall   motion abnormalities   The left ventricle is normal in size with normal wall thickness. Normal left ventricular diastolic function. No significant valvular disease. Systolic pulmonary artery pressure (SPAP) is normal and estimated at 29mmHg   (right atrial pressure 3mmHg). ASSESSMENT:  · Acute hypoxemic respiratory failure  · ARDS  · Leukocytosis -  · maculopapular rash abdomen and extremities-favor drug reaction. Zosyn stopped 7/11/2020 with improvement today   · COVID-19 viral multifocal pneumonia. Post 2 units of CCP 7/2/2020.   Completed 10 days of Remdesevir  7/7/2020  · Shingles  · Frequent PVCs         PLAN:  Supplemental oxygen to maintain SaO2 >92%; wean as tolerated  Meropenem D#6/7 and IV acyclovir per ID D#11/12. Completed 4 days of Zosyn stopped due to rash ; zyvox stopped after 8 days  Continue Lasix 40 IV bid  KCL replacement   heparin drip   Nutrition: advance diet as able  Blood sugar control, with goal 150-180  GI prophylaxis: Protonix twice daily  DVT prophylaxis:  Heparin drip -> Lovenox 40 mg bid  MRSA prophylaxis: Bactroban   Code status: Full code   PT/OT  Lu, daughter [phone]      Patient is critically ill with acute respiratory failure. Critical care time spent reviewing labs/films, examining patient, collaborating with other physicians but excluding procedures for life threatening organ failure is 31 minutes.

## 2020-07-16 NOTE — PROGRESS NOTES
Extremely Thick       Puree   x 1/2 applesauce container   Solid         Dysphagia Treatment and Impressions:  RN approved entry to room this date. Patient presents with no s/s of penetration/aspiration with HTL and puree this date. No fatigue noted over session. With NTL and thin liquids, patient immediately made \"gargling\" throat clear. Patient reported liking HTL (orange juice) over the thin and NTL trials (water). Unsure if throat clear related to penetration/aspiration or distaste. No change in respiratory vitals noted. Suspect poor lingual coordination/manipulation d/t inability to put dentures in place. When placed in mouth, patient unable to use tongue to put into place. Potentially poor fitting upper dentures d/t patient or clinician unable to properly place. Recommend upgrade to moderately thick (honey) liquids and puree consistencies. DC pleasure feeds . Dysphagia Goals:  Timeframe for Long-term Goals: 14 days (7/29/20)  Goal 1: The pt will tolerate safest and least restrictive diet without s/s of aspiration. 07/16, progressing, see above     Short-term Goals  Timeframe for Short-term Goals: 10 days (7/25/20)  Dysphagia Goals: The patient will tolerate recommended diet without observed clinical signs of aspiration. 07/16, progressing, see above  The patient/caregiver will demonstrate understanding of compensatory strategies for improved swallowing safety. 07/16, progressing, see above  The patient will tolerate instrumental swallowing procedure.   07/16, not appropriate at this time d/t COVID positive  The patient will tolerate mechanical soft foods 10/10. 07/16, unable to place dentures d/t poor bolus control  The patient will tolerate nectar thick liquids without signs and symptoms of aspiration 10/10 via cup. 07/16, attempted, immediate gargled throat clear    Recommendations:  Solid Consistency: Puree  Liquid Consistency: Moderately Thick (honey) liquids  Medication: Whole in edel    Patient/Family/Caregiver Education: Pt and RN educated on compensatory strategies, diet recommendation and POC    Compensatory Strategies: Sit up for all meals and thereafter for 30 minutes, Eat with small bites (1/2 tsp; 1 tsp), No straws, Alternate solids with liquids and Take your medication with apple sauce    Plan:    Continued Dysphagia treatment with goals per plan of care. Discharge Recommendations: TBD    If pt discharges from hospital prior to Speech/Swallowing discharge, this note serves as tx and discharge summary. Total Treatment Time / Charges     Time in Time out Total Time / units   Cognitive Tx         Speech Tx      Dysphagia Tx 1050 1105 15 min / 1 unit     Signature: Jacek Gale Notice  Speech-Language Pathologist [id number]

## 2020-07-16 NOTE — PROGRESS NOTES
Shift summary    0730 Shift assessment completed with documentation to follow. Is awake and alert. Confused only to time and date. 0900 O2 to 6L/min/high flow with O2 sat 94%. 1000 Multidisciplinary rounds with Dr. Liz Shaw and team. Events overnight, labs, and current status reviewed. See orders for increase in lasix and IV potassium sliding scale. 200 OOB to chair at bedside using stacy lift and sling. Tolerated moving from bed to chair without issue. 1300 Facetime with daughter for 5 minutes. Able to articulate conversation without issue. 1330 ECHO done while in chair at bedside. Results to follow. 1400 Had swallowing evaluation earlier and passed. Diet order to follow. 1530 Back to bed using stacy lift and sling. Tolerated being up and tolerated move back to bed without issue. O2 weaned to 4L high flow. 1600 PT/OT at bedside. Had [place] sit on edge of the bed for a period of time. See note for treatment provided and response. 36 Facetime with daughter and granddaughter for 10 minutes. Able to articulate conversation without issue. Is aware he has been very ill for several weeks. 1800 Sleeping. 1920 Shift report and care hand off to TRISTAN Deleon RN

## 2020-07-16 NOTE — PROGRESS NOTES
Patient is not able to demonstrated the ability to move from a reclining position to an upright position within the recliner. Patient is confused, demented and /or unable to follow instruction.

## 2020-07-16 NOTE — PROGRESS NOTES
---> pureed diet now  On 3L NC    On droplet plus precautions    Invasive Lines: PICC placed on 2020      MV: Intubated on 2020, extubated     Recent Labs     20   PHART 7.530*   PVL9MQV 32.7*   PO2ART 82.5       MV Settings:  Vent Mode: AC/VC Rate Set: 0 bmp/Vt Ordered: 400 mL/ /FiO2 : 50 %    IV:   heparin (porcine) 13.3 mL/hr (20)    propofol 25 mcg/kg/min (20 0657)    dextrose         Vitals:  Temp  Av.6 °F (37.6 °C)  Min: 99.1 °F (37.3 °C)  Max: 99.9 °F (37.7 °C)  Pulse  Av.1  Min: 80  Max: 96  BP  Min: 110/57  Max: 157/73  SpO2  Av.4 %  Min: 91 %  Max: 100 %  Patient Vitals for the past 4 hrs:   BP Temp Temp src Pulse Resp SpO2 Weight   20 0700 128/62 99.4 °F (37.4 °C) Bladder 80 23 94 % --   20 0600 (!) 122/56 -- -- 81 19 94 % --   20 0526 -- -- -- -- -- -- 186 lb 1.6 oz (84.4 kg)   20 0500 (!) 110/57 -- -- 85 23 100 % --   20 0400 (!) 125/57 99.6 °F (37.6 °C) Bladder 88 25 94 % --       CVP:        Intake/Output Summary (Last 24 hours) at 2020 0736  Last data filed at 2020 0525  Gross per 24 hour   Intake 866.5 ml   Output 2080 ml   Net -1213.5 ml       Physical Exam:  General:  Pt is awake alert and oriented , very fatigued   Neck:  JVD absent. Neck supple  Chest: Diminished breath sounds . No wheezes or rhonchi   Cardiovascular:  RRR ,S1S2 normal  Abdomen:  Soft, non tender, non distended, BS +  Extremities: edema has significantly decreased   Intact peripheral pulses.  Brisk cap refill, < 2 secs  Rash on trunk and ext improving   Neuro:awake, improving strength , following simple commands            Medications:  Scheduled Meds:   furosemide  40 mg Intravenous BID    meropenem  1 g Intravenous Q8H    acyclovir  5 mg/kg (Adjusted) Intravenous Q8H    pantoprazole  40 mg Intravenous BID    insulin lispro  0-12 Units Subcutaneous Q4H    ketotifen  1 drop Both Eyes BID    rosuvastatin  10 mg Oral Daily  aspirin  81 mg Oral Daily    sodium chloride flush  10 mL Intravenous 2 times per day       PRN Meds:  potassium chloride, ipratropium-albuterol, heparin (porcine), heparin (porcine), fentanNYL, sennosides-docusate sodium, glucose, dextrose, glucagon (rDNA), dextrose, tetrahydrozoline, aluminum & magnesium hydroxide-simethicone, ondansetron, sodium chloride flush, acetaminophen **OR** acetaminophen    Results:  CBC:   Recent Labs     07/14/20  0500 07/15/20  0556 07/16/20  0530   WBC 15.4* 14.3* 12.4*   HGB 11.5* 11.4* 11.5*   HCT 35.3* 35.5* 35.4*   MCV 88.0 87.1 88.3    256 232     BMP:   Recent Labs     07/14/20  0500 07/15/20  0556 07/16/20  0530   * 135* 135*   K 3.3* 3.0* 3.3*   CL 94* 96* 95*   CO2 29 28 29   BUN 31* 25* 26*   CREATININE 0.7* 0.7* 0.7*     LIVER PROFILE:   Recent Labs     07/14/20  0500 07/15/20  0556 07/16/20  0530   AST 82* 36 35   ALT 69* 46* 39   BILIDIR 0.3 0.4* 0.3   BILITOT 0.6 0.9 0.8   ALKPHOS 88 82 82     PT/INR:   Recent Labs     07/16/20  0530   PROTIME 13.1   INR 1.13     Results for NEWTON BOWENS T (MRN [id number]) as of 7/1/2020 09:46   Ref. Range 6/24/2020 21:35 6/26/2020 15:24 6/30/2020 04:20   D-Dimer, Quant Latest Ref Range: 0 - 229 ng/mL  (H) 682 (H) 653 (H)     Results for Jordimanuel Gleasons SARAH (MRN [id number]) as of 7/1/2020 09:46   Ref. Range 6/24/2020 21:35   Ferritin Latest Ref Range: 30.0 - 400.0 ng/mL 97.3       Cultures:  Results for Harley BOWENS Leonel (MRN [id number]) as of 6/30/2020 09:20   Ref. Range 6/24/2020 22:00   SARS-CoV-2, PCR Latest Ref Range: Not Detected  DETECTED (A)     Susceptibility     Staphylococcus epidermidis (1)     Antibiotic Interpretation TARA Status    ciprofloxacin Sensitive <=0.5 mcg/mL     nitrofurantoin Sensitive <=16 mcg/mL     oxacillin Sensitive <=0.25 mcg/mL     tetracycline Sensitive <=1 mcg/mL     trimethoprim-sulfamethoxazole Sensitive <=10 mcg/mL           Films:    XR CHEST PORTABLE   Final Result   1. Stable low lung volumes with bilateral lung infiltrates, compatible with   pneumonia. VL Extremity Venous Bilateral   Final Result      XR CHEST PORTABLE   Final Result   Stable appropriate life support device positioning. No substantial change in bilateral airspace disease. XR CHEST PORTABLE   Final Result   1. Stable chest with bilateral lung infiltrates. Findings are concerning for   pulmonary edema versus pneumonia. XR CHEST PORTABLE   Final Result   Patchy bilateral airspace disease, slightly increased as compared to prior. XR CHEST PORTABLE   Final Result   Improving bilateral pneumonia versus edema. XR CHEST 1 VW   Final Result   Increasing patchy opacities throughout both lungs, most compatible with   multifocal pneumonia. Component of pulmonary edema should be considered. XR ABDOMEN (KUB) (SINGLE AP VIEW)   Final Result   1. Indeterminate bowel gas pattern         XR CHEST PORTABLE   Final Result   Stable bilateral pneumonia. XR CHEST PORTABLE   Final Result   Stable central ground-glass airspace opacities. Supportive devices are   unchanged. XR CHEST PORTABLE   Final Result   The endotracheal tube tip is approximately 3 cm above the thai. XR CHEST PORTABLE   Final Result   Mild increase in the amount of bibasilar opacity since yesterday. Large   bilateral pneumonias. XR CHEST PORTABLE   Final Result   Stable bilateral pulmonary opacities. XR CHEST PORTABLE   Final Result   Minimal increase in the amount of opacity in each lung consistent with slight   worsening of bilateral pneumonia. XR CHEST PORTABLE   Final Result   Supportive tubing is in normal position. Low lung volume study, with stable alveolar opacities in the mid and lower   lungs, pneumonia versus atelectasis. XR CHEST PORTABLE   Final Result   Increased in degree and extent of bilateral mid and lower lung pneumonia.    The increased may be partially accentuated by low lung volumes. XR CHEST 1 VW   Final Result   Persistent bilateral airspace disease, similar to prior. IR PICC WO SQ PORT/PUMP > 5 YEARS   Final Result   Successful placement of PICC line. XR CHEST PORTABLE   Final Result   Appropriate endotracheal tube positioning. Multifocal airspace opacities and areas of atelectasis correlate with recent   CT chest.         XR ABDOMEN FOR NG/OG/NE TUBE PLACEMENT   Final Result   NG tube terminates over the stomach. CT CHEST PULMONARY EMBOLISM W CONTRAST   Final Result   1. No pulmonary embolism. 2. Multifocal pneumonia scattered throughout both lungs with minimal pleural   effusions. XR CHEST STANDARD (2 VW)   Final Result   No radiographic evidence of acute pulmonary disease. Assessment and Plan:    Acute hypoxic respiratory failure due to COVID-19 infection   ARDS    - intubated, on mech vent  - s.p remdesivir and convalescent plasma   - improving very slowly   - Reintubated on 7/6 d/t displacement of existing ETT  - 7/14 , extubated to NC high flow  - wean as able, 3L HF NC  - ongoing diuresis for peripheral edema, IV lasix 40 BID, good urine output, edema improved    COVID -19 PNA  - s/p Decadron - 10 days. - Completed 10 days of Remdesivir on 7/7/2020  - S/P transfusion of 2 units of convalescent plasma on 7/2/2020  - imaging with ARDS initially and now improving   - remains off VENT and BiPAP. On 3L HF NC    Sepsis  - POA, due to PNA, COVID 19  - with leukocytosis, fevers, tachypnea  - COVID-19 detected  - improving fevers  -Antibiotics adjusted per infectious disease( tele consult)  Currently on acyclovir and merrem. Stopped zosyn d/t rash. Completed x8 days of ZYVOX      Multifocal pneumonia  - related to COVID-19.    - Tracheal aspirate sent. - was on  broad-spectrum antibiotics, vanc and  Cefepime--> now off .     Completed zyvox  Continue merrem      Possible Shingles  Distrubution of rash concerning for drug eruption vs covid related vs  leukocytoclastic vasculitis. - generalized involvement of trunk, torso, and extremities without classic vesicular lesions and bilateral distribution make shingles less likely. - started IV Acyclovir per ID recs- can stop       Petechial Rash  - noted on arms and trunk  - given benadryl IV  - followed by ID - remains on IV acyclovir, monitor      Elevated D dimer  - due to COVID 19  - CT chest neg for PE  - holding lovenox for GI bleed- can resume now     UTI  - cx positive for staph, treated    Hyperlipidemia  - on statin. Rectal Bleeding -   GI to eval.   Hgb stable.    Resumed on heparin gtt am of 7/12  Heparin gtt off  On bid lovenox now          Full Code  Start PT in am  Repeat SLP  Transfer to out of ICU today    Moraima Gayle MD 7/16/2020 7:36 AM

## 2020-07-16 NOTE — PROGRESS NOTES
Shift assessment complete- see flowsheets. Pt is alert and oriented to self. Disoriented to place, time and situation. Reorientation provided. VSS  Respirations are easy, even and unlabored. Pt is on 4L HFNC. Lungs diminished throughout. PICC WDL. Lines infusing or flushed at this time. Plan of care and goals reviewed. Call light within reach. Bed in lowest position with wheels locked. No needs expressed at this time. Will continue to monitor.

## 2020-07-16 NOTE — PROGRESS NOTES
Occupational Therapy Daily Treatment Note    Unit: ICU  Date:  7/16/2020  Patient Name:    Shaniqua Baez  Admitting diagnosis:  Multifocal pneumonia [J18.9]  Admit Date:  6/24/2020  Precautions/Restrictions:  Fall risk, Bed/chair alarm, Lines -IV, Supplemental O2 (4L) and Fischer catheter and Isolation Precautions: Droplet Plus - COVID      Discharge Recommendations: SNF  DME needs for discharge: defer to facility       Therapy recommendations for staff:   Assist of 2 to sit edge of bed. Alok-lift to chair. AM-PAC Score: AM-PAC Inpatient Daily Activity Raw Score: 7  Home Health S4 Level: NA       Treatment Time:  14:55-15:55  Treatment number:  2   Total Treatment Time:   60 minutes    History of Present Illness: per Dr Martha Goins H&P 6/25/20  \" The patient is a 82 y. o. male with PMH below, presents with SOB/PINEDA, fatigue, lightheadedness, dyspnea on exertion, fever.  Patient reports that for the last few months he is had increasing problems with fatigue and lightheadedness.  He reports he gets lightheaded especially when he stands up too fast.  He also describes dyspnea on exertion which appears to be worsening even with a few steps.  He does not have shortness of breath at rest. Arslan Chandler says he feels like \"I am running out of gas much faster than I used to. \" Arslan Chandler reports that he went to a corn hole tournament last week. [place] were approximately 200 teams there. Arslan Chandler does not know of any sick contacts but was in close contact with many people there. Arslan Chandler also notes that he has had a little bit of chest pressure but denies that it is actually vinod pain.  He denies urinary symptoms. Arslan Chandler has had an occasional cough but does not believe it has been productive. Arslan Chandler reports that he is normally fairly active and independent but has been less so the last several days. Arslan Chandler reports he measured his temperature at 101.7 at home today.  His temp was 99.3 and 99.5 in the ER. \"     COVID-19 positive, intubated 6/29-7/14, PICC 6/29    Subjective:  Patient supine in bed and agreeable to treatment. Patient reports sitting in recliner chair for several hours this date. Pain   Yes  Rating: moderate  Location: L UE with AROM exercises  Pain Medicine Status: Denies need      Bed Mobility:   Supine to Sit:  Max A of 2  Sit to Supine:  Max A of 2  Rolling: Max A  Scooting: Max A    Transfer Training:   Sit to stand: Mod A of 2 from EOB to STEDY  Stand to sit:  Mod A of 2  Bed to Chair:  Not Tested  Bed to [facility]:   Not Tested  Standard toilet:   Not Tested    Activity Tolerance   Pt completed therapy session with SOB noted with activity  SpO2: 94% on 3L of O2 at rest. O2 dropped to 78% while standing in STEDY. Gradually increased O2 to 8L. SpO2 fluctuated from 85-91%. Decreased O2 to 4L at end of session with SpO2 at 93%. HR: 90s-105  BP:     ADL Training:   Upper body dressing:  Max A  Upper body bathing:  Not Tested  Lower body dressing:  Not Tested  Lower body bathing:  Not Tested  Toileting:   Not Tested  Grooming/Hygiene:  Max A for mouth care    Therapeutic Exercise:   Hand flex/ext:  x10  Wrist flex/ext:  x10  Forearm sup/pronation:  x10   Elbow flex/ext: x10    Patient Education:   Role of OT  Recommendations for DC    Positioning Needs: In bed, call light and needs in reach. Family Present:  No    Assessment: Patient demonstrated significant improvements with mobility and AROM of B UEs. Patient would benefit from continued skilled OT services daily. Will increase frequency to 5-7x a week. GOALS  To be met in 3 Visits:  1). Bed to toilet/BSC: Max A      To be met in 5 Visits:  1). Supine to/from Sit:             Mod A   2). Upper Body Bathing: Mod A   3). Lower Body Bathing:         Max A   4). Upper Body Dressing: Mod A   5). Lower Body Dressing:       Max A   6).  Pt to demonstrate UE exs x 15 reps with minimal cues      Plan: cont with [facility], OTR/L #[id number]    If patient discharges from this facility prior to next visit, this note will serve as the Discharge Summary

## 2020-07-16 NOTE — PROGRESS NOTES
101.7 at home today.  His temp was 99.3 and 99.5 in the ER. \"  [id number] positive, intubated 6/29-7/14, PICC 6/29    Home Health S4 Level Recommendation: NA  AM-PAC Mobility Score   AM-PAC Inpatient Mobility Raw Score : 9     Treatment Time:  1500 - 1600  Treatment number: 2  Timed Code Treatment Minutes: 60 minutes  Total Treatment Minutes:  60  minutes    Cognition    A&O Person and Place   Able to follow 1 step commands    Subjective  Patient lying supine in bed with no family present   Pt agreeable to this PT tx. Pain   No  Location: N/A  Rating:    NA/10  Pain Medicine Status: No request made     Bed Mobility   Supine to Sit:    Max A  and 2 persons  Sit to Supine:   Max A  and 2 persons  Rolling: Max A   Scooting: Max A  and 2 persons    Transfer Training     Sit to stand: Max A  and 2 persons  Stand to sit:   Mod A  and 2 persons  Bed to Chair:   Total A and 2 persons with use of RIC STEDY    Gait Training gait deferred due to difficulty with transfers; pt ambulated 0 ft. Distance:      0 ft  Deviations (firm surface/linoleum):  N/A  Assistive Device Used:    N/A  Level of Assist:    Not Tested  Comment:     Stair Training deferred, pt unsafe/not appropriate to complete stairs at this time  # of Steps:   N/A  Level of Assist:  Not Tested  UE Support:  NA  Assistive Device:  N/A  Pattern:   N/A  Comments:     Therapeutic Exercise all completed bilaterally unless indicated  Ankle Pumps x 10 reps  Quad sets x 10 reps  Heel slides x 10 reps  LAQ x 10 reps  Hip abduction: x 10 reps  Forward trunk lean for 10 reps with Min A, glut squeezes in supported standing while in [place] 4 reps. Balance  Sitting:  Fair ; CGA  Comments: 15    Standing: Poor;  Max A  and 2 persons  Comments: 30 sec x 2 reps    Patient Education      Role of PT, POC, Discharge recommendations, DC recommendations, safety awareness, transfer techniques, pursed lip breathing, energy conservation, pacing activity and calling for assist with mobility. Positioning Needs       Pt in bed, alarm set, positioned in proper neutral alignment and pressure relief provided. Call light provided and all needs within reach    ROM Measurements N/A  Knee Flexion:  Knee Extension:     Activity Tolerance   Pt completed therapy session with SOB noted with activity  Spo2 = 82 - 95%  HR = 90 - 105 BPM.    Other  N/A    Assessment :  Patient showed improvement in physical function today. Unsupported sitting tolerance improved from 7 min to 15 min with decreased level of assist from Mod A to CGA today. Patient was able to participate in sit to stand with Max A x 2 using Stedy with improved bilateral UE function and control (LUE>RUE). Co-treatment was done with Occupational therapist to improve EOB sitting activity tolerance and balance, improved UE control to participate in ADLs while Physical therapist working on trunk control, and LE positioning and control. Recommending SNF upon discharge as patient functioning well below baseline, demonstrates good rehab potential and unable to return home due to limited or no family support, burden of care beyond caregiver ability, home environment not conducive to patient recovery, limited safety awareness and significant decline in functional capacity as per his PLOF. .    Goals (all goals ongoing unless otherwise indicated)  To be met in 3 visits:  1). Independent with LE Ex x 10 reps     To be met in 6 visits:  1). Supine to/from sit: Max A   2). Sit to/from stand: Max A   3). Bed to chair: Max A   4). Gait: Ambulate 5 ft.   with  Max A  and use of LRAD (least restrictive assistive device)  5). Tolerate B LE exercises 3 sets of 10-15 reps    Plan   Continue with plan of care. Attempt bed to chair transfers using Stedy as tolerated by the patient. Signature: Melida Harman, MS PT, # [phone]      If patient discharges from this facility prior to next visit, this note will serve as the Discharge Summary.

## 2020-07-16 NOTE — CONSULTS
7-16-20 (0530) aptt 84.8. Please reduce heparin drip to 13.3 ml/hr. Next aptt 1300 on 7-16-20. 1600 [facility]. Ph.  7/16/2020 7:16 AM

## 2020-07-16 NOTE — PROGRESS NOTES
Shift Summary    0322 Shift assessment completed with documentation to follow. Is awake and alert this morning. Oriented to self, place, and situation. Still disoriented to time and day/date. 3001 Multidisciplinary rounds with Dr. Marcia Mcmahon and team. Overnight events discussed. See orders. Heparin infusion to be discontinued and lovenox will begin. 0950 OOB to bedside chair with use of overhead sling and lift. Tolerated without issue. Still very \"sore\" and \"stiff.' Continues to have generalized upper body edema. Is able to move right arm with gross motor movement today. Still unable to move left arm without assistance . 1010 Took oral medications without issue. Swallowed them whole with thickened juice. Fed him an entire container of apple sauce and tolerated. 200 Took few bites of pureed lunch, Was not interested in any more than a bite or two.  1300 Facetime with daughter for 15 minutes. 1330 Back to bed with sling and stacy lift. Tolerated being OOB without any issues. 1500 PT/OT to work with him.  1600 Stood with use of Steady device with assistance of two persons. Tolerated without issue. 1700 Bathed and shaved. 1800 Watching TV. Comfortable. Noted to be moving right hand more and wiggling left fingers. 1930 Shift report and care handoff to KEATON Gu RN.

## 2020-07-17 LAB
ALBUMIN SERPL-MCNC: 2.8 G/DL (ref 3.4–5)
ALP BLD-CCNC: 71 U/L (ref 40–129)
ALT SERPL-CCNC: 35 U/L (ref 10–40)
ANION GAP SERPL CALCULATED.3IONS-SCNC: 12 MMOL/L (ref 3–16)
AST SERPL-CCNC: 33 U/L (ref 15–37)
BILIRUB SERPL-MCNC: 0.8 MG/DL (ref 0–1)
BILIRUBIN DIRECT: 0.3 MG/DL (ref 0–0.3)
BILIRUBIN, INDIRECT: 0.5 MG/DL (ref 0–1)
BUN BLDV-MCNC: 27 MG/DL (ref 7–20)
CALCIUM SERPL-MCNC: 8.1 MG/DL (ref 8.3–10.6)
CHLORIDE BLD-SCNC: 97 MMOL/L (ref 99–110)
CO2: 29 MMOL/L (ref 21–32)
CREAT SERPL-MCNC: 0.8 MG/DL (ref 0.8–1.3)
GFR AFRICAN AMERICAN: >60
GFR NON-AFRICAN AMERICAN: >60
GLUCOSE BLD-MCNC: 100 MG/DL (ref 70–99)
GLUCOSE BLD-MCNC: 104 MG/DL (ref 70–99)
GLUCOSE BLD-MCNC: 126 MG/DL (ref 70–99)
GLUCOSE BLD-MCNC: 86 MG/DL (ref 70–99)
GLUCOSE BLD-MCNC: 95 MG/DL (ref 70–99)
HCT VFR BLD CALC: 35.2 % (ref 40.5–52.5)
HEMOGLOBIN: 11.7 G/DL (ref 13.5–17.5)
MCH RBC QN AUTO: 29.3 PG (ref 26–34)
MCHC RBC AUTO-ENTMCNC: 33.2 G/DL (ref 31–36)
MCV RBC AUTO: 88.4 FL (ref 80–100)
PDW BLD-RTO: 14.3 % (ref 12.4–15.4)
PERFORMED ON: ABNORMAL
PERFORMED ON: NORMAL
PLATELET # BLD: 225 K/UL (ref 135–450)
PMV BLD AUTO: 8.5 FL (ref 5–10.5)
POTASSIUM SERPL-SCNC: 3.3 MMOL/L (ref 3.5–5.1)
RBC # BLD: 3.97 M/UL (ref 4.2–5.9)
SODIUM BLD-SCNC: 138 MMOL/L (ref 136–145)
TOTAL PROTEIN: 5.4 G/DL (ref 6.4–8.2)
TRIGL SERPL-MCNC: 142 MG/DL (ref 0–150)
WBC # BLD: 10.7 K/UL (ref 4–11)

## 2020-07-17 PROCEDURE — 2580000003 HC RX 258: Performed by: INTERNAL MEDICINE

## 2020-07-17 PROCEDURE — 6360000002 HC RX W HCPCS: Performed by: INTERNAL MEDICINE

## 2020-07-17 PROCEDURE — 97530 THERAPEUTIC ACTIVITIES: CPT

## 2020-07-17 PROCEDURE — 84478 ASSAY OF TRIGLYCERIDES: CPT

## 2020-07-17 PROCEDURE — 6370000000 HC RX 637 (ALT 250 FOR IP): Performed by: INTERNAL MEDICINE

## 2020-07-17 PROCEDURE — 85027 COMPLETE CBC AUTOMATED: CPT

## 2020-07-17 PROCEDURE — 99291 CRITICAL CARE FIRST HOUR: CPT | Performed by: INTERNAL MEDICINE

## 2020-07-17 PROCEDURE — 97110 THERAPEUTIC EXERCISES: CPT

## 2020-07-17 PROCEDURE — C9113 INJ PANTOPRAZOLE SODIUM, VIA: HCPCS | Performed by: INTERNAL MEDICINE

## 2020-07-17 PROCEDURE — 2000000000 HC ICU R&B

## 2020-07-17 PROCEDURE — 94761 N-INVAS EAR/PLS OXIMETRY MLT: CPT

## 2020-07-17 PROCEDURE — 80076 HEPATIC FUNCTION PANEL: CPT

## 2020-07-17 PROCEDURE — 99232 SBSQ HOSP IP/OBS MODERATE 35: CPT | Performed by: INTERNAL MEDICINE

## 2020-07-17 PROCEDURE — 92526 ORAL FUNCTION THERAPY: CPT

## 2020-07-17 PROCEDURE — 97112 NEUROMUSCULAR REEDUCATION: CPT

## 2020-07-17 PROCEDURE — 80048 BASIC METABOLIC PNL TOTAL CA: CPT

## 2020-07-17 PROCEDURE — 2700000000 HC OXYGEN THERAPY PER DAY

## 2020-07-17 RX ADMIN — Medication 10 ML: at 22:07

## 2020-07-17 RX ADMIN — KETOTIFEN FUMARATE 1 DROP: 0.35 SOLUTION/ DROPS OPHTHALMIC at 08:17

## 2020-07-17 RX ADMIN — Medication 10 ML: at 10:44

## 2020-07-17 RX ADMIN — PANTOPRAZOLE SODIUM 40 MG: 40 INJECTION, POWDER, FOR SOLUTION INTRAVENOUS at 22:06

## 2020-07-17 RX ADMIN — MEROPENEM 1 G: 1 INJECTION, POWDER, FOR SOLUTION INTRAVENOUS at 00:25

## 2020-07-17 RX ADMIN — ENOXAPARIN SODIUM 40 MG: 40 INJECTION SUBCUTANEOUS at 10:45

## 2020-07-17 RX ADMIN — ASPIRIN 81 MG 81 MG: 81 TABLET ORAL at 08:15

## 2020-07-17 RX ADMIN — PANTOPRAZOLE SODIUM 40 MG: 40 INJECTION, POWDER, FOR SOLUTION INTRAVENOUS at 13:10

## 2020-07-17 RX ADMIN — MEROPENEM 1 G: 1 INJECTION, POWDER, FOR SOLUTION INTRAVENOUS at 10:45

## 2020-07-17 RX ADMIN — ACYCLOVIR SODIUM 365 MG: 50 INJECTION, SOLUTION INTRAVENOUS at 17:32

## 2020-07-17 RX ADMIN — ACYCLOVIR SODIUM 365 MG: 50 INJECTION, SOLUTION INTRAVENOUS at 00:25

## 2020-07-17 RX ADMIN — ACYCLOVIR SODIUM 365 MG: 50 INJECTION, SOLUTION INTRAVENOUS at 08:14

## 2020-07-17 RX ADMIN — ROSUVASTATIN CALCIUM 10 MG: 10 TABLET, FILM COATED ORAL at 08:17

## 2020-07-17 RX ADMIN — MEROPENEM 1 G: 1 INJECTION, POWDER, FOR SOLUTION INTRAVENOUS at 17:32

## 2020-07-17 RX ADMIN — FUROSEMIDE 40 MG: 10 INJECTION, SOLUTION INTRAMUSCULAR; INTRAVENOUS at 17:32

## 2020-07-17 RX ADMIN — ACETAMINOPHEN 650 MG: 325 TABLET ORAL at 08:15

## 2020-07-17 RX ADMIN — FUROSEMIDE 40 MG: 10 INJECTION, SOLUTION INTRAMUSCULAR; INTRAVENOUS at 10:45

## 2020-07-17 RX ADMIN — ENOXAPARIN SODIUM 40 MG: 40 INJECTION SUBCUTANEOUS at 22:07

## 2020-07-17 RX ADMIN — KETOTIFEN FUMARATE 1 DROP: 0.35 SOLUTION/ DROPS OPHTHALMIC at 22:07

## 2020-07-17 ASSESSMENT — PAIN SCALES - GENERAL
PAINLEVEL_OUTOF10: 0
PAINLEVEL_OUTOF10: 2
PAINLEVEL_OUTOF10: 5

## 2020-07-17 NOTE — PROGRESS NOTES
This RN and night RN attempting to get pt to chair. Pt sitting on bedside with this RN supporting 100% pt weight. Pt having no trunk stability and very limited use of hands or legs. Pt having small smear of BM. Pt oxygenation at 80% on 15L high flow. Pt back to be in chair position. Pt states he is very confused but is alert to person, place, situation. Pt calling his daughter on facetime and wife on phone while this RN in room.      Pt now 93% on 12L High flow NC.

## 2020-07-17 NOTE — PROGRESS NOTES
Internal Medicine ICU Progress Note      Interval History:     Patient admitted with COVID -19 - tested positive 6/24. Pt w/ persistent high fevers, progressive hypoxia. Initiated on Remdesivir, dexamethasone. Intubated for worsening resp failure on 6/29/20. Requiring HF O2. Had high fevers w/ T max of 104 F    7/2/20: S/P Convalescent plasma transfusion 2 units     7/3/20--> 7/7: fevers improved, persistent hypoxia  Has developed new shingles, started on acyclovir  ET tube changed on  7/6 7/8/20:  Spiking temps, up to 102.3 °F.  -Infectious disease following .  -Hypoxia slightly improved but FiO2 down from 75% to 55% today , PEEP remains at 14      7/10/20:  - pt improving now. -Temperatures are improving, low-grade fevers  - sats improved , Fi o2 down to 50 % for the last couple of days and remained stable. PEEP down to 8  -Spontaneous breathing trials initiated today and patient tolerated this well for 2 hours   -He is back on mechanical ventilation , responding a little when sedation decreased . 7/11/20  Ongoing rash involves the whole body. Palms and soles spared. Passing blood per rectum - serial H&H and GI eval ordered last night. 7/12/2020  Rash improved today. Pt is off sedation - more awake, follows commands. Denies pain. Febrile last night Tmax 100.9    No signs of recurrent bleeding - he was resumed on heparin infusion this am.       7/13- ongoing weaning trials  arousable on vent, following some commands    7/14 , extubated to NC high flow today   Mentation better, following commands well     7/15    Extubated on 7/14  Appears to be improving on 6L HF NC now. 94% saturation  Up to chair today with nursing, tolerated fairly well  Plan for 7/16?   Speech recommends NPO except to pleasure feeds of PUREED, attempt progression of diet on 7/16 7/16  Patient continues to show improvement  Working with PT/OT, recommending SNF on d/c  Speech recommendations, NPO ---> pureed diet now  On 3L NC      Patient seen resting in bed, appears comfortable. Back up to 6L HF NC now, up to 12 overnight. Febrile overnight @ 100.4   Patient is noted to be very weak      On droplet plus precautions    Invasive Lines: PICC placed on 2020      MV: Intubated on 2020, extubated     No results for input(s): PHART, VRW9AJR, PO2ART in the last 72 hours. MV Settings:  Vent Mode: AC/VC Rate Set: 0 bmp/Vt Ordered: 400 mL/ /FiO2 : 50 %    IV:   dextrose         Vitals:  Temp  Av.8 °F (37.7 °C)  Min: 99.2 °F (37.3 °C)  Max: 100.4 °F (38 °C)  Pulse  Av.6  Min: 82  Max: 100  BP  Min: 106/53  Max: 173/75  SpO2  Av.8 %  Min: 91 %  Max: 95 %  Patient Vitals for the past 4 hrs:   BP Temp Temp src Pulse Resp SpO2   20 0500 (!) 132/52 -- -- 90 (!) 31 92 %   20 0400 (!) 119/51 99.5 °F (37.5 °C) Bladder 91 30 91 %       CVP:        Intake/Output Summary (Last 24 hours) at 2020 0757  Last data filed at 2020 0500  Gross per 24 hour   Intake 980 ml   Output 1825 ml   Net -845 ml       Physical Exam:  General:  Pt is awake alert and oriented , very fatigued   Neck:  JVD absent. Neck supple  Chest: Diminished breath sounds . No wheezes or rhonchi   Cardiovascular:  RRR ,S1S2 normal  Abdomen:  Soft, non tender, non distended, BS +  Extremities: edema has significantly decreased   Intact peripheral pulses.  Brisk cap refill, < 2 secs  Rash on trunk and ext improving   Neuro:awake, improving strength , following simple commands            Medications:  Scheduled Meds:   enoxaparin  40 mg Subcutaneous BID    furosemide  40 mg Intravenous BID    meropenem  1 g Intravenous Q8H    acyclovir  5 mg/kg (Adjusted) Intravenous Q8H    pantoprazole  40 mg Intravenous BID    insulin lispro  0-12 Units Subcutaneous Q4H    ketotifen  1 drop Both Eyes BID    rosuvastatin  10 mg Oral Daily    aspirin  81 mg Oral Daily    sodium chloride flush  10 mL Intravenous 2 times per day       PRN Meds:  potassium chloride, ipratropium-albuterol, fentanNYL, sennosides-docusate sodium, glucose, dextrose, glucagon (rDNA), dextrose, tetrahydrozoline, aluminum & magnesium hydroxide-simethicone, ondansetron, sodium chloride flush, acetaminophen **OR** acetaminophen    Results:  CBC:   Recent Labs     07/15/20  0556 07/16/20  0530 07/17/20  0500   WBC 14.3* 12.4* 10.7   HGB 11.4* 11.5* 11.7*   HCT 35.5* 35.4* 35.2*   MCV 87.1 88.3 88.4    232 225     BMP:   Recent Labs     07/15/20  0556 07/16/20  0530 07/17/20  0720   * 135* 138   K 3.0* 3.3* 3.3*   CL 96* 95* 97*   CO2 28 29 29   BUN 25* 26* 27*   CREATININE 0.7* 0.7* 0.8     LIVER PROFILE:   Recent Labs     07/15/20  0556 07/16/20  0530 07/17/20  0720   AST 36 35 33   ALT 46* 39 35   BILIDIR 0.4* 0.3 0.3   BILITOT 0.9 0.8 0.8   ALKPHOS 82 82 71     PT/INR:   Recent Labs     07/16/20  0530   PROTIME 13.1   INR 1.13     Results for NEWTON BOWENS T (MRN [id number]) as of 7/1/2020 09:46   Ref. Range 6/24/2020 21:35 6/26/2020 15:24 6/30/2020 04:20   D-Dimer, Quant Latest Ref Range: 0 - 229 ng/mL  (H) 682 (H) 653 (H)     Results for Trevor Friend T (MRN [id number]) as of 7/1/2020 09:46   Ref. Range 6/24/2020 21:35   Ferritin Latest Ref Range: 30.0 - 400.0 ng/mL 97.3       Cultures:  Results for Nayan BOWENS (MRN [id number]) as of 6/30/2020 09:20   Ref. Range 6/24/2020 22:00   SARS-CoV-2, PCR Latest Ref Range: Not Detected  DETECTED (A)     Susceptibility     Staphylococcus epidermidis (1)     Antibiotic Interpretation TARA Status    ciprofloxacin Sensitive <=0.5 mcg/mL     nitrofurantoin Sensitive <=16 mcg/mL     oxacillin Sensitive <=0.25 mcg/mL     tetracycline Sensitive <=1 mcg/mL     trimethoprim-sulfamethoxazole Sensitive <=10 mcg/mL           Films:    XR CHEST PORTABLE   Final Result   1. Stable low lung volumes with bilateral lung infiltrates, compatible with   pneumonia.          VL Extremity Venous Bilateral Final Result      XR CHEST PORTABLE   Final Result   Stable appropriate life support device positioning. No substantial change in bilateral airspace disease. XR CHEST PORTABLE   Final Result   1. Stable chest with bilateral lung infiltrates. Findings are concerning for   pulmonary edema versus pneumonia. XR CHEST PORTABLE   Final Result   Patchy bilateral airspace disease, slightly increased as compared to prior. XR CHEST PORTABLE   Final Result   Improving bilateral pneumonia versus edema. XR CHEST 1 VW   Final Result   Increasing patchy opacities throughout both lungs, most compatible with   multifocal pneumonia. Component of pulmonary edema should be considered. XR ABDOMEN (KUB) (SINGLE AP VIEW)   Final Result   1. Indeterminate bowel gas pattern         XR CHEST PORTABLE   Final Result   Stable bilateral pneumonia. XR CHEST PORTABLE   Final Result   Stable central ground-glass airspace opacities. Supportive devices are   unchanged. XR CHEST PORTABLE   Final Result   The endotracheal tube tip is approximately 3 cm above the thai. XR CHEST PORTABLE   Final Result   Mild increase in the amount of bibasilar opacity since yesterday. Large   bilateral pneumonias. XR CHEST PORTABLE   Final Result   Stable bilateral pulmonary opacities. XR CHEST PORTABLE   Final Result   Minimal increase in the amount of opacity in each lung consistent with slight   worsening of bilateral pneumonia. XR CHEST PORTABLE   Final Result   Supportive tubing is in normal position. Low lung volume study, with stable alveolar opacities in the mid and lower   lungs, pneumonia versus atelectasis. XR CHEST PORTABLE   Final Result   Increased in degree and extent of bilateral mid and lower lung pneumonia. The increased may be partially accentuated by low lung volumes.          XR CHEST 1 VW   Final Result   Persistent bilateral airspace disease, similar to prior. IR PICC WO SQ PORT/PUMP > 5 YEARS   Final Result   Successful placement of PICC line. XR CHEST PORTABLE   Final Result   Appropriate endotracheal tube positioning. Multifocal airspace opacities and areas of atelectasis correlate with recent   CT chest.         XR ABDOMEN FOR NG/OG/NE TUBE PLACEMENT   Final Result   NG tube terminates over the stomach. CT CHEST PULMONARY EMBOLISM W CONTRAST   Final Result   1. No pulmonary embolism. 2. Multifocal pneumonia scattered throughout both lungs with minimal pleural   effusions. XR CHEST STANDARD (2 VW)   Final Result   No radiographic evidence of acute pulmonary disease. Assessment and Plan:    Acute hypoxic respiratory failure due to COVID-19 infection   ARDS    - intubated, on mech vent  - s.p remdesivir and convalescent plasma   - improving very slowly   - Reintubated on 7/6 d/t displacement of existing ETT  - 7/14 , extubated to NC high flow  - wean as able, 3L HF NC  - ongoing diuresis for peripheral edema, IV lasix 40 BID, good urine output, edema improved  -back up to 6L HF NC, ? Aspiration  - consider repeat xray       COVID -19 PNA  - s/p Decadron - 10 days. - Completed 10 days of Remdesivir on 7/7/2020  - S/P transfusion of 2 units of convalescent plasma on 7/2/2020  - imaging with ARDS initially and now improving   - remains off VENT and BiPAP. On 3L HF NC    Sepsis  - POA, due to PNA, COVID 19  - with leukocytosis, fevers, tachypnea  - COVID-19 detected  - improving fevers  -Antibiotics adjusted per infectious disease( tele consult)  Currently on acyclovir and merrem. Stopped zosyn d/t rash. Completed x8 days of ZYVOX      Multifocal pneumonia  - related to COVID-19.    - Tracheal aspirate sent. - was on  broad-spectrum antibiotics, vanc and  Cefepime--> now off .     Completed zyvox  Continue merrem      Possible Shingles  Distrubution of rash concerning for drug eruption vs covid related vs  leukocytoclastic vasculitis. - generalized involvement of trunk, torso, and extremities without classic vesicular lesions and bilateral distribution make shingles less likely. - started IV Acyclovir per ID recs- can stop       Petechial Rash  - noted on arms and trunk  - given benadryl IV  - followed by ID - remains on IV acyclovir, monitor      Elevated D dimer  - due to COVID 19  - CT chest neg for PE  - holding lovenox for GI bleed- can resume now     UTI  - cx positive for staph, treated    Hyperlipidemia  - on statin. Rectal Bleeding -   GI to eval.   Hgb stable.    Resumed on heparin gtt am of 7/12  Heparin gtt off  On bid lovenox now          Full Code  Start PT in am  Repeat SLP      Marsha Payton MD 7/17/2020 7:57 AM

## 2020-07-17 NOTE — PLAN OF CARE
Pt up to chair X 2 hours tonight with 2 person assist. Pt needed to have O2 increased to 12 liters while up in the chair. Pt able to be titrated back down to 6 liters once he returned to bed.

## 2020-07-17 NOTE — PROGRESS NOTES
Pt bathed. O2 sat dropped with activity. O2 increased to 12 liters for bath and ROM  Returned to bed with 2 person assist. Vy care done. Attempted more ROM with pt. Pt's legs very stiff with external rotation. Pt turned and repositioned on his L side. O2 decreased to 8 liters once in bed and settled.

## 2020-07-17 NOTE — PLAN OF CARE
Nutrition Problem #1: Inadequate oral intake  Intervention: Food and/or Nutrient Delivery: Continue Current Diet  Nutritional Goals: (po intake will increase to at least 50% of each meal)

## 2020-07-17 NOTE — PROGRESS NOTES
Occupational Therapy Daily Treatment Note    Unit: ICU  Date:  7/17/2020  Patient Name:    Marshall Valentin  Admitting diagnosis:  Multifocal pneumonia [J18.9]  Admit Date:  6/24/2020  Precautions/Restrictions:  Fall risk, Bed/chair alarm, Lines -IV, Supplemental O2 (4L) and Fischer catheter and Isolation Precautions: Droplet Plus - COVID      Discharge Recommendations: SNF  DME needs for discharge: defer to facility       Therapy recommendations for staff:   Assist of 2 to sit edge of bed. Alok-lift to chair. AM-PAC Score: AM-PAC Inpatient Daily Activity Raw Score: 7  Home Health S4 Level: NA       Treatment Time:  3885-9302  Treatment number:  3   Total Treatment Time:   65 minutes    History of Present Illness: per Dr Joey Brock H&P 6/25/20  \" The patient is a 82 y. o. male with PMH below, presents with SOB/PINEDA, fatigue, lightheadedness, dyspnea on exertion, fever.  Patient reports that for the last few months he is had increasing problems with fatigue and lightheadedness.  He reports he gets lightheaded especially when he stands up too fast.  He also describes dyspnea on exertion which appears to be worsening even with a few steps.  He does not have shortness of breath at rest. Lulu Sun says he feels like \"I am running out of gas much faster than I used to. \" Lulu Sun reports that he went to a corn hole tournament last week. Amy Hamilton were approximately 200 teams there. Lulu Sun does not know of any sick contacts but was in close contact with many people there. Lulu Sun also notes that he has had a little bit of chest pressure but denies that it is actually vinod pain.  He denies urinary symptoms. Lulu Sun has had an occasional cough but does not believe it has been productive. Lulu Sun reports that he is normally fairly active and independent but has been less so the last several days. Lulu Sun reports he measured his temperature at 101.7 at home today.  His temp was 99.3 and 99.5 in the ER. \"     COVID-19 positive, intubated 6/29-7/14, PICC     Subjective:  Patient supine in bed and agreeable to treatment. Patient reports feeling confused this date, stated \"[facility]\" for place, \"December\" for current month and \"\" for year. [facility] when attempting to reorient to place. Oriented to name and  only. Difficulty following one step cues for therapeutic exercises at times, requires very simple commands this date. Reports fatigue. Pain   No complaints at rest, Some intermittent grimacing with AROM of UE and LEs  Rating: not rated but does report some discomfort with LE ABD, \"all over\"  Location:  Pain Medicine Status: No request made      Bed Mobility:   Supine to Sit:  Not Tested  Sit to Supine:  Not Tested  Rolling: Max A of 1-2 B directions  Scooting: Max A of 2 for supine scoot to [facility]    Transfer Training:   Sit to stand:   Not Tested   Stand to sit:  Not Tested  Bed to Chair:  Not Tested  Bed to [facility]:   Not Tested  Standard toilet:   Not Tested     Patient more confused and fatigued this date and unable to safely participate in EOB/OOB tasks. Activity Tolerance   Pt completed therapy session with SOB noted with activity, confused and easily fatigued. SpO2: 94% on 8L of O2 at rest.   HR: 86 at rest  BP: 126/60 at rest    VSS during exercise and bed mobility    ADL Training:   Upper body dressing:  Not Tested  Upper body bathing:  Not Tested  Lower body dressing:  Not Tested  Lower body bathing:  Not Tested  Toileting:   Not Tested  Grooming/Hygiene:   Total A  for wiping face/mouth per patient request, attempted hand over hand manipulation of washcloth but patient unable to participate effectively    Therapeutic Exercise:   Hand flex/ext:  x5 AAROM  Wrist flex/ext:  x5 AAROM/PROM  Forearm sup/pronation:  x10 AAROM  Shoulder flex/ext:  x10 AAROM   Elbow flex/ext: x10 AAROM    Patient Education:   Role of OT  Recommendations for DC   Simple UE AROM (pronation/supination-partial- is most independent movement at this time)    Positioning Needs: In bed, call light and needs in reach. Family Present:  No    Assessment: Patient more fatigued and confused this date, RN notified. Continue as tolerated. GOALS-continue all  To be met in 3 Visits:  1). Bed to toilet/BSC: Max A      To be met in 5 Visits:  1). Supine to/from Sit:             Mod A   2). Upper Body Bathing: Mod A   3). Lower Body Bathing:         Max A   4). Upper Body Dressing: Mod A   5). Lower Body Dressing:       Max A   6).  Pt to demonstrate UE exs x 15 reps with minimal cues      Plan: cont with [address], OTR/L 1959      If patient discharges from this facility prior to next visit, this note will serve as the Discharge Summary

## 2020-07-17 NOTE — PROGRESS NOTES
Pulmonary & Critical Care Medicine ICU Progress Note      CC: Acute hypoxemic respiratory failure    Events of Last 24 hours:   Patient had O2 increased overnight but then titrated back down to 12L. He was able to get up to chair. Invasive Lines: IV: PICC     MV: -  Vent Mode: AC/VC Rate Set: 0 bmp/Vt Ordered: 400 mL/ /FiO2 : 50 %  No results for input(s): PHART, WLW2WQV, PO2ART in the last 72 hours. IV:   dextrose         Vitals:  Blood pressure (!) 124/58, pulse 85, temperature 100.2 °F (37.9 °C), temperature source Bladder, resp. rate 30, height 5' 7\" (1.702 m), weight 186 lb 1.6 oz (84.4 kg), SpO2 98 %. On 12L NC  Temp  Av °F (37.8 °C)  Min: 99.5 °F (37.5 °C)  Max: 100.4 °F (38 °C)    Intake/Output Summary (Last 24 hours) at 2020 1011  Last data filed at 2020 0500  Gross per 24 hour   Intake 880 ml   Output 1825 ml   Net -945 ml       Constitutional:  No acute distress. NCAT; appears stated age  Eyes: No sclera icterus. EOM intact. No visible discharge. HENT: Head is normocephalic and atraumatic. Mucus membranes are moist and the tongue appears normal. Normal appearing nose. External Ears normal.   Neck: No visualized mass. Mittie Perches is midline   Resp: No accessory muscle use. Respiratory effort normal  Cardiovascular: No LE edema ; no JVD  Musculoskeletal: No signs of ataxia. Normal range of motion of the neck.   Skin: No significant exanthematous lesions or discoloration noted on facial skin    Neuro: awake and follows commands, moves all extremities; confused      Scheduled Meds:   enoxaparin  40 mg Subcutaneous BID    furosemide  40 mg Intravenous BID    meropenem  1 g Intravenous Q8H    acyclovir  5 mg/kg (Adjusted) Intravenous Q8H    pantoprazole  40 mg Intravenous BID    insulin lispro  0-12 Units Subcutaneous Q4H    ketotifen  1 drop Both Eyes BID    rosuvastatin  10 mg Oral Daily    aspirin  81 mg Oral Daily    sodium chloride flush  10 mL Intravenous 2 PLAN:  Supplemental oxygen to maintain SaO2 >92%; wean as tolerated  Meropenem D#7/7 and IV acyclovir per ID D#12/12. Completed 4 days of Zosyn stopped due to rash ; zyvox stopped after 8 days  Continue Lasix 40 IV bid  KCL replacement  Nutrition: advance diet as able- pureed currently  Blood sugar control, with goal 150-180  GI prophylaxis: Protonix twice daily  DVT prophylaxis:  Heparin drip -> Lovenox 40 mg bid  MRSA prophylaxis: Bactroban   Code status: Full code   PT/OT  Lu, daughter [phone]      Patient is critically ill with acute respiratory failure. Critical care time spent reviewing labs/films, examining patient, collaborating with other physicians but excluding procedures for life threatening organ failure is 32 minutes.

## 2020-07-17 NOTE — PROGRESS NOTES
Comprehensive Nutrition Assessment    Type and Reason for Visit:  Reassess    Nutrition Recommendations/Plan:   1. Continue general diet order + minced and moist solids/miderately thick liquids (HTL) - consistency changes, per SLP. 2. Monitor appetite, po intake, and SLP progress notes. 3. Monitor mental status, nutrition-related labs, bowel function, and weight trends. Nutrition Assessment:  (declining - poor po intake d/t dislike of minced/moist & HTL)    Malnutrition Assessment:  Malnutrition Status: At risk for malnutrition (Comment)    Context:  Acute Illness     Findings of the 6 clinical characteristics of malnutrition:  Energy Intake:  Mild decrease in energy intake (Comment)(x 2 days (dislikes altered consistencies))  Weight Loss:  7 - Greater than 5% over 1 month     Body Fat Loss:  Unable to assess(COVID-19 +)     Muscle Mass Loss:  Unable to assess(COVID-19 +)    Fluid Accumulation:  1 - Mild Extremities   Strength:  Not Performed    Estimated Daily Nutrient Needs:  Energy (kcal):  1720 - 1978 kcals based on 20-23 kcals/kg/CBW; Weight Used for Energy Requirements:  Current     Protein (g):  80 - 101 g protein based on 1.2-1.5 g/kg/IBW; Weight Used for Protein Requirements:  Ideal        Fluid (ml/day):  1700 - 2000 ml ; Weight Used for Fluid Requirements:  Current      Nutrition Related Findings:  (poor po intake; A & O x 3; + BM on 7/14/20)      Wounds:  (shingles on lower-mid back)       Current Nutrition Therapies:    DIET DYSPHAGIA MINCED AND MOIST; Moderately Thick (Honey)    Anthropometric Measures:  · Height: (5' 7\")  · Current Body Weight: (186# 1 oz; obtained on 7/16/20)   · Admission Body Weight: (200# 1 oz; weight obtained on 7/2/20)    · Usual Body Weight: (180's, per past PCP encounters)     · Ideal Body Weight: 148 lbs. · BMI: 29.15 kg/m2   · BMI Categories: Overweight (BMI 25.0-29. 9)       Nutrition Diagnosis:   · Inadequate oral intake related to swallowing difficulty, other (comment)(weakness; dislike of altered consistencies) as evidenced by intake 0-25%, swallow study results, weight loss greater than or equal to 5% in 1 month, other (comment)(weakness; dislike of altered consistencies)      Nutrition Interventions:   Food and/or Nutrient Delivery:  Continue Current Diet  Nutrition Education/Counseling:  No recommendation at this time   Coordination of Nutrition Care:  Continued Inpatient Monitoring, Interdisciplinary Rounds    Goals:  (po intake will increase to at least 50% of each meal)       Nutrition Monitoring and Evaluation:   Behavioral-Environmental Outcomes:  Beliefs and Attitutes   Food/Nutrient Intake Outcomes:  Food and Nutrient Intake  Physical Signs/Symptoms Outcomes:  Biochemical Data, Meal Time Behavior, Nutrition Focused Physical Findings, Weight     Discharge Planning:     Too soon to determine     Electronically signed by Elsy York RD, LD on 7/17/20 at 1:56 PM EDT    Contact: [phone]

## 2020-07-17 NOTE — PROGRESS NOTES
pursed lip breathing, energy conservation, pacing activity and calling for assist with mobility. Positioning Needs       Pt in bed and in seated position using bed controls, alarm set, positioned in proper neutral alignment and pressure relief provided. Call light provided and all needs within reach    ROM Measurements N/A  Knee Flexion:  Knee Extension:     Activity Tolerance   Pt completed therapy session with SOB noted with activity, SpO2: baseline 96%, with activity 88%. Other  N/A    Assessment :  Patient presented today with intermittent confusion and difficulty to follow commands while performing bed exercises. Patient was SOB with sitting upright without support hence EOB sitting was not attempted today. Patient remained Max A x 2 person for all bed mobility tasks. Recommending SNF upon discharge as patient functioning well below baseline, demonstrates good rehab potential and unable to return home due to limited or no family support, burden of care beyond caregiver ability, home environment not conducive to patient recovery, limited safety awareness and significant decline in functional capacity as per his PLOF. Goals (all goals ongoing unless otherwise indicated)  To be met in 3 visits:  1). Independent with LE Ex x 10 reps     To be met in 6 visits:  1).  Supine to/from sit: Max A   2).  Sit to/from stand: Max A   3).  Bed to chair: Max A   4).  Gait: Ambulate 5 ft.   with  Max A  and use of LRAD (least restrictive assistive device)  5).  Tolerate B LE exercises 3 sets of 10-15 reps     Plan   Continue with plan of care. Signature: Monica Jha, MS PT, # [id number]      If patient discharges from this facility prior to next visit, this note will serve as the Discharge Summary.

## 2020-07-17 NOTE — PROGRESS NOTES
Speech Language Pathology  Facility/Department: [facility] ICU  Dysphagia Daily Treatment Note    NAME: Linda Velazquez  : 1935  MRN: [id number]    Recommendations:  Solid Consistency: Upgrade to Dysphagia II (minced and moist); occasional cues for double swallow  Liquid Consistency: Moderately Thick (honey) liquids  Medication: Whole in puree      Patient Diagnosis(es):   Patient Active Problem List    Diagnosis Date Noted    Fatigue     Leukocytosis     Bright red blood per rectum     COVID-19 virus infection     Acute kidney injury ([facility] [address] [facility]     Fever     Arm edema     Hypertriglyceridemia     Herpes zoster without complication     Asymptomatic bacteriuria     Endotracheally intubated     On mechanically assisted ventilation (HCC)     [facility] respiratory failure with hypoxia ([facility])     Class 1 obesity due to excess calories with body mass index (BMI) of 31.0 to 31.9 in adult     Peripherally inserted central catheter (PICC) in place     Hyperlipidemia     ARDS (adult respiratory distress syndrome) ([facility] [address]     Pneumonia due to COVID-19 virus 2020    Sepsis ([facility] [address]     Urinary tract infection without hematuria     Multifocal pneumonia 2020    Orthostasis      Allergies: Allergies   Allergen Reactions    Buspar [Buspirone] Other (See Comments)     Face,face numb    Codeine Other (See Comments)     Face,lips numb    Methylphenidate Other (See Comments)     Face numbness    Ritalin [Methylphenidate Hcl] Other (See Comments)     Face. lips numb    Zoloft [Sertraline Hcl] Other (See Comments)     Numbness of lips     Subjective: Pt seen upright in bed, alert and agreeable to therapy, although pleasantly confused. RN OK'd SLP entry and therapy -- reported pt dislikes current modified diet with reduced PO intake. RN also reported that pt had some drooling from oral cavity, increased confusion. Pain: No c/o pain    Current Diet: DIET DYSPHAGIA PUREED;  Moderately Thick (Honey)    Diet Tolerance:  Patient tolerating current diet level without signs/symptoms of penetration / aspiration per chart, RN    P.O. Trials: Thin       Nectar / Mildly Thick   x x1 tsp; pt declining further PO   Honey / Moderately Thick   x x3 tsp, x4 cup   Pudding / Extremely Thick       Puree   x x4 bites puree   Solid   x x5 bites softened marco antonio cracker in puree     Dysphagia Treatment and Impressions:  Pt currently on 6 L O2 HF NC. Pt observed with NTL via tsp, HTL via tsp and cup, puree and mech soft trials this date. Grossly timely swallow initiation noted. Pt declined placing top dentures despite encouragement. Pt required cues for occasional double swallow with mech soft trials d/t min-mod oral / lingual residue initially. Double swallow successfully cleared this residual.  No overt s/s of aspiration/penetration noted throughout PO trials. Pt's RR consistent at 20-24/min throughout PO, O2 sats 95-97%. Pt denied globus sensation post-swallow with all PO. Pt accepted only 1 tsp of NTL (final PO trial) and then declined further PO trials despite encouragement. Reduced bolus control noted with NTL with suspected rapid spillover BOT, audible swallow noted. Recommend upgrade to Dysphagia II (Minced and moist) diet, continue honey-thick liquids, meds whole in puree, cues for occasional double swallow with PO. RN aware of recs. ST to continue to follow. Dysphagia Goals:  Timeframe for Long-term Goals: 14 days (7/29/20)  Goal 1: The pt will tolerate safest and least restrictive diet without s/s of aspiration. 07/17, progressing, see above     Short-term Goals  Timeframe for Short-term Goals: 10 days (7/25/20)  Dysphagia Goals: The patient will tolerate recommended diet without observed clinical signs of aspiration. 07/17, progressing, see above  The patient/caregiver will demonstrate understanding of compensatory strategies for improved swallowing safety.  07/17, progressing, pt needs ongoing reinforcement  The patient will tolerate instrumental swallowing procedure. 07/17, not appropriate at this time d/t COVID positive  The patient will tolerate mechanical soft foods 10/10. 07/17, ongoing, see above  The patient will tolerate nectar thick liquids without signs and symptoms of aspiration 10/10 via cup. 07/17: see above, pt accepted x1 tsp NTL only    Recommendations:  Solid Consistency: Upgrade to Dysphagia II (minced and moist) diet; occasional cues for swallow swallow  Liquid Consistency: Moderately Thick (honey) liquids  Medication: Whole in puree    Patient/Family/Caregiver Education: Pt and RN educated on compensatory strategies, diet recommendation with upgrade to dysphagia II (minced and moist), and POC. Pt needs ongoing recommendation. Compensatory Strategies: Sit up for all meals and thereafter for 30 minutes, Eat with small bites (1/2 tsp; 1 tsp), No straws, Alternate solids with liquids and Take your medication with apple sauce    Plan:    Continued Dysphagia treatment with goals per plan of care. Discharge Recommendations: TBD pending acute care progress    If pt discharges from hospital prior to Speech/Swallowing discharge, this note serves as tx and discharge summary.      Total Treatment Time / Charges     Time in Time out Total Time / units   Cognitive Tx         Speech Tx      Dysphagia Tx 1125 1152 27 min / 1 unit     Signature:  David Callahan M.S. [facility]  Speech-language pathologist  [phone]

## 2020-07-18 LAB
ALBUMIN SERPL-MCNC: 2.9 G/DL (ref 3.4–5)
ALP BLD-CCNC: 72 U/L (ref 40–129)
ALT SERPL-CCNC: 27 U/L (ref 10–40)
ANION GAP SERPL CALCULATED.3IONS-SCNC: 13 MMOL/L (ref 3–16)
AST SERPL-CCNC: 24 U/L (ref 15–37)
BILIRUB SERPL-MCNC: 0.8 MG/DL (ref 0–1)
BILIRUBIN DIRECT: 0.3 MG/DL (ref 0–0.3)
BILIRUBIN, INDIRECT: 0.5 MG/DL (ref 0–1)
BUN BLDV-MCNC: 25 MG/DL (ref 7–20)
CALCIUM SERPL-MCNC: 8.2 MG/DL (ref 8.3–10.6)
CHLORIDE BLD-SCNC: 96 MMOL/L (ref 99–110)
CO2: 30 MMOL/L (ref 21–32)
CREAT SERPL-MCNC: 0.8 MG/DL (ref 0.8–1.3)
GFR AFRICAN AMERICAN: >60
GFR NON-AFRICAN AMERICAN: >60
GLUCOSE BLD-MCNC: 100 MG/DL (ref 70–99)
GLUCOSE BLD-MCNC: 101 MG/DL (ref 70–99)
GLUCOSE BLD-MCNC: 102 MG/DL (ref 70–99)
GLUCOSE BLD-MCNC: 104 MG/DL (ref 70–99)
GLUCOSE BLD-MCNC: 97 MG/DL (ref 70–99)
HCT VFR BLD CALC: 36.8 % (ref 40.5–52.5)
HEMOGLOBIN: 12.3 G/DL (ref 13.5–17.5)
MCH RBC QN AUTO: 29.7 PG (ref 26–34)
MCHC RBC AUTO-ENTMCNC: 33.4 G/DL (ref 31–36)
MCV RBC AUTO: 89 FL (ref 80–100)
PDW BLD-RTO: 14.6 % (ref 12.4–15.4)
PERFORMED ON: ABNORMAL
PERFORMED ON: NORMAL
PLATELET # BLD: 210 K/UL (ref 135–450)
PMV BLD AUTO: 7.5 FL (ref 5–10.5)
POTASSIUM SERPL-SCNC: 3.6 MMOL/L (ref 3.5–5.1)
RBC # BLD: 4.14 M/UL (ref 4.2–5.9)
SODIUM BLD-SCNC: 139 MMOL/L (ref 136–145)
TOTAL PROTEIN: 5.7 G/DL (ref 6.4–8.2)
WBC # BLD: 10.7 K/UL (ref 4–11)

## 2020-07-18 PROCEDURE — 92526 ORAL FUNCTION THERAPY: CPT

## 2020-07-18 PROCEDURE — 94761 N-INVAS EAR/PLS OXIMETRY MLT: CPT

## 2020-07-18 PROCEDURE — 6360000002 HC RX W HCPCS: Performed by: INTERNAL MEDICINE

## 2020-07-18 PROCEDURE — 2580000003 HC RX 258: Performed by: INTERNAL MEDICINE

## 2020-07-18 PROCEDURE — 36592 COLLECT BLOOD FROM PICC: CPT

## 2020-07-18 PROCEDURE — 99291 CRITICAL CARE FIRST HOUR: CPT | Performed by: INTERNAL MEDICINE

## 2020-07-18 PROCEDURE — C9113 INJ PANTOPRAZOLE SODIUM, VIA: HCPCS | Performed by: INTERNAL MEDICINE

## 2020-07-18 PROCEDURE — 6370000000 HC RX 637 (ALT 250 FOR IP): Performed by: INTERNAL MEDICINE

## 2020-07-18 PROCEDURE — 80076 HEPATIC FUNCTION PANEL: CPT

## 2020-07-18 PROCEDURE — 80048 BASIC METABOLIC PNL TOTAL CA: CPT

## 2020-07-18 PROCEDURE — 2700000000 HC OXYGEN THERAPY PER DAY

## 2020-07-18 PROCEDURE — 2000000000 HC ICU R&B

## 2020-07-18 PROCEDURE — 85027 COMPLETE CBC AUTOMATED: CPT

## 2020-07-18 RX ADMIN — Medication 10 ML: at 08:14

## 2020-07-18 RX ADMIN — PANTOPRAZOLE SODIUM 40 MG: 40 INJECTION, POWDER, FOR SOLUTION INTRAVENOUS at 21:32

## 2020-07-18 RX ADMIN — Medication 10 ML: at 21:32

## 2020-07-18 RX ADMIN — KETOTIFEN FUMARATE 1 DROP: 0.35 SOLUTION/ DROPS OPHTHALMIC at 21:32

## 2020-07-18 RX ADMIN — FUROSEMIDE 40 MG: 10 INJECTION, SOLUTION INTRAMUSCULAR; INTRAVENOUS at 08:13

## 2020-07-18 RX ADMIN — KETOTIFEN FUMARATE 1 DROP: 0.35 SOLUTION/ DROPS OPHTHALMIC at 08:13

## 2020-07-18 RX ADMIN — PANTOPRAZOLE SODIUM 40 MG: 40 INJECTION, POWDER, FOR SOLUTION INTRAVENOUS at 08:14

## 2020-07-18 RX ADMIN — ASPIRIN 81 MG 81 MG: 81 TABLET ORAL at 08:14

## 2020-07-18 RX ADMIN — FUROSEMIDE 40 MG: 10 INJECTION, SOLUTION INTRAMUSCULAR; INTRAVENOUS at 17:08

## 2020-07-18 RX ADMIN — TETRAHYDROZOLINE HCL 1 DROP: 0.05 SOLUTION/ DROPS OPHTHALMIC at 08:13

## 2020-07-18 RX ADMIN — ENOXAPARIN SODIUM 40 MG: 40 INJECTION SUBCUTANEOUS at 08:14

## 2020-07-18 RX ADMIN — ROSUVASTATIN CALCIUM 10 MG: 10 TABLET, FILM COATED ORAL at 08:14

## 2020-07-18 RX ADMIN — ENOXAPARIN SODIUM 40 MG: 40 INJECTION SUBCUTANEOUS at 21:32

## 2020-07-18 ASSESSMENT — PAIN SCALES - GENERAL
PAINLEVEL_OUTOF10: 0

## 2020-07-18 NOTE — PROGRESS NOTES
Shift assessment complete- see flowsheets. Pt is alert and oriented to self. Disoriented to place, time and situation. Reorientation provided. VSS  Respirations are easy, even and unlabored. Pt is on 8L HFNC. Lungs clear in upper lobes and diminished in lower lobes. PICC WDL. Lines infusing or flushed at this time. Facetimed daughter and granddaughter. Plan of care and goals reviewed. Call light within reach. Bed in lowest position with wheels locked. No needs expressed at this time. Will continue to monitor.

## 2020-07-18 NOTE — PROGRESS NOTES
Moderately Thick       Pudding / Extremely Thick       Puree       Solid         Dysphagia Treatment and Impressions:  Cleared to see pt by RN; per RN, pt tolerated diet very well this am. Pt currently on 8 L O2 HF NC, seated up in bed, awake and alert; declined PO trials this date. PO trials withheld. Pt stated distaste for diet order. Provided education to patient regarding diet/thickened liquid rationale, swallow function, dysphagia, aspiration risks, and safe swallow strategies. Pt stated comprehension; suspect needs ongoing reinforcement. Also discussed recommendation for instrumental swallow assessment when pt no longer COVID positive. Recommend continue current diet as tolerated, when pt fully alert, seated upright. Cont per POC. Dysphagia Goals:  Timeframe for Long-term Goals: 14 days (7/29/20)  Goal 1: The pt will tolerate safest and least restrictive diet without s/s of aspiration. 07/18, progressing, see above     Short-term Goals  Timeframe for Short-term Goals: 10 days (7/25/20)  Dysphagia Goals: The patient will tolerate recommended diet without observed clinical signs of aspiration. 07/18, progressing, see above  The patient/caregiver will demonstrate understanding of compensatory strategies for improved swallowing safety. 07/18, progressing, pt needs reinforcement  The patient will tolerate instrumental swallowing procedure. 07/18, not appropriate at this time d/t COVID positive  The patient will tolerate mechanical soft foods 10/10. 07/18, not directly targeted, pt declined PO, see above  The patient will tolerate nectar thick liquids without signs and symptoms of aspiration 10/10 via cup. 07/18: not directly targeted, pt declined PO.   Recommendations:  Solid Consistency: Upgrade to Dysphagia II (minced and moist) diet; occasional cues for swallow swallow  Liquid Consistency: Moderately Thick (honey) liquids  Medication: Whole in puree    Patient/Family/Caregiver Education: Pt educated on compensatory strategies, diet recommendation/rationale (dysphagia II (minced and moist)), and POC. Pt needs ongoing reinforcement. Compensatory Strategies: Sit up for all meals and thereafter for 30 minutes, Eat with small bites (1/2 tsp; 1 tsp), No straws, Alternate solids with liquids and Take your medication with apple sauce    Plan:    Continued Dysphagia treatment with goals per plan of care. Discharge Recommendations: TBD pending acute care progress    If pt discharges from hospital prior to Speech/Swallowing discharge, this note serves as tx and discharge summary.      Total Treatment Time / Charges     Time in Time out Total Time / units   Cognitive Tx         Speech Tx      Dysphagia Tx 0900 0926 26 min / 1 unit     Signature:  Judah Mcbride M.A., [id number] [id number]  Speech-Language Pathologist

## 2020-07-18 NOTE — PROGRESS NOTES
Pulmonary & Critical Care Medicine ICU Progress Note      CC: Acute hypoxemic respiratory failure    Events of Last 24 hours:   Patient still hypoxemic. He is confused. Low grade temp. Invasive Lines: IV: PICC     MV: -  Vent Mode: AC/VC Rate Set: 0 bmp/Vt Ordered: 400 mL/ /FiO2 : 50 %  No results for input(s): PHART, JKU8RLF, PO2ART in the last 72 hours. IV:   dextrose         Vitals:  Blood pressure (!) 125/55, pulse 84, temperature 99.4 °F (37.4 °C), temperature source Bladder, resp. rate 23, height 5' 7\" (1.702 m), weight 183 lb 3.2 oz (83.1 kg), SpO2 97 %. On 12L NC  Temp  Av.6 °F (37.6 °C)  Min: 99.4 °F (37.4 °C)  Max: 100.2 °F (37.9 °C)    Intake/Output Summary (Last 24 hours) at 2020 0739  Last data filed at 2020 4076  Gross per 24 hour   Intake 90 ml   Output 865 ml   Net -775 ml       Constitutional:  No acute distress. NCAT; appears stated age  Eyes: No sclera icterus. EOM intact. No visible discharge. HENT: Head is normocephalic and atraumatic. Mucus membranes are moist and the tongue appears normal. Normal appearing nose. External Ears normal.   Neck: No visualized mass. Feliciano Parma is midline   Resp: No accessory muscle use. Respiratory effort normal  Cardiovascular: No LE edema ; no JVD  Musculoskeletal: No signs of ataxia. Normal range of motion of the neck.   Skin: No significant exanthematous lesions or discoloration noted on facial skin    Neuro: awake and follows commands, moves all extremities; confused      Scheduled Meds:   enoxaparin  40 mg Subcutaneous BID    furosemide  40 mg Intravenous BID    pantoprazole  40 mg Intravenous BID    insulin lispro  0-12 Units Subcutaneous Q4H    ketotifen  1 drop Both Eyes BID    rosuvastatin  10 mg Oral Daily    aspirin  81 mg Oral Daily    sodium chloride flush  10 mL Intravenous 2 times per day     PRN Meds:  potassium chloride, ipratropium-albuterol, sennosides-docusate sodium, glucose, dextrose, glucagon Completed 4 days of Zosyn stopped due to rash ; zyvox stopped after 8 days  Continue Lasix 40 IV bid  KCL replacement  Nutrition: advance diet as able- pureed currently  Blood sugar control, with goal 150-180  GI prophylaxis: Protonix twice daily  DVT prophylaxis:   Lovenox 40 mg bid  MRSA prophylaxis: Bactroban   Code status: Full code   PT/OT  Lu, daughter [phone]      Patient is critically ill with acute respiratory failure. Critical care time spent reviewing labs/films, examining patient, collaborating with other physicians but excluding procedures for life threatening organ failure is 31 minutes.

## 2020-07-18 NOTE — PROGRESS NOTES
Shift assessment is complete. Pt is alert to self only, confused to day/time/place/situation. He is now on 7 liters nasal canula with easy/even respiration. Left upper arm PICC is dry/itnact and saline locked. He has generalized weakness in all extremities and was a complete feed this morning with a poor appetite. Fischer is intact and draining. Updated Lu (daughter). VSS, telemetry SR. Pt continues in covid precautions with yellow cart at the doorway.

## 2020-07-18 NOTE — PROGRESS NOTES
End of shift note. Pt continues on 8 liters nasal canula, attempts were made to lower the oxygen, but patient desaturations to 90%. Pt continues to have a poor appetite and eating 25% of his meals. Right upper arm PICC remains dry/intact. Fischer dry/intact and draining cloudy/yellow. Pt continues confused. Facetime using patient's own cell phone, calling Jose Torres (daughter), Thomas Cuadra (friend), Melida Dumont (friend), and spouse. Pt was very clear and able to have his own conversations. VSS, telemetry SR. Pt continues in covid precautions.

## 2020-07-19 LAB
ALBUMIN SERPL-MCNC: 3.1 G/DL (ref 3.4–5)
ALP BLD-CCNC: 71 U/L (ref 40–129)
ALT SERPL-CCNC: 22 U/L (ref 10–40)
ANION GAP SERPL CALCULATED.3IONS-SCNC: 10 MMOL/L (ref 3–16)
AST SERPL-CCNC: 19 U/L (ref 15–37)
BILIRUB SERPL-MCNC: 0.9 MG/DL (ref 0–1)
BILIRUBIN DIRECT: 0.3 MG/DL (ref 0–0.3)
BILIRUBIN, INDIRECT: 0.6 MG/DL (ref 0–1)
BUN BLDV-MCNC: 26 MG/DL (ref 7–20)
CALCIUM SERPL-MCNC: 8.4 MG/DL (ref 8.3–10.6)
CHLORIDE BLD-SCNC: 95 MMOL/L (ref 99–110)
CO2: 32 MMOL/L (ref 21–32)
CREAT SERPL-MCNC: 0.8 MG/DL (ref 0.8–1.3)
GFR AFRICAN AMERICAN: >60
GFR NON-AFRICAN AMERICAN: >60
GLUCOSE BLD-MCNC: 101 MG/DL (ref 70–99)
GLUCOSE BLD-MCNC: 101 MG/DL (ref 70–99)
GLUCOSE BLD-MCNC: 113 MG/DL (ref 70–99)
GLUCOSE BLD-MCNC: 86 MG/DL (ref 70–99)
GLUCOSE BLD-MCNC: 96 MG/DL (ref 70–99)
HCT VFR BLD CALC: 37.7 % (ref 40.5–52.5)
HEMOGLOBIN: 12.3 G/DL (ref 13.5–17.5)
INR BLD: 1.14 (ref 0.86–1.14)
MCH RBC QN AUTO: 29.3 PG (ref 26–34)
MCHC RBC AUTO-ENTMCNC: 32.7 G/DL (ref 31–36)
MCV RBC AUTO: 89.7 FL (ref 80–100)
PDW BLD-RTO: 14.8 % (ref 12.4–15.4)
PERFORMED ON: ABNORMAL
PERFORMED ON: ABNORMAL
PERFORMED ON: NORMAL
PERFORMED ON: NORMAL
PLATELET # BLD: 201 K/UL (ref 135–450)
PMV BLD AUTO: 7.6 FL (ref 5–10.5)
POTASSIUM SERPL-SCNC: 3.4 MMOL/L (ref 3.5–5.1)
PROTHROMBIN TIME: 13.2 SEC (ref 10–13.2)
RBC # BLD: 4.2 M/UL (ref 4.2–5.9)
SODIUM BLD-SCNC: 137 MMOL/L (ref 136–145)
TOTAL PROTEIN: 5.8 G/DL (ref 6.4–8.2)
TRIGL SERPL-MCNC: 162 MG/DL (ref 0–150)
WBC # BLD: 9.3 K/UL (ref 4–11)

## 2020-07-19 PROCEDURE — 6370000000 HC RX 637 (ALT 250 FOR IP): Performed by: INTERNAL MEDICINE

## 2020-07-19 PROCEDURE — C9113 INJ PANTOPRAZOLE SODIUM, VIA: HCPCS | Performed by: INTERNAL MEDICINE

## 2020-07-19 PROCEDURE — 2000000000 HC ICU R&B

## 2020-07-19 PROCEDURE — 85027 COMPLETE CBC AUTOMATED: CPT

## 2020-07-19 PROCEDURE — 94761 N-INVAS EAR/PLS OXIMETRY MLT: CPT

## 2020-07-19 PROCEDURE — 80076 HEPATIC FUNCTION PANEL: CPT

## 2020-07-19 PROCEDURE — 2700000000 HC OXYGEN THERAPY PER DAY

## 2020-07-19 PROCEDURE — 6360000002 HC RX W HCPCS: Performed by: INTERNAL MEDICINE

## 2020-07-19 PROCEDURE — 85610 PROTHROMBIN TIME: CPT

## 2020-07-19 PROCEDURE — 2580000003 HC RX 258: Performed by: INTERNAL MEDICINE

## 2020-07-19 PROCEDURE — 99291 CRITICAL CARE FIRST HOUR: CPT | Performed by: INTERNAL MEDICINE

## 2020-07-19 PROCEDURE — 84478 ASSAY OF TRIGLYCERIDES: CPT

## 2020-07-19 PROCEDURE — 80048 BASIC METABOLIC PNL TOTAL CA: CPT

## 2020-07-19 RX ORDER — SODIUM CHLORIDE 9 MG/ML
INJECTION, SOLUTION INTRAVENOUS
Status: DISPENSED
Start: 2020-07-19 | End: 2020-07-19

## 2020-07-19 RX ADMIN — ROSUVASTATIN CALCIUM 10 MG: 10 TABLET, FILM COATED ORAL at 08:11

## 2020-07-19 RX ADMIN — TETRAHYDROZOLINE HCL 1 DROP: 0.05 SOLUTION/ DROPS OPHTHALMIC at 08:13

## 2020-07-19 RX ADMIN — ASPIRIN 81 MG 81 MG: 81 TABLET ORAL at 08:11

## 2020-07-19 RX ADMIN — PANTOPRAZOLE SODIUM 40 MG: 40 INJECTION, POWDER, FOR SOLUTION INTRAVENOUS at 08:11

## 2020-07-19 RX ADMIN — POTASSIUM CHLORIDE 20 MEQ: 400 INJECTION, SOLUTION INTRAVENOUS at 08:08

## 2020-07-19 RX ADMIN — ENOXAPARIN SODIUM 40 MG: 40 INJECTION SUBCUTANEOUS at 08:10

## 2020-07-19 RX ADMIN — PANTOPRAZOLE SODIUM 40 MG: 40 INJECTION, POWDER, FOR SOLUTION INTRAVENOUS at 20:47

## 2020-07-19 RX ADMIN — POTASSIUM CHLORIDE 20 MEQ: 400 INJECTION, SOLUTION INTRAVENOUS at 06:55

## 2020-07-19 RX ADMIN — KETOTIFEN FUMARATE 1 DROP: 0.35 SOLUTION/ DROPS OPHTHALMIC at 08:13

## 2020-07-19 RX ADMIN — FUROSEMIDE 40 MG: 10 INJECTION, SOLUTION INTRAMUSCULAR; INTRAVENOUS at 08:10

## 2020-07-19 RX ADMIN — FUROSEMIDE 40 MG: 10 INJECTION, SOLUTION INTRAMUSCULAR; INTRAVENOUS at 17:25

## 2020-07-19 RX ADMIN — KETOTIFEN FUMARATE 1 DROP: 0.35 SOLUTION/ DROPS OPHTHALMIC at 20:47

## 2020-07-19 RX ADMIN — ENOXAPARIN SODIUM 40 MG: 40 INJECTION SUBCUTANEOUS at 20:46

## 2020-07-19 RX ADMIN — Medication 10 ML: at 20:47

## 2020-07-19 ASSESSMENT — PAIN SCALES - GENERAL
PAINLEVEL_OUTOF10: 0

## 2020-07-19 NOTE — PROGRESS NOTES
Pulmonary & Critical Care Medicine ICU Progress Note      CC: Acute hypoxemic respiratory failure    Events of Last 24 hours:   Patient confused at times, overall very weak. Tm 99.6    Invasive Lines: IV: PICC     MV: -  Vent Mode: AC/VC Rate Set: 0 bmp/Vt Ordered: 400 mL/ /FiO2 : 50 %  No results for input(s): PHART, DXD1DCB, PO2ART in the last 72 hours. IV:   sodium chloride      sodium chloride      dextrose         Vitals:  Blood pressure 134/64, pulse 83, temperature 99.1 °F (37.3 °C), temperature source Bladder, resp. rate 16, height 5' 7\" (1.702 m), weight 182 lb 1.6 oz (82.6 kg), SpO2 99 %. On 8L NC  Temp  Av.3 °F (37.4 °C)  Min: 99.1 °F (37.3 °C)  Max: 99.6 °F (37.6 °C)    Intake/Output Summary (Last 24 hours) at 2020 0718  Last data filed at 2020 0405  Gross per 24 hour   Intake 320 ml   Output 1590 ml   Net -1270 ml       Constitutional:  No acute distress. NCAT; appears stated age  Eyes: No sclera icterus. EOM intact. No visible discharge. HENT: Head is normocephalic and atraumatic. Mucus membranes are moist and the tongue appears normal. Normal appearing nose. External Ears normal.   Neck: No visualized mass. Ced Fent is midline   Resp: No accessory muscle use. Respiratory effort normal , few basilar crackles  Cardiovascular: No LE edema ; no JVD, RRR no m/g/r  Musculoskeletal: No signs of ataxia. Normal range of motion of the neck.   Skin: No significant exanthematous lesions or discoloration noted on facial skin    Neuro: awake and follows commands, moves all extremities; confused      Scheduled Meds:   enoxaparin  40 mg Subcutaneous BID    furosemide  40 mg Intravenous BID    pantoprazole  40 mg Intravenous BID    insulin lispro  0-12 Units Subcutaneous Q4H    ketotifen  1 drop Both Eyes BID    rosuvastatin  10 mg Oral Daily    aspirin  81 mg Oral Daily    sodium chloride flush  10 mL Intravenous 2 times per day     PRN Meds:  potassium chloride, ipratropium-albuterol, sennosides-docusate sodium, glucose, dextrose, glucagon (rDNA), dextrose, tetrahydrozoline, aluminum & magnesium hydroxide-simethicone, ondansetron, sodium chloride flush, acetaminophen **OR** acetaminophen    Results:  CBC:   Recent Labs     07/17/20  0500 07/18/20  0615 07/19/20  0530   WBC 10.7 10.7 9.3   HGB 11.7* 12.3* 12.3*   HCT 35.2* 36.8* 37.7*   MCV 88.4 89.0 89.7    210 201     BMP:   Recent Labs     07/17/20  0720 07/18/20  0615 07/19/20  0530    139 137   K 3.3* 3.6 3.4*   CL 97* 96* 95*   CO2 29 30 32   BUN 27* 25* 26*   CREATININE 0.8 0.8 0.8     LIVER PROFILE:   Recent Labs     07/17/20  0720 07/18/20  0615 07/19/20  0530   AST 33 24 19   ALT 35 27 22   BILIDIR 0.3 0.3 0.3   BILITOT 0.8 0.8 0.9   ALKPHOS 71 72 71       Cultures:  6/24 Urine Staphylococcus epidermidis  6/24 COVID 19 +  6/29 Trach Asp NRF  7/2 respiratory NRF  7/3 BC NGTD  7/6 UC NGTD  7/7 Resp CX NRF   7/7 BC NGTD   7/7 UC NGTD         Films:  CXR 7/14 was reviewed by me and it showed   Similar Bilateral patchy airspace opacities, ETT in place    Echo 7/15: Conclusions      Summary   LV systolic function is normal with a visually estimated EF of 55%. Endocardium not entirely well visualized but no obvious segmental wall   motion abnormalities   The left ventricle is normal in size with normal wall thickness. Normal left ventricular diastolic function. No significant valvular disease. Systolic pulmonary artery pressure (SPAP) is normal and estimated at 29mmHg   (right atrial pressure 3mmHg). ASSESSMENT:  · Acute hypoxemic respiratory failure  · ARDS  · Leukocytosis -  · maculopapular rash abdomen and extremities-favor drug reaction. Zosyn stopped 7/11/2020 with improvement today   · COVID-19 viral multifocal pneumonia. Post 2 units of CCP 7/2/2020.   Completed 10 days of Remdesevir  7/7/2020  · Shingles  · Frequent PVCs         PLAN:  Supplemental oxygen to maintain SaO2 >92%; wean as

## 2020-07-19 NOTE — PLAN OF CARE
Problem: SAFETY  Goal: Free from accidental physical injury  Outcome: Ongoing     Problem: KNOWLEDGE DEFICIT  Goal: Patient/S.O. demonstrates understanding of disease process, treatment plan, medications, and discharge instructions. Outcome: Ongoing     Problem: Airway Clearance - Ineffective:  Goal: Clear lung sounds  Description: Clear lung sounds  Outcome: Ongoing  Goal: Ability to maintain a clear airway will improve  Description: Ability to maintain a clear airway will improve  Outcome: Ongoing     Problem: Gas Exchange - Impaired:  Goal: Levels of oxygenation will improve  Description: Levels of oxygenation will improve  Outcome: Ongoing     Problem: Hyperthermia:  Goal: Ability to maintain a body temperature in the normal range will improve  Description: Ability to maintain a body temperature in the normal range will improve  Outcome: Ongoing     Problem: Airway Clearance - Ineffective  Goal: Achieve or maintain patent airway  Outcome: Ongoing     Problem: Gas Exchange - Impaired  Goal: Absence of hypoxia  Outcome: Ongoing  Goal: Promote optimal lung function  Outcome: Ongoing     Problem: Breathing Pattern - Ineffective  Goal: Ability to achieve and maintain a regular respiratory rate  Outcome: Ongoing     Problem:  Body Temperature -  Risk of, Imbalanced  Goal: Ability to maintain a body temperature within defined limits  Outcome: Ongoing  Goal: Will regain or maintain usual level of consciousness  Outcome: Ongoing  Goal: Complications related to the disease process, condition or treatment will be avoided or minimized  Outcome: Ongoing     Problem: Isolation Precautions - Risk of Spread of Infection  Goal: Prevent transmission of infection  Outcome: Ongoing     Problem: Nutrition Deficits  Goal: Optimize nutrtional status  Outcome: Ongoing     Problem: Risk for Fluid Volume Deficit  Goal: Maintain normal heart rhythm  Outcome: Ongoing  Goal: Maintain absence of muscle cramping  Outcome: Ongoing  Goal: Maintain normal serum potassium, sodium, calcium, phosphorus, and pH  Outcome: Ongoing     Problem: Loneliness or Risk for Loneliness  Goal: Demonstrate positive use of time alone when socialization is not possible  Outcome: Ongoing     Problem: Fatigue  Goal: Verbalize increase energy and improved vitality  Outcome: Ongoing     Problem: Patient Education: Go to Patient Education Activity  Goal: Patient/Family Education  Outcome: Ongoing     Problem: Restraint Use - Nonviolent/Non-Self-Destructive Behavior:  Goal: Absence of restraint indications  Description: Absence of restraint indications  Outcome: Ongoing  Goal: Absence of restraint-related injury  Description: Absence of restraint-related injury  Outcome: Ongoing     Problem: Nutrition  Goal: Optimal nutrition therapy  Outcome: Ongoing  Goal: Understanding of nutritional guidelines  Outcome: Ongoing     Problem: Skin Integrity:  Goal: Will show no infection signs and symptoms  Description: Will show no infection signs and symptoms  Outcome: Ongoing  Goal: Absence of new skin breakdown  Description: Absence of new skin breakdown  Outcome: Ongoing     Problem: Falls - Risk of:  Goal: Will remain free from falls  Description: Will remain free from falls  Outcome: Ongoing  Goal: Absence of physical injury  Description: Absence of physical injury  Outcome: Ongoing     Problem: Urinary Elimination:  Goal: Signs and symptoms of infection will decrease  Description: Signs and symptoms of infection will decrease  Outcome: Ongoing  Goal: Complications related to the disease process, condition or treatment will be avoided or minimized  Description: Complications related to the disease process, condition or treatment will be avoided or minimized  Outcome: Ongoing     Problem: Pain:  Goal: Pain level will decrease  Description: Pain level will decrease  Outcome: Ongoing  Goal: Control of acute pain  Description: Control of acute pain  Outcome: Ongoing  Goal: Control of chronic pain  Description: Control of chronic pain  Outcome: Ongoing

## 2020-07-19 NOTE — PROGRESS NOTES
End of shift note. VSS this shift, telemetry continues sinus rhythm. Pt is now on 5 liters and tolerating. He slept a lot today. He made facetime calls to (2) daughters and called wife. All lines remain intact. Appetite is gradually improving. Covid precautions continues with yellow cart at the door way.

## 2020-07-19 NOTE — PROGRESS NOTES
25* 26*   CREATININE 0.8 0.8 0.8   CALCIUM 8.1* 8.2* 8.4     Recent Labs     07/17/20  0720 07/18/20  0615 07/19/20  0530   AST 33 24 19   ALT 35 27 22   BILIDIR 0.3 0.3 0.3   BILITOT 0.8 0.8 0.9   ALKPHOS 71 72 71     Recent Labs     07/19/20  0530   INR 1.14     No results for input(s): CKTOTAL, TROPONINI in the last 72 hours. Urinalysis:      Lab Results   Component Value Date    NITRU Negative 07/12/2020    WBCUA 0-2 07/12/2020    BACTERIA 1+ 06/24/2020    RBCUA >100 07/12/2020    BLOODU LARGE 07/12/2020    SPECGRAV 1.010 07/12/2020    GLUCOSEU Negative 07/12/2020       Radiology:  XR CHEST PORTABLE   Final Result   1. Stable low lung volumes with bilateral lung infiltrates, compatible with   pneumonia. VL Extremity Venous Bilateral   Final Result      XR CHEST PORTABLE   Final Result   Stable appropriate life support device positioning. No substantial change in bilateral airspace disease. XR CHEST PORTABLE   Final Result   1. Stable chest with bilateral lung infiltrates. Findings are concerning for   pulmonary edema versus pneumonia. XR CHEST PORTABLE   Final Result   Patchy bilateral airspace disease, slightly increased as compared to prior. XR CHEST PORTABLE   Final Result   Improving bilateral pneumonia versus edema. XR CHEST 1 VW   Final Result   Increasing patchy opacities throughout both lungs, most compatible with   multifocal pneumonia. Component of pulmonary edema should be considered. XR ABDOMEN (KUB) (SINGLE AP VIEW)   Final Result   1. Indeterminate bowel gas pattern         XR CHEST PORTABLE   Final Result   Stable bilateral pneumonia. XR CHEST PORTABLE   Final Result   Stable central ground-glass airspace opacities. Supportive devices are   unchanged. XR CHEST PORTABLE   Final Result   The endotracheal tube tip is approximately 3 cm above the thai.          XR CHEST PORTABLE   Final Result   Mild increase in the amount of bibasilar opacity since yesterday. Large   bilateral pneumonias. XR CHEST PORTABLE   Final Result   Stable bilateral pulmonary opacities. XR CHEST PORTABLE   Final Result   Minimal increase in the amount of opacity in each lung consistent with slight   worsening of bilateral pneumonia. XR CHEST PORTABLE   Final Result   Supportive tubing is in normal position. Low lung volume study, with stable alveolar opacities in the mid and lower   lungs, pneumonia versus atelectasis. XR CHEST PORTABLE   Final Result   Increased in degree and extent of bilateral mid and lower lung pneumonia. The increased may be partially accentuated by low lung volumes. XR CHEST 1 VW   Final Result   Persistent bilateral airspace disease, similar to prior. IR PICC WO SQ PORT/PUMP > 5 YEARS   Final Result   Successful placement of PICC line. XR CHEST PORTABLE   Final Result   Appropriate endotracheal tube positioning. Multifocal airspace opacities and areas of atelectasis correlate with recent   CT chest.         XR ABDOMEN FOR NG/OG/NE TUBE PLACEMENT   Final Result   NG tube terminates over the stomach. CT CHEST PULMONARY EMBOLISM W CONTRAST   Final Result   1. No pulmonary embolism. 2. Multifocal pneumonia scattered throughout both lungs with minimal pleural   effusions. XR CHEST STANDARD (2 VW)   Final Result   No radiographic evidence of acute pulmonary disease.                  Assessment/Plan:    Active Hospital Problems    Diagnosis    Fatigue [R53.83]    Leukocytosis [D72.829]    Bright red blood per rectum [K62.5]    COVID-19 virus infection [U07.1]    Acute kidney injury ([address] [N17.9]    Fever [R50.9]    Arm edema [R60.0]    Hypertriglyceridemia [E78.1]    Herpes zoster without complication [S37.1]    Asymptomatic bacteriuria [R82.71]    Endotracheally intubated [Z97.8]    On mechanically assisted ventilation (HCC) [Z99.11]    Acute respiratory failure with hypoxia ([facility]) [J96.01]    Class 1 obesity due to excess calories with body mass index (BMI) of 31.0 to 31.9 in adult [E66.09, Z68.31]    Peripherally inserted central catheter (PICC) in place [Z45.2]    Hyperlipidemia [E78.5]    ARDS (adult respiratory distress syndrome) ([facility]) [J80]    Pneumonia due to COVID-19 virus [U07.1, J12.89]    Sepsis ([address] [A41.9]    Urinary tract infection without hematuria [N39.0]    Multifocal pneumonia [J18.9]    Orthostasis [I95.1]         Acute hypoxic respiratory failure due to COVID-19 infection   ARDS     - intubated, on mech vent  - s.p remdesivir and convalescent plasma   - improving very slowly   - Reintubated on 7/6 d/t displacement of existing ETT  - 7/14 , extubated to NC high flow  - wean as able, 3L HF NC  - ongoing diuresis for peripheral edema, IV lasix 40 BID, good urine output, edema improved  -back up to 6L HF NC, ? Aspiration        COVID -19 PNA  - s/p Decadron - 10 days. - Completed 10 days of Remdesivir on 7/7/2020  - S/P transfusion of 2 units of convalescent plasma on 7/2/2020  - imaging with ARDS initially and now improving   - remains off VENT and BiPAP. On 6L HF NC       Sepsis  - POA, due to PNA, COVID 19  - with leukocytosis, fevers, tachypnea  - COVID-19 detected  - improving fevers  -Antibiotics adjusted per infectious disease( tele consult)  Currently on acyclovir and merrem. Stopped zosyn d/t rash. Completed x8 days of ZYVOX        Multifocal pneumonia  - related to COVID-19.    - Tracheal aspirate sent. - was on  broad-spectrum antibiotics, vanc and  Cefepime--> now off . Completed zyvox  Completed merrem        Possible Shingles  Distrubution of rash concerning for drug eruption vs covid related vs  leukocytoclastic vasculitis. - generalized involvement of trunk, torso, and extremities without classic vesicular lesions and bilateral distribution make shingles less likely.    - started IV Acyclovir per ID recs - stopped        Petechial Rash  - noted on arms and trunk  - given benadryl IV  - followed by ID - remains on IV acyclovir, monitor        Elevated D dimer  - due to COVID 19  - CT chest neg for PE  - holding lovenox for GI bleed- can resume now      UTI  - cx positive for staph, treated     Hyperlipidemia  - on statin.        Rectal Bleeding -   GI to eval.   Hgb stable. Resumed on heparin gtt am of 7/12  Heparin gtt off  On bid lovenox now      Diet: DIET DYSPHAGIA MINCED AND MOIST;  Moderately Thick (Honey)  Code Status: Full Code    PT/OT Eval Status: ordered    Dispo - cont care in ICU    Lacie Meyers MD

## 2020-07-19 NOTE — PROGRESS NOTES
Shift assessment complete- see flowsheets. Pt is alert and oriented to self. Disoriented to place, time and situation. Reorientation provided. VSS  Respirations are easy, even and unlabored. Pt is on 8L HFNC. Lungs clear in upper lobes and diminished in lower lobes. PICC WDL. Lines infusing or flushed at this time. Facetimed daughter and granddaughter. Pt played mini [employer] set- stronger than yesterday. About 30minutes of exercise done.      Plan of care and goals reviewed. Call light within reach.  Bed in lowest position with wheels locked. No needs expressed at this time.  Will continue to monitor

## 2020-07-20 ENCOUNTER — TELEPHONE (OUTPATIENT)
Dept: PULMONOLOGY | Age: 85
End: 2020-07-20

## 2020-07-20 VITALS
SYSTOLIC BLOOD PRESSURE: 111 MMHG | DIASTOLIC BLOOD PRESSURE: 63 MMHG | OXYGEN SATURATION: 93 % | RESPIRATION RATE: 18 BRPM | BODY MASS INDEX: 28.58 KG/M2 | HEART RATE: 87 BPM | TEMPERATURE: 99.7 F | WEIGHT: 182.1 LBS | HEIGHT: 67 IN

## 2020-07-20 LAB
ALBUMIN SERPL-MCNC: 3.1 G/DL (ref 3.4–5)
ALP BLD-CCNC: 70 U/L (ref 40–129)
ALT SERPL-CCNC: 19 U/L (ref 10–40)
ANION GAP SERPL CALCULATED.3IONS-SCNC: 9 MMOL/L (ref 3–16)
AST SERPL-CCNC: 21 U/L (ref 15–37)
BILIRUB SERPL-MCNC: 0.6 MG/DL (ref 0–1)
BILIRUBIN DIRECT: <0.2 MG/DL (ref 0–0.3)
BILIRUBIN, INDIRECT: ABNORMAL MG/DL (ref 0–1)
BUN BLDV-MCNC: 25 MG/DL (ref 7–20)
CALCIUM SERPL-MCNC: 8.4 MG/DL (ref 8.3–10.6)
CHLORIDE BLD-SCNC: 98 MMOL/L (ref 99–110)
CO2: 33 MMOL/L (ref 21–32)
CREAT SERPL-MCNC: 0.7 MG/DL (ref 0.8–1.3)
GFR AFRICAN AMERICAN: >60
GFR NON-AFRICAN AMERICAN: >60
GLUCOSE BLD-MCNC: 131 MG/DL (ref 70–99)
GLUCOSE BLD-MCNC: 144 MG/DL (ref 70–99)
GLUCOSE BLD-MCNC: 149 MG/DL (ref 70–99)
GLUCOSE BLD-MCNC: 150 MG/DL (ref 70–99)
HCT VFR BLD CALC: 37.1 % (ref 40.5–52.5)
HEMOGLOBIN: 12.1 G/DL (ref 13.5–17.5)
MCH RBC QN AUTO: 29 PG (ref 26–34)
MCHC RBC AUTO-ENTMCNC: 32.7 G/DL (ref 31–36)
MCV RBC AUTO: 88.7 FL (ref 80–100)
PDW BLD-RTO: 15.5 % (ref 12.4–15.4)
PERFORMED ON: ABNORMAL
PLATELET # BLD: 206 K/UL (ref 135–450)
PMV BLD AUTO: 7.9 FL (ref 5–10.5)
POTASSIUM SERPL-SCNC: 3.6 MMOL/L (ref 3.5–5.1)
RBC # BLD: 4.18 M/UL (ref 4.2–5.9)
SODIUM BLD-SCNC: 140 MMOL/L (ref 136–145)
TOTAL PROTEIN: 5.8 G/DL (ref 6.4–8.2)
TRIGL SERPL-MCNC: 169 MG/DL (ref 0–150)
WBC # BLD: 11 K/UL (ref 4–11)

## 2020-07-20 PROCEDURE — 97530 THERAPEUTIC ACTIVITIES: CPT

## 2020-07-20 PROCEDURE — 85027 COMPLETE CBC AUTOMATED: CPT

## 2020-07-20 PROCEDURE — 97535 SELF CARE MNGMENT TRAINING: CPT

## 2020-07-20 PROCEDURE — 80076 HEPATIC FUNCTION PANEL: CPT

## 2020-07-20 PROCEDURE — 6370000000 HC RX 637 (ALT 250 FOR IP): Performed by: INTERNAL MEDICINE

## 2020-07-20 PROCEDURE — 94761 N-INVAS EAR/PLS OXIMETRY MLT: CPT

## 2020-07-20 PROCEDURE — C9113 INJ PANTOPRAZOLE SODIUM, VIA: HCPCS | Performed by: INTERNAL MEDICINE

## 2020-07-20 PROCEDURE — 84478 ASSAY OF TRIGLYCERIDES: CPT

## 2020-07-20 PROCEDURE — 6360000002 HC RX W HCPCS: Performed by: INTERNAL MEDICINE

## 2020-07-20 PROCEDURE — 97112 NEUROMUSCULAR REEDUCATION: CPT

## 2020-07-20 PROCEDURE — 97110 THERAPEUTIC EXERCISES: CPT

## 2020-07-20 PROCEDURE — 80048 BASIC METABOLIC PNL TOTAL CA: CPT

## 2020-07-20 PROCEDURE — 99239 HOSP IP/OBS DSCHRG MGMT >30: CPT | Performed by: INTERNAL MEDICINE

## 2020-07-20 PROCEDURE — 99232 SBSQ HOSP IP/OBS MODERATE 35: CPT | Performed by: INTERNAL MEDICINE

## 2020-07-20 RX ORDER — KETOTIFEN FUMARATE 0.35 MG/ML
1 SOLUTION/ DROPS OPHTHALMIC 2 TIMES DAILY
Qty: 1 ML | Refills: 0
Start: 2020-07-20 | End: 2020-07-30

## 2020-07-20 RX ORDER — PANTOPRAZOLE SODIUM 40 MG/1
40 TABLET, DELAYED RELEASE ORAL
Status: DISCONTINUED | OUTPATIENT
Start: 2020-07-21 | End: 2020-07-20 | Stop reason: HOSPADM

## 2020-07-20 RX ADMIN — ENOXAPARIN SODIUM 40 MG: 40 INJECTION SUBCUTANEOUS at 09:01

## 2020-07-20 RX ADMIN — ASPIRIN 81 MG 81 MG: 81 TABLET ORAL at 09:01

## 2020-07-20 RX ADMIN — PANTOPRAZOLE SODIUM 40 MG: 40 INJECTION, POWDER, FOR SOLUTION INTRAVENOUS at 09:01

## 2020-07-20 RX ADMIN — KETOTIFEN FUMARATE 1 DROP: 0.35 SOLUTION/ DROPS OPHTHALMIC at 09:02

## 2020-07-20 RX ADMIN — FUROSEMIDE 40 MG: 10 INJECTION, SOLUTION INTRAMUSCULAR; INTRAVENOUS at 09:01

## 2020-07-20 RX ADMIN — ROSUVASTATIN CALCIUM 10 MG: 10 TABLET, FILM COATED ORAL at 09:01

## 2020-07-20 NOTE — PROGRESS NOTES
Pulmonary & Critical Care Medicine ICU Progress Note      CC: Acute hypoxemic respiratory failure    Events of Last 24 hours:   Oxygen is being titrated down    Invasive Lines: IV: PICC     MV: -  Vent Mode: AC/VC Rate Set: 0 bmp/Vt Ordered: 400 mL/ /FiO2 : 50 %  No results for input(s): PHART, BLD2WMR, PO2ART in the last 72 hours. IV:   dextrose         Vitals:  Blood pressure (!) 131/52, pulse 81, temperature 99.4 °F (37.4 °C), temperature source Bladder, resp. rate 17, height 5' 7\" (1.702 m), weight 182 lb 1.6 oz (82.6 kg), SpO2 95 %. On 2 L NC  Temp  Av.2 °F (37.3 °C)  Min: 98.9 °F (37.2 °C)  Max: 99.4 °F (37.4 °C)    Intake/Output Summary (Last 24 hours) at 2020 0749  Last data filed at 2020 0600  Gross per 24 hour   Intake 1246 ml   Output 1900 ml   Net -654 ml       General: ill appearing. Eyes: PERRL. No sclera icterus. No conjunctival injection. ENT: No discharge. Pharynx clear. Neck: Trachea midline. Normal thyroid. Resp: No accessory muscle use. No crackles. No wheezing. CV: Regular rate. Regular rhythm. No mumur or rub   GI: Non-tender. Skin: Warm and dry   Neuro: A&OX3. Patellar reflexes are symmetric.   Very weak    Scheduled Meds:   enoxaparin  40 mg Subcutaneous BID    furosemide  40 mg Intravenous BID    pantoprazole  40 mg Intravenous BID    insulin lispro  0-12 Units Subcutaneous Q4H    ketotifen  1 drop Both Eyes BID    rosuvastatin  10 mg Oral Daily    aspirin  81 mg Oral Daily    sodium chloride flush  10 mL Intravenous 2 times per day     PRN Meds:  potassium chloride, ipratropium-albuterol, sennosides-docusate sodium, glucose, dextrose, glucagon (rDNA), dextrose, tetrahydrozoline, aluminum & magnesium hydroxide-simethicone, ondansetron, sodium chloride flush, acetaminophen **OR** acetaminophen    Results:  CBC:   Recent Labs     20  0615 20  0530 20  0615   WBC 10.7 9.3 11.0   HGB 12.3* 12.3* 12.1*   HCT 36.8* 37.7* 37.1* MCV 89.0 89.7 88.7    201 206     BMP:   Recent Labs     07/18/20  0615 07/19/20  0530 07/20/20  0615    137 140   K 3.6 3.4* 3.6   CL 96* 95* 98*   CO2 30 32 33*   BUN 25* 26* 25*   CREATININE 0.8 0.8 0.7*     LIVER PROFILE:   Recent Labs     07/18/20  0615 07/19/20  0530 07/20/20  0615   AST 24 19 21   ALT 27 22 19   BILIDIR 0.3 0.3 <0.2   BILITOT 0.8 0.9 0.6   ALKPHOS 72 71 70       Cultures:  6/24/20 Urine Staphylococcus epidermidis  6/24/20 COVID 19 +  6/29/20 Trach Asp NRF  7/2/20 respiratory NRF  7/3/20 BC NGTD  7/6/20 UC NGTD  7/7/20 Resp CX NRF   7/7/20 BC NGTD   7/7/20 UC NGTD     Films:  No new    Echo 7/15/20   EF 89%  Normal diastolic function    ASSESSMENT:  · Acute hypoxemic respiratory failure  · ARDS  · COVID-19 pneumonia. Post 2 units of CCP 7/2/2020. Completed 10 days of Remdesevir  7/7/2020  · Shingles  · Frequent PVCs     PLAN:  - COVID-19 isolation, droplet plus  -   Supplemental oxygen to maintain SaO2 >92%; wean as tolerated  Completed 7 days meropenem 12 days IV acyclovir per ID.  Completed 4 days of Zosyn -- stopped due to rash ; Zyvox stopped after 8 days  PT/OT   Prophylaxis: Lovenox, Protonix  Code status: Full code   Kelsey Fagan, daughter [phone]  Okay with me for d/c.  PA LAT CXR in 4-6 weeks

## 2020-07-20 NOTE — PROGRESS NOTES
Lu is aware of placement of patient to [facility] covid unit, to be in the same room as his wife. Lu states the concern of him sharing the room with his wife who has been repeatedly swabbed for covid and continuing to be positive. Deferred Lu (daughter) to [facility] gue[facility]a from [facility]. Pt to be picked up the [facility] at 1500.

## 2020-07-20 NOTE — CARE COORDINATION
[address] Transitions BPCI-A    2020    Patient: Sathya Hobson Patient : 1935   MRN: [id number]  Reason for Admission: Sepsis  RARS: Readmission Risk Score: [address] included in BPCI-A with qualifying  (sepsis) at admission to [facility] on 2020. CTN received [facility] Predict Outcome indicating greater gains at SNF vs home with home health. Per chart plan is SNF vs. ARU. [facility] that \"it is unlikely that patient has the endurance to tolerate 3 hours a day of therapy in an ARU/IF setting at this time. \"    Anticipated length of stay at SNF ~ 19.5 days average  Therapy 547 minutes/week  Projected non-skilled caregiver needs post SNF ~ 6 hours/day    Will share report with  and file under Media. CTN following for discharge plan. Jerel Rivera will follow at SNF. Readmission Risk  Patient Active Problem List   Diagnosis    Multifocal pneumonia    Orthostasis    Pneumonia due to COVID-19 virus    Sepsis ([address]    Urinary tract infection without hematuria    ARDS (adult respiratory distress syndrome) (HCC)    Hyperlipidemia    Acute respiratory failure with hypoxia (HCC)    Class 1 obesity due to excess calories with body mass index (BMI) of 31.0 to 31.9 in adult    Peripherally inserted central catheter (PICC) in place    Asymptomatic bacteriuria    Endotracheally intubated    On mechanically assisted ventilation (HCC)    Herpes zoster without complication    Fever    Arm edema    Hypertriglyceridemia    COVID-19 virus infection    Acute kidney injury ([address]    Bright red blood per rectum    Fatigue    Leukocytosis       Follow Up  No future appointments.     Health Maintenance  Health Maintenance Due   Topic Date Due    Lipid screen  2020       Ai Gee, RN  Care Transition Nurse  [phone] mobile
Attempted to call patient in room several times this am with no answer. Call placed to pt's wife however, she fell at home and is now in the ER. CM will follow and do interview when C19 comes back.
CASE MANAGEMENT INITIAL ASSESSMENT      Reviewed chart and completed assessment via telephone with: Unable to reach spouse at home by phone    Explained Case Management role/services. Primary contact information: Joe Dao, spouse    Admit date/status:  Inpatient 6/25/20    Diagnosis:  (+) COVID -23, multifocal pneumonia, intubated    Is this a Readmission?:   Yes. Worsening SOB/PINEDA, fatigue, elevated temp. Insurance:   Medicare    Precert required for SNF -  N        3 night stay required - Y    Living arrangements, Adls, care needs, prior to admission:   Lives with spouse in [facility] and was independent with ADLs prior this hospitalization. Transportation:  self    Durable Medical Equipment at home: Walker_x_Cane__RTS_x_ BSC__Shower Chair_x_  02__ HHN__ CPAP__  BiPap__  Hospital Bed__ W/C___ Other__________    Services in the home and/or outpatient, prior to admission:  none    Dialysis Facility (if applicable)  no    PT/OT recs:  [address] [place] [address] Exemption Notification (HEN):  n/a    Barriers to discharge:   Intubated in ICU    Plan/comments:  Plans to go home at discharge with spouse. ECOC on chart for MD signature  Yes.
Called [facility] for update and possible dc today to facility. Covid +. Awaiting call back. 1200 Addendum- LM with ([facility]) for bed today. Waiting for DON to review.  Updated nursing
Case Management/Follow up:    Chart reviewed for length of stay. Hospital day #  6     Unit:  ICU    Diagnosis and current status as per MD progress:  Acute respiratory failure due to COVID-19. He has ARDS. Is on the ventilator. Critically ill but stable.     Anticipated d/c date:  tba    Expected plan for discharge:  home    Potential barriers:  none    Precert required/date initiated:  no    Confirmed plan with patient and/or family:  no    Comments:  CM attempted to call spouse but no answer.       Home 02 in place at home prior to admission:  No  Pt/family informed of need to bring portable home 02 tank on day of d/c:  No
DISCHARGE ORDER  Date/Time 2020 12:56 PM  Completed by: Asiya Day, Case Management    Patient Name: Shakila Morales    : 1935    Admitting Diagnosis: Multifocal pneumonia [J18.9]    Financial Payer Type :  Medicare  Admit order Date and Status: 20 0245  (verify MD's last order for status of admission/Traditional Medicare 3 day qualifying stay required for SNF)    Noted discharge order. Confirmed discharge plan  (Pt): Yes  and__Dtr Pam__per nursing Fabian)_  If pt confirmed DC plan does family need to be contacted by CM Yes if yes who______  Discharge Plan:  Pt dc'd today to [facility]. Spouse is there also. Bed available  Per ([facility]) Cristhianid +. Nursing updated        Pt is being d/c'd to [facility] today. Pt's O2 sats are 95% on 2l nc. Discharge orders and Continuity of Care faxed to facility: Yes  Hospital Exemption Notification System complete: Yes  Transportation arranged: Yes - [facility]  Pick-up @ 1500. Patient / Family (Pt) aware of  time: Yes  Dtr [address] notified per nursing Kathy Lucio  Nursing aware of  time: Yes  Receiving facility aware of  time: Yes  [facility]  Pre-cert obtained? No    Discharge timeout done with [facility]. All discharge needs and concerns addressed. Discharging nurse to complete LUIZ, reconcile AVS, and place final copy with patient's discharge packet. Discharging RN to ensure that written prescriptions for  Level II medications are sent with patient to the facility as per protocol.
INTERDISCIPLINARY PLAN OF CARE CONFERENCE    Date/Time: 7/10/2020 11:47 AM  Completed by: Galileo Turner, Case Management      Patient Name:  Felecia Evans  YOB: 1935  Admitting Diagnosis: Multifocal pneumonia [J18.9]     Admit Date/Time:  6/24/2020  7:33 PM    Chart reviewed. Interdisciplinary team met to discuss patient progress and discharge plans. Disciplines included Case Management, Nursing, and Dietitian. Current Status: ICU, vent, covid pneumonia     PT/OT recommendation: n/a    Anticipated Discharge Date: TBD  Expected D/C Disposition:  Unsure at present time  Confirmed plan with patient and/or family No  Discharge Plan Comments: pt remains in ICU on vent. Will cont to follow with pt as condition progresses. The Plan for Transition of Care is related to the following treatment goals: prior plan was to discharge home. Will follow for poss rehab needs.     Home O2 in place on admit: No  Pt informed of need to bring portable home O2 tank on day of discharge for nursing to connect prior to leaving:  Not Indicated  Verbalized agreement/Understanding:  Not Indicated
INTERDISCIPLINARY PLAN OF CARE CONFERENCE    Date/Time: 7/13/2020 11:44 AM  Completed by: Aurora Sanchez, Case Management      Patient Name:  Felecia Evans  YOB: 1935  Admitting Diagnosis: Multifocal pneumonia [J18.9]     Admit Date/Time:  6/24/2020  7:33 PM    Chart reviewed. Interdisciplinary team met to discuss patient progress and discharge plans. Disciplines included Case Management, Nursing, and Dietitian. Current StatusInpt status    PT/OT recommendation:n/a    Anticipated Discharge Date: Vent  Expected D/C Disposition:  unsure  Confirmed plan with patient and/or family Yes  Discharge Plan Comments: Remains on vent.  CM following and will develop dcp as pt progresses      The Plan for Transition of Care is related to the following treatment goals: unsure    Home O2 in place on admit: No  Pt informed of need to bring portable home O2 tank on day of discharge for nursing to connect prior to leaving:  Not Indicated  Verbalized agreement/Understanding:  Not Indicated
INTERDISCIPLINARY PLAN OF CARE CONFERENCE    Date/Time: 7/15/2020 1:47 PM  Completed by: Melissa Buenrostro, Case Management      Patient Name:  Vin Puentes  YOB: 1935  Admitting Diagnosis: Multifocal pneumonia [J18.9]     Admit Date/Time:  6/24/2020  7:33 PM    Chart reviewed. Interdisciplinary team met to discuss patient progress and discharge plans. Disciplines included Case Management, Nursing, and Dietitian. Current Status:inpt    PT/OT recommendation:tbd    Anticipated Discharge Date: tbd  Expected D/C Disposition:  Skilled nursing facility Confirmed plan with patient and/or family No  Discharge Plan Comments: Reviewed chart. Pt extubated 07/14/20, now on 8 liters high flow. Pt from home with spouse and awaiting PT notes when appropriate. Follow. The Plan for Transition of Care is related to the following treatment goals: home with [facility] STR    The Patient and/or patient representative  was provided with a choice of provider and agrees   with the discharge plan.  [x] Yes [] No      Home O2 in place on admit: No  Pt informed of need to bring portable home O2 tank on day of discharge for nursing to connect prior to leaving:  Not Indicated  Verbalized agreement/Understanding:  Not Indicated
INTERDISCIPLINARY PLAN OF CARE CONFERENCE    Date/Time: 7/17/2020 1:28 PM  Completed by: Sam Huang Case Management      Patient Name:  Maurilio Méndez  YOB: 1935  Admitting Diagnosis: Multifocal pneumonia [J18.9]     Admit Date/Time:  6/24/2020  7:33 PM    Chart reviewed. Interdisciplinary team met to discuss patient progress and discharge plans. Disciplines included Case Management, Nursing, and Dietitian. Current Status: ICU, O2, IV antibiotics    PT/OT recommendation: SNF    Anticipated Discharge Date: TBD  Expected D/C Disposition:  Skilled nursing facility  Confirmed plan with patient and/or family No  Discharge Plan Comments: Pt cont in ICU. Plan for SNF or ARU if appropriate. Will cont to follow.        The Plan for Transition of Care is related to the following treatment goals: SNF    Home O2 in place on admit: No  Pt informed of need to bring portable home O2 tank on day of discharge for nursing to connect prior to leaving:  Not Indicated  Verbalized agreement/Understanding:  Not Indicated
INTERDISCIPLINARY PLAN OF CARE CONFERENCE    Date/Time: 7/3/2020 10:37 AM  Completed by: Shoshana Fan, Case Management      Patient Name:  Linda Velazquez  YOB: 1935  Admitting Diagnosis: Multifocal pneumonia [J18.9]     Admit Date/Time:  6/24/2020  7:33 PM    Chart reviewed. Interdisciplinary team met to discuss patient progress and discharge plans. Disciplines included Case Management, Nursing, and Dietitian. Current Status:Inpt status    PT/OT recommendation:n/a    Anticipated Discharge Date: TBd  Expected D/C Disposition:  Unsure  Confirmed plan with patient and/or family Yes  Discharge Plan Comments: Remains on vent. Covid +. Will follow and develop dcp as pt progresses.      The Plan for Transition of Care is related to the following treatment goals: Unsure    Home O2 in place on admit: No  Pt informed of need to bring portable home O2 tank on day of discharge for nursing to connect prior to leaving:  Not Indicated  Verbalized agreement/Understanding:  Not Indicated
Affiliation  Dialysis:  No    · Name:  · Location  · Dialysis Schedule:  · Phone:   · Fax: Outpatient PT/OT: No    Cancer Center: No     CHF Clinic: No     Pulmonary Rehab: No  Pain Clinic: No  Community Mental Health: No    Wound Clinic: No     COVID SCREENING: yes- Covid positive      Other: n/a    The Plan for Transition of Care is related to the following treatment goals: to return home       DISCHARGE PLAN: plans home with no needs. On RA at this time. He is open to [place] [id number] if needed, however was IPTA. Explained Case Management role/services.

## 2020-07-20 NOTE — PROGRESS NOTES
Shift assessment is complete. Pt is alert/oriented to self/place/situation, confused to day/time Pt is now on 2 liters nasal canula, oxygen saturations 94%. Left/right lungs are clear. Pt has generalized weakness in all extremities. Transferred patient with 2 person assistance to recliner chair at bedside. Pt tolerated well. DEMI PICC dry/intact and saline locked. Fischer intact and draining. Telemetry NSR, VSS. Pt remains in covid precautions as patient was positive for covid this admit.

## 2020-07-20 NOTE — PROGRESS NOTES
Inpatient Physical Therapy Daily Treatment Note    Unit: ICU  Date:  7/20/2020  Patient Name:    Denny Bui  Admitting diagnosis:  Multifocal pneumonia [J18.9]  Admit Date:  6/24/2020  Precautions/Restrictions: Fall risk, Bed/chair alarm, Lines -IV, Supplemental O2 (6), Fischer catheter and Covid19 (+ve), Droplet plus precautions, Confusion, Telemetry, Continuous pulse oximetry and Isolation Precautions: Droplet Plus - COVID     Discharge Recommendations: SNF  DME needs for discharge: defer to facility         Therapy recommendation for EMS Transport: requires transport by cot due to extensive physical assistance needed for functional transfers.     Therapy recommendations for staff:   Assist of 2 with use of Stedy for all transfers to/from chair   Assist x 2 for bed mobility and sitting at the EOB.     History of Present Illness: per Dr Mcgee Night H&P 6/25/20  \" The patient is a 82 y. o. male with PMH below, presents with SOB/PINEDA, fatigue, lightheadedness, dyspnea on exertion, fever.  Patient reports that for the last few months he is had increasing problems with fatigue and lightheadedness.  He reports he gets lightheaded especially when he stands up too fast.  He also describes dyspnea on exertion which appears to be worsening even with a few steps.  He does not have shortness of breath at rest. [facility] says he feels like \"I am running out of gas much faster than I used to. \" [facility] reports that he went to a corn hole tournament last week. [employer] were approximately 200 teams there. [facility] does not know of any sick contacts but was in close contact with many people there. [facility] also notes that he has had a little bit of chest pressure but denies that it is actually vinod pain.  He denies urinary symptoms. [facility] has had an occasional cough but does not believe it has been productive. [facility] reports that he is normally fairly active and independent but has been less so the last several days. [facility] reports he measured his temperature at 101.7 at home today.  His temp was 99.3 and 99.5 in the ER. \"  [id number] positive, intubated 6/29-7/14, PICC 6/29    Home Health S4 Level Recommendation: NA  AM-PAC Mobility Score   AM-PAC Inpatient Mobility Raw Score : 9     Treatment Time:  6736 - 3404  Treatment number: 4  Timed Code Treatment Minutes: 46 minutes  Total Treatment Minutes:  46  minutes    Cognition    A&O Person, able to converse with daughter with video call. Able to follow 1 step commands    Subjective  Patient sitting up in chair with no family present   Pt agreeable to this PT tx. Pain   No  Location: N/A  Rating:    NA/10  Pain Medicine Status: No request made     Bed Mobility   Supine to Sit:    Mod A  and 2 persons  Sit to Supine:   Not Tested, Mod A  and 2 persons  Rolling: Max A   Scooting: Max A  and 2 persons    Transfer Training     Sit to stand: Mod A  and 2 persons with RW, Min A from [employer] elevated seating extensions. Stand to sit:   Mod A  and 2 persons  Bed to Chair:   Mod A  and 2 persons with use of Qiro    Gait Training gait deferred due to difficulty with transfers; pt ambulated 0 ft. Distance:      0 ft  Deviations (firm surface/linoleum):  N/A  Assistive Device Used:    N/A  Level of Assist:    Not Tested  Comment:     Stair Training deferred, pt unsafe/not appropriate to complete stairs at this time    Therapeutic Exercise all completed bilaterally unless indicated  Ankle Pumps x 10 reps  Quad sets x 10 reps  Heel slides x 10 reps  LAQ x 10 reps  Hip abduction: x 10 reps    Balance  Sitting:  Fair +; CGA  Comments:     Standing: Fair -; Not Tested, Mod A  and 2 persons  Comments: 40 sec, 30 sec, 30 sec (3 attempts) with RW. Patient Education      Role of PT, POC, Discharge recommendations, DC recommendations, safety awareness, transfer techniques, pursed lip breathing, energy conservation, pacing activity and calling for assist with mobility.      Positioning Needs       Pt in bed and in seated position using bed controls, alarm set, positioned in proper neutral alignment and pressure relief provided. Call light provided and all needs within reach    ROM Measurements N/A  Knee Flexion:  Knee Extension:     Activity Tolerance   Pt completed therapy session with SOB noted with activity, SpO2: baseline 96%, with activity 89%. Other  N/A    Assessment :  Patient was feeling much better today. Patient was able to sit in the recliner with LEs down with CGA for 20 min with bilateral UEs movements. Patient was able to use the hands with elbow support. Patient also able to participate in sit to stand transfers with Mod A but had difficulty in weight shifting hence Stedy was used for chair to bed transfers which patient tolerated well with mild SOB. Patient showed improved bilateral UE control with Min A to feed ice cream and improved LE control to participate in functional transfers. Recommending SNF upon discharge as patient functioning well below baseline, demonstrates good rehab potential and unable to return home due to limited or no family support, burden of care beyond caregiver ability, home environment not conducive to patient recovery, limited safety awareness and significant decline in functional capacity as per his PLOF. Goals (all goals ongoing unless otherwise indicated)  To be met in 3 visits:  1). Independent with LE Ex x 10 reps     To be met in 6 visits:  1).  Supine to/from sit: Max A   2).  Sit to/from stand: Max A   3).  Bed to chair: Max A   4).  Gait: Ambulate 5 ft.   with  Max A  and use of LRAD (least restrictive assistive device)  5).  Tolerate B LE exercises 3 sets of 10-15 reps     Plan   Continue with plan of care. Signature: Akanksha Natarajan MS PT, # [id number]      If patient discharges from this facility prior to next visit, this note will serve as the Discharge Summary.

## 2020-07-20 NOTE — PROGRESS NOTES
Daughter Lu goodrich timed while pt up in chair. Pt returned to bed at 2200. After being up for 1 hour. Pt bathed, shaved and edna care and medeiros care  given once back in bed. Pt took orange sherbet and tolerated it well. Pt positioned on R side and stated he wanted to go to sleep.

## 2020-07-20 NOTE — PROGRESS NOTES
Hospitalist Progress Note      PCP: Nuris Nolan MD    Date of Admission: 6/24/2020     Admitted with COVID-19. Initiated on room December of dexamethasone for worsening hypoxia. Intubated on 6/29/2020  Extubated on 7/14. Subjective: low grade fevers   Now on 2 L of O2    Medications:  Reviewed    Infusion Medications    dextrose       Scheduled Medications    enoxaparin  40 mg Subcutaneous BID    furosemide  40 mg Intravenous BID    pantoprazole  40 mg Intravenous BID    insulin lispro  0-12 Units Subcutaneous Q4H    ketotifen  1 drop Both Eyes BID    rosuvastatin  10 mg Oral Daily    aspirin  81 mg Oral Daily    sodium chloride flush  10 mL Intravenous 2 times per day     PRN Meds: potassium chloride, ipratropium-albuterol, sennosides-docusate sodium, glucose, dextrose, glucagon (rDNA), dextrose, tetrahydrozoline, aluminum & magnesium hydroxide-simethicone, ondansetron, sodium chloride flush, acetaminophen **OR** acetaminophen      Intake/Output Summary (Last 24 hours) at 7/20/2020 0932  Last data filed at 7/20/2020 0600  Gross per 24 hour   Intake 1246 ml   Output 1900 ml   Net -654 ml       Physical Exam Performed:    BP (!) 162/66   Pulse 89   Temp 99.7 °F (37.6 °C) (Bladder)   Resp 28   Ht 5' 7\" (1.702 m)   Wt 182 lb 1.6 oz (82.6 kg)   SpO2 95%   BMI 28.52 kg/m²     General:  Pt is awake , very fatigued   Neck:  JVD absent. Neck supple  Chest: Diminished breath sounds . No wheezes or rhonchi   Cardiovascular:  RRR ,S1S2 normal  Abdomen:  Soft, non tender, non distended, BS +  Extremities: edema has significantly decreased   Intact peripheral pulses.  Brisk cap refill, < 2 secs  Rash on trunk and ext improving   Neuro:awake, improving strength , following simple commands  Oriented to place        Labs:   Recent Labs     07/18/20  0615 07/19/20  0530 07/20/20  0615   WBC 10.7 9.3 11.0   HGB 12.3* 12.3* 12.1*   HCT 36.8* 37.7* 37.1*    201 206     Recent Labs tube tip is approximately 3 cm above the thai. XR CHEST PORTABLE   Final Result   Mild increase in the amount of bibasilar opacity since yesterday. Large   bilateral pneumonias. XR CHEST PORTABLE   Final Result   Stable bilateral pulmonary opacities. XR CHEST PORTABLE   Final Result   Minimal increase in the amount of opacity in each lung consistent with slight   worsening of bilateral pneumonia. XR CHEST PORTABLE   Final Result   Supportive tubing is in normal position. Low lung volume study, with stable alveolar opacities in the mid and lower   lungs, pneumonia versus atelectasis. XR CHEST PORTABLE   Final Result   Increased in degree and extent of bilateral mid and lower lung pneumonia. The increased may be partially accentuated by low lung volumes. XR CHEST 1 VW   Final Result   Persistent bilateral airspace disease, similar to prior. IR PICC WO SQ PORT/PUMP > 5 YEARS   Final Result   Successful placement of PICC line. XR CHEST PORTABLE   Final Result   Appropriate endotracheal tube positioning. Multifocal airspace opacities and areas of atelectasis correlate with recent   CT chest.         XR ABDOMEN FOR NG/OG/NE TUBE PLACEMENT   Final Result   NG tube terminates over the stomach. CT CHEST PULMONARY EMBOLISM W CONTRAST   Final Result   1. No pulmonary embolism. 2. Multifocal pneumonia scattered throughout both lungs with minimal pleural   effusions. XR CHEST STANDARD (2 VW)   Final Result   No radiographic evidence of acute pulmonary disease.                  Assessment/Plan:    Active Hospital Problems    Diagnosis    Fatigue [R53.83]    Leukocytosis [D72.829]    Bright red blood per rectum [K62.5]    COVID-19 virus infection [U07.1]    Acute kidney injury ([address] [N17.9]    Fever [R50.9]    Arm edema [R60.0]    Hypertriglyceridemia [E78.1]    Herpes zoster without complication [D54.3]    Asymptomatic

## 2020-07-20 NOTE — PROGRESS NOTES
0.8 0.8   CALCIUM 8.1* 8.2* 8.4     Recent Labs     07/17/20  0720 07/18/20  0615 07/19/20  0530   AST 33 24 19   ALT 35 27 22   BILIDIR 0.3 0.3 0.3   BILITOT 0.8 0.8 0.9   ALKPHOS 71 72 71     Recent Labs     07/19/20  0530   INR 1.14     No results for input(s): CKTOTAL, TROPONINI in the last 72 hours. Urinalysis:      Lab Results   Component Value Date    NITRU Negative 07/12/2020    WBCUA 0-2 07/12/2020    BACTERIA 1+ 06/24/2020    RBCUA >100 07/12/2020    BLOODU LARGE 07/12/2020    SPECGRAV 1.010 07/12/2020    GLUCOSEU Negative 07/12/2020       Radiology:  XR CHEST PORTABLE   Final Result   1. Stable low lung volumes with bilateral lung infiltrates, compatible with   pneumonia. VL Extremity Venous Bilateral   Final Result      XR CHEST PORTABLE   Final Result   Stable appropriate life support device positioning. No substantial change in bilateral airspace disease. XR CHEST PORTABLE   Final Result   1. Stable chest with bilateral lung infiltrates. Findings are concerning for   pulmonary edema versus pneumonia. XR CHEST PORTABLE   Final Result   Patchy bilateral airspace disease, slightly increased as compared to prior. XR CHEST PORTABLE   Final Result   Improving bilateral pneumonia versus edema. XR CHEST 1 VW   Final Result   Increasing patchy opacities throughout both lungs, most compatible with   multifocal pneumonia. Component of pulmonary edema should be considered. XR ABDOMEN (KUB) (SINGLE AP VIEW)   Final Result   1. Indeterminate bowel gas pattern         XR CHEST PORTABLE   Final Result   Stable bilateral pneumonia. XR CHEST PORTABLE   Final Result   Stable central ground-glass airspace opacities. Supportive devices are   unchanged. XR CHEST PORTABLE   Final Result   The endotracheal tube tip is approximately 3 cm above the thai.          XR CHEST PORTABLE   Final Result   Mild increase in the amount of bibasilar opacity since yesterday. Large   bilateral pneumonias. XR CHEST PORTABLE   Final Result   Stable bilateral pulmonary opacities. XR CHEST PORTABLE   Final Result   Minimal increase in the amount of opacity in each lung consistent with slight   worsening of bilateral pneumonia. XR CHEST PORTABLE   Final Result   Supportive tubing is in normal position. Low lung volume study, with stable alveolar opacities in the mid and lower   lungs, pneumonia versus atelectasis. XR CHEST PORTABLE   Final Result   Increased in degree and extent of bilateral mid and lower lung pneumonia. The increased may be partially accentuated by low lung volumes. XR CHEST 1 VW   Final Result   Persistent bilateral airspace disease, similar to prior. IR PICC WO SQ PORT/PUMP > 5 YEARS   Final Result   Successful placement of PICC line. XR CHEST PORTABLE   Final Result   Appropriate endotracheal tube positioning. Multifocal airspace opacities and areas of atelectasis correlate with recent   CT chest.         XR ABDOMEN FOR NG/OG/NE TUBE PLACEMENT   Final Result   NG tube terminates over the stomach. CT CHEST PULMONARY EMBOLISM W CONTRAST   Final Result   1. No pulmonary embolism. 2. Multifocal pneumonia scattered throughout both lungs with minimal pleural   effusions. XR CHEST STANDARD (2 VW)   Final Result   No radiographic evidence of acute pulmonary disease.                  Assessment/Plan:    Active Hospital Problems    Diagnosis    Fatigue [R53.83]    Leukocytosis [D72.829]    Bright red blood per rectum [K62.5]    COVID-19 virus infection [U07.1]    Acute kidney injury ([address] [N17.9]    Fever [R50.9]    Arm edema [R60.0]    Hypertriglyceridemia [E78.1]    Herpes zoster without complication [Y54.5]    Asymptomatic bacteriuria [R82.71]    Endotracheally intubated [Z97.8]    On mechanically assisted ventilation (HCC) [Z99.11]    Acute respiratory failure with stopped        Petechial Rash  - noted on arms and trunk  - given benadryl IV  - followed by ID - off IV acyclovir        Elevated D dimer  - due to COVID 19  - CT chest neg for PE  - held lovenox for GI bleed - resumed now      UTI  - cx positive for staph, treated     Hyperlipidemia  - on statin.        Rectal Bleeding -   GI consulted  Hgb stable. Resumed on heparin gtt am of 7/12  Heparin gtt off  On bid lovenox now       Diet: DIET DYSPHAGIA MINCED AND MOIST;  Moderately Thick (Honey)  Dietary Nutrition Supplements: Frozen Oral Supplement  Code Status: Full Code    PT/OT Eval Status: ordered    Dispo - cont care in ICU    Kee Montelongo MD

## 2020-07-20 NOTE — PROGRESS NOTES
Occupational Therapy Daily Treatment Note    Unit: ICU  Date:  7/20/2020  Patient Name:    Namrata Martínez  Admitting diagnosis:  Multifocal pneumonia [J18.9]  Admit Date:  6/24/2020  Precautions/Restrictions:  Fall risk, Bed/chair alarm, Lines -IV, Supplemental O2 (4L) and Fischer catheter and Isolation Precautions: Droplet Plus - COVID        Discharge Recommendations: SNF  DME needs for discharge: defer to facility       Therapy recommendations for staff:   Assist of 2 (minimal assist) with use of RIC STEDY and gait belt for all transfers to/from BSC/chair    AM-PAC Score: AM-PAC Inpatient Daily Activity Raw Score: 7  Home Health S4 Level: NA       Treatment Time:  4459-6591  Treatment number:  4   Total Treatment Time:   46 minutes    History of Present Illness: per Dr Kandace Dixon H&P 6/25/20  \" The patient is a 82 y. o. male with PMH below, presents with SOB/PINEDA, fatigue, lightheadedness, dyspnea on exertion, fever.  Patient reports that for the last few months he is had increasing problems with fatigue and lightheadedness.  He reports he gets lightheaded especially when he stands up too fast.  He also describes dyspnea on exertion which appears to be worsening even with a few steps.  He does not have shortness of breath at rest. [facility] says he feels like \"I am running out of gas much faster than I used to. \" [facility] reports that he went to a corn hole tournament last week. [facility] were approximately 200 teams there. [facility] does not know of any sick contacts but was in close contact with many people there. [facility] also notes that he has had a little bit of chest pressure but denies that it is actually vinod pain.  He denies urinary symptoms. [facility] has had an occasional cough but does not believe it has been productive. [facility] reports that he is normally fairly active and independent but has been less so the last several days. [facility] reports he measured his temperature at 101.7 at home today.  His temp was 99.3 and 99.5 in the ER.\"     COVID-19 positive, intubated 6/29-7/14, PICC 6/29    Subjective:  Pt reclined in recliner upon therapist entry. Pt reports he has been up since breakfast. Pt reports he is feeling tired and ready for bed but would like to eat some lunch before getting into bed. RN reports pt has had some difficulty with sleep and wake cycles. Pt has been sleeping much of the day. Pt reports understanding and agreeable to chair position in bed to try and stay awake during the day. Pt completed phone call with dtr this date. He was able to hold the phone independently for approx 5 mintues. Pain   None reported throughout session     Bed Mobility:   Supine to Sit:  Not Tested  Sit to Supine: Mod A of 2  Rolling:           Not Tested  Scooting: Max A of 2 (in chair and to [facility])     Transfer Training:   Sit to stand:   Min A of 2 up to ; CGA x2 at [facility]   Stand to sit:  Min A of 2 to assist with control of descent   Pt unable to trial steps at the  this date related to poor balance and fear of falling. Chair to Bed:  Total A via Stedy  Bed to [facility]:   Not Tested  Standard toilet:   Not Tested    Activity Tolerance   Pt completed therapy session with SOB noted with standing. Pt reports fear of falling and does experience some signs of anxiety. SpO2: lowest 86% standing in the STEDY. Pt dropped to 89% with stand at [facility]     ADL Training:   Upper body dressing:  Not Tested  Upper body bathing:  Not Tested  Lower body dressing:  Max A  Lower body bathing:  Not Tested  Toileting:   Not Tested  Grooming/Hygiene:  False Pass to brush hair (Pt with difficulty rotating his wrist to complete)   Feeding: Total of 15 trials: ate chocolate ice cream    Trial 1-4: False Pass     Trial 5-10: Alternate False Pass and Min A     Trial 11-15: A to scoop only, Indpt to bring spoon to mouth      Therapeutic Exercise:   N/A    Patient Education:   Role of OT  Recommendations for DC    Positioning Needs: In bed, call light and needs in reach.

## 2020-07-25 NOTE — DISCHARGE SUMMARY
Name:  Martha Mcghee  Room:  3013/3013-01  MRN:    [id number]    Discharge Summary      This discharge summary is in conjunction with a complete physical exam done on the day of discharge. Attending Physician: ESTEFANI Arrieta Discharging Physician: ESTEFANI Arrieta Admit: 6/24/2020  Discharge:  7/20/2020    Diagnoses this Admission    Principal Problem:    Pneumonia due to COVID-19 virus  Active Problems:    Multifocal pneumonia    Orthostasis    Sepsis ([address]    Urinary tract infection without hematuria    ARDS (adult respiratory distress syndrome) (HCC)    Hyperlipidemia    Acute respiratory failure with hypoxia ([facility])    Class 1 obesity due to excess calories with body mass index (BMI) of 31.0 to 31.9 in adult    Peripherally inserted central catheter (PICC) in place    Asymptomatic bacteriuria    Endotracheally intubated    On mechanically assisted ventilation ([facility])    Herpes zoster without complication    Fever    Arm edema    Hypertriglyceridemia    COVID-19 virus infection    Acute kidney injury ([address]    Bright red blood per rectum    Fatigue    Leukocytosis  Resolved Problems:    * No resolved hospital problems. *          Procedures (Please Review Full Report for Details)             XR CHEST PORTABLE   Final Result   1. Stable chest with bilateral lung infiltrates. Findings are concerning for   pulmonary edema versus pneumonia.           XR CHEST PORTABLE   Final Result   Patchy bilateral airspace disease, slightly increased as compared to prior.           XR CHEST PORTABLE   Final Result   Improving bilateral pneumonia versus edema.           XR CHEST 1 VW   Final Result   Increasing patchy opacities throughout both lungs, most compatible with   multifocal pneumonia. Component of pulmonary edema should be considered.           XR ABDOMEN (KUB) (SINGLE AP VIEW)   Final Result   1.  Indeterminate bowel gas pattern         Consults    pulm     HPI:  The patient is a 82 y.o. male with PMH below, presents with SOB/PINEDA, fatigue, lightheadedness, dyspnea on exertion, fever. Patient reports that for the last few months he is had increasing problems with fatigue and lightheadedness. He reports he gets lightheaded especially when he stands up too fast.  He also describes dyspnea on exertion which appears to be worsening even with a few steps. He does not have shortness of breath at rest.  He says he feels like \"I am running out of gas much faster than I used to. \"  He reports that he went to a corn hole tournament last week. There were approximately 200 teams there. He does not know of any sick contacts but was in close contact with many people there. He also notes that he has had a little bit of chest pressure but denies that it is actually vinod pain. He denies urinary symptoms. He has had an occasional cough but does not believe it has been productive. He reports that he is normally fairly active and independent but has been less so the last several days. He reports he measured his temperature at 101.7 at home today. His temp was 99.3 and 99.5 in the ER. Hospital Course  Acute hypoxic respiratory failure due to COVID-19 infection   ARDS     - [facility]  - s.p remdesivir and convalescent plasma   - improving very slowly   - Reintubated on 7/6 d/t displacement of existing ETT  - 7/14 , extubated to NC high flow  - wean as able, 3L HF NC  - ongoing diuresis for peripheral edema, IV lasix 40 BID, good urine output, edema improved  -now on 2 L of O2  Switch to oral lasix         COVID -19 PNA  - s/p Decadron - 10 days. - Completed 10 days of Remdesivir on 7/7/2020  - S/P transfusion of 2 units of convalescent plasma on 7/2/2020  - imaging with ARDS initially and now improving   - remains off VENT and BiPAP.  On 2L  NC        Sepsis - resolved  - POA, due to PNA, COVID 19  - with leukocytosis, fevers, tachypnea  - COVID-19 detected  - improving fevers  -Antibiotics adjusted per infectious disease( tele consult)  Currently on acyclovir and merrem. Stopped zosyn d/t rash. Completed x8 days of ZYVOX        Multifocal pneumonia  - related to COVID-19.    - Tracheal aspirate sent.    - was on  broad-spectrum antibiotics, vanc and  Cefepime--> now off .    Completed zyvox  Completed merrem        Possible Shingles  Distrubution of rash concerning for drug eruption vs covid related vs  leukocytoclastic vasculitis. - generalized involvement of trunk, torso, and extremities without classic vesicular lesions and bilateral distribution make shingles less likely. - started IV Acyclovir per ID recs - stopped        Petechial Rash  - noted on arms and trunk  - given benadryl IV  - followed by ID - off IV acyclovir        Elevated D dimer  - due to COVID 19  - CT chest neg for PE  - held lovenox for GI bleed - resumed now      UTI  - cx positive for staph, treated     Hyperlipidemia  - on statin.        Rectal Bleeding -   GI consulted  Hgb stable. Resumed on heparin gtt am of 7/12  Heparin gtt off  On bid lovenox now     Ot/pt    D/c to SNF            Discharge Medications     Medication List      START taking these medications    ketotifen 0.025 % ophthalmic solution  Commonly known as:  ZADITOR  Place 1 drop into both eyes 2 times daily for 10 days        CONTINUE taking these medications    aspirin 81 MG tablet     Crestor 10 MG tablet  Generic drug:  rosuvastatin     Omega 3 1000 MG Caps     vitamin B-12 1000 MCG tablet  Commonly known as:  CYANOCOBALAMIN        STOP taking these medications    clorazepate 7.5 MG tablet  Commonly known as:  TRANXENE           Where to Get Your Medications      Information about where to get these medications is not yet available    Ask your nurse or doctor about these medications  · ketotifen 0.025 % ophthalmic solution           Discharge Condition/Location: Stable to SNF     Follow Up:   Follow up with SNF physician    Total time spent on discharge is 35 minutes      DANYN GARCIA 7/25/2020 11:51 AM

## 2020-07-27 NOTE — TELEPHONE ENCOUNTER
Pts daughter Edna Saul called and said that the pt is in [facility] in the covid unit doing rehab. Pt will be there for several weeks. Pts daughter said that we can try to do the visit through the facility.  Pams # is [phone]

## 2020-07-27 NOTE — TELEPHONE ENCOUNTER
Spoke with with nurse Mine Cuadra she states maybe discharged soon from [facility] and would like a return call 8/7/20 regarding scheduling appointment. [phone].

## 2020-08-25 NOTE — TELEPHONE ENCOUNTER
Order Pended CXR. Spoke with Nurse at [facility] patient was d/c home 8/25/20. Spoke with patient's daughter patient has physical Therapy on 8/28/20 and r/s appt to 9/3/20.

## 2020-08-28 ENCOUNTER — HOSPITAL ENCOUNTER (OUTPATIENT)
Age: 85
Discharge: HOME | End: 2020-08-28
Payer: MEDICARE

## 2020-08-28 ENCOUNTER — HOSPITAL ENCOUNTER (OUTPATIENT)
Dept: GENERAL RADIOLOGY | Age: 85
Discharge: HOME | End: 2020-08-28
Payer: MEDICARE

## 2020-08-28 PROCEDURE — 71046 X-RAY EXAM CHEST 2 VIEWS: CPT

## 2020-09-03 ENCOUNTER — TELEPHONE (OUTPATIENT)
Dept: PULMONOLOGY | Age: 85
End: 2020-09-03

## 2020-09-03 ENCOUNTER — VIRTUAL VISIT (OUTPATIENT)
Dept: PULMONOLOGY | Age: 85
End: 2020-09-03
Payer: MEDICARE

## 2020-09-03 PROCEDURE — 99213 OFFICE O/P EST LOW 20 MIN: CPT | Performed by: INTERNAL MEDICINE

## 2020-09-03 RX ORDER — POTASSIUM CHLORIDE 1500 MG/1
TABLET, FILM COATED, EXTENDED RELEASE ORAL
COMMUNITY

## 2020-09-03 RX ORDER — CLORAZEPATE DIPOTASSIUM 3.75 MG/1
3.75 TABLET ORAL 2 TIMES DAILY PRN
COMMUNITY
Start: 2020-05-05

## 2020-09-03 NOTE — PROGRESS NOTES
Pulmonary Follow-up Note  Chief Complaint: COVID  Referring Provider: hospital f/u    Interval history: doing well, back home from rehab, has weaned oxygen to off. Is still on 20 meQ KCl, which was started in rehab. Presenting history: 82-year-old male with minimal medical history who presented with a 2-month history of fatigue and lightheadedness, orthostasis. The symptoms worsened over the last 2 days and so he measured his temperature which was 101.7 at home. Without treatment, his temperature improved to 99.5 in the ER. He reported that he attended a corn hole tournament with several 100 people 1 week prior. reports that he has quit smoking. His smoking use included cigarettes. He started smoking about 30 years ago. He has a 25.00 pack-year smoking history. He has never used smokeless tobacco.     Review of Systems:    Physical Exam:  There were no vitals taken for this visit. VIRTUAL  Constitutional:  No acute distress. Appears well developed and nourished. Eyes: EOM intact. Conjunctivae anicteric. No visible discharge. HENT: Head is normocephalic and atraumatic. Mucus membranes are moist and the tongue appears normal. Normal appearing nose. External Ears normal. Neck with no obvious mass and the trachea is midline. Respiratory: No accessory muscle usage. Respiratory effort normal. No visualized signs of difficulty breathing or respiratory distress  Cardiovascular: No obvious peripheral edema. Skin: No significant exanthematous lesions or discoloration noted on facial skin    Psychiatric: No anxiety or Agitation. Alert and Oriented to person, place and time. Normal judgement and insight. Data:   Echo 7/15/20   EF 70%  Normal diastolic function    8/91/68 CHEST CT  Pulmonary Arteries: Pulmonary arteries are adequately opacified for    evaluation.  No intraluminal filling defect to suggest pulmonary embolism.     Main pulmonary artery is normal in caliber.         Mediastinum: Heart size at the upper limit of normal.  Coronary artery    calcification.  No pericardial effusion.  Thoracic aorta is normal caliber. No lymphadenopathy.  Thyroid and esophagus are unremarkable.         Lungs/pleura: Multifocal areas of consolidation throughout both lungs. Minimal right and trace left pleural effusions with adjacent atelectasis.         Upper Abdomen: No acute abnormality.         Soft Tissues/Bones: Flowing ossification anteriorly along the spine suggests    DISH.              Impression    1. No pulmonary embolism. 2. Multifocal pneumonia scattered throughout both lungs with minimal pleural    effusions. 6/29/20 CXR multifocal airspace disease similar to CT imaging  8/28/20 CXR is clear     ASSESSMENT:  · COVID-19 pneumonia. F/U CXR shows resolution  · ARDS 2/2 above, resolved  · H/O Shingles  · Frequent PVCs      PLAN:  · No additional imaging is recommended. · I did ask patient to get his scheduled blood work tomorrow, which is 5 days early, given that he is on a relatively high dose of potassium. · Please look for potassium level from [facility] tomorrow and text result to me. Sathya Hobson is a 82 y.o. male being evaluated by a Virtual Visit (video visit) encounter to address concerns as mentioned above. A caregiver was present when appropriate. Due to this being a TeleHealth encounter ([facility] emergency), evaluation of the following organ systems was limited: Vitals/Constitutional/EENT/Resp/CV/GI//MS/Neuro/Skin/Heme-Lymph-Imm. Pursuant to the emergency declaration under the [address] [place] [address] authority and the [employer] Act, this Virtual Visit was conducted with patient's (and/or legal guardian's) consent, to reduce the patient's risk of exposure to COVID-19 and provide necessary medical care.   The patient (and/or legal guardian) has also been advised to contact this office for worsening conditions or problems, and seek emergency medical treatment and/or call 911 if deemed necessary. Services were provided through a video synchronous discussion virtually to substitute for in-person clinic visit. Patient was located in his home, provider was located in his office. --Nicole Reyna MD on 9/3/2020 at 10:10 AM    An electronic signature was used to authenticate this note.

## 2020-09-04 ENCOUNTER — TELEPHONE (OUTPATIENT)
Dept: PULMONOLOGY | Age: 85
End: 2020-09-04

## 2020-09-04 NOTE — TELEPHONE ENCOUNTER
Can you call patient and confirm he got bloodwork and notify me of where it was drawn. I do not see the result in care everywhere which includes [facility].

## 2020-09-04 NOTE — TELEPHONE ENCOUNTER
----- Message from Jennifer López MD sent at 9/4/2020  2:57 PM EDT -----  Can you call and confirm patient had blood work and where? Will you ask the lab to call my cell with the potassium result when it is available?   [phone]

## 2020-09-04 NOTE — TELEPHONE ENCOUNTER
Dr. Gurmeet Jaquez responded \"let him know is potassium level is normal.  He can continue taking the (potassium) 20 mill equivalents that he is currently taking until he follows up with his regular PCP\".

## 2020-11-27 NOTE — PROGRESS NOTES
FiO2 increased to 80%   SpO2 89%       07/06/20 0839   Vent Information   Vt Ordered 430 mL   Rate Set 16 bmp   Peak Flow 70 L/min   Pressure Support 0 cmH20   FiO2  80 %   SpO2 (!) 89 %   SpO2/FiO2 ratio 111.25   Sensitivity 3   PEEP/CPAP 14   I Time/ I Time % 0 s   Vent Patient Data   High Peep/I Pressure 0   Peak Inspiratory Pressure 26 cmH2O   Mean Airway Pressure 18 cmH20   Rate Measured 31 br/min   Vt Exhaled 456 mL   Minute Volume 14.6 Liters   I:E Ratio 1:1.90   Spontaneous Breathing Trial (SBT) RT Doc   Pulse 89   Additional Respiratory  Assessments   Resp 29   Alarm Settings   High Pressure Alarm 64 cmH2O   Low Minute Volume Alarm 5 L/min   High Respiratory Rate 40 br/min Spoke with pt's mom appt confirmed.

## 2021-06-23 ENCOUNTER — TELEPHONE (OUTPATIENT)
Dept: PULMONOLOGY | Age: 86
End: 2021-06-23

## 2021-06-23 ENCOUNTER — OFFICE VISIT (OUTPATIENT)
Dept: PULMONOLOGY | Age: 86
End: 2021-06-23
Payer: MEDICARE

## 2021-06-23 VITALS
RESPIRATION RATE: 18 BRPM | HEART RATE: 65 BPM | OXYGEN SATURATION: 94 % | WEIGHT: 198 LBS | TEMPERATURE: 97.2 F | SYSTOLIC BLOOD PRESSURE: 132 MMHG | DIASTOLIC BLOOD PRESSURE: 74 MMHG | BODY MASS INDEX: 31.01 KG/M2

## 2021-06-23 DIAGNOSIS — J12.82 PNEUMONIA DUE TO COVID-19 VIRUS: Primary | ICD-10-CM

## 2021-06-23 DIAGNOSIS — R06.02 SOB (SHORTNESS OF BREATH): Primary | ICD-10-CM

## 2021-06-23 DIAGNOSIS — R06.02 SHORTNESS OF BREATH: ICD-10-CM

## 2021-06-23 DIAGNOSIS — U07.1 PNEUMONIA DUE TO COVID-19 VIRUS: Primary | ICD-10-CM

## 2021-06-23 PROCEDURE — 1036F TOBACCO NON-USER: CPT | Performed by: INTERNAL MEDICINE

## 2021-06-23 PROCEDURE — 1123F ACP DISCUSS/DSCN MKR DOCD: CPT | Performed by: INTERNAL MEDICINE

## 2021-06-23 PROCEDURE — G8417 CALC BMI ABV UP PARAM F/U: HCPCS | Performed by: INTERNAL MEDICINE

## 2021-06-23 PROCEDURE — G8427 DOCREV CUR MEDS BY ELIG CLIN: HCPCS | Performed by: INTERNAL MEDICINE

## 2021-06-23 PROCEDURE — 99214 OFFICE O/P EST MOD 30 MIN: CPT | Performed by: INTERNAL MEDICINE

## 2021-06-23 PROCEDURE — 4040F PNEUMOC VAC/ADMIN/RCVD: CPT | Performed by: INTERNAL MEDICINE

## 2021-06-23 ASSESSMENT — SLEEP AND FATIGUE QUESTIONNAIRES
HOW LIKELY ARE YOU TO NOD OFF OR FALL ASLEEP WHILE LYING DOWN TO REST IN THE AFTERNOON WHEN CIRCUMSTANCES PERMIT: 0
HOW LIKELY ARE YOU TO NOD OFF OR FALL ASLEEP WHILE WATCHING TV: 1
ESS TOTAL SCORE: 2
HOW LIKELY ARE YOU TO NOD OFF OR FALL ASLEEP WHILE SITTING AND TALKING TO SOMEONE: 0
HOW LIKELY ARE YOU TO NOD OFF OR FALL ASLEEP IN A CAR, WHILE STOPPED FOR A FEW MINUTES IN TRAFFIC: 0
HOW LIKELY ARE YOU TO NOD OFF OR FALL ASLEEP WHEN YOU ARE A PASSENGER IN A CAR FOR AN HOUR WITHOUT A BREAK: 0
HOW LIKELY ARE YOU TO NOD OFF OR FALL ASLEEP WHILE SITTING QUIETLY AFTER LUNCH WITHOUT ALCOHOL: 0
HOW LIKELY ARE YOU TO NOD OFF OR FALL ASLEEP WHILE SITTING AND READING: 1
HOW LIKELY ARE YOU TO NOD OFF OR FALL ASLEEP WHILE SITTING INACTIVE IN A PUBLIC PLACE: 0

## 2021-06-23 NOTE — TELEPHONE ENCOUNTER
Patient scheduled for Echo 6/30/21. Watch for results. ASSESSMENT: 6/23/21  · COVID-19 pneumonia. F/U CXR shows resolution  · Shortness of breath - cardiac v pulmonary v deconditioning   · New lower extremity edema   · H/O Shingles  · Frequent PVCs      PLAN:  · ECHO for shortness of breath, call for result   · Plan CT if ECHO not revealing - consider CTPA if any elevated right heart pressures, otherwise no IV dye -- look for echo results first  · No additional imaging is recommended.     · Set up f/u for about one month

## 2021-06-23 NOTE — PROGRESS NOTES
Pulmonary Follow-up Note  Chief Complaint: COVID  Referring Provider: hospital f/u    Interval History #2: after home from rehab, he has not had further improvement and feels his shortness of breath is a little worsened. He notes he has new lower extremity edema, never prior to COVID. SOB with 1 flight of stairs, can only tolerate his exercise for about 5 minutes, then runs out of steam and endorses shortness of breath. Interval history: doing well, back home from rehab, has weaned oxygen to off. Is still on 20 meQ KCl, which was started in rehab. Presenting history: 51-year-old male with minimal medical history who presented with a 2-month history of fatigue and lightheadedness, orthostasis. The symptoms worsened over the last 2 days and so he measured his temperature which was 101.7 at home. Without treatment, his temperature improved to 99.5 in the ER. He reported that he attended a corn hole tournament with several 100 people 1 week prior. reports that he has quit smoking. His smoking use included cigarettes. He started smoking about 30 years ago. He has a 25.00 pack-year smoking history. He has never used smokeless tobacco.     Review of Systems:    Physical Exam:  Blood pressure 132/74, pulse 65, temperature 97.2 °F (36.2 °C), temperature source Temporal, resp. rate 18, weight 198 lb (89.8 kg), SpO2 94 %. Constitutional:  No acute distress. HENT:  Oropharynx is clear and moist.   Neck: No tracheal deviation present. Cardiovascular: Normal heart sounds. + lower extremity edema. Pulmonary/Chest: No wheezes. No rhonchi. No rales. No decreased breath sounds. No accessory muscle usage or stridor. Musculoskeletal: No cyanosis. No clubbing. Skin: Skin is warm and dry. Psychiatric: Normal mood and affect.   Neurologic: speech fluent, alert and oriented, strength symmetric      Data:   Echo 7/15/20   EF 81%  Normal diastolic function    9/35/58 CHEST CT  Pulmonary Arteries: Pulmonary

## 2021-06-30 ENCOUNTER — HOSPITAL ENCOUNTER (OUTPATIENT)
Dept: NON INVASIVE DIAGNOSTICS | Age: 86
Discharge: HOME | End: 2021-06-30
Payer: MEDICARE

## 2021-06-30 LAB
LV EF: 55 %
LVEF MODALITY: NORMAL

## 2021-06-30 PROCEDURE — 93306 TTE W/DOPPLER COMPLETE: CPT

## 2021-06-30 NOTE — TELEPHONE ENCOUNTER
Tell pt his ECHO looks pretty good: heart squeeze is normal, valves look okay, there is mild stiffening of the heart but this is very common with age > 72. The changes are mild and I don't think the culprit in shortness of breath.     Plan CT CHEST no IV dye, dx covid pneumonia, shortness of breath -- see me for results

## 2021-07-06 ENCOUNTER — HOSPITAL ENCOUNTER (OUTPATIENT)
Dept: CT IMAGING | Age: 86
Discharge: HOME | End: 2021-07-06
Payer: MEDICARE

## 2021-07-06 DIAGNOSIS — J12.82 PNEUMONIA DUE TO COVID-19 VIRUS: ICD-10-CM

## 2021-07-06 DIAGNOSIS — U07.1 PNEUMONIA DUE TO COVID-19 VIRUS: ICD-10-CM

## 2021-07-06 DIAGNOSIS — R06.02 SHORTNESS OF BREATH: ICD-10-CM

## 2021-07-06 PROCEDURE — 71250 CT THORAX DX C-: CPT

## 2021-07-07 ENCOUNTER — OFFICE VISIT (OUTPATIENT)
Dept: PULMONOLOGY | Age: 86
End: 2021-07-07
Payer: MEDICARE

## 2021-07-07 VITALS
DIASTOLIC BLOOD PRESSURE: 70 MMHG | BODY MASS INDEX: 30.86 KG/M2 | OXYGEN SATURATION: 98 % | HEIGHT: 67 IN | WEIGHT: 196.6 LBS | HEART RATE: 68 BPM | TEMPERATURE: 95.5 F | SYSTOLIC BLOOD PRESSURE: 115 MMHG

## 2021-07-07 DIAGNOSIS — R93.89 ABNORMAL CT OF THE CHEST: Primary | ICD-10-CM

## 2021-07-07 PROCEDURE — 99213 OFFICE O/P EST LOW 20 MIN: CPT | Performed by: INTERNAL MEDICINE

## 2021-07-07 PROCEDURE — 1036F TOBACCO NON-USER: CPT | Performed by: INTERNAL MEDICINE

## 2021-07-07 PROCEDURE — 1123F ACP DISCUSS/DSCN MKR DOCD: CPT | Performed by: INTERNAL MEDICINE

## 2021-07-07 PROCEDURE — G8417 CALC BMI ABV UP PARAM F/U: HCPCS | Performed by: INTERNAL MEDICINE

## 2021-07-07 PROCEDURE — 4040F PNEUMOC VAC/ADMIN/RCVD: CPT | Performed by: INTERNAL MEDICINE

## 2021-07-07 PROCEDURE — G8428 CUR MEDS NOT DOCUMENT: HCPCS | Performed by: INTERNAL MEDICINE

## 2021-07-07 NOTE — LETTER
[facility] Pulmonary, Critical Care, and Sleep  [address] [id number]  Phone: [phone]  Fax: [phone]           Мария Henley MD      July 7, 2021     Patient: Matt Dsouza   MR Number: <[id number]>   YOB: 1935   Date of Visit: 7/7/2021       Dear Dr. Rashid Eneida:     Wilian Fallow has had persistent shortness of breath after COVID pneumonia. He had a f/u CT chest for me which shows inflammatory changes consistent with COVID pneumonia. I suspect the underlying pattern of lung injury is permanent given its persistence. At this point I do not recommend any further treatment. I specifically recommend against prednisone as it is unlikely to benefit and may cause harm. Of note, his ECHO was relatively reassuring. I recommend he slowly increase his level of activity as he tolerates and attempts to return to activities he enjoys such as bowling and corn hole. He should contact me if he experiences any new or concerning symptoms. If you have questions, please do not hesitate to call me.      Sincerely,        Мария Henley MD    CC providers:    No Recipients

## 2021-07-07 NOTE — PROGRESS NOTES
Pulmonary Follow-up Note  Chief Complaint: COVID  Referring Provider: hospital f/u    Interval History 3:   Still with more shortness of breath than prior to COVID but overall returning to prior level of functioning, I encouraged back to bowling/cornhole etc... Interval History #2: after home from rehab  he has not had further improvement and feels his shortness of breath is a little worsened. He notes he has new lower extremity edema, never prior to COVID. SOB with 1 flight of stairs, can only tolerate his exercise for about 5 minutes, then runs out of steam and endorses shortness of breath. Interval history: doing well, back home from rehab, has weaned oxygen to off. Is still on 20 meQ KCl, which was started in rehab. Presenting history: 79-year-old male with minimal medical history who presented with a 2-month history of fatigue and lightheadedness, orthostasis. The symptoms worsened over the last 2 days and so he measured his temperature which was 101.7 at home. Without treatment, his temperature improved to 99.5 in the ER. He reported that he attended a corn hole tournament with several 100 people 1 week prior. reports that he has quit smoking. His smoking use included cigarettes. He started smoking about 30 years ago. He has a 25.00 pack-year smoking history. He has never used smokeless tobacco.     Review of Systems:    Physical Exam:  Blood pressure 115/70, pulse 68, temperature 95.5 °F (35.3 °C), height 5' 7\" (1.702 m), weight 196 lb 9.6 oz (89.2 kg), SpO2 98 %. Constitutional:  No acute distress. HENT:  Oropharynx is clear and moist.   Neck: No tracheal deviation present. Cardiovascular: Normal heart sounds. + lower extremity edema. Pulmonary/Chest: No wheezes. No rhonchi. No rales. No decreased breath sounds. No accessory muscle usage or stridor. Musculoskeletal: No cyanosis. No clubbing. Skin: Skin is warm and dry. Psychiatric: Normal mood and affect.   Neurologic: speech fluent, alert and oriented, strength symmetric      Data:   Echo 7/15/20   EF 86%  Normal diastolic function    ECHO 6/30/21  EF 55%. No regional wall motion abnormalities. Grade I diastolic dysfunction with normal left ventricular filling pressure. SPAP 26   Aortic valve sclerosis but without stenosis and regurgitation. 6/25/20 CHEST CT  Pulmonary Arteries: Pulmonary arteries are adequately opacified for    evaluation.  No intraluminal filling defect to suggest pulmonary embolism. Main pulmonary artery is normal in caliber.         Mediastinum: Heart size at the upper limit of normal.  Coronary artery    calcification.  No pericardial effusion.  Thoracic aorta is normal caliber. No lymphadenopathy.  Thyroid and esophagus are unremarkable.         Lungs/pleura: Multifocal areas of consolidation throughout both lungs. Minimal right and trace left pleural effusions with adjacent atelectasis.         Upper Abdomen: No acute abnormality.         Soft Tissues/Bones: Flowing ossification anteriorly along the spine suggests    DISH.              Impression    1. No pulmonary embolism. 2. Multifocal pneumonia scattered throughout both lungs with minimal pleural    effusions. CT CHEST 7/6/21\  Mediastinum: The thyroid is stable and unremarkable.  There is no pathologic   lymphadenopathy within the chest or mediastinum.  There is atherosclerotic   disease of the thoracic aorta, without evidence of aneurysm.  There is   coronary atherosclerosis.  No pericardial abnormality is identified.       Lungs/pleura:  The central airways are patent. [place] is chronic nonspecific   elevation of the right hemidiaphragm with mild compressive atelectasis within   the right middle and right lower lobes.  There is mild parenchymal scarring   within the right middle lobe.  There are somewhat nonspecific curvilinear and   ground-glass opacities throughout both lungs, which could represent mild   multifocal atelectasis or possibly developing multifocal atypical pneumonia. No localized focus of dense airspace consolidation is identified. Yaquelin Collar is   no evidence of a pneumothorax or pleural effusion.  No suspicious pulmonary   nodule or mass is identified.       Upper Abdomen: The adrenal glands are unremarkable bilaterally. Yaquelin Collar is an   exophytic cyst off of the left kidney.  The remainder of the upper abdomen is   unremarkable.       Soft Tissues/Bones: There is diffuse idiopathic skeletal hyperostosis.  No   osteolytic or osteoblastic lesion is seen.           Impression   1. Mild curvilinear and ground-glass opacities throughout both lungs could   represent either atelectasis or possibly early developing multifocal   pneumonia, to include atypical viral causes. 2. Stable chronic elevation of the right hemidiaphragm with associated mild   compressive atelectasis within the right middle and right lower lobes. 6/29/20 CXR multifocal airspace disease similar to CT imaging  8/28/20 CXR is clear         ASSESSMENT:  · COVID-19 pneumonia. June 2020.   Incomplete resolution on CT imaging/abnormal CT chest   · Shortness of breath - cardiac v pulmonary v deconditioning   · H/O Shingles  · Frequent PVCs      PLAN:  · See me PRN

## 2022-04-19 ENCOUNTER — HOSPITAL ENCOUNTER (OUTPATIENT)
Dept: VASCULAR LAB | Age: 87
Discharge: HOME | End: 2022-04-19
Payer: MEDICARE

## 2022-04-19 DIAGNOSIS — I65.23 BILATERAL CAROTID ARTERY STENOSIS: ICD-10-CM

## 2022-04-19 PROCEDURE — 93880 EXTRACRANIAL BILAT STUDY: CPT

## 2023-01-19 NOTE — PLAN OF CARE
Female Pregnancy Counseling Text: Female patients should also be on two forms of birth control while taking this medication and for one month after their last dose. Problem: SAFETY  Goal: Free from accidental physical injury  6/27/2020 1003 by Juan Pierce RN  Outcome: Ongoing     Problem: KNOWLEDGE DEFICIT  Goal: Patient/S.O. demonstrates understanding of disease process, treatment plan, medications, and discharge instructions.   6/27/2020 1003 by Juan Pierce RN  Outcome: Ongoing     Problem: Airway Clearance - Ineffective:  Goal: Clear lung sounds  Description: Clear lung sounds  6/27/2020 1003 by Juan Pierce RN  Outcome: Ongoing     Problem: Airway Clearance - Ineffective:  Goal: Ability to maintain a clear airway will improve  Description: Ability to maintain a clear airway will improve  6/27/2020 1003 by Juan Pierce RN  Outcome: Ongoing     Problem: Gas Exchange - Impaired:  Goal: Levels of oxygenation will improve  Description: Levels of oxygenation will improve  6/27/2020 1003 by Juan Pierce RN  Outcome: Ongoing     Problem: Hyperthermia:  Goal: Ability to maintain a body temperature in the normal range will improve  Description: Ability to maintain a body temperature in the normal range will improve  6/27/2020 1003 by Juan Pierce RN  Outcome: Ongoing     Problem: Airway Clearance - Ineffective  Goal: Achieve or maintain patent airway  6/27/2020 1003 by Juan Pierce RN  Outcome: Ongoing     Problem: Gas Exchange - Impaired  Goal: Absence of hypoxia  6/27/2020 1003 by Juan Pierce RN  Outcome: Ongoing What Is The Patient's Gender: Female Headache Monitoring: I recommended monitoring the headaches for now. There is no evidence of increased intracranial pressure. They were instructed to call if the headaches are worsening. Retinoid Dermatitis Aggressive Treatment: I recommended more frequent application of Cetaphil or CeraVe to the areas of dermatitis. I also prescribed a topical steroid for twice daily use until the dermatitis resolves. Most Recent Ast (Optional): WNL Is Cheilitis Present?: No Myalgia Monitoring: I explained this is common when taking isotretinoin. If this worsens they will contact us. Use Therapeutic Ranged Or Therapeutic Target: please select Range or Target Dosing Month 4 (Required For Cumulative Dosing): 30mg BID Pounds Preamble Statement (Weight Entered In Details Tab): Reported Weight in pounds: Lower Range (In Mg/Kg): 106 Jazmyne Howell Female Completion Statement: After discussing her treatment course we decided to discontinue isotretinoin therapy at this time. I explained that she would need to continue her birth control methods for at least one month after the last dosage. She should also get a pregnancy test one month after the last dose. She shouldn't donate blood for one month after the last dose. She should call with any new symptoms of depression. Kilograms Preamble Statement (Weight Entered In Details Tab): Reported Weight in kilograms: Hypercholesterolemia Monitoring: I explained this is common when taking isotretinoin. We will monitor closely. Nosebleeds Normal Treatment: I explained this is common when taking isotretinoin. I recommended saline mist in each nostril multiple times a day. If this worsens they will contact us. Male Completion Statement: After discussing his treatment course we decided to discontinue isotretinoin therapy at this time. He shouldn't donate blood for one month after the last dose. He should call with any new symptoms of depression. Dosing Month 5 (Required For Cumulative Dosing): 40mg BID Myalgia Treatment: I explained this is common when taking isotretinoin. If this worsens they will contact us. They may try OTC ibuprofen. Are Labs Available For Review?: Yes Cheilitis Normal Treatment: I recommended application of Vaseline or Aquaphor numerous times a day (as often as every hour) and before going to bed. Upper Range (In Mg/Kg): [address] Cheilitis Aggressive Treatment: I recommended application of Vaseline or Aquaphor numerous times a day (as often as every hour) and before going to bed. I also prescribed a topical steroid for twice daily use. Ipledge Number (Optional): [id number] Xerosis Normal Treatment: I recommended application of Cetaphil or CeraVe numerous times a day and before going to bed to all dry areas. Weight Units: pounds Target Cumulative Dosage (In Mg/Kg): [address] Retinoid Dermatitis Normal Treatment: I recommended more frequent application of Cetaphil or CeraVe to the areas of dermatitis. Next Month's Dosage: Continue Current Dosage Xerosis Aggressive Treatment: I recommended application of Cetaphil or CeraVe numerous times a day and before going to bed to all dry areas. I also prescribed a topical steroid for twice daily use. Months Of Therapy Completed: 7 Comments: PATIENT WAS NOT ABLE TO GET HER MEDICATION IN DECEMBER DUE TO IPLEDGE BEING DOWN Detail Level: Zone Most Recent Beta Hcg (Optional): negative Xerosis Normal Treatment: I recommended application of Cetaphil or CeraVe numerous times a day going to bed to all dry areas. Counseling Text: I reviewed the side effect in detail. Patient should get monthly blood tests, not donate blood, not drive at night if vision affected, and not share medication. Xerosis Aggressive Treatment: I recommended application of Cetaphil or CeraVe numerous times a day going to bed to all dry areas. I also prescribed a topical steroid for twice daily use.

## 2025-04-18 NOTE — PROGRESS NOTES
Stef Quintero    Age 89 y.o.    male    1935    MRN [id number]    5/9/2025  Arrival Time_____________  OR Time____________43 Min     Procedure(s):  CYSTOSCOPY, BILATERAL RETROGRADE PYELOGRAM                      Monitor Anesthesia Care   Surgeon(s):  Oliver David, MD      DAY ADMIT ___  SDS/OP ___  OUTPT IN BED ___        Phone [phone] (home)                  PCP _____________________ Phone_________________ Epic ( ) Epic CE ( ) Appt ________    NOTES: _________________________________________ Consult/Cardio _______________    ____________________________________________________________________________    ____________________________________________________________________________  PAT APPT DATE:________ TIME: ________  FAXED QAD: _______  (__) H&P w/ Hospitalist    (__) PAT orders in EPIC    (__) Meet with PAT nurse  __________________________________________________________________________  Preop Nurse phone screen complete: _____________  (__) CBC     (__) W/ DIFF ___________  (__) CT CHEST  __________   (__) Hgb A1C    ___________  (__) CHEST X RAY   __________  (__) LIPID PROFILE  ___________  (__) EKG   __________  (__) PT-INR / APTT  ___________  (__) PFT's   __________  (__) BMP   ___________  (__) CAROTIDS  __________  (__) CMP   ___________  (__) VEIN MAPPING  __________  (__) U/A   ___________  ( X ) HISTORY & PHYSICAL __________  (__) URINE C & S  ___________  (__) CARDIAC CLEARANCE __________  (__) U/A W/ FLEX  ___________  (__) PULM. CLEARANCE __________  (__) SERUM PREGNANCY ___________  (__) Preop Orders in EPIC __________  (__) TYPE & SCREEN __________repeat ( ) (__)  __________________ __________  (__) Albumin   ___________  (__)  __________________ __________  (__) TRANSFERRIN  ___________  (__)  __________________ __________  (__) LIVER PROFILE  ___________  (__) URINE PREG DOS __________  (__) MRSA NASAL SWAB ___________  (__) BLOOD SUGAR DOS __________  (__) SED

## 2025-05-02 RX ORDER — LEVOTHYROXINE SODIUM 50 UG/1
50 TABLET ORAL DAILY
COMMUNITY

## 2025-05-02 RX ORDER — AMLODIPINE BESYLATE 2.5 MG/1
2.5 TABLET ORAL DAILY
COMMUNITY

## 2025-05-02 NOTE — PROGRESS NOTES
Surgery Date and Time:  5/9/25 @ 03:30 pm    Arrival Time:  01:30 pm       The instructions given when a patient needs to stop oral intake prior to surgery varies. Follow the instructions you were     given by your Surgeon or RN during the Pre-op call.      __X__Do not eat anything after Midnight the night before the surgery.  Clear liquids day of surgery up until 07:30 am.    NO gum, mints, candy or ice chips the day of surgery.       Only take the following medications with a small sip of water the morning of surgery:  amlodipine, levothyroxine        Aspirin, Ibuprofen, Advil, Naproxen, Vitamin E and other Anti-inflammatory products and supplements should be stopped       for 5-7days before surgery or as directed by your physician/surgeon.     - Do not smoke or vape, and do not drink any alcoholic beverages 24 hours prior to surgery, this includes NA Beer. Refrain from using     any recreational drugs, including non-prescribed prescription drugs.     -You may brush your teeth and gargle the morning of surgery.  DO NOT SWALLOW WATER.    -You MUST plan for a responsible adult to stay on site while you are here and take you home after your surgery. You will not be allowed                to leave alone or drive yourself home. It is requested someone stay with you the first 24 hrs. Your surgery will be cancelled if you do not                have a ride home with a responsible adult.    -A parent/legal guardian must accompany a child scheduled for surgery and plan to stay at the hospital until the child                is discharged. Please do not bring other children with you.    -Please wear simple, loose-fitting clothing to the hospital. Do not bring valuables (money, credit cards, checkbooks, etc.)                Do not wear any makeup (including no eye makeup) and no nail polish if applicable or requested to remove.             - DO NOT wear any jewelry or body piercings day of surgery.  All body piercing

## 2025-05-08 ENCOUNTER — ANESTHESIA EVENT (OUTPATIENT)
Dept: OPERATING ROOM | Age: 89
End: 2025-05-08
Payer: MEDICARE

## 2025-05-09 ENCOUNTER — APPOINTMENT (OUTPATIENT)
Dept: GENERAL RADIOLOGY | Age: 89
End: 2025-05-09
Attending: UROLOGY
Payer: MEDICARE

## 2025-05-09 ENCOUNTER — HOSPITAL ENCOUNTER (OUTPATIENT)
Age: 89
Setting detail: OUTPATIENT SURGERY
Discharge: HOME | End: 2025-05-09
Attending: UROLOGY | Admitting: UROLOGY
Payer: MEDICARE

## 2025-05-09 ENCOUNTER — ANESTHESIA (OUTPATIENT)
Dept: OPERATING ROOM | Age: 89
End: 2025-05-09
Payer: MEDICARE

## 2025-05-09 VITALS
HEART RATE: 65 BPM | SYSTOLIC BLOOD PRESSURE: 130 MMHG | OXYGEN SATURATION: 95 % | HEIGHT: 67 IN | WEIGHT: 190 LBS | BODY MASS INDEX: 29.82 KG/M2 | RESPIRATION RATE: 18 BRPM | DIASTOLIC BLOOD PRESSURE: 73 MMHG | TEMPERATURE: 97.6 F

## 2025-05-09 DIAGNOSIS — R31.9 HEMATURIA, UNDIAGNOSED CAUSE: ICD-10-CM

## 2025-05-09 LAB
EKG ATRIAL RATE: 72 BPM
EKG DIAGNOSIS: NORMAL
EKG P AXIS: 44 DEGREES
EKG P-R INTERVAL: 190 MS
EKG Q-T INTERVAL: 388 MS
EKG QRS DURATION: 112 MS
EKG QTC CALCULATION (BAZETT): 424 MS
EKG R AXIS: -40 DEGREES
EKG T AXIS: 75 DEGREES
EKG VENTRICULAR RATE: 72 BPM

## 2025-05-09 PROCEDURE — 7100000000 HC PACU RECOVERY - FIRST 15 MIN: Performed by: UROLOGY

## 2025-05-09 PROCEDURE — 7100000001 HC PACU RECOVERY - ADDTL 15 MIN: Performed by: UROLOGY

## 2025-05-09 PROCEDURE — 2580000003 HC RX 258: Performed by: ANESTHESIOLOGY

## 2025-05-09 PROCEDURE — 2709999900 HC NON-CHARGEABLE SUPPLY: Performed by: UROLOGY

## 2025-05-09 PROCEDURE — 93005 ELECTROCARDIOGRAM TRACING: CPT | Performed by: ANESTHESIOLOGY

## 2025-05-09 PROCEDURE — 7100000010 HC PHASE II RECOVERY - FIRST 15 MIN: Performed by: UROLOGY

## 2025-05-09 PROCEDURE — 6360000002 HC RX W HCPCS: Performed by: NURSE ANESTHETIST, CERTIFIED REGISTERED

## 2025-05-09 PROCEDURE — 7100000011 HC PHASE II RECOVERY - ADDTL 15 MIN: Performed by: UROLOGY

## 2025-05-09 PROCEDURE — 3700000001 HC ADD 15 MINUTES (ANESTHESIA): Performed by: UROLOGY

## 2025-05-09 PROCEDURE — 3600000014 HC SURGERY LEVEL 4 ADDTL 15MIN: Performed by: UROLOGY

## 2025-05-09 PROCEDURE — 2500000003 HC RX 250 WO HCPCS: Performed by: UROLOGY

## 2025-05-09 PROCEDURE — C1769 GUIDE WIRE: HCPCS | Performed by: UROLOGY

## 2025-05-09 PROCEDURE — C2617 STENT, NON-COR, TEM W/O DEL: HCPCS | Performed by: UROLOGY

## 2025-05-09 PROCEDURE — 6360000004 HC RX CONTRAST MEDICATION: Performed by: UROLOGY

## 2025-05-09 PROCEDURE — 6360000002 HC RX W HCPCS: Performed by: UROLOGY

## 2025-05-09 PROCEDURE — 3700000000 HC ANESTHESIA ATTENDED CARE: Performed by: UROLOGY

## 2025-05-09 PROCEDURE — 74018 RADEX ABDOMEN 1 VIEW: CPT

## 2025-05-09 PROCEDURE — 3600000004 HC SURGERY LEVEL 4 BASE: Performed by: UROLOGY

## 2025-05-09 PROCEDURE — 88305 TISSUE EXAM BY PATHOLOGIST: CPT

## 2025-05-09 DEVICE — URETERAL STENT
Type: IMPLANTABLE DEVICE | Site: URETER | Status: FUNCTIONAL
Brand: PERCUFLEX™ PLUS

## 2025-05-09 RX ORDER — CEFDINIR 300 MG/1
300 CAPSULE ORAL 2 TIMES DAILY
Qty: 20 CAPSULE | Refills: 0 | Status: SHIPPED | OUTPATIENT
Start: 2025-05-09 | End: 2025-05-19

## 2025-05-09 RX ORDER — SODIUM CHLORIDE 0.9 % (FLUSH) 0.9 %
5-40 SYRINGE (ML) INJECTION EVERY 12 HOURS SCHEDULED
Status: DISCONTINUED | OUTPATIENT
Start: 2025-05-09 | End: 2025-05-09 | Stop reason: HOSPADM

## 2025-05-09 RX ORDER — LABETALOL HYDROCHLORIDE 5 MG/ML
5 INJECTION, SOLUTION INTRAVENOUS EVERY 10 MIN PRN
Status: DISCONTINUED | OUTPATIENT
Start: 2025-05-09 | End: 2025-05-09 | Stop reason: HOSPADM

## 2025-05-09 RX ORDER — FENTANYL CITRATE 50 UG/ML
INJECTION, SOLUTION INTRAMUSCULAR; INTRAVENOUS
Status: DISCONTINUED | OUTPATIENT
Start: 2025-05-09 | End: 2025-05-09 | Stop reason: SDUPTHER

## 2025-05-09 RX ORDER — ONDANSETRON 2 MG/ML
4 INJECTION INTRAMUSCULAR; INTRAVENOUS
Status: DISCONTINUED | OUTPATIENT
Start: 2025-05-09 | End: 2025-05-09 | Stop reason: HOSPADM

## 2025-05-09 RX ORDER — DEXAMETHASONE SODIUM PHOSPHATE 4 MG/ML
INJECTION, SOLUTION INTRA-ARTICULAR; INTRALESIONAL; INTRAMUSCULAR; INTRAVENOUS; SOFT TISSUE
Status: DISCONTINUED | OUTPATIENT
Start: 2025-05-09 | End: 2025-05-09 | Stop reason: SDUPTHER

## 2025-05-09 RX ORDER — LEVOFLOXACIN 5 MG/ML
500 INJECTION, SOLUTION INTRAVENOUS EVERY 24 HOURS
Status: DISCONTINUED | OUTPATIENT
Start: 2025-05-09 | End: 2025-05-09 | Stop reason: HOSPADM

## 2025-05-09 RX ORDER — ONDANSETRON 2 MG/ML
INJECTION INTRAMUSCULAR; INTRAVENOUS
Status: DISCONTINUED | OUTPATIENT
Start: 2025-05-09 | End: 2025-05-09 | Stop reason: SDUPTHER

## 2025-05-09 RX ORDER — SODIUM CHLORIDE 9 MG/ML
INJECTION, SOLUTION INTRAVENOUS PRN
Status: DISCONTINUED | OUTPATIENT
Start: 2025-05-09 | End: 2025-05-09 | Stop reason: HOSPADM

## 2025-05-09 RX ORDER — LIDOCAINE HYDROCHLORIDE 20 MG/ML
INJECTION, SOLUTION INFILTRATION; PERINEURAL
Status: DISCONTINUED | OUTPATIENT
Start: 2025-05-09 | End: 2025-05-09 | Stop reason: SDUPTHER

## 2025-05-09 RX ORDER — SODIUM CHLORIDE 0.9 % (FLUSH) 0.9 %
5-40 SYRINGE (ML) INJECTION PRN
Status: DISCONTINUED | OUTPATIENT
Start: 2025-05-09 | End: 2025-05-09 | Stop reason: HOSPADM

## 2025-05-09 RX ORDER — PROPOFOL 10 MG/ML
INJECTION, EMULSION INTRAVENOUS
Status: DISCONTINUED | OUTPATIENT
Start: 2025-05-09 | End: 2025-05-09 | Stop reason: SDUPTHER

## 2025-05-09 RX ORDER — DIPHENHYDRAMINE HYDROCHLORIDE 50 MG/ML
12.5 INJECTION, SOLUTION INTRAMUSCULAR; INTRAVENOUS
Status: DISCONTINUED | OUTPATIENT
Start: 2025-05-09 | End: 2025-05-09 | Stop reason: HOSPADM

## 2025-05-09 RX ORDER — OXYCODONE HYDROCHLORIDE 5 MG/1
10 TABLET ORAL PRN
Status: DISCONTINUED | OUTPATIENT
Start: 2025-05-09 | End: 2025-05-09 | Stop reason: HOSPADM

## 2025-05-09 RX ORDER — NALOXONE HYDROCHLORIDE 0.4 MG/ML
INJECTION, SOLUTION INTRAMUSCULAR; INTRAVENOUS; SUBCUTANEOUS PRN
Status: DISCONTINUED | OUTPATIENT
Start: 2025-05-09 | End: 2025-05-09 | Stop reason: HOSPADM

## 2025-05-09 RX ORDER — MEPERIDINE HYDROCHLORIDE 50 MG/ML
12.5 INJECTION INTRAMUSCULAR; INTRAVENOUS; SUBCUTANEOUS EVERY 5 MIN PRN
Status: DISCONTINUED | OUTPATIENT
Start: 2025-05-09 | End: 2025-05-09 | Stop reason: HOSPADM

## 2025-05-09 RX ORDER — OXYCODONE HYDROCHLORIDE 5 MG/1
5 TABLET ORAL PRN
Status: DISCONTINUED | OUTPATIENT
Start: 2025-05-09 | End: 2025-05-09 | Stop reason: HOSPADM

## 2025-05-09 RX ORDER — LIDOCAINE HYDROCHLORIDE 10 MG/ML
1 INJECTION, SOLUTION EPIDURAL; INFILTRATION; INTRACAUDAL; PERINEURAL
Status: DISCONTINUED | OUTPATIENT
Start: 2025-05-09 | End: 2025-05-09 | Stop reason: HOSPADM

## 2025-05-09 RX ORDER — MIDAZOLAM HYDROCHLORIDE 1 MG/ML
2 INJECTION, SOLUTION INTRAMUSCULAR; INTRAVENOUS
Status: DISCONTINUED | OUTPATIENT
Start: 2025-05-09 | End: 2025-05-09 | Stop reason: HOSPADM

## 2025-05-09 RX ORDER — SODIUM CHLORIDE, SODIUM LACTATE, POTASSIUM CHLORIDE, CALCIUM CHLORIDE 600; 310; 30; 20 MG/100ML; MG/100ML; MG/100ML; MG/100ML
INJECTION, SOLUTION INTRAVENOUS CONTINUOUS
Status: DISCONTINUED | OUTPATIENT
Start: 2025-05-09 | End: 2025-05-09 | Stop reason: HOSPADM

## 2025-05-09 RX ADMIN — LEVOFLOXACIN 500 MG: 5 INJECTION, SOLUTION INTRAVENOUS at 15:57

## 2025-05-09 RX ADMIN — LIDOCAINE HYDROCHLORIDE 100 MG: 20 INJECTION, SOLUTION INFILTRATION; PERINEURAL at 16:03

## 2025-05-09 RX ADMIN — FENTANYL CITRATE 50 MCG: 50 INJECTION, SOLUTION INTRAMUSCULAR; INTRAVENOUS at 16:07

## 2025-05-09 RX ADMIN — SODIUM CHLORIDE: 9 INJECTION, SOLUTION INTRAVENOUS at 15:57

## 2025-05-09 RX ADMIN — ONDANSETRON 4 MG: 2 INJECTION INTRAMUSCULAR; INTRAVENOUS at 16:07

## 2025-05-09 RX ADMIN — PROPOFOL 120 MG: 10 INJECTION, EMULSION INTRAVENOUS at 16:03

## 2025-05-09 RX ADMIN — DEXAMETHASONE SODIUM PHOSPHATE 8 MG: 4 INJECTION, SOLUTION INTRAMUSCULAR; INTRAVENOUS at 16:07

## 2025-05-09 ASSESSMENT — PAIN - FUNCTIONAL ASSESSMENT
PAIN_FUNCTIONAL_ASSESSMENT: 0-10
PAIN_FUNCTIONAL_ASSESSMENT: NONE - DENIES PAIN

## 2025-05-09 NOTE — ANESTHESIA POSTPROCEDURE EVALUATION
Department of Anesthesiology  Postprocedure Note    Patient: Stef Quintero  MRN: [id number]  YOB: 1935  Date of evaluation: 5/9/2025    Procedure Summary       Date: 05/09/25 Room / Location: [address]    Anesthesia Start: 1557 Anesthesia Stop: 1641    Procedure: CYSTOSCOPY, BILATERAL RETROGRADE PYELOGRAM, LEFT STENT INSERTION, BLADDER BIOPSY WITH FULGURATION (Bilateral) Diagnosis:       Hematuria, undiagnosed cause      (Hematuria, undiagnosed cause [R31.9])    Surgeons: Oliver David MD Responsible Provider: Jag Elise MD    Anesthesia Type: MAC ASA Status: 3            Anesthesia Type: No value filed.    Racquel Phase I: Racquel Score: 10    Racquel Phase II: Racquel Score: 10    Anesthesia Post Evaluation    Patient location during evaluation: PACU  Patient participation: complete - patient participated  Level of consciousness: awake and alert  Airway patency: patent  Nausea & Vomiting: no nausea and no vomiting  Cardiovascular status: blood pressure returned to baseline  Respiratory status: acceptable  Hydration status: euvolemic  Comments: VSS on transfer to phase 2 recovery.  No anesthetic complications.  Pain management: adequate    No notable events documented.

## 2025-05-09 NOTE — ANESTHESIA PRE PROCEDURE
Department of Anesthesiology  Preprocedure Note       Name:  Stef Quintero   Age:  89 y.o.  :  1935                                          MRN:  [id number]         Date:  2025      Surgeon: Surgeon(s):  Oliver David MD    Procedure: Procedure(s):  CYSTOSCOPY, BILATERAL RETROGRADE PYELOGRAM    Medications prior to admission:   Prior to Admission medications    Medication Sig Start Date End Date Taking? Authorizing Provider   levothyroxine (SYNTHROID) 50 MCG tablet Take 1 tablet by mouth Daily   Yes Mary Llanos MD   amLODIPine (NORVASC) 2.5 MG tablet Take 1 tablet by mouth daily   Yes Mary Llanos MD   Ferrous Sulfate Dried (FERROUS SULFATE CR PO) Take 1 tablet by mouth daily   Yes Mary Llanos MD   clorazepate (TRANXENE) 3.75 MG tablet Take 1 tablet by mouth 2 times daily as needed for Anxiety. 20  Yes Mary Llanos MD   vitamin B-12 (CYANOCOBALAMIN) 1000 MCG tablet Take 1 tablet by mouth daily   Yes Mary Llanos MD   rosuvastatin (CRESTOR) 10 MG tablet Take 1 tablet by mouth daily   Yes ProviderMary MD   Omega 3 1000 MG CAPS Take 1 capsule by mouth daily   Yes Mary Llanos MD   aspirin 81 MG tablet Take 1 tablet by mouth daily   Yes Mary Llanos MD       Current medications:    Current Facility-Administered Medications   Medication Dose Route Frequency Provider Last Rate Last Admin    levoFLOXacin (LEVAQUIN) 500 MG/100ML infusion 500 mg  500 mg IntraVENous Q24H Oliver David MD        lidocaine PF 1 % injection 1 mL  1 mL IntraDERmal Once PRN Kavin Pearson MD        lactated ringers infusion   IntraVENous Continuous Kavin Pearson MD        sodium chloride flush 0.9 % injection 5-40 mL  5-40 mL IntraVENous 2 times per day Kavin Pearson MD        sodium chloride flush 0.9 % injection 5-40 mL  5-40 mL IntraVENous PRN Kavin Pearson MD        0.9 % sodium chloride infusion   IntraVENous PRN Michel

## 2025-05-09 NOTE — PROGRESS NOTES
Patient admitted to [facility] in preparation for surgery, VSS. Consent confirmed. IV inserted into right hand, NS infusing. Belongings in locker 6. NPO since midnight. Family at bedside, phone number in system for text updates, call light within reach.

## 2025-05-09 NOTE — H&P
Urology Attending Admission Note      Reason for Admission: Gross hematuria    History: 89 year old male with gross hematuria here for evaluation    Meds: see med rec  Family History, Social History, Review of Systems:  Reviewed and agreed to as per chart    Exam:    Vitals:  Ht 1.727 m (5' 8\")   Wt 88 kg (194 lb)   BMI 29.50 kg/m²   No data recorded  No intake or output data in the 24 hours ending 05/09/25 1320    Physical:   Well developed, well nourished in no acute distress  Mood indicates no abnormalities. Pt doesn’t appear depressed  Orientated to time and place  Neck is supple, trachea is midline  Respiratory effort is normal  Cardiovascular show no extremity swelling  Abdomen no masses or hernias are palpated, there is no tenderness. Liver and Spleen appear normal.  Skin show no abnormal lesions  Lymph nodes are not palpated in the inguinal, neck, or axillary area.    Labs:  WBC:    Lab Results   Component Value Date/Time    WBC 11.0 07/20/2020 06:15 AM     Hemoglobin/Hematocrit:    Lab Results   Component Value Date/Time    HGB 12.1 07/20/2020 06:15 AM    HCT 37.1 07/20/2020 06:15 AM     BMP:    Lab Results   Component Value Date/Time     07/20/2020 06:15 AM    K 3.6 07/20/2020 06:15 AM    K 4.2 06/26/2020 06:09 AM    CL 98 07/20/2020 06:15 AM    CO2 33 07/20/2020 06:15 AM    BUN 25 07/20/2020 06:15 AM    CREATININE 0.7 07/20/2020 06:15 AM    CALCIUM 8.4 07/20/2020 06:15 AM    GFRAA >60 07/20/2020 06:15 AM    LABGLOM >60 07/20/2020 06:15 AM     PT/INR:    Lab Results   Component Value Date/Time    PROTIME 13.2 07/19/2020 05:30 AM    INR 1.14 07/19/2020 05:30 AM     PTT:    Lab Results   Component Value Date/Time    APTT 84.8 07/16/2020 05:30 AM   [APTT    Impression/Plan:     Proceed with cystoscopy and retrograde pyelograms      NELSY ARVIZU MD

## 2025-05-14 NOTE — OP NOTE
FISH. Once the bladder was refilled, cystoscopic examination was performed. The ureteral orifices were normal in size, position caliber, with clear efflux from both. 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 The right sided tumor was biopsied and cauterized. Due to the proximity of the tumor to the left ureteral orifice, a guidewire was placed up the left ureter. Once this was done, the tumor next to the left ureteral orifice was biopsied and cauterized. Given its close proximity to the left ureteral orifice and the change for scarring, it was elected to place a left sided stent. A left sided retrograde pyelogram demonstrated narrowing of the mid-ureter, but did not demonstrate any hydronephrosis. A 4.8x28 JJ was placed over the existing left sided wire. A right retrograde pyelogram was normal. At this point, the bladder was drained, the cystoscope was removed, and the patient was taken to recovery in stable condition.      Electronically signed by NELSY ARVIZU MD on 5/14/2025 at 3:22 PM

## 2025-06-16 NOTE — PROGRESS NOTES
07/13/20 1904   Vent Information   $Ventilation $Subsequent Day   Skin Assessment Clean, dry, & intact   Vent Type 840   Vent Mode AC/VC   Vt Ordered 400 mL   Rate Set 16 bmp   Peak Flow 75 L/min   Pressure Support 0 cmH20   FiO2  50 %   SpO2 94 %   SpO2/FiO2 ratio 188   Sensitivity 2   PEEP/CPAP 5   I Time/ I Time % 0 s   Humidification Source Heated wire   Humidification Temp 37   Humidification Temp Measured 37   Circuit Condensation Drained   Vent Patient Data   High Peep/I Pressure 0   Peak Inspiratory Pressure 19 cmH2O   Mean Airway Pressure 5.8 cmH20   Rate Measured 34 br/min   Vt Exhaled 478 mL   Minute Volume 14.6 Liters   I:E Ratio 1.00:1   Plateau Pressure 24 [id number]   Static Compliance 25 mL/cmH2O   Dynamic Compliance 34 mL/cmH2O   Cough/Sputum   Sputum How Obtained Endotracheal;Suctioned   Cough Productive   Sputum Amount Moderate   Sputum Color Creamy; Red   Tenacity Thick   Spontaneous Breathing Trial (SBT) RT Doc   Pulse 84   Breath Sounds   Right Upper Lobe Diminished   Right Middle Lobe Diminished   Right Lower Lobe Diminished   Left Upper Lobe Diminished   Left Lower Lobe Diminished   Additional Respiratory  Assessments   Resp (!) 31   Position Semi-Harris's   Alarm Settings   High Pressure Alarm 40 cmH2O   Low Minute Volume Alarm 5 L/min   High Respiratory Rate 40 br/min   Patient Observation   Observations ETT SIZE 8.0, SECURED AT 24 LIP LINE. AMBU BAG AT HEAD OF BED. WATER BAG GOOD. ETT (adult)   Placement Date/Time: 06/29/20 0230   Preoxygenation: Yes  Mask Ventilation: Ventilated by mask (1)  Technique: Video laryngoscopy  Type: Cuffed  Tube Size: 8 mm  Laryngoscope: GlideScope  Blade Size: 4  Location: Oral  Insertion attempts: 1  Placement. ..    Secured at 24 cm   Measured From Lips   ET Placement Left   Secured By Commercial tube leblanc   Site Condition Dry        07/13/20 1904   Vent Information   $Ventilation $Subsequent Day   Skin Assessment Clean, dry, & intact   Vent Type 840 Response received from Caroline kenney [facility] indicating patient is eligible for bed hold status and can return to facility at time of discharge.  Per notes, plan Acute hospital to hospital transfer for services unavailable at [facility]   Ambulance form and demographic sheet prepared and placed in lite chart.  Patient will need followed and assisted with discharge planning as necessary.   Dax Wong, MSN RN  [facility]  [phone]     Vent Mode AC/VC   Vt Ordered 400 mL   Rate Set 16 bmp   Peak Flow 75 L/min   Pressure Support 0 cmH20   FiO2  50 %   SpO2 94 %   SpO2/FiO2 ratio 188   Sensitivity 2   PEEP/CPAP 5   I Time/ I Time % 0 s   Humidification Source Heated wire   Humidification Temp 37   Humidification Temp Measured 37   Circuit Condensation Drained   Vent Patient Data   High Peep/I Pressure 0   Peak Inspiratory Pressure 19 cmH2O   Mean Airway Pressure 5.8 cmH20   Rate Measured 34 br/min   Vt Exhaled 478 mL   Minute Volume 14.6 Liters   I:E Ratio 1.00:1   Plateau Pressure 24 [id number]   Static Compliance 25 mL/cmH2O   Dynamic Compliance 34 mL/cmH2O   Cough/Sputum   Sputum How Obtained Endotracheal;Suctioned   Cough Productive   Sputum Amount Moderate   Sputum Color Creamy; Red   Tenacity Thick   Spontaneous Breathing Trial (SBT) RT Doc   Pulse 84   Breath Sounds   Right Upper Lobe Diminished   Right Middle Lobe Diminished   Right Lower Lobe Diminished   Left Upper Lobe Diminished   Left Lower Lobe Diminished   Additional Respiratory  Assessments   Resp (!) 31   Position Semi-Harris's   Alarm Settings   High Pressure Alarm 40 cmH2O   Low Minute Volume Alarm 5 L/min   High Respiratory Rate 40 br/min   Patient Observation   Observations ETT SIZE 8.0, SECURED AT 24 LIP LINE. AMBU BAG AT HEAD OF BED. WATER BAG GOOD. ETT (adult)   Placement Date/Time: 06/29/20 0230   Preoxygenation: Yes  Mask Ventilation: Ventilated by mask (1)  Technique: Video laryngoscopy  Type: Cuffed  Tube Size: 8 mm  Laryngoscope: GlideScope  Blade Size: 4  Location: Oral  Insertion attempts: 1  Placement. ..    Secured at 24 cm   Measured From Lips   ET Placement Left   Secured By Commercial tube leblanc   Site Condition Dry        07/13/20 1904   Vent Information   $Ventilation $Subsequent Day   Skin Assessment Clean, dry, & intact   Vent Type 840   Vent Mode AC/VC   Vt Ordered 400 mL   Rate Set 16 bmp   Peak Flow 75 L/min   Pressure Support 0 cmH20   FiO2  50 %   SpO2 94 Humidification Temp 37   Humidification Temp Measured 37   Circuit Condensation Drained   Vent Patient Data   High Peep/I Pressure 0   Peak Inspiratory Pressure 19 cmH2O   Mean Airway Pressure 5.8 cmH20   Rate Measured 34 br/min   Vt Exhaled 478 mL   Minute Volume 14.6 Liters   I:E Ratio 1.00:1   Plateau Pressure 24 [id number]   Static Compliance 25 mL/cmH2O   Dynamic Compliance 34 mL/cmH2O   Cough/Sputum   Sputum How Obtained Endotracheal;Suctioned   Cough Productive   Sputum Amount Moderate   Sputum Color Creamy; Red   Tenacity Thick   Spontaneous Breathing Trial (SBT) RT Doc   Pulse 84   Breath Sounds   Right Upper Lobe Diminished   Right Middle Lobe Diminished   Right Lower Lobe Diminished   Left Upper Lobe Diminished   Left Lower Lobe Diminished   Additional Respiratory  Assessments   Resp (!) 31   Position Semi-Ahrris's   Alarm Settings   High Pressure Alarm 40 cmH2O   Low Minute Volume Alarm 5 L/min   High Respiratory Rate 40 br/min   Patient Observation   Observations ETT SIZE 8.0, SECURED AT 24 LIP LINE. AMBU BAG AT HEAD OF BED. WATER BAG GOOD. ETT (adult)   Placement Date/Time: 06/29/20 0230   Preoxygenation: Yes  Mask Ventilation: Ventilated by mask (1)  Technique: Video laryngoscopy  Type: Cuffed  Tube Size: 8 mm  Laryngoscope: GlideScope  Blade Size: 4  Location: Oral  Insertion attempts: 1  Placement. ..    Secured at 24 cm   Measured From Lips   ET Placement Left   Secured By Commercial tube leblanc   Site Condition Dry        07/13/20 1904   Vent Information   $Ventilation $Subsequent Day   Skin Assessment Clean, dry, & intact   Vent Type 840   Vent Mode AC/VC   Vt Ordered 400 mL   Rate Set 16 bmp   Peak Flow 75 L/min   Pressure Support 0 cmH20   FiO2  50 %   SpO2 94 %   SpO2/FiO2 ratio 188   Sensitivity 2   PEEP/CPAP 5   I Time/ I Time % 0 s   Humidification Source Heated wire   Humidification Temp 37   Humidification Temp Measured 37   Circuit Condensation Drained   Vent Patient Data   High Peep/I Pressure 0   Peak Inspiratory Pressure 19 cmH2O   Mean Airway Pressure 5.8 cmH20   Rate Measured 34 br/min   Vt Exhaled 478 mL   Minute Volume 14.6 Liters   I:E Ratio 1.00:1   Plateau Pressure 24 [id number]   Static Compliance 25 mL/cmH2O   Dynamic Compliance 34 mL/cmH2O   Cough/Sputum   Sputum How Obtained Endotracheal;Suctioned   Cough Productive   Sputum Amount Moderate   Sputum Color Creamy; Red   Tenacity Thick   Spontaneous Breathing Trial (SBT) RT Doc   Pulse 84   Breath Sounds   Right Upper Lobe Diminished   Right Middle Lobe Diminished   Right Lower Lobe Diminished   Left Upper Lobe Diminished   Left Lower Lobe Diminished   Additional Respiratory  Assessments   Resp (!) 31   Position Semi-Harris's   Alarm Settings   High Pressure Alarm 40 cmH2O   Low Minute Volume Alarm 5 L/min   High Respiratory Rate 40 br/min   Patient Observation   Observations ETT SIZE 8.0, SECURED AT 24 LIP LINE. AMBU BAG AT HEAD OF BED. WATER BAG GOOD. ETT (adult)   Placement Date/Time: 06/29/20 0230   Preoxygenation: Yes  Mask Ventilation: Ventilated by mask (1)  Technique: Video laryngoscopy  Type: Cuffed  Tube Size: 8 mm  Laryngoscope: GlideScope  Blade Size: 4  Location: Oral  Insertion attempts: 1  Placement. ..    Secured at 24 cm   Measured From Lips   ET Placement Left   Secured By Commercial tube leblanc   Site Condition Dry        07/13/20 1904   Vent Information   $Ventilation $Subsequent Day   Skin Assessment Clean, dry, & intact   Vent Type 840   Vent Mode AC/VC   Vt Ordered 400 mL   Rate Set 16 bmp   Peak Flow 75 L/min   Pressure Support 0 cmH20   FiO2  50 %   SpO2 94 %   SpO2/FiO2 ratio 188   Sensitivity 2   PEEP/CPAP 5   I Time/ I Time % 0 s   Humidification Source Heated wire   Humidification Temp 37   Humidification Temp Measured 37   Circuit Condensation Drained   Vent Patient Data   High Peep/I Pressure 0   Peak Inspiratory Pressure 19 cmH2O   Mean Airway Pressure 5.8 cmH20   Rate Measured 34 br/min   Vt Exhaled 478 mL Compliance 34 mL/cmH2O   Cough/Sputum   Sputum How Obtained Endotracheal;Suctioned   Cough Productive   Sputum Amount Moderate   Sputum Color Creamy; Red   Tenacity Thick   Spontaneous Breathing Trial (SBT) RT Doc   Pulse 84   Breath Sounds   Right Upper Lobe Diminished   Right Middle Lobe Diminished   Right Lower Lobe Diminished   Left Upper Lobe Diminished   Left Lower Lobe Diminished   Additional Respiratory  Assessments   Resp (!) 31   Position Semi-Harris's   Alarm Settings   High Pressure Alarm 40 cmH2O   Low Minute Volume Alarm 5 L/min   High Respiratory Rate 40 br/min   Patient Observation   Observations ETT SIZE 8.0, SECURED AT 24 LIP LINE. AMBU BAG AT HEAD OF BED. WATER BAG GOOD. ETT (adult)   Placement Date/Time: 06/29/20 0230   Preoxygenation: Yes  Mask Ventilation: Ventilated by mask (1)  Technique: Video laryngoscopy  Type: Cuffed  Tube Size: 8 mm  Laryngoscope: GlideScope  Blade Size: 4  Location: Oral  Insertion attempts: 1  Placement. ..    Secured at 24 cm   Measured From Lips   ET Placement Left   Secured By Commercial tube leblanc   Site Condition Dry        07/13/20 1904   Vent Information   $Ventilation $Subsequent Day   Skin Assessment Clean, dry, & intact   Vent Type 840   Vent Mode AC/VC   Vt Ordered 400 mL   Rate Set 16 bmp   Peak Flow 75 L/min   Pressure Support 0 cmH20   FiO2  50 %   SpO2 94 %   SpO2/FiO2 ratio 188   Sensitivity 2   PEEP/CPAP 5   I Time/ I Time % 0 s   Humidification Source Heated wire   Humidification Temp 37   Humidification Temp Measured 37   Circuit Condensation Drained   Vent Patient Data   High Peep/I Pressure 0   Peak Inspiratory Pressure 19 cmH2O   Mean Airway Pressure 5.8 cmH20   Rate Measured 34 br/min   Vt Exhaled 478 mL   Minute Volume 14.6 Liters   I:E Ratio 1.00:1   Plateau Pressure 24 [id number]   Static Compliance 25 mL/cmH2O   Dynamic Compliance 34 mL/cmH2O   Cough/Sputum   Sputum How Obtained Endotracheal;Suctioned   Cough Productive   Sputum Amount Moderate Sputum Color Creamy; Red   Tenacity Thick   Spontaneous Breathing Trial (SBT) RT Doc   Pulse 84   Breath Sounds   Right Upper Lobe Diminished   Right Middle Lobe Diminished   Right Lower Lobe Diminished   Left Upper Lobe Diminished   Left Lower Lobe Diminished   Additional Respiratory  Assessments   Resp (!) 31   Position Semi-Harris's   Alarm Settings   High Pressure Alarm 40 cmH2O   Low Minute Volume Alarm 5 L/min   High Respiratory Rate 40 br/min   Patient Observation   Observations ETT SIZE 8.0, SECURED AT 24 LIP LINE. AMBU BAG AT HEAD OF BED. WATER BAG GOOD. ETT (adult)   Placement Date/Time: 06/29/20 0230   Preoxygenation: Yes  Mask Ventilation: Ventilated by mask (1)  Technique: Video laryngoscopy  Type: Cuffed  Tube Size: 8 mm  Laryngoscope: GlideScope  Blade Size: 4  Location: Oral  Insertion attempts: 1  Placement. ..    Secured at 24 cm   Measured From Lips   ET Placement Left   Secured By Commercial tube leblanc   Site Condition Dry

## (undated) DEVICE — CORD LAPAROSCOPIC MONOPOLAR

## (undated) DEVICE — SYRINGE MED 10ML POLYPR GEN PURP FLAT TOP LUERLOCK TIP

## (undated) DEVICE — SYRINGE MED 10ML LUERLOCK TIP W/O SFTY DISP

## (undated) DEVICE — SOLUTION IRRIG 2000ML STRL H2O UROMATIC PLAS CONT USP

## (undated) DEVICE — GUIDEWIRE ENDOSCP L150CM DIA0.035IN TIP 3CM PTFE NIT

## (undated) DEVICE — STERILE POLYISOPRENE POWDER-FREE SURGICAL GLOVES: Brand: PROTEXIS

## (undated) DEVICE — GUIDEWIRE UROLOGICAL STR STD 0.035 IN X150 CM (QTY = BOX OF 5)

## (undated) DEVICE — SOLUTION IRRIG 1000ML STRL H2O USP PLAS POUR BTL

## (undated) DEVICE — CYSTO: Brand: MEDLINE INDUSTRIES, INC.